# Patient Record
Sex: MALE | Race: WHITE | NOT HISPANIC OR LATINO | Employment: OTHER | URBAN - METROPOLITAN AREA
[De-identification: names, ages, dates, MRNs, and addresses within clinical notes are randomized per-mention and may not be internally consistent; named-entity substitution may affect disease eponyms.]

---

## 2017-01-07 ENCOUNTER — HOSPITAL ENCOUNTER (EMERGENCY)
Facility: HOSPITAL | Age: 62
Discharge: HOME | End: 2017-01-07
Attending: EMERGENCY MEDICINE
Payer: MEDICARE

## 2017-01-07 VITALS
OXYGEN SATURATION: 98 % | BODY MASS INDEX: 31.18 KG/M2 | WEIGHT: 194 LBS | RESPIRATION RATE: 18 BRPM | SYSTOLIC BLOOD PRESSURE: 159 MMHG | HEART RATE: 66 BPM | TEMPERATURE: 98 F | HEIGHT: 66 IN | DIASTOLIC BLOOD PRESSURE: 83 MMHG

## 2017-01-07 DIAGNOSIS — J44.1 COPD EXACERBATION: Primary | ICD-10-CM

## 2017-01-07 LAB
ALBUMIN SERPL BCP-MCNC: 3.8 G/DL
ALP SERPL-CCNC: 77 U/L
ALT SERPL W/O P-5'-P-CCNC: 10 U/L
ANION GAP SERPL CALC-SCNC: 9 MMOL/L
AST SERPL-CCNC: 13 U/L
BASOPHILS # BLD AUTO: 0.01 K/UL
BASOPHILS NFR BLD: 0.2 %
BILIRUB SERPL-MCNC: 0.7 MG/DL
BUN SERPL-MCNC: 41 MG/DL
CALCIUM SERPL-MCNC: 9.4 MG/DL
CHLORIDE SERPL-SCNC: 104 MMOL/L
CO2 SERPL-SCNC: 30 MMOL/L
CREAT SERPL-MCNC: 2.8 MG/DL
DIFFERENTIAL METHOD: ABNORMAL
EOSINOPHIL # BLD AUTO: 0.1 K/UL
EOSINOPHIL NFR BLD: 1.6 %
ERYTHROCYTE [DISTWIDTH] IN BLOOD BY AUTOMATED COUNT: 13.8 %
EST. GFR  (AFRICAN AMERICAN): 27 ML/MIN/1.73 M^2
EST. GFR  (NON AFRICAN AMERICAN): 23 ML/MIN/1.73 M^2
FLUAV AG SPEC QL IA: NEGATIVE
FLUBV AG SPEC QL IA: NEGATIVE
GLUCOSE SERPL-MCNC: 111 MG/DL
HCT VFR BLD AUTO: 32.3 %
HGB BLD-MCNC: 11.4 G/DL
LYMPHOCYTES # BLD AUTO: 0.6 K/UL
LYMPHOCYTES NFR BLD: 14.3 %
MCH RBC QN AUTO: 31.2 PG
MCHC RBC AUTO-ENTMCNC: 35.3 %
MCV RBC AUTO: 89 FL
MONOCYTES # BLD AUTO: 0.6 K/UL
MONOCYTES NFR BLD: 14.7 %
NEUTROPHILS # BLD AUTO: 3 K/UL
NEUTROPHILS NFR BLD: 69.2 %
PLATELET # BLD AUTO: 79 K/UL
PMV BLD AUTO: 8.9 FL
POTASSIUM SERPL-SCNC: 3.8 MMOL/L
PROT SERPL-MCNC: 6.8 G/DL
RBC # BLD AUTO: 3.65 M/UL
SODIUM SERPL-SCNC: 143 MMOL/L
SPECIMEN SOURCE: NORMAL
WBC # BLD AUTO: 4.28 K/UL

## 2017-01-07 PROCEDURE — 93005 ELECTROCARDIOGRAM TRACING: CPT

## 2017-01-07 PROCEDURE — 85025 COMPLETE CBC W/AUTO DIFF WBC: CPT

## 2017-01-07 PROCEDURE — 99284 EMERGENCY DEPT VISIT MOD MDM: CPT

## 2017-01-07 PROCEDURE — 25000242 PHARM REV CODE 250 ALT 637 W/ HCPCS: Performed by: EMERGENCY MEDICINE

## 2017-01-07 PROCEDURE — 80053 COMPREHEN METABOLIC PANEL: CPT

## 2017-01-07 PROCEDURE — 94644 CONT INHLJ TX 1ST HOUR: CPT

## 2017-01-07 PROCEDURE — 94640 AIRWAY INHALATION TREATMENT: CPT

## 2017-01-07 PROCEDURE — 87400 INFLUENZA A/B EACH AG IA: CPT | Mod: 59

## 2017-01-07 RX ORDER — ALBUTEROL SULFATE 90 UG/1
1 AEROSOL, METERED RESPIRATORY (INHALATION) EVERY 4 HOURS PRN
Qty: 1 INHALER | Refills: 1 | Status: SHIPPED | OUTPATIENT
Start: 2017-01-07 | End: 2017-06-08 | Stop reason: SDUPTHER

## 2017-01-07 RX ORDER — PREDNISONE 20 MG/1
40 TABLET ORAL DAILY
Qty: 10 TABLET | Refills: 0 | Status: SHIPPED | OUTPATIENT
Start: 2017-01-07 | End: 2017-01-17

## 2017-01-07 RX ORDER — AZITHROMYCIN 250 MG/1
250 TABLET, FILM COATED ORAL DAILY
Qty: 6 TABLET | Refills: 0 | Status: SHIPPED | OUTPATIENT
Start: 2017-01-07 | End: 2017-01-17

## 2017-01-07 RX ORDER — ALBUTEROL SULFATE 2.5 MG/.5ML
10 SOLUTION RESPIRATORY (INHALATION) ONCE
Status: COMPLETED | OUTPATIENT
Start: 2017-01-07 | End: 2017-01-07

## 2017-01-07 RX ADMIN — ALBUTEROL SULFATE 10 MG: 2.5 SOLUTION RESPIRATORY (INHALATION) at 03:01

## 2017-01-07 NOTE — ED AVS SNAPSHOT
[facility]  [address]  [place] [address]               Leif Ramirez JrRyann   2017  2:58 PM   ED    Description:  Male : 1955   Department:  [facility]           Your Care was Coordinated By:     Provider Role From To    Marcus Chen Jr., MD Attending Provider 17 1536 --      Reason for Visit     Wheezing           Diagnoses this Visit        Comments    COPD exacerbation    -  Primary       ED Disposition     ED Disposition Condition Comment    Discharge             To Do List           Follow-up Information     Follow up with Wale Mcguire MD In 1 week(s).    Specialty:  Internal Medicine    Contact information:    [address]  [address]  [place] [address]  [phone]         These Medications        Disp Refills Start End    azithromycin (ZITHROMAX Z-JEANNE) 250 MG tablet 6 tablet 0 2017    Take 1 tablet (250 mg total) by mouth once daily. Two tabs initially then 1 tab per day. - Oral    [facility] [address] [place] [address] [address] AT [facility] Ph #: [phone]       albuterol 90 mcg/actuation inhaler 1 Inhaler 1 2017    Inhale 1 puff into the lungs every 4 (four) hours as needed for Wheezing. - Inhalation    [facility] [employer] [address] [place] [address] [facility] [address] [facility] Ph #: [phone]       predniSONE (DELTASONE) 20 MG tablet 10 tablet 0 2017    Take 2 tablets (40 mg total) by mouth once daily. - Oral    [facility] [employer] [address] [facility] Ph #: [phone]         [facility]     [facility] Care [facility] -  Assistance  Registered nurses in the [facility] provide clinical advisement, health education, appointment booking, and other advisory  services.  Call for this free service at [phone].             Medications           Message regarding Medications     Verify the changes and/or additions to your medication regime listed below are the same as discussed with your clinician today.  If any of these changes or additions are incorrect, please notify your healthcare provider.        START taking these NEW medications        Refills    azithromycin (ZITHROMAX Z-JEANNE) 250 MG tablet 0    Sig: Take 1 tablet (250 mg total) by mouth once daily. Two tabs initially then 1 tab per day.    Class: Print    Route: Oral    albuterol 90 mcg/actuation inhaler 1    Sig: Inhale 1 puff into the lungs every 4 (four) hours as needed for Wheezing.    Class: Print    Route: Inhalation    predniSONE (DELTASONE) 20 MG tablet 0    Sig: Take 2 tablets (40 mg total) by mouth once daily.    Class: Print    Route: Oral      These medications were administered today        Dose Freq    albuterol sulfate nebulizer solution 10 mg 10 mg Once    Sig: Take 10 mg by nebulization once.    Class: Normal    Route: Nebulization           Verify that the below list of medications is an accurate representation of the medications you are currently taking.  If none reported, the list may be blank. If incorrect, please contact your healthcare provider. Carry this list with you in case of emergency.           Current Medications     albuterol 90 mcg/actuation inhaler Inhale 1 puff into the lungs every 4 (four) hours as needed for Wheezing.    albuterol sulfate nebulizer solution 10 mg Take 10 mg by nebulization once.    amlodipine (NORVASC) 10 MG tablet Take 1 tablet (10 mg total) by mouth once daily.    azithromycin (ZITHROMAX Z-JEANNE) 250 MG tablet Take 1 tablet (250 mg total) by mouth once daily. Two tabs initially then 1 tab per day.    ERGOCALCIFEROL, VITAMIN D2, (VITAMIN D ORAL) Take 2,000 Units by mouth once daily.     ferrous sulfate 325 mg (65 mg iron) Tab tablet TAKE ONE TABLET BY MOUTH  "ONCE DAILY WITH LUNCH    fluoxetine (PROZAC) 20 MG capsule Take 1 capsule by mouth once daily.    gabapentin (NEURONTIN) 300 MG capsule Take 300 mg by mouth 3 (three) times daily.     hydrALAZINE (APRESOLINE) 10 MG tablet TAKE 1 TABLET(10 MG) BY MOUTH THREE TIMES DAILY    hydrocodone-acetaminophen 5-325mg (NORCO) 5-325 mg per tablet Take 1 tablet by mouth 3 (three) times daily as needed for Pain.    lorazepam (ATIVAN) 1 MG tablet Take 0.5 tablets (0.5 mg total) by mouth every 6 (six) hours as needed for Anxiety.    olanzapine (ZYPREXA) 20 MG tablet Take 20 mg by mouth nightly.     olanzapine (ZYPREXA) 5 MG tablet Take 5 mg by mouth once daily.    predniSONE (DELTASONE) 20 MG tablet Take 2 tablets (40 mg total) by mouth once daily.    tamsulosin (FLOMAX) 0.4 mg Cp24 Take 1 capsule (0.4 mg total) by mouth once daily.           Clinical Reference Information           Your Vitals Were     BP Pulse Temp Resp Height Weight    159/83 66 97.8 °F (36.6 °C) (Oral) 18 5' 6" (1.676 m) 88 kg (194 lb)    SpO2 BMI             98% 31.31 kg/m2         Allergies as of 1/7/2017        Reactions    Codeine     Other reaction(s): Nausea    Depakote [Divalproex] Other (See Comments)    Thrombocytopenia    Haldol [Haloperidol Lactate] Other (See Comments)    Seizures    Methadone     Morphine     Naloxone     Opium     Propoxyphene     Stelazine [Trifluoperazine]     Amoxicillin Rash      Immunizations Administered on Date of Encounter - 1/7/2017     None      ED Micro, Lab, POCT     Start Ordered       Status Ordering Provider    01/07/17 1542 01/07/17 1541  CBC auto differential  Once      Final result     01/07/17 1542 01/07/17 1541  Comprehensive metabolic panel  Once      Final result     01/07/17 1542 01/07/17 1541  Influenza antigen  STAT      Final result       ED Imaging Orders     Start Ordered       Status Ordering Provider    01/07/17 1542 01/07/17 1541  X-Ray Chest 1 View  1 time imaging      Final result         Discharge " Instructions           COPD Flare    You have had a flare-up of your COPD.  COPD, or chronic obstructive pulmonary disease, is a common lung disease. It causes your airways to become irritated and narrower. This makes it harder for you to breathe. Emphysema and chronic bronchitis are both types of COPD. This is a chronic condition, which means you always have it. Sometimes it gets worse. When this happens, it is called a flare-up.  Symptoms of COPD  People with COPD may have symptoms most of the time. In a flare-up, your symptoms get worse. These symptoms may mean you are having a flare-up:  · Shortness of breath, shallow or rapid breathing, or wheezing that gets worse  · Lung infection  · Cough that gets worse  · More mucus, thicker mucus or mucus of a different color  · Tiredness, decreased energy, or trouble doing your usual activities  · Fever  · Chest tightness  · Your symptoms dont get better even when you use your usual medicines, inhalers, and nebulizer  · Trouble talking  · You feel confused  Causes of flare-ups  Unfortunately, a flare-up can happen even though you did everything right, and you followed your doctors instructions. Some causes of flare-ups are:  · Smoking or secondhand smoke  · Colds, the flu, or respiratory infections  · Air pollution  · Sudden change in the weather  · Dust, irritating chemicals, or strong fumes  · Not taking your medicines as prescribed  Home care  Here are some things you can do at home to treat a flare-up:  · Try not to panic. This makes it harder to breathe, and keeps you from doing the right things.  · Dont smoke or be around others who are smoking.  · Try to drink more fluids than usual during a flare-up, unless your doctor has told you not to because of heart and kidney problems. More fluids can help loosen the mucus.  · Use your inhalers and nebulizer, if you have one, as you have been told to.  · If you were given antibiotics, take them until they are used up or  your doctor tells you to stop. Its important to finish the antibiotics, even though you feel better. This will make sure the infection has cleared.  · If you were given prednisone or another steroid, finish it even if you feel better.  Preventing a flare-up  Even though flare-ups happen, the best way to treat one is to prevent it before it starts. Here are some pointers:  · Dont smoke or be around others who are smoking.  · Take your medicines as you have been told.  · Talk with your doctor about getting a flu shot every year. Also find out if you need a pneumonia shot.  · If there is a weather advisory warning to stay indoors, try to stay inside when possible.  · Try to eat healthy and get plenty of sleep.  · Try to avoid things that usually set you off, like dust, chemical fumes, hairsprays, or strong perfumes.  Follow-up care  Follow up with your healthcare provider.  If a culture was done, you will be told if your treatment needs to be changed. You can call as directed for the results.  If X-rays were done, and a radiologist had not seen them while you were there, they will be reviewed. You will be told if there is a change in the reading, especially if it affects your treatment.  Call 911  Call 911 if any of these occur:  · You have trouble breathing  · You feel confused or its difficult to wake you up  · You faint or lose consciousness  · You have a rapid heart rate  · You have new pain in your chest, arm, shoulder, neck or upper back  When to seek medical advice  Call your healthcare provider right away if any of these occur:  · Wheezing or shortness of breath gets worse  · You need to use your inhalers more often than usual without relief  · Fever of 100.4°F (38ºC) or higher, or as directed  · Coughing up lots of dark-colored or bloody sputum (mucus)  · Chest pain with each breath  · You do not start to get better within 24 hours  · Swelling or your ankles gets worse  · Dizziness or weakness     ©  6367-8537 [employer]. [address] [place] [address], [place] [address]. All rights reserved. This information is not intended as a substitute for professional medical care. Always follow your healthcare professional's instructions.          [facility] Sign-Up     Activating your [facility] account is as easy as 1-2-3!     1) Visit [email or web address], select Sign Up Now, enter this activation code and your date of birth, then select Next.  [email or web address]  Expires: 1/20/2017  3:26 PM      2) Create a username and password to use when you visit [facility] in the future and select a security question in case you lose your password and select Next.    3) Enter your e-mail address and click Sign Up!    Additional Information  If you have questions, please e-mail [email or web address] or call [phone] to talk to our [facility] staff. Remember, [facility] is NOT to be used for urgent needs. For medical emergencies, dial 911.         Smoking Cessation     If you would like to quit smoking:   You may be eligible for free services if you are a [place] resident and started smoking cigarettes before September 1, 1988.  Call the Smoking Cessation Trust (SCT) toll free at (958) 800-3317 or (940) 042-5517.   Call 2-800-QUIT-NOW if you do not meet the above criteria.             [facility] complies with applicable Federal civil rights laws and does not discriminate on the basis of race, color, national origin, age, disability, or sex.        Language Assistance Services     ATTENTION: Language assistance services are available, free of charge. Please call [phone].      ATENCIÓN: Si habla español, tiene a young disposición servicios gratuitos de asistencia lingüística. Llame al [phone].     CHÚ Ý: N?u b?n nói Ti?ng Vi?t, có các d?ch v? h? tr? ngôn ng? mi?n phí dành cho b?n. G?i s? [phone].

## 2017-01-07 NOTE — ED PROVIDER NOTES
Encounter Date: 1/7/2017       History     Chief Complaint   Patient presents with    Wheezing     coughing and wheezing that woke him up this morning.  Was given solumedrol 125mg and albuterol treatment by EMS.     Review of patient's allergies indicates:   Allergen Reactions    Codeine      Other reaction(s): Nausea    Depakote [divalproex] Other (See Comments)     Thrombocytopenia    Haldol [haloperidol lactate] Other (See Comments)     Seizures    Methadone     Morphine     Naloxone     Opium     Propoxyphene     Stelazine [trifluoperazine]     Amoxicillin Rash     HPI     62 y/o WF with history of Bipolar disorder, Schizophrenia; Asthma/COPD, CHF, chronic hepatitis C, DM, HTN, presents with wheezing, SOB, cough, fever (subjective), feeling flu-like. Symptoms began last night. He ran out of Albuterol and could not refill it because of the cost. No chest pain or vomiting or diarrhea. Patient was given a neb treatment and Solumedrol 125 mg IVP enroute by EMS.  Past Medical History   Diagnosis Date    Anemia     Anxiety     Arthritis     Asthma     Bipolar affective     Cancer      blood and bone    CHF (congestive heart failure)     Chronic hepatitis C with cirrhosis     COPD (chronic obstructive pulmonary disease)     Coronary artery disease     Depression     Diabetes mellitus type II     Encounter for blood transfusion     History of back injury      Chronic back pain    Hyperlipidemia     Hypertension     Internal hemorrhoid, bleeding 9/21/2015    Pancytopenia     Schizo affective schizophrenia     Seizures     Stroke     Thyroid disease      No past medical history pertinent negatives.  Past Surgical History   Procedure Laterality Date    Elbow bursa surgery      Joint replacement      Orif femur fracture Right 8/2015     Family History   Problem Relation Age of Onset    Hypertension Mother     Diabetes Mother     Transient ischemic attack Mother     Heart disease  Mother      stent placement    Arthritis Mother     Heart disease Father     Hypertension Father     Diabetes Father     Stroke Father     Arthritis Sister     Hypertension Sister     Heart disease Maternal Grandmother     Diabetes Maternal Grandmother     Stroke Maternal Grandmother     Cancer Paternal Grandmother      breast    Heart disease Paternal Grandmother     Cancer Sister      brain     Social History   Substance Use Topics    Smoking status: Current Every Day Smoker     Packs/day: 0.50    Smokeless tobacco: Never Used    Alcohol use No     Review of Systems   Constitutional: Positive for fever (subjective).   HENT: Negative for facial swelling and sore throat.    Eyes: Negative for discharge and redness.   Respiratory: Positive for cough, shortness of breath and wheezing. Negative for chest tightness.    Cardiovascular: Negative for chest pain and leg swelling.   Gastrointestinal: Negative for abdominal pain, diarrhea, nausea and vomiting.   Genitourinary: Negative for difficulty urinating, dysuria, frequency and hematuria.   Musculoskeletal: Negative.  Negative for back pain and joint swelling.   Skin: Negative for rash.   Neurological: Negative for dizziness, speech difficulty and headaches.   Hematological: Negative for adenopathy.   Psychiatric/Behavioral: Negative.  Negative for confusion.   All other systems reviewed and are negative.      Physical Exam   Initial Vitals   BP Pulse Resp Temp SpO2   01/07/17 1500 01/07/17 1500 01/07/17 1500 01/07/17 1500 01/07/17 1500   165/90 81 20 97.8 °F (36.6 °C) 100 %     Physical Exam    Nursing note and vitals reviewed.  Constitutional: He appears well-developed and well-nourished.   HENT:   Head: Normocephalic and atraumatic.   Right Ear: External ear normal.   Left Ear: External ear normal.   Nose: Nose normal.   Mouth/Throat: Oropharynx is clear and moist.   Eyes: Conjunctivae and EOM are normal. Pupils are equal, round, and reactive to  light.   Neck: Normal range of motion. Neck supple. No JVD present.   Cardiovascular: Normal rate, regular rhythm and normal heart sounds.   No murmur heard.  Pulmonary/Chest: No respiratory distress. He has wheezes (bilateral). He has no rhonchi. He has no rales. He exhibits no tenderness.   Abdominal: Soft. Bowel sounds are normal. There is no tenderness. There is no rebound and no guarding.   Musculoskeletal: Normal range of motion. He exhibits no edema or tenderness.   Neurological: He is alert and oriented to person, place, and time. He has normal strength. No cranial nerve deficit.   Skin: Skin is warm and dry. No rash noted.   Psychiatric: He has a normal mood and affect.         ED Course   Procedures  Labs Reviewed   CBC W/ AUTO DIFFERENTIAL - Abnormal; Notable for the following:        Result Value    RBC 3.65 (*)     Hemoglobin 11.4 (*)     Hematocrit 32.3 (*)     MCH 31.2 (*)     Platelets 79 (*)     MPV 8.9 (*)     Lymph # 0.6 (*)     Lymph% 14.3 (*)     All other components within normal limits   COMPREHENSIVE METABOLIC PANEL - Abnormal; Notable for the following:     CO2 30 (*)     Glucose 111 (*)     BUN, Bld 41 (*)     Creatinine 2.8 (*)     eGFR if  27 (*)     eGFR if non  23 (*)     All other components within normal limits   INFLUENZA A AND B ANTIGEN     Imaging Results         X-Ray Chest 1 View (Final result) Result time:  01/07/17 16:20:11    Final result by Lico Vance MD (01/07/17 16:20:11)    Impression:       No acute intrathoracic process.          Electronically signed by: LICO VANCE MD  Date:     01/07/17  Time:    16:20     Narrative:    [id number]  Accession # [id number]      Study:  XR CHEST 1 VIEW    Indication: cough.    Comparison: Chest radiograph from 10/10/2016.    Findings:     XR CHEST 1 VIEW.    Mild cardiomegaly.  Calcification of the aortic arch.  Pulmonary vasculature is within normal limits.    Lungs well-aerated.  No focal  consolidation.   No pleural effusion.  Osseous structures demonstrate no significant abnormalities.              EKG Readings: (Independently Interpreted)   Initial Reading: No STEMI. Rhythm: Normal Sinus Rhythm. Heart Rate: 78. Ectopy: No Ectopy. Conduction: Normal. ST Segments: Normal ST Segments. T Waves: Normal. Axis: Left Axis Deviation. Clinical Impression: Normal Sinus Rhythm Other Impression: LAD          Medical Decision Making:   Initial Assessment:   60 y/o WF with history of Bipolar disorder, Schizophrenia; Asthma/COPD, CHF, chronic hepatitis C, DM, HTN, presents with wheezing, SOB, cough, fever (subjective), feeling flu-like. Symptoms began last night. He ran out of Albuterol and could not refill it because of the cost. No chest pain or vomiting or diarrhea. Patient was given a neb treatment and Solumedrol 125 mg IVP enroute by EMS.    Afebrile; bilateral wheezes  Differential Diagnosis:   Influenza; other viral syndrome; COPD/Asthma exacerbation; pneumonia; CHF  Clinical Tests:   Lab Tests: Ordered and Reviewed  The following lab test(s) were unremarkable: CBC and CMP       <> Summary of Lab: Influenza screen negative  CXR - no acute findings  EKG - no acute findings    Radiological Study: Ordered and Reviewed  Medical Tests: Ordered and Reviewed  ED Management:  Exam; labs; CXR  1 hour long nebulizer treatment with albuterol 10 mg  Patient was give Solumedrol 125 mg IVP enroute by EMS    Symptoms improve after treatment  Rx Z-pack; Albuterol MDI; Prednisone 40 mg daily x 5d  Follow up with PCP next week  Return to ER if condition worsens    Critical Care: 35 minutes                   ED Course     Clinical Impression:   The encounter diagnosis was COPD exacerbation.          Marcus Chen Jr., MD  01/07/17 4663       Marcus Chen Jr., MD  01/07/17 7785

## 2017-01-07 NOTE — DISCHARGE INSTRUCTIONS
COPD Flare    You have had a flare-up of your COPD.  COPD, or chronic obstructive pulmonary disease, is a common lung disease. It causes your airways to become irritated and narrower. This makes it harder for you to breathe. Emphysema and chronic bronchitis are both types of COPD. This is a chronic condition, which means you always have it. Sometimes it gets worse. When this happens, it is called a flare-up.  Symptoms of COPD  People with COPD may have symptoms most of the time. In a flare-up, your symptoms get worse. These symptoms may mean you are having a flare-up:  · Shortness of breath, shallow or rapid breathing, or wheezing that gets worse  · Lung infection  · Cough that gets worse  · More mucus, thicker mucus or mucus of a different color  · Tiredness, decreased energy, or trouble doing your usual activities  · Fever  · Chest tightness  · Your symptoms dont get better even when you use your usual medicines, inhalers, and nebulizer  · Trouble talking  · You feel confused  Causes of flare-ups  Unfortunately, a flare-up can happen even though you did everything right, and you followed your doctors instructions. Some causes of flare-ups are:  · Smoking or secondhand smoke  · Colds, the flu, or respiratory infections  · Air pollution  · Sudden change in the weather  · Dust, irritating chemicals, or strong fumes  · Not taking your medicines as prescribed  Home care  Here are some things you can do at home to treat a flare-up:  · Try not to panic. This makes it harder to breathe, and keeps you from doing the right things.  · Dont smoke or be around others who are smoking.  · Try to drink more fluids than usual during a flare-up, unless your doctor has told you not to because of heart and kidney problems. More fluids can help loosen the mucus.  · Use your inhalers and nebulizer, if you have one, as you have been told to.  · If you were given antibiotics, take them until they are used up or your doctor tells  you to stop. Its important to finish the antibiotics, even though you feel better. This will make sure the infection has cleared.  · If you were given prednisone or another steroid, finish it even if you feel better.  Preventing a flare-up  Even though flare-ups happen, the best way to treat one is to prevent it before it starts. Here are some pointers:  · Dont smoke or be around others who are smoking.  · Take your medicines as you have been told.  · Talk with your doctor about getting a flu shot every year. Also find out if you need a pneumonia shot.  · If there is a weather advisory warning to stay indoors, try to stay inside when possible.  · Try to eat healthy and get plenty of sleep.  · Try to avoid things that usually set you off, like dust, chemical fumes, hairsprays, or strong perfumes.  Follow-up care  Follow up with your healthcare provider.  If a culture was done, you will be told if your treatment needs to be changed. You can call as directed for the results.  If X-rays were done, and a radiologist had not seen them while you were there, they will be reviewed. You will be told if there is a change in the reading, especially if it affects your treatment.  Call 911  Call 911 if any of these occur:  · You have trouble breathing  · You feel confused or its difficult to wake you up  · You faint or lose consciousness  · You have a rapid heart rate  · You have new pain in your chest, arm, shoulder, neck or upper back  When to seek medical advice  Call your healthcare provider right away if any of these occur:  · Wheezing or shortness of breath gets worse  · You need to use your inhalers more often than usual without relief  · Fever of 100.4°F (38ºC) or higher, or as directed  · Coughing up lots of dark-colored or bloody sputum (mucus)  · Chest pain with each breath  · You do not start to get better within 24 hours  · Swelling or your ankles gets worse  · Dizziness or weakness     © 9664-5269 [employer] [facility] [employer]  [employer]. [address] [place] [address], [place] [address]. All rights reserved. This information is not intended as a substitute for professional medical care. Always follow your healthcare professional's instructions.

## 2017-01-07 NOTE — ED NOTES
Spoke with patient in lobby - states unhappy with Dr. Chen and service but unable to state what he feels needs to be done. Dr. Chen aware and has spoken to patient several times, explaining that there is no reason for admission. Reviewed discharge instructions with patient as well as lab results. Patient requesting to speak with Dr. Mcguire. Explained to patient that Dr. Mcguire is not available on weekends. Recommended that he fill prescriptions and follow-up on Monday. Asked if there was anyone to call to pick him up and states he will take cab home. Assisted patient in calling cab. Patient is awake, alert, oriented. Ambulatory with steady gait with no distress noted.

## 2017-01-07 NOTE — ED NOTES
Patient has verified the spelling of their name and  on armband  LOC: The patient is awake, alert, and aware of environment with an appropriate affect, the patient is oriented x 3 and speaking appropriately.   APPEARANCE: Patient resting comfortably and in no acute distress, patient is clean and well groomed, patient's clothing is properly fastened.   SKIN: The skin is warm and dry, color consistent with ethnicity, patient has normal skin turgor and moist mucus membranes, skin intact, no breakdown or bruising noted.   :   Voids without difficulty  MUSCULOSKELETAL: Patient moving all extremities spontaneously, no obvious swelling or deformities noted.   RESPIRATORY: Airway is open and patent, respirations are spontaneous, patient has a normal effort and rate, no accessory muscle use noted, bilateral breath sounds diminished - expiratory wheezineg, SOB, scant yellow productive cough   ABDOMEN: Soft and non tender to palpation, no distention noted, normoactive bowel sounds present in all four quadrants.   CARDIAC:  Normal rate and rhythm, no peripheral edema noted, less then 3 second capillary refill, denies chest pain

## 2017-01-09 ENCOUNTER — TELEPHONE (OUTPATIENT)
Dept: FAMILY MEDICINE | Facility: CLINIC | Age: 62
End: 2017-01-09

## 2017-01-09 NOTE — TELEPHONE ENCOUNTER
----- Message from Sarah Santiago MA sent at 1/9/2017  9:06 AM CST -----  Contact: [phone] self [phone] self   Patient is requesting to be seen today, went to ED last night and released the same night. Patient still having trouble breathing and wheezing. Please advise

## 2017-01-09 NOTE — TELEPHONE ENCOUNTER
Called patient he states he only has one pill left explained to patient that he had taken the medication incorrectly and patient not able to understand. Appointment made

## 2017-01-10 DIAGNOSIS — R06.02 SHORTNESS OF BREATH: Primary | ICD-10-CM

## 2017-01-25 DIAGNOSIS — G89.29 CHRONIC BILATERAL LOW BACK PAIN WITH LEFT-SIDED SCIATICA: ICD-10-CM

## 2017-01-25 DIAGNOSIS — M25.551 BILATERAL HIP PAIN: ICD-10-CM

## 2017-01-25 DIAGNOSIS — M25.552 BILATERAL HIP PAIN: ICD-10-CM

## 2017-01-25 DIAGNOSIS — M54.42 CHRONIC BILATERAL LOW BACK PAIN WITH LEFT-SIDED SCIATICA: ICD-10-CM

## 2017-01-25 RX ORDER — HYDROCODONE BITARTRATE AND ACETAMINOPHEN 5; 325 MG/1; MG/1
1 TABLET ORAL 3 TIMES DAILY PRN
Qty: 90 TABLET | Refills: 0 | Status: SHIPPED | OUTPATIENT
Start: 2017-01-25 | End: 2017-03-01 | Stop reason: SDUPTHER

## 2017-01-25 NOTE — TELEPHONE ENCOUNTER
Pt requesting a refill on pain medication hydrocodone-acetaminophen 5-325mg (NORCO) 5-325 mg per tablet.  Please advise

## 2017-01-25 NOTE — TELEPHONE ENCOUNTER
----- Message from James Haque sent at 1/25/2017  9:35 AM CST -----  Contact: [phone]/self  Pt requesting a refill on pain medication hydrocodone-acetaminophen 5-325mg (NORCO) 5-325 mg per tablet.  Please advise

## 2017-01-26 ENCOUNTER — TELEPHONE (OUTPATIENT)
Dept: FAMILY MEDICINE | Facility: CLINIC | Age: 62
End: 2017-01-26

## 2017-01-26 NOTE — TELEPHONE ENCOUNTER
Called patient to inform him that his prescription is ready for . I was unable to leave a voicemail

## 2017-01-26 NOTE — TELEPHONE ENCOUNTER
----- Message from Lilly Lambert sent at 1/26/2017  2:03 PM CST -----  Contact: [phone]/ self   Patient called in requesting to speak with you. Patient prefers to speak with a nurse. Please advise.

## 2017-01-31 ENCOUNTER — TELEPHONE (OUTPATIENT)
Dept: FAMILY MEDICINE | Facility: CLINIC | Age: 62
End: 2017-01-31

## 2017-01-31 NOTE — TELEPHONE ENCOUNTER
----- Message from Asya Oconnor sent at 1/31/2017  8:48 AM CST -----  Contact: [phone]  Pt is complaining of pain on his left side and would like to be seen today of tomorrow. Please advise.

## 2017-01-31 NOTE — TELEPHONE ENCOUNTER
Spoke with patient who stated that he has been having pain in his hip, appointment scheduled with Dr. Mcguire for tomorrow.

## 2017-02-01 ENCOUNTER — OFFICE VISIT (OUTPATIENT)
Dept: PODIATRY | Facility: CLINIC | Age: 62
End: 2017-02-01
Payer: MEDICARE

## 2017-02-01 VITALS
HEART RATE: 62 BPM | HEIGHT: 66 IN | BODY MASS INDEX: 31.18 KG/M2 | DIASTOLIC BLOOD PRESSURE: 76 MMHG | WEIGHT: 194 LBS | SYSTOLIC BLOOD PRESSURE: 128 MMHG

## 2017-02-01 DIAGNOSIS — M20.41 HAMMER TOES OF BOTH FEET: ICD-10-CM

## 2017-02-01 DIAGNOSIS — R29.898 WEAKNESS OF BOTH LOWER EXTREMITIES: ICD-10-CM

## 2017-02-01 DIAGNOSIS — E11.42 DIABETIC POLYNEUROPATHY ASSOCIATED WITH TYPE 2 DIABETES MELLITUS: Primary | ICD-10-CM

## 2017-02-01 DIAGNOSIS — L84 CORN OR CALLUS: ICD-10-CM

## 2017-02-01 DIAGNOSIS — B35.1 ONYCHOMYCOSIS DUE TO DERMATOPHYTE: ICD-10-CM

## 2017-02-01 DIAGNOSIS — M20.42 HAMMER TOES OF BOTH FEET: ICD-10-CM

## 2017-02-01 PROCEDURE — 11721 DEBRIDE NAIL 6 OR MORE: CPT | Mod: PBBFAC,PO | Performed by: PODIATRIST

## 2017-02-01 PROCEDURE — 99999 PR PBB SHADOW E&M-EST. PATIENT-LVL III: CPT | Mod: PBBFAC,,, | Performed by: PODIATRIST

## 2017-02-01 PROCEDURE — 99499 UNLISTED E&M SERVICE: CPT | Mod: S$PBB,,, | Performed by: PODIATRIST

## 2017-02-01 PROCEDURE — 11721 DEBRIDE NAIL 6 OR MORE: CPT | Mod: S$PBB,Q9,, | Performed by: PODIATRIST

## 2017-02-01 PROCEDURE — 99213 OFFICE O/P EST LOW 20 MIN: CPT | Mod: PBBFAC,PO | Performed by: PODIATRIST

## 2017-02-01 RX ORDER — LORAZEPAM 1 MG/1
TABLET ORAL
Refills: 0 | COMMUNITY
Start: 2017-01-19 | End: 2017-08-02 | Stop reason: SDUPTHER

## 2017-02-01 RX ORDER — METOPROLOL TARTRATE 50 MG/1
TABLET ORAL
Refills: 1 | COMMUNITY
Start: 2016-12-27 | End: 2017-04-06 | Stop reason: SDUPTHER

## 2017-02-01 RX ORDER — FUROSEMIDE 80 MG/1
TABLET ORAL
Refills: 1 | COMMUNITY
Start: 2016-12-27 | End: 2017-11-17 | Stop reason: ALTCHOICE

## 2017-02-01 NOTE — MR AVS SNAPSHOT
[facility]   [address] [address]  Phone: [phone]                  Leif Ramirez Jr.   2017 9:45 AM   Office Visit    Description:  Male : 1955   Provider:  Rian Rico DPM   Department:  [facility]           Reason for Visit     Nail Care           Diagnoses this Visit        Comments    Diabetic polyneuropathy associated with type 2 diabetes mellitus    -  Primary     Hammer toes of both feet         Onychomycosis due to dermatophyte         Corn or callus         Weakness of both lower extremities                To Do List           Future Appointments        Provider Department Dept Phone    2017 4:20 PM Wale Mcguire MD [facility] [phone]      Goals (5 Years of Data)     None      Follow-Up and Disposition     Return in about 4 months (around 2017).      [facility]     [facility] -  Assistance  Registered nurses in the [facility] provide clinical advisement, health education, appointment booking, and other advisory services.  Call for this free service at [phone].             Medications           Message regarding Medications     Verify the changes and/or additions to your medication regime listed below are the same as discussed with your clinician today.  If any of these changes or additions are incorrect, please notify your healthcare provider.             Verify that the below list of medications is an accurate representation of the medications you are currently taking.  If none reported, the list may be blank. If incorrect, please contact your healthcare provider. Carry this list with you in case of emergency.           Current Medications     albuterol 90 mcg/actuation inhaler Inhale 1 puff into the lungs every 4 (four) hours as needed for Wheezing.    amlodipine (NORVASC) 10 MG tablet Take 1 tablet (10 mg total) by mouth once daily.    ERGOCALCIFEROL, VITAMIN D2,  "(VITAMIN D ORAL) Take 2,000 Units by mouth once daily.     ferrous sulfate 325 mg (65 mg iron) Tab tablet TAKE ONE TABLET BY MOUTH ONCE DAILY WITH LUNCH    fluoxetine (PROZAC) 20 MG capsule Take 1 capsule by mouth once daily.    furosemide (LASIX) 80 MG tablet TK ONE T PO QAM.    gabapentin (NEURONTIN) 300 MG capsule Take 300 mg by mouth 3 (three) times daily.     hydrALAZINE (APRESOLINE) 10 MG tablet TAKE 1 TABLET(10 MG) BY MOUTH THREE TIMES DAILY    hydrocodone-acetaminophen 5-325mg (NORCO) 5-325 mg per tablet Take 1 tablet by mouth 3 (three) times daily as needed for Pain.    lorazepam (ATIVAN) 1 MG tablet TK 1/2 T PO Q 6 H PRF ANXIETY    metoprolol tartrate (LOPRESSOR) 50 MG tablet TK ONE T PO BID.    olanzapine (ZYPREXA) 20 MG tablet Take 20 mg by mouth nightly.     olanzapine (ZYPREXA) 5 MG tablet Take 5 mg by mouth once daily.    tamsulosin (FLOMAX) 0.4 mg Cp24 Take 1 capsule (0.4 mg total) by mouth once daily.           Clinical Reference Information           Vital Signs - Last Recorded  Most recent update: 2/1/2017  9:35 AM by José Luis Wilson MA    BP Pulse Ht Wt BMI    128/76 62 5' 6" (1.676 m) 88 kg (194 lb) 31.31 kg/m2      Blood Pressure          Most Recent Value    BP  128/76      Allergies as of 2/1/2017     Codeine    Depakote [Divalproex]    Haldol [Haloperidol Lactate]    Methadone    Morphine    Naloxone    Opium    Propoxyphene    Stelazine [Trifluoperazine]    Amoxicillin      Immunizations Administered on Date of Encounter - 2/1/2017     None      Maintenance Dialysis History     Patient has no recorded history of maintenance dialysis.      [facility] Sign-Up     Activating your [facility] account is as easy as 1-2-3!     1) Visit [email or web address], select Sign Up Now, enter this activation code and your date of birth, then select Next.  [email or web address]  Expires: 3/18/2017 10:12 AM      2) Create a username and password to use when you visit [facility] in the future and select a security " question in case you lose your password and select Next.    3) Enter your e-mail address and click Sign Up!    Additional Information  If you have questions, please e-mail [email or web address] or call [phone] to talk to our [facility] staff. Remember, [facility] is NOT to be used for urgent needs. For medical emergencies, dial 911.         Smoking Cessation     If you would like to quit smoking:   You may be eligible for free services if you are a [place] resident and started smoking cigarettes before September 1, 1988.  Call the [employer]oking [employer]essation [employer] ([employer]) toll free at (940) 713-3179 or (979) 406-2376.   Call 3-918-QUIT-NOW if you do not meet the above criteria.

## 2017-02-01 NOTE — PROGRESS NOTES
Subjective:      Patient ID: Leif Ramirez Jr. is a 61 y.o. male.    Chief Complaint: No chief complaint on file.    Leif is a 61 y.o. male who presents to the clinic for evaluation and treatment of high risk feet. Leif has a past medical history of Anemia; Anxiety; Arthritis; Asthma; Bipolar affective; Cancer; CHF (congestive heart failure); Chronic hepatitis C with cirrhosis; COPD (chronic obstructive pulmonary disease); Coronary artery disease; Depression; Diabetes mellitus type II; Encounter for blood transfusion; History of back injury; Hyperlipidemia; Hypertension; Internal hemorrhoid, bleeding (9/21/2015); Pancytopenia; Schizo affective schizophrenia; Seizures; Stroke; and Thyroid disease. The patient's chief complaint is long, thick toenails. This patient has documented high risk feet requiring routine maintenance secondary to peripheral neuropathy.    PCP: Wale Mcguire MD    Date Last Seen by PCP: 12/6/16        Hemoglobin A1C   Date Value Ref Range Status   04/15/2015 4.7 4.5 - 6.2 % Final   12/18/2012 4.8 4.0 - 6.2 % Final   10/15/2012 text % Final     Comment:     Hemoglobin A1C:   3.7        Review of Systems   Constitution: Negative for chills, fever and malaise/fatigue.   Cardiovascular: Negative for chest pain, leg swelling, orthopnea and palpitations.   Respiratory: Negative for cough, shortness of breath and wheezing.    Skin: Positive for color change, dry skin and nail changes. Negative for itching, poor wound healing and rash.   Musculoskeletal: Negative for arthritis, gout, joint pain, joint swelling, muscle weakness and myalgias.   Neurological: Positive for disturbances in coordination, numbness, paresthesias and sensory change. Negative for dizziness, focal weakness and tremors.           Objective:      Physical Exam   Cardiovascular:   Dorsalis pedis and posterior tibial pulses are diminished Bilaterally. Toes are cool to touch. Feet are warm proximally.There is decreased digital  hair. Skin is atrophic, slightly hyperpigmented, and mildly edematous       Musculoskeletal:   Musculoskeletal:  Muscle strength is 5/5 in all groups bilaterally.  Metatarsophalangeal and subtalar range of motion are within normal limits without crepitus bilaterally. There is limitation of ankle dorsiflexion with knees extended and flexed Bilaterally.    Reducible extensor and flexor contractures at the MTPJ and PIPJ of toes 2-5, bilat.          Neurological:   [place] 5.07 monofilamant testing is diminished both feet. Sharp/dull sensation diminished Bilaterally. Light touch absent Bilaterally.       Skin:   Toenails 1-5 bilaterally are elongated by 2-3 mm, thickened by 2-3 mm, discolored/yellowed, dystrophic, brittle with subungual debris. No incurvation. Mild xerosis noted, bilat. No open wounds.                 Assessment:       Encounter Diagnoses   Name Primary?    Diabetic polyneuropathy associated with type 2 diabetes mellitus Yes    Hammer toes of both feet     Onychomycosis due to dermatophyte     Corn or callus     Weakness of both lower extremities          Plan:       Diagnoses and all orders for this visit:    Diabetic polyneuropathy associated with type 2 diabetes mellitus    Hammer toes of both feet    Onychomycosis due to dermatophyte    Corn or callus    Weakness of both lower extremities      I counseled the patient on his conditions, their implications and medical management.        - Shoe inspection. Diabetic Foot Education. Patient reminded of the importance of good nutrition and blood sugar control to help prevent podiatric complications of diabetes. Patient instructed on proper foot hygeine. We discussed wearing proper shoe gear, daily foot inspections, never walking without protective shoe gear, never putting sharp instruments to feet.     - With patient's permission, nails were aggressively reduced and debrided x 10 to their soft tissue attachment mechanically and with electric  , removing all offending nail and debris. Patient relates relief following the procedure. He will continue to monitor the areas daily, inspect his feet, wear protective shoe gear when ambulatory, moisturizer to maintain skin integrity and follow in this office in approximately 6 months, sooner p.r.n.    - Discussed importance of supportive shoes with accommodative toe box to reduce pressure and irritation to forefoot

## 2017-02-03 DIAGNOSIS — E11.9 TYPE 2 DIABETES MELLITUS WITHOUT COMPLICATION: ICD-10-CM

## 2017-02-17 ENCOUNTER — TELEPHONE (OUTPATIENT)
Dept: FAMILY MEDICINE | Facility: CLINIC | Age: 62
End: 2017-02-17

## 2017-02-17 DIAGNOSIS — G89.29 CHRONIC BILATERAL LOW BACK PAIN WITHOUT SCIATICA: Primary | ICD-10-CM

## 2017-02-17 DIAGNOSIS — M54.50 CHRONIC BILATERAL LOW BACK PAIN WITHOUT SCIATICA: Primary | ICD-10-CM

## 2017-02-17 NOTE — TELEPHONE ENCOUNTER
----- Message from Oli Hui LPN sent at 2/17/2017  4:03 PM CST -----  Contact: [phone]  Said he saw you in December and he wants a referral to a neurologist and an orthopedist.      ----- Message -----     From: Donya Barros     Sent: 2/17/2017   3:54 PM       To: [facility] Staff    Patient would like to speak with you regarding his referral to see a neurologist

## 2017-02-17 NOTE — TELEPHONE ENCOUNTER
----- Message from Donya Barros sent at 2/17/2017  3:54 PM CST -----  Contact: [phone]  Patient would like to speak with you regarding his referral to see a neurologist

## 2017-02-20 ENCOUNTER — TELEPHONE (OUTPATIENT)
Dept: FAMILY MEDICINE | Facility: CLINIC | Age: 62
End: 2017-02-20

## 2017-02-20 DIAGNOSIS — R20.2 BILATERAL LEG PARESTHESIA: Primary | ICD-10-CM

## 2017-02-20 DIAGNOSIS — M79.605 BILATERAL LEG PAIN: ICD-10-CM

## 2017-02-20 DIAGNOSIS — M54.9 BACK PAIN, UNSPECIFIED BACK LOCATION, UNSPECIFIED BACK PAIN LATERALITY, UNSPECIFIED CHRONICITY: ICD-10-CM

## 2017-02-20 DIAGNOSIS — M79.604 BILATERAL LEG PAIN: ICD-10-CM

## 2017-02-20 NOTE — TELEPHONE ENCOUNTER
----- Message from Erin Abbott sent at 2/20/2017  1:39 PM CST -----  Contact: self/[phone]  Patient would like to be seen before 2/28/17 for a pinch nerve in his leg and hip pain.  Please advise

## 2017-02-21 NOTE — TELEPHONE ENCOUNTER
----- Message from Erin Abbott sent at 2/21/2017  2:53 PM CST -----  Contact: self/[phone]  Patient would like to be seen soon for a referral.  Please advise

## 2017-02-24 ENCOUNTER — TELEPHONE (OUTPATIENT)
Dept: FAMILY MEDICINE | Facility: CLINIC | Age: 62
End: 2017-02-24

## 2017-02-24 DIAGNOSIS — M54.9 BACK PAIN, UNSPECIFIED BACK LOCATION, UNSPECIFIED BACK PAIN LATERALITY, UNSPECIFIED CHRONICITY: Primary | ICD-10-CM

## 2017-02-24 NOTE — TELEPHONE ENCOUNTER
----- Message from Graciela Garnica MA sent at 2/22/2017  8:57 AM CST -----  Regarding: FW: Orthopedic Referral  Please advise  ----- Message -----     From: Alexa Liriano     Sent: 2/22/2017   8:54 AM       To: [facility] Staff  Subject: Orthopedic Referral                              Dr. Mcguire referring patient to Orthopedics for low back pain. Orthopedics does not see back pain, recommend referral to  [facility].

## 2017-02-25 PROCEDURE — 99284 EMERGENCY DEPT VISIT MOD MDM: CPT | Mod: 25

## 2017-02-25 PROCEDURE — 96360 HYDRATION IV INFUSION INIT: CPT

## 2017-02-26 ENCOUNTER — HOSPITAL ENCOUNTER (EMERGENCY)
Facility: HOSPITAL | Age: 62
Discharge: HOME | End: 2017-02-26
Attending: EMERGENCY MEDICINE
Payer: MEDICARE

## 2017-02-26 VITALS
HEART RATE: 76 BPM | OXYGEN SATURATION: 93 % | HEIGHT: 68 IN | BODY MASS INDEX: 28.04 KG/M2 | WEIGHT: 185 LBS | RESPIRATION RATE: 20 BRPM | DIASTOLIC BLOOD PRESSURE: 112 MMHG | TEMPERATURE: 98 F | SYSTOLIC BLOOD PRESSURE: 190 MMHG

## 2017-02-26 DIAGNOSIS — R31.9 HEMATURIA: Primary | ICD-10-CM

## 2017-02-26 LAB
ALBUMIN SERPL BCP-MCNC: 3.8 G/DL
ALP SERPL-CCNC: 65 U/L
ALT SERPL W/O P-5'-P-CCNC: 9 U/L
AMMONIA PLAS-SCNC: 21 UMOL/L
AMORPH CRY URNS QL MICRO: NORMAL
ANION GAP SERPL CALC-SCNC: 10 MMOL/L
AST SERPL-CCNC: 13 U/L
BACTERIA #/AREA URNS HPF: NORMAL /HPF
BASOPHILS # BLD AUTO: 0.01 K/UL
BASOPHILS NFR BLD: 0.3 %
BILIRUB SERPL-MCNC: 0.5 MG/DL
BILIRUB UR QL STRIP: NEGATIVE
BUN SERPL-MCNC: 44 MG/DL
CALCIUM SERPL-MCNC: 9 MG/DL
CHLORIDE SERPL-SCNC: 109 MMOL/L
CLARITY UR: CLEAR
CO2 SERPL-SCNC: 24 MMOL/L
COLOR UR: YELLOW
CREAT SERPL-MCNC: 2.7 MG/DL
DIFFERENTIAL METHOD: ABNORMAL
EOSINOPHIL # BLD AUTO: 0.1 K/UL
EOSINOPHIL NFR BLD: 1.7 %
ERYTHROCYTE [DISTWIDTH] IN BLOOD BY AUTOMATED COUNT: 14.7 %
EST. GFR  (AFRICAN AMERICAN): 28 ML/MIN/1.73 M^2
EST. GFR  (NON AFRICAN AMERICAN): 24 ML/MIN/1.73 M^2
ETHANOL SERPL-MCNC: <10 MG/DL
GLUCOSE SERPL-MCNC: 90 MG/DL
GLUCOSE UR QL STRIP: NEGATIVE
HCT VFR BLD AUTO: 28.3 %
HGB BLD-MCNC: 9.8 G/DL
HGB UR QL STRIP: ABNORMAL
HYALINE CASTS #/AREA URNS LPF: 0 /LPF
INR PPP: 1
KETONES UR QL STRIP: NEGATIVE
LEUKOCYTE ESTERASE UR QL STRIP: NEGATIVE
LYMPHOCYTES # BLD AUTO: 0.9 K/UL
LYMPHOCYTES NFR BLD: 30.8 %
MCH RBC QN AUTO: 30.7 PG
MCHC RBC AUTO-ENTMCNC: 34.6 %
MCV RBC AUTO: 89 FL
MICROSCOPIC COMMENT: NORMAL
MONOCYTES # BLD AUTO: 0.3 K/UL
MONOCYTES NFR BLD: 11.3 %
NEUTROPHILS # BLD AUTO: 1.6 K/UL
NEUTROPHILS NFR BLD: 55.9 %
NITRITE UR QL STRIP: NEGATIVE
PH UR STRIP: 7 [PH] (ref 5–8)
PLATELET # BLD AUTO: 70 K/UL
PMV BLD AUTO: 8.5 FL
POTASSIUM SERPL-SCNC: 4.5 MMOL/L
PROT SERPL-MCNC: 6.5 G/DL
PROT UR QL STRIP: ABNORMAL
PROTHROMBIN TIME: 10.4 SEC
RBC # BLD AUTO: 3.19 M/UL
RBC #/AREA URNS HPF: 1 /HPF (ref 0–4)
SODIUM SERPL-SCNC: 143 MMOL/L
SP GR UR STRIP: 1.01 (ref 1–1.03)
SQUAMOUS #/AREA URNS HPF: 1 /HPF
URN SPEC COLLECT METH UR: ABNORMAL
UROBILINOGEN UR STRIP-ACNC: NEGATIVE EU/DL
WBC # BLD AUTO: 2.92 K/UL
WBC #/AREA URNS HPF: 0 /HPF (ref 0–5)

## 2017-02-26 PROCEDURE — 81000 URINALYSIS NONAUTO W/SCOPE: CPT

## 2017-02-26 PROCEDURE — 25000003 PHARM REV CODE 250: Performed by: EMERGENCY MEDICINE

## 2017-02-26 PROCEDURE — 85025 COMPLETE CBC W/AUTO DIFF WBC: CPT

## 2017-02-26 PROCEDURE — 80053 COMPREHEN METABOLIC PANEL: CPT

## 2017-02-26 PROCEDURE — 82140 ASSAY OF AMMONIA: CPT

## 2017-02-26 PROCEDURE — 80320 DRUG SCREEN QUANTALCOHOLS: CPT

## 2017-02-26 PROCEDURE — 85610 PROTHROMBIN TIME: CPT

## 2017-02-26 RX ADMIN — SODIUM CHLORIDE 1000 ML: 0.9 INJECTION, SOLUTION INTRAVENOUS at 03:02

## 2017-02-26 NOTE — ED NOTES
Pt. Is easily arrousable. bp-190/112. md updated, pt. Is asymptomatic. Spoke to pt. Sister who will come and pick him up. Pt. Is still drowsy and states he often over medicates himself for his chronic pain. Denies taking any medications while here. He states he is a recovering alcoholic and has not had a drink in approx. 15 months. He denies si and states he attends AA meetings. The pat. ia able to stand and ambulate without assistance but with some discomfort.

## 2017-02-26 NOTE — ED PROVIDER NOTES
Encounter Date: 2/25/2017       History     Chief Complaint   Patient presents with    Male  Problem     pt. reports dysuria/hematuria for a month. today reports bloody discharge from penis     Review of patient's allergies indicates:   Allergen Reactions    Codeine      Other reaction(s): Nausea    Depakote [divalproex] Other (See Comments)     Thrombocytopenia    Haldol [haloperidol lactate] Other (See Comments)     Seizures    Methadone     Morphine     Naloxone     Opium     Propoxyphene     Stelazine [trifluoperazine]     Amoxicillin Rash     HPI Comments: 63 yo male with h/o hep c cirrhosis presents with bilat back pain, gross hematuria and dysuria for 1 day. No BRBPR    The history is provided by the patient.     Past Medical History:   Diagnosis Date    Anemia     Anxiety     Arthritis     Asthma     Bipolar affective     Cancer     blood and bone    CHF (congestive heart failure)     Chronic hepatitis C with cirrhosis     COPD (chronic obstructive pulmonary disease)     Coronary artery disease     Depression     Diabetes mellitus type II     Encounter for blood transfusion     History of back injury     Chronic back pain    Hyperlipidemia     Hypertension     Internal hemorrhoid, bleeding 9/21/2015    Pancytopenia     Schizo affective schizophrenia     Seizures     Stroke     Thyroid disease      Past Surgical History:   Procedure Laterality Date    ELBOW BURSA SURGERY      JOINT REPLACEMENT      ORIF FEMUR FRACTURE Right 8/2015     Family History   Problem Relation Age of Onset    Hypertension Mother     Diabetes Mother     Transient ischemic attack Mother     Heart disease Mother      stent placement    Arthritis Mother     Heart disease Father     Hypertension Father     Diabetes Father     Stroke Father     Arthritis Sister     Hypertension Sister     Heart disease Maternal Grandmother     Diabetes Maternal Grandmother     Stroke Maternal Grandmother      Cancer Paternal Grandmother      breast    Heart disease Paternal Grandmother     Cancer Sister      brain     Social History   Substance Use Topics    Smoking status: Current Every Day Smoker     Packs/day: 0.50    Smokeless tobacco: Never Used    Alcohol use No     Review of Systems   Constitutional: Negative for fever.   HENT: Negative for sore throat.    Respiratory: Negative for shortness of breath.    Cardiovascular: Negative for chest pain.   Gastrointestinal: Negative for abdominal distention, abdominal pain, anal bleeding, blood in stool, constipation, diarrhea, nausea, rectal pain and vomiting.   Genitourinary: Positive for hematuria. Negative for difficulty urinating and dysuria.   Musculoskeletal: Negative for back pain.   Skin: Negative for rash.   Neurological: Negative for weakness.   Hematological: Does not bruise/bleed easily.   All other systems reviewed and are negative.      Physical Exam   Initial Vitals   BP Pulse Resp Temp SpO2   02/25/17 2236 02/25/17 2236 02/25/17 2236 02/25/17 2236 02/25/17 2236   160/84 67 16 97.1 °F (36.2 °C) 96 %     Physical Exam    Nursing note and vitals reviewed.  Constitutional: Vital signs are normal. He appears well-developed and well-nourished. No distress.   HENT:   Head: Normocephalic and atraumatic.   Eyes: EOM are normal. Pupils are equal, round, and reactive to light.   Neck: Normal range of motion. Neck supple.   Cardiovascular: Normal rate and regular rhythm.   Pulmonary/Chest: Breath sounds normal. No respiratory distress. He has no wheezes. He has no rhonchi. He has no rales. He exhibits no tenderness.   Abdominal: Soft. He exhibits no distension. There is tenderness. There is no rebound and no guarding.   Musculoskeletal: Normal range of motion. He exhibits no tenderness.   Neurological: He is alert and oriented to person, place, and time. No cranial nerve deficit.   Skin: Skin is warm and dry.   Psychiatric: He has a normal mood and affect.          ED Course   Procedures  Labs Reviewed   URINALYSIS   CBC W/ AUTO DIFFERENTIAL   COMPREHENSIVE METABOLIC PANEL   AMMONIA   PROTIME-INR                               ED Course     Clinical Impression:   The encounter diagnosis was Hematuria.    Disposition:   Disposition: Discharged  Condition: Stable       Jerel Singletary MD  02/28/17 0133

## 2017-02-26 NOTE — ED TRIAGE NOTES
Pt. C/o rt. Flank pain for approx. 1 month. Pt. Reports having some bloody penile discharge today. Pt.noted to have some dried blood on his underwear. Denies n/v or other associated syptoms. Pt. Is a poor historian but answers simple question.

## 2017-02-26 NOTE — DISCHARGE INSTRUCTIONS
Blood in the Urine    Blood in the urine (hematuria) has many possible causes. If it occurs after an injury (such as a car accident or fall), it is most often a sign of bruising to the kidney or bladder. Common causes of blood in the urine include urinary tract infections, kidney stones, inflammation, tumors, or certain other diseases of the kidney or bladder. Menstruation can cause blood to appear in the urine sample, although it is not coming from the urinary tract.  If only a trace amount of blood is present, it will show up on the urine test, even though the urine may be yellow and not pink or red. This may occur with any of the above conditions, as well as heavy exercise or high fever. In this case, your doctor may want to repeat the urine test on another day. This will show if the blood is still present. If it is, then other tests can be done to find out the cause.  Home care  Follow these home care guidelines:  · If your urine does not appear bloody (pink, brown or red) then you do not need to restrict your activity in any way.  · If you can see blood in your urine, rest and avoid heavy exertion until your next exam. Do not use aspirin, blood thinners, or anti-platelet or anti-inflammatory medicines. These include ibuprofen and naproxen. These thin the blood and may increase bleeding.  Follow-up care  Follow up with your healthcare provider, or as advised. If you were injured and had blood in your urine, you should have a repeat urine test in 1 to 2 days. Contact your doctor for this test.  A radiologist will review any X-rays that were taken. You will be told of any new findings that may affect your care.  When to seek medical advice  Call your healthcare provider right away if any of these occur:  · Bright red blood or blood clots in the urine (if you did not have this before)  · Weakness, dizziness or fainting  · New groin, abdominal, or back pain  · Fever of 100.4ºF (38ºC) or higher, or as directed by  your healthcare provider  · Repeated vomiting  · Bleeding from the nose or gums or easy bruising  Date Last Reviewed: 9/1/2016 © 2000-2016 [employer]. [address] [place] [address], [place] [address]. All rights reserved. This information is not intended as a substitute for professional medical care. Always follow your healthcare professional's instructions.        Blood in the Urine    Blood in the urine (hematuria) has many possible causes. If it occurs after an injury (such as a car accident or fall), it is most often a sign of bruising to the kidney or bladder. Common causes of blood in the urine include urinary tract infections, kidney stones, inflammation, tumors, or certain other diseases of the kidney or bladder. Menstruation can cause blood to appear in the urine sample, although it is not coming from the urinary tract.  If only a trace amount of blood is present, it will show up on the urine test, even though the urine may be yellow and not pink or red. This may occur with any of the above conditions, as well as heavy exercise or high fever. In this case, your doctor may want to repeat the urine test on another day. This will show if the blood is still present. If it is, then other tests can be done to find out the cause.  Home care  Follow these home care guidelines:  · If your urine does not appear bloody (pink, brown or red) then you do not need to restrict your activity in any way.  · If you can see blood in your urine, rest and avoid heavy exertion until your next exam. Do not use aspirin, blood thinners, or anti-platelet or anti-inflammatory medicines. These include ibuprofen and naproxen. These thin the blood and may increase bleeding.  Follow-up care  Follow up with your healthcare provider, or as advised. If you were injured and had blood in your urine, you should have a repeat urine test in 1 to 2 days. Contact your doctor for this test.  A radiologist will review any X-rays that were  taken. You will be told of any new findings that may affect your care.  When to seek medical advice  Call your healthcare provider right away if any of these occur:  · Bright red blood or blood clots in the urine (if you did not have this before)  · Weakness, dizziness or fainting  · New groin, abdominal, or back pain  · Fever of 100.4ºF (38ºC) or higher, or as directed by your healthcare provider  · Repeated vomiting  · Bleeding from the nose or gums or easy bruising  Date Last Reviewed: 9/1/2016  © 0153-1985 [employer]. [address] [place] [address]. All rights reserved. This information is not intended as a substitute for professional medical care. Always follow your healthcare professional's instructions.

## 2017-02-26 NOTE — ED AVS SNAPSHOT
[facility]  [address]  [place] [address]               Leif Ramirez Jr.   2017 12:15 AM   ED    Description:  Male : 1955   Department:  [facility]           Your Care was Coordinated By:     Provider Role From To    Jerel Singletary MD Attending Provider 17 0129 --      Reason for Visit     Male  Problem           Diagnoses this Visit        Comments    Hematuria    -  Primary       ED Disposition     None           To Do List           Follow-up Information     Follow up with Wale Mcguire MD In 1 day.    Specialty:  Internal Medicine    Contact information:    [address]  [address]  [facility] [id number] [phone]          Follow up with Jaylon Avelar MD In 1 week.    Specialty:  Urology    Contact information:    [address]  [address]  [facility] [id number] [phone]        [facility]     [facility] -  Assistance  Registered nurses in the [facility] provide clinical advisement, health education, appointment booking, and other advisory services.  Call for this free service at [phone].             Medications           Message regarding Medications     Verify the changes and/or additions to your medication regime listed below are the same as discussed with your clinician today.  If any of these changes or additions are incorrect, please notify your healthcare provider.        These medications were administered today        Dose Freq    sodium chloride 0.9% bolus 1,000 mL 1,000 mL ED 1 Time    Sig: Inject 1,000 mLs into the vein ED 1 Time.    Class: Normal    Route: Intravenous           Verify that the below list of medications is an accurate representation of the medications you are currently taking.  If none reported, the list may be blank. If incorrect, please contact your healthcare provider. Carry this list with you in case of emergency.            Current Medications     albuterol 90 mcg/actuation inhaler Inhale 1 puff into the lungs every 4 (four) hours as needed for Wheezing.    amlodipine (NORVASC) 10 MG tablet Take 1 tablet (10 mg total) by mouth once daily.    ERGOCALCIFEROL, VITAMIN D2, (VITAMIN D ORAL) Take 2,000 Units by mouth once daily.     ferrous sulfate 325 mg (65 mg iron) Tab tablet TAKE ONE TABLET BY MOUTH ONCE DAILY WITH LUNCH    fluoxetine (PROZAC) 20 MG capsule Take 1 capsule by mouth once daily.    furosemide (LASIX) 80 MG tablet TK ONE T PO QAM.    gabapentin (NEURONTIN) 300 MG capsule Take 300 mg by mouth 3 (three) times daily.     hydrALAZINE (APRESOLINE) 10 MG tablet TAKE 1 TABLET(10 MG) BY MOUTH THREE TIMES DAILY    hydrocodone-acetaminophen 5-325mg (NORCO) 5-325 mg per tablet Take 1 tablet by mouth 3 (three) times daily as needed for Pain.    lorazepam (ATIVAN) 1 MG tablet TK 1/2 T PO Q 6 H PRF ANXIETY    metoprolol tartrate (LOPRESSOR) 50 MG tablet TK ONE T PO BID.    olanzapine (ZYPREXA) 20 MG tablet Take 20 mg by mouth nightly.     olanzapine (ZYPREXA) 5 MG tablet Take 5 mg by mouth once daily.    tamsulosin (FLOMAX) 0.4 mg Cp24 Take 1 capsule (0.4 mg total) by mouth once daily.           Clinical Reference Information           Your Vitals Were     BP                   160/84           Allergies as of 2/26/2017        Reactions    Codeine     Other reaction(s): Nausea    Depakote [Divalproex] Other (See Comments)    Thrombocytopenia    Haldol [Haloperidol Lactate] Other (See Comments)    Seizures    Methadone     Morphine     Naloxone     Opium     Propoxyphene     Stelazine [Trifluoperazine]     Amoxicillin Rash      Immunizations Administered on Date of Encounter - 2/26/2017     None      ED Micro, Lab, POCT     Start Ordered       Status Ordering Provider    02/26/17 0332 02/26/17 0331  Ethanol  Add-on      Completed     02/26/17 0233 02/26/17 0232  Protime-INR  STAT      Final result     02/26/17 0232 02/26/17 0232  CBC auto  differential  STAT      Final result     02/26/17 0232 02/26/17 0232  Comprehensive metabolic panel  STAT      Final result     02/26/17 0232 02/26/17 0232  Ammonia  Once      Final result     02/26/17 0232 02/26/17 0232  Ethanol  Once      Final result     02/26/17 0057 02/26/17 0056  Urinalysis Clean Catch  STAT      Final result     02/26/17 0056 02/26/17 0056  Urinalysis Microscopic  Once      Final result       ED Imaging Orders     Start Ordered       Status Ordering Provider    02/26/17 0232 02/26/17 0232  CT Renal Stone Study ABD Pelvis WO  1 time imaging      Final result         Discharge Instructions           Blood in the Urine    Blood in the urine (hematuria) has many possible causes. If it occurs after an injury (such as a car accident or fall), it is most often a sign of bruising to the kidney or bladder. Common causes of blood in the urine include urinary tract infections, kidney stones, inflammation, tumors, or certain other diseases of the kidney or bladder. Menstruation can cause blood to appear in the urine sample, although it is not coming from the urinary tract.  If only a trace amount of blood is present, it will show up on the urine test, even though the urine may be yellow and not pink or red. This may occur with any of the above conditions, as well as heavy exercise or high fever. In this case, your doctor may want to repeat the urine test on another day. This will show if the blood is still present. If it is, then other tests can be done to find out the cause.  Home care  Follow these home care guidelines:  · If your urine does not appear bloody (pink, brown or red) then you do not need to restrict your activity in any way.  · If you can see blood in your urine, rest and avoid heavy exertion until your next exam. Do not use aspirin, blood thinners, or anti-platelet or anti-inflammatory medicines. These include ibuprofen and naproxen. These thin the blood and may increase  bleeding.  Follow-up care  Follow up with your healthcare provider, or as advised. If you were injured and had blood in your urine, you should have a repeat urine test in 1 to 2 days. Contact your doctor for this test.  A radiologist will review any X-rays that were taken. You will be told of any new findings that may affect your care.  When to seek medical advice  Call your healthcare provider right away if any of these occur:  · Bright red blood or blood clots in the urine (if you did not have this before)  · Weakness, dizziness or fainting  · New groin, abdominal, or back pain  · Fever of 100.4ºF (38ºC) or higher, or as directed by your healthcare provider  · Repeated vomiting  · Bleeding from the nose or gums or easy bruising  Date Last Reviewed: 9/1/2016 © 2000-2016 [employer]. [address] [place] [address]. All rights reserved. This information is not intended as a substitute for professional medical care. Always follow your healthcare professional's instructions.        Blood in the Urine    Blood in the urine (hematuria) has many possible causes. If it occurs after an injury (such as a car accident or fall), it is most often a sign of bruising to the kidney or bladder. Common causes of blood in the urine include urinary tract infections, kidney stones, inflammation, tumors, or certain other diseases of the kidney or bladder. Menstruation can cause blood to appear in the urine sample, although it is not coming from the urinary tract.  If only a trace amount of blood is present, it will show up on the urine test, even though the urine may be yellow and not pink or red. This may occur with any of the above conditions, as well as heavy exercise or high fever. In this case, your doctor may want to repeat the urine test on another day. This will show if the blood is still present. If it is, then other tests can be done to find out the cause.  Home care  Follow these home care  guidelines:  · If your urine does not appear bloody (pink, brown or red) then you do not need to restrict your activity in any way.  · If you can see blood in your urine, rest and avoid heavy exertion until your next exam. Do not use aspirin, blood thinners, or anti-platelet or anti-inflammatory medicines. These include ibuprofen and naproxen. These thin the blood and may increase bleeding.  Follow-up care  Follow up with your healthcare provider, or as advised. If you were injured and had blood in your urine, you should have a repeat urine test in 1 to 2 days. Contact your doctor for this test.  A radiologist will review any X-rays that were taken. You will be told of any new findings that may affect your care.  When to seek medical advice  Call your healthcare provider right away if any of these occur:  · Bright red blood or blood clots in the urine (if you did not have this before)  · Weakness, dizziness or fainting  · New groin, abdominal, or back pain  · Fever of 100.4ºF (38ºC) or higher, or as directed by your healthcare provider  · Repeated vomiting  · Bleeding from the nose or gums or easy bruising  Date Last Reviewed: 9/1/2016 © 2000-2016 [employer]. [address] [place] [address], [place] [address]. All rights reserved. This information is not intended as a substitute for professional medical care. Always follow your healthcare professional's instructions.          Your Scheduled Appointments     Mar 27, 2017  2:00 PM CDT   Established Patient Visit with Wale Mcguire MD   [facility] ([address]  [facility] [address] [phone]              [facility] Sign-Up     Activating your [facility] account is as easy as 1-2-3!     1) Visit [email or web address], select Sign Up Now, enter this activation code and your date of birth, then select Next.  [email or web address]  Expires: 3/18/2017 10:12 AM      2) Create a username and password to use when you visit  [facility] in the future and select a security question in case you lose your password and select Next.    3) Enter your e-mail address and click Sign Up!    Additional Information  If you have questions, please e-mail [email or web address] or call [phone] to talk to our [facility] staff. Remember, [facility] is NOT to be used for urgent needs. For medical emergencies, dial 911.          [facility] complies with applicable Federal civil rights laws and does not discriminate on the basis of race, color, national origin, age, disability, or sex.        Language Assistance Services     ATTENTION: Language assistance services are available, free of charge. Please call [phone].      ATENCIÓN: Si trila kassie, tiene a young disposición servicios gratuitos de asistencia lingüística. Llame al [phone].     CHÚ Ý: N?u b?n nói Ti?ng Vi?t, có các d?ch v? h? tr? ngôn ng? mi?n phí dành cho b?n. G?i s? [phone].

## 2017-02-27 ENCOUNTER — TELEPHONE (OUTPATIENT)
Dept: FAMILY MEDICINE | Facility: CLINIC | Age: 62
End: 2017-02-27

## 2017-02-27 NOTE — TELEPHONE ENCOUNTER
----- Message from Paige Mei sent at 2/27/2017  9:44 AM CST -----  Contact: [phone] self  Patient was discharged from [facility] last night. He had blood in his urine and they wanted him to have a follow up with Dr. Mcguire the next day. Patient is requesting a return call and is needing a follow up appointment as soon as possible.

## 2017-02-27 NOTE — TELEPHONE ENCOUNTER
----- Message from Lilly Lambert sent at 2/27/2017 10:01 AM CST -----  Contact: [phone]  Patient went to the ER and would like to follow up with Dr Mcguire. Patient would like to be seen sooner than the next available appointment. Please advise.

## 2017-03-01 ENCOUNTER — OFFICE VISIT (OUTPATIENT)
Dept: UROLOGY | Facility: CLINIC | Age: 62
End: 2017-03-01
Payer: MEDICARE

## 2017-03-01 VITALS
BODY MASS INDEX: 28.04 KG/M2 | DIASTOLIC BLOOD PRESSURE: 79 MMHG | SYSTOLIC BLOOD PRESSURE: 125 MMHG | HEART RATE: 71 BPM | HEIGHT: 68 IN | WEIGHT: 185 LBS | TEMPERATURE: 98 F

## 2017-03-01 DIAGNOSIS — G89.29 CHRONIC BILATERAL LOW BACK PAIN WITH LEFT-SIDED SCIATICA: ICD-10-CM

## 2017-03-01 DIAGNOSIS — N18.4 CKD (CHRONIC KIDNEY DISEASE), STAGE IV: ICD-10-CM

## 2017-03-01 DIAGNOSIS — M25.551 BILATERAL HIP PAIN: ICD-10-CM

## 2017-03-01 DIAGNOSIS — R31.0 GROSS HEMATURIA: Primary | ICD-10-CM

## 2017-03-01 DIAGNOSIS — N40.1 BPH NOS W UR OBS/LUTS: ICD-10-CM

## 2017-03-01 DIAGNOSIS — M54.9 BACK PAIN, UNSPECIFIED BACK LOCATION, UNSPECIFIED BACK PAIN LATERALITY, UNSPECIFIED CHRONICITY: ICD-10-CM

## 2017-03-01 DIAGNOSIS — F17.210 CIGARETTE SMOKER: ICD-10-CM

## 2017-03-01 DIAGNOSIS — M54.42 CHRONIC BILATERAL LOW BACK PAIN WITH LEFT-SIDED SCIATICA: ICD-10-CM

## 2017-03-01 DIAGNOSIS — M25.552 BILATERAL HIP PAIN: ICD-10-CM

## 2017-03-01 PROCEDURE — 99999 PR PBB SHADOW E&M-EST. PATIENT-LVL III: CPT | Mod: PBBFAC,,, | Performed by: UROLOGY

## 2017-03-01 PROCEDURE — 99213 OFFICE O/P EST LOW 20 MIN: CPT | Mod: PBBFAC,PO | Performed by: UROLOGY

## 2017-03-01 PROCEDURE — 99215 OFFICE O/P EST HI 40 MIN: CPT | Mod: S$PBB,,, | Performed by: UROLOGY

## 2017-03-01 PROCEDURE — 87086 URINE CULTURE/COLONY COUNT: CPT

## 2017-03-01 RX ORDER — CARVEDILOL 25 MG/1
TABLET ORAL
Refills: 3 | COMMUNITY
Start: 2017-02-22 | End: 2017-05-05

## 2017-03-01 NOTE — TELEPHONE ENCOUNTER
----- Message from Radha Cordero sent at 3/1/2017  4:52 PM CST -----  Contact: [phone]  Patient needs refill on hydrocodone-acetaminophen 5-325mg (NORCO) 5-325 mg per tablet

## 2017-03-01 NOTE — MR AVS SNAPSHOT
[facility]  [address] [place] [address] Ave  [place] [address]  Phone: [phone]                  Leif Ramirez Jr.   3/1/2017 4:00 PM   Office Visit    Description:  Male : 1955   Provider:  Jaylon Avelar MD   Department:  [facility]           Diagnoses this Visit        Comments    Gross hematuria    -  Primary     BPH NOS w ur obs/LUTS         CKD (chronic kidney disease), stage IV         Back pain, unspecified back location, unspecified back pain laterality, unspecified chronicity                To Do List           Future Appointments        Provider Department Dept Phone    3/10/2017 2:30 PM Jaylon Avelar MD [facility] [phone]    3/27/2017 2:00 PM Wale Mcguire MD [facility] [phone]      Goals (5 Years of Data)     None      [facility]     [facility] On Call Nurse Care Line -  Assistance  Registered nurses in the [facility] On Call Center provide clinical advisement, health education, appointment booking, and other advisory services.  Call for this free service at [phone].             Medications           Message regarding Medications     Verify the changes and/or additions to your medication regime listed below are the same as discussed with your clinician today.  If any of these changes or additions are incorrect, please notify your healthcare provider.             Verify that the below list of medications is an accurate representation of the medications you are currently taking.  If none reported, the list may be blank. If incorrect, please contact your healthcare provider. Carry this list with you in case of emergency.           Current Medications     albuterol 90 mcg/actuation inhaler Inhale 1 puff into the lungs every 4 (four) hours as needed for Wheezing.    amlodipine (NORVASC) 10 MG tablet Take 1 tablet (10 mg total) by mouth once daily.    carvedilol (COREG) 25 MG tablet     ERGOCALCIFEROL, VITAMIN D2, (VITAMIN D ORAL)  Take 2,000 Units by mouth once daily.     ferrous sulfate 325 mg (65 mg iron) Tab tablet TAKE ONE TABLET BY MOUTH ONCE DAILY WITH LUNCH    fluoxetine (PROZAC) 20 MG capsule Take 1 capsule by mouth once daily.    furosemide (LASIX) 80 MG tablet TK ONE T PO QAM.    gabapentin (NEURONTIN) 300 MG capsule Take 300 mg by mouth 3 (three) times daily.     hydrALAZINE (APRESOLINE) 10 MG tablet TAKE 1 TABLET(10 MG) BY MOUTH THREE TIMES DAILY    hydrocodone-acetaminophen 5-325mg (NORCO) 5-325 mg per tablet Take 1 tablet by mouth 3 (three) times daily as needed for Pain.    lorazepam (ATIVAN) 1 MG tablet TK 1/2 T PO Q 6 H PRF ANXIETY    metoprolol tartrate (LOPRESSOR) 50 MG tablet TK ONE T PO BID.    olanzapine (ZYPREXA) 20 MG tablet Take 20 mg by mouth nightly.     olanzapine (ZYPREXA) 5 MG tablet Take 5 mg by mouth once daily.    tamsulosin (FLOMAX) 0.4 mg Cp24 Take 1 capsule (0.4 mg total) by mouth once daily.           Clinical Reference Information           Your Vitals Were     BP                   125/79           Blood Pressure          Most Recent Value    BP  125/79      Allergies as of 3/1/2017     Codeine    Depakote [Divalproex]    Haldol [Haloperidol Lactate]    Methadone    Morphine    Naloxone    Opium    Propoxyphene    Stelazine [Trifluoperazine]    Amoxicillin      Immunizations Administered on Date of Encounter - 3/1/2017     None      Orders Placed During Today's Visit      Normal Orders This Visit    Urine culture     Future Labs/Procedures Expected by Expires    Cystoscopy  As directed 3/1/2018      Maintenance Dialysis History     Patient has no recorded history of maintenance dialysis.      [facility] Sign-Up     Activating your [facility] account is as easy as 1-2-3!     1) Visit [email or web address], select Sign Up Now, enter this activation code and your date of birth, then select Next.  [email or web address]  Expires: 3/18/2017 10:12 AM      2) Create a username and password to use when you visit [facility] in  the future and select a security question in case you lose your password and select Next.    3) Enter your e-mail address and click Sign Up!    Additional Information  If you have questions, please e-mail [email or web address] or call [phone] to talk to our [facility] staff. Remember, [facility] is NOT to be used for urgent needs. For medical emergencies, dial 911.         Smoking Cessation     If you would like to quit smoking:   You may be eligible for free services if you are a [place] resident and started smoking cigarettes before September 1, 1988.  Call the Smoking Cessation [employer] (SCT) toll free at (226) 683-2910 or (550) 747-1592.   Call 2-909-QUIT-NOW if you do not meet the above criteria.            Language Assistance Services     ATTENTION: Language assistance services are available, free of charge. Please call [phone].      ATENCIÓN: Si habla español, tiene a young disposición servicios gratuitos de asistencia lingüística. Llame al [phone].     CHÚ Ý: N?u b?n nói Ti?ng Vi?t, có các d?ch v? h? tr? ngôn ng? mi?n phí dành cho b?n. G?i s? [phone].         [facility] complies with applicable Federal civil rights laws and does not discriminate on the basis of race, color, national origin, age, disability, or sex.

## 2017-03-01 NOTE — PROGRESS NOTES
"HPI:  Leif Ramirez Jr. is a 62 y.o. year old male that  presents with No chief complaint on file.  .  This patient comes in follow-up from recent emergency room visit for gross hematuria.  He states that he has had 2-3 episodes of gross hematuria recently which have resolved on their own Noncontrast CT scan obtained shows normal kidneys with no evidence of kidney stones.  This image is brought up by me and reviewed and I agree with the presence of normal-appearing kidneys.  Patient has been seen by this urology office in October 2016 for evaluation of left testicular pain and sebaceous cyst of scrotum which no therapy was needed.  Patient is new to me.  The patient has "slight" dysuria and claims to have fevers and chills at home which have since resolved.    He is in between strength of stream with nocturia ×4-5.  He does not strain to void and he has been on tamsulosin long-standing    The patient does have chronic back pain and request for me to refill his Percocet.  The patient is in a wheelchair due to orthopedic issues with his back and hips    He denies a family history of prostate cancer and denies ever having kidney stones.  He states that he smokes 1 pack per day of cigarettes since age 13.    Patient has stage IV chronic kidney disease and recent GFR was 24 which is stable for the patient.    Chart review shows the note from his PCP from December of last year with the patient complaining of abdominal pain.  This shows also a history of hepatitis C and anxiety      Past Medical History:   Diagnosis Date    Allergy     poison ivy    Anemia     Anxiety     Arthritis     Asthma     Bipolar affective     Cancer     blood and bone    CHF (congestive heart failure)     Chronic hepatitis C with cirrhosis     COPD (chronic obstructive pulmonary disease)     Coronary artery disease     Depression     Diabetes mellitus type II     Disorder of kidney and ureter     Elevated PSA     has had prostate " "problems but unaware of his psa level    Encounter for blood transfusion     History of back injury     Chronic back pain    Hyperlipidemia     Hypertension     Internal hemorrhoid, bleeding 9/21/2015    Pancytopenia     Schizo affective schizophrenia     Seizures     Stroke     Thyroid disease      Social History     Social History    Marital status: Single     Spouse name: N/A    Number of children: N/A    Years of education: N/A     Occupational History    Not on file.     Social History Main Topics    Smoking status: Current Every Day Smoker     Packs/day: 0.50    Smokeless tobacco: Never Used    Alcohol use No    Drug use: No      Comment: quit 1985    Sexual activity: Not Currently     Other Topics Concern    Not on file     Social History Narrative     Past Surgical History:   Procedure Laterality Date    ELBOW BURSA SURGERY      JOINT REPLACEMENT      ORIF FEMUR FRACTURE Right 8/2015     Family History   Problem Relation Age of Onset    Hypertension Mother     Diabetes Mother     Transient ischemic attack Mother     Heart disease Mother      stent placement    Arthritis Mother     Heart disease Father     Hypertension Father     Diabetes Father     Stroke Father     Arthritis Sister     Hypertension Sister     Heart disease Maternal Grandmother     Diabetes Maternal Grandmother     Stroke Maternal Grandmother     Cancer Paternal Grandmother      breast    Heart disease Paternal Grandmother     Cancer Sister      brain    Prostate cancer Neg Hx     Kidney disease Neg Hx            Review of Systems  The patient has  chest pains.  The patient has  shortness of breath  Patient wears glasses.  Patient has chronic back pain.  He denies diarrhea.  All other review of systems are negative.      Physical Exam:  /79  Pulse 71  Temp 97.7 °F (36.5 °C)  Ht 5' 8" (1.727 m)  Wt 83.9 kg (185 lb)  BMI 28.13 kg/m2  General appearance: alert, cooperative, no " distress  Constitutional:Oriented to person, place, and time.appears well-developed and well-nourished.   HEENT: Normocephalic, atraumatic, neck symmetrical, no nasal discharge   Eyes: conjunctivae/corneas clear, PERRL, EOM's intact  Lungs: clear to auscultation bilaterally, no dullness to percussion bilaterally  Heart: regular rate and rhythm without rub; no displacement of the PMI   Abdomen: soft, non-tender; bowel sounds normoactive; no organomegaly  :Penis/perineum without lesions, scrotum without rash/cysts, epididymis nontender bilaterally, urethral meatus in normal location normal size, no penile plaques palpated, prostate smoothly enlarged, no nodules, seminal vesicles not palpated.  No rectal masses, sphincter tone normal.  Testes equal in size without masses  Extremities: extremities symmetric; no clubbing, cyanosis, or edema  Integument: Skin color, texture, turgor normal; no rashes; hair distrubution normal  Neurologic: Alert and oriented X 3, normal strength, normal coordination and gait  Psychiatric: no pressured speech; normal affect; no evidence of impaired cognition     LABS:    Complete Blood Count  Lab Results   Component Value Date    RBC 3.19 (L) 02/26/2017    HGB 9.8 (L) 02/26/2017    HCT 28.3 (L) 02/26/2017    MCV 89 02/26/2017    MCH 30.7 02/26/2017    MCHC 34.6 02/26/2017    RDW 14.7 (H) 02/26/2017    PLT 70 (L) 02/26/2017    MPV 8.5 (L) 02/26/2017    GRAN 1.6 (L) 02/26/2017    GRAN 55.9 02/26/2017    LYMPH 0.9 (L) 02/26/2017    LYMPH 30.8 02/26/2017    MONO 0.3 02/26/2017    MONO 11.3 02/26/2017    EOS 0.1 02/26/2017    BASO 0.01 02/26/2017    EOSINOPHIL 1.7 02/26/2017    BASOPHIL 0.3 02/26/2017    DIFFMETHOD Automated 02/26/2017       Comprehensive Metabolic Panel  Lab Results   Component Value Date    GLU 90 02/26/2017    BUN 44 (H) 02/26/2017    CREATININE 2.7 (H) 02/26/2017     02/26/2017    K 4.5 02/26/2017     02/26/2017    PROT 6.5 02/26/2017    ALBUMIN 3.8 02/26/2017     BILITOT 0.5 02/26/2017    AST 13 02/26/2017    ALKPHOS 65 02/26/2017    CO2 24 02/26/2017    ALT 9 (L) 02/26/2017    ANIONGAP 10 02/26/2017    EGFRNONAA 24 (A) 02/26/2017    ESTGFRAFRICA 28 (A) 02/26/2017       PSA  Lab Results   Component Value Date    PSA 0.60 10/13/2011         Assessment:    ICD-10-CM ICD-9-CM    1. Gross hematuria R31.0 599.71 Urine culture      Cystoscopy   2. BPH NOS w ur obs/LUTS N40.1 600.91    3. CKD (chronic kidney disease), stage IV N18.4 585.4    4. Back pain, unspecified back location, unspecified back pain laterality, unspecified chronicity M54.9 724.5    5. Cigarette smoker F17.210 305.1      The primary encounter diagnosis was Gross hematuria. Diagnoses of BPH NOS w ur obs/LUTS, CKD (chronic kidney disease), stage IV, Back pain, unspecified back location, unspecified back pain laterality, unspecified chronicity, and Cigarette smoker were also pertinent to this visit.      Plan: #1 gross hematuria.  Plan.Plan.  I discussed at length in detail with the patient the need to evaluate blood in the urine.  I discussed that this is being done to rule out the presence of urothelial cancer.  I discussed the need for both imaging of the upper tracts and also cystoscopy.  Patient voices understanding and agrees.  Recent noncontrast CT will serve as evaluation of his upper tracts.  Cystoscopy at next available time slot.  Urine will be sent to rule out infection    #2 chronic kidney disease stage IV.  Avoid contrast CT.  After cystoscopy, we will discuss possible retrograde pyelography with the patient    #3 BPH.  Plan.  Continue tamsulosin.  Further recommendation after cystoscopy    #4Strong tobacco history.  I discussed that this is a risk factor for urothelial cancer and is another reason to perform workup.    #5 back pain.  Plan.  Patient requesting Percocet.  I discussed that I cannot give him a prescription for this and that he has to see the physician that is managing his chronic back  pain for a refill of this narcotic.  Orders Placed This Encounter   Procedures    Cystoscopy    Urine culture           Jaylon Avelar MD

## 2017-03-02 LAB — BACTERIA UR CULT: NO GROWTH

## 2017-03-02 RX ORDER — HYDROCODONE BITARTRATE AND ACETAMINOPHEN 5; 325 MG/1; MG/1
1 TABLET ORAL 3 TIMES DAILY PRN
Qty: 90 TABLET | Refills: 0 | Status: SHIPPED | OUTPATIENT
Start: 2017-03-02 | End: 2017-04-06 | Stop reason: SDUPTHER

## 2017-03-03 ENCOUNTER — TELEPHONE (OUTPATIENT)
Dept: FAMILY MEDICINE | Facility: CLINIC | Age: 62
End: 2017-03-03

## 2017-03-03 NOTE — TELEPHONE ENCOUNTER
----- Message from Emilie Ruggiero sent at 3/3/2017 10:51 AM CST -----  No. [phone]   Is patient's script ready to be picked up.    Please call.

## 2017-03-09 ENCOUNTER — OFFICE VISIT (OUTPATIENT)
Dept: SPINE | Facility: CLINIC | Age: 62
End: 2017-03-09
Payer: MEDICARE

## 2017-03-09 VITALS
HEIGHT: 68 IN | WEIGHT: 185 LBS | SYSTOLIC BLOOD PRESSURE: 133 MMHG | DIASTOLIC BLOOD PRESSURE: 86 MMHG | HEART RATE: 63 BPM | BODY MASS INDEX: 28.04 KG/M2

## 2017-03-09 DIAGNOSIS — M47.812 CERVICAL SPONDYLOSIS WITHOUT MYELOPATHY: ICD-10-CM

## 2017-03-09 DIAGNOSIS — M54.14 THORACIC AND LUMBOSACRAL NEURITIS: Primary | ICD-10-CM

## 2017-03-09 DIAGNOSIS — G89.29 CHRONIC BILATERAL LOW BACK PAIN WITH BILATERAL SCIATICA: ICD-10-CM

## 2017-03-09 DIAGNOSIS — M54.41 CHRONIC BILATERAL LOW BACK PAIN WITH BILATERAL SCIATICA: ICD-10-CM

## 2017-03-09 DIAGNOSIS — M54.2 NECK PAIN: ICD-10-CM

## 2017-03-09 DIAGNOSIS — M43.10 ACQUIRED SPONDYLOLISTHESIS: ICD-10-CM

## 2017-03-09 DIAGNOSIS — M50.30 DEGENERATION OF CERVICAL INTERVERTEBRAL DISC: ICD-10-CM

## 2017-03-09 DIAGNOSIS — M54.17 THORACIC AND LUMBOSACRAL NEURITIS: Primary | ICD-10-CM

## 2017-03-09 DIAGNOSIS — M54.42 CHRONIC BILATERAL LOW BACK PAIN WITH BILATERAL SCIATICA: ICD-10-CM

## 2017-03-09 DIAGNOSIS — M51.37 DDD (DEGENERATIVE DISC DISEASE), LUMBOSACRAL: ICD-10-CM

## 2017-03-09 DIAGNOSIS — M47.819 SPONDYLOSIS WITHOUT MYELOPATHY: ICD-10-CM

## 2017-03-09 PROCEDURE — 99214 OFFICE O/P EST MOD 30 MIN: CPT | Mod: PBBFAC | Performed by: PHYSICIAN ASSISTANT

## 2017-03-09 PROCEDURE — 99999 PR PBB SHADOW E&M-EST. PATIENT-LVL IV: CPT | Mod: PBBFAC,,, | Performed by: PHYSICIAN ASSISTANT

## 2017-03-09 PROCEDURE — 99214 OFFICE O/P EST MOD 30 MIN: CPT | Mod: S$PBB,,, | Performed by: PHYSICIAN ASSISTANT

## 2017-03-09 NOTE — LETTER
March 9, 2017      Wale Mcguire MD  [address]  [address]  [facility] [address]           [facility]  [address]  [address]  [facility] [address]  Phone: [phone]  Fax: [phone]          Patient: Leif Ramirez Jr.   MR Number: [id number]   YOB: 1955   Date of Visit: 3/9/2017       Dear Dr. Wale Mcguire:    Thank you for referring Leif Ramirez to me for evaluation. Attached you will find relevant portions of my assessment and plan of care.    If you have questions, please do not hesitate to call me. I look forward to following Leif Ramirez along with you.    Sincerely,    Malathi Shelton PA-C    Enclosure  CC:  No Recipients    If you would like to receive this communication electronically, please contact [email or web address] or [phone] to request more information on [employer] Link access.    For providers and/or their staff who would like to refer a patient to [facility], please contact us through our one-stop-shop provider referral line, [facility], at [phone].    If you feel you have received this communication in error or would no longer like to receive these types of communications, please e-mail [email or web address]

## 2017-03-09 NOTE — MR AVS SNAPSHOT
[facility]  [address]  [address]  [facility] [address]  Phone: [phone]  Fax: [phone]                  Leif Ramirez Jr.   3/9/2017 1:00 PM   Office Visit    Description:  Male : 1955   Provider:  Malathi Shelton PA-C   Department:  [facility]           Reason for Visit     Low-back Pain           Diagnoses this Visit        Comments    Thoracic and lumbosacral neuritis    -  Primary     Spondylosis without myelopathy         DDD (degenerative disc disease), lumbosacral         Acquired spondylolisthesis         Chronic bilateral low back pain with bilateral sciatica                To Do List           Future Appointments        Provider Department Dept Phone    3[facility]/2017 2:30 PM [facility] [phone]    3/16/2017 8:00 AM MD Lori Marie  Neurology [phone]    3/27/2017 2:00 PM Wale Mcguire MD [facility] Family Medicine [phone]    2017 1:00 PM Malathi Shelton PA-C [facility] [phone]      Goals (5 Years of Data)     None      Follow-Up and Disposition     Return in 3 months (on 2017).      [facility] On [facility]     [facility] On Call Nurse Care Line - 24/7 Assistance  Registered nurses in the [facility] provide clinical advisement, health education, appointment booking, and other advisory services.  Call for this free service at [phone].             Medications           Message regarding Medications     Verify the changes and/or additions to your medication regime listed below are the same as discussed with your clinician today.  If any of these changes or additions are incorrect, please notify your healthcare provider.             Verify that the below list of medications is an accurate representation of the medications you are currently taking.  If none reported, the list may be blank. If incorrect, please contact your healthcare provider. Carry this list with you  "in case of emergency.           Current Medications     albuterol 90 mcg/actuation inhaler Inhale 1 puff into the lungs every 4 (four) hours as needed for Wheezing.    amlodipine (NORVASC) 10 MG tablet Take 1 tablet (10 mg total) by mouth once daily.    carvedilol (COREG) 25 MG tablet     ERGOCALCIFEROL, VITAMIN D2, (VITAMIN D ORAL) Take 2,000 Units by mouth once daily.     ferrous sulfate 325 mg (65 mg iron) Tab tablet TAKE ONE TABLET BY MOUTH ONCE DAILY WITH LUNCH    fluoxetine (PROZAC) 20 MG capsule Take 1 capsule by mouth once daily.    gabapentin (NEURONTIN) 300 MG capsule Take 300 mg by mouth 3 (three) times daily.     hydrALAZINE (APRESOLINE) 10 MG tablet TAKE 1 TABLET(10 MG) BY MOUTH THREE TIMES DAILY    hydrocodone-acetaminophen 5-325mg (NORCO) 5-325 mg per tablet Take 1 tablet by mouth 3 (three) times daily as needed for Pain.    metoprolol tartrate (LOPRESSOR) 50 MG tablet TK ONE T PO BID.    olanzapine (ZYPREXA) 20 MG tablet Take 20 mg by mouth nightly.     olanzapine (ZYPREXA) 5 MG tablet Take 5 mg by mouth once daily.    tamsulosin (FLOMAX) 0.4 mg Cp24 Take 1 capsule (0.4 mg total) by mouth once daily.    furosemide (LASIX) 80 MG tablet TK ONE T PO QAM.    lorazepam (ATIVAN) 1 MG tablet TK 1/2 T PO Q 6 H PRF ANXIETY           Clinical Reference Information           Your Vitals Were     BP Pulse Height Weight BMI    133/86 63 5' 8" (1.727 m) 83.9 kg (185 lb) 28.13 kg/m2      Blood Pressure          Most Recent Value    BP  133/86      Allergies as of 3/9/2017     Codeine    Depakote [Divalproex]    Haldol [Haloperidol Lactate]    Methadone    Morphine    Naloxone    Opium    Propoxyphene    Stelazine [Trifluoperazine]    Amoxicillin      Immunizations Administered on Date of Encounter - 3/9/2017     None      Orders Placed During Today's Visit      Normal Orders This Visit    Ambulatory referral to Physical Therapy - Lumbar       Maintenance Dialysis History     Patient has no recorded history of " maintenance dialysis.      [facility] Sign-Up     Activating your [facility] account is as easy as 1-2-3!     1) Visit [email or web address], select Sign Up Now, enter this activation code and your date of birth, then select Next.  [email or web address]  Expires: 3/18/2017 10:12 AM      2) Create a username and password to use when you visit [facility] in the future and select a security question in case you lose your password and select Next.    3) Enter your e-mail address and click Sign Up!    Additional Information  If you have questions, please e-mail [email or web address] or call [phone] to talk to our [facility] staff. Remember, [facility] is NOT to be used for urgent needs. For medical emergencies, dial 911.         Smoking Cessation     If you would like to quit smoking:   You may be eligible for free services if you are a [place] resident and started smoking cigarettes before September 1, 1988.  Call the Smoking Cessation [employer] (SCT) toll free at (898) 133-1110 or (151) 830-2105.   Call -752-QUIT-NOW if you do not meet the above criteria.            Language Assistance Services     ATTENTION: Language assistance services are available, free of charge. Please call [phone].      ATENCIÓN: Si habla español, tiene a young disposición servicios gratuitos de asistencia lingüística. Llame al [phone].     CHÚ Ý: N?u b?n nói Ti?ng Vi?t, có các d?ch v? h? tr? ngôn ng? mi?n phí dành cho b?n. G?i s? [phone].         [facility] complies with applicable Federal civil rights laws and does not discriminate on the basis of race, color, national origin, age, disability, or sex.

## 2017-03-09 NOTE — PROGRESS NOTES
Subjective:      Patient ID: Leif Ramirez Jr. is a 62 y.o. male.    Chief Complaint: Low-back Pain      HPI     He is a very poor historian, however he looks much better today than in the past. He is very calm and collected. Known slip C3-C4 and C4-C5 with diffuse mild DDD/spondylosis along with slip L4-L5 with mild DDD/spondylosis L4-S1.     His neck pain is improved. Had a few good massages that helped. He was going to PT in [place] but had a disagreement with the therapist and does not want to go back. He continues to complain of constant LBP with radiation to bilateral legs (entire) to his feet. Pain is worse with increased activity. He has some relief with medications. He notes numbness, tingling, and weakness in his legs. Pain varies and can be sharp, shooting, or stabbing. He rates his pain as a 10 on a scale of 1-10. No injections. No surgery on his neck/back. He is on norco and neurontin- gets these from PCP. Known CKD stage 3 with schizoaffective disorder, Bipolar disorder, COPD, HTN, and chronic Hep C with cirrhosis.        Review of Systems   Constitution: Negative for chills, night sweats and weight gain.   Gastrointestinal: Negative for nausea and vomiting.   Neurological: Negative for disturbances in coordination and loss of balance.           Objective:        General: Leif is well-developed, well-nourished, appears stated age, in no acute distress, alert and oriented to time, place and person.     Ortho/SPM Exam    Patient sits comfortably in the exam room and answers questions appropriately. Grossly patient is able to move bilateral UEs/LEs without difficulty. He is in WC.     Strength testing of the bilateral LEs shows  Right hip abduction:  +5/5  Left hip abduction:  +5/5  Right hip flexion:  +5/5   Left hip flexion:  +5/5  Right hip extensors:  +5/5  Left hip extensors:  +5/5  Right quadriceps:  +5/5  Left quadriceps:  +5/5  Right hamstring:  +5/5  Left hamstring:  +5/5  Right  dorsiflexion:   +5/5  Left dorsiflexion:  +5/5  Right plantar flexion:  +5/5  Left plantar flexion:  +5/5   Right EHL:  +5/5   Left EHL:  +5/5    Sensation is intact to light touch in bilateral LEs.         Assessment:       1. Thoracic and lumbosacral neuritis    2. Spondylosis without myelopathy    3. DDD (degenerative disc disease), lumbosacral    4. Acquired spondylolisthesis    5. Chronic bilateral low back pain with bilateral sciatica    6. Neck pain    7. Degeneration of cervical intervertebral disc    8. Cervical spondylosis without myelopathy           Plan:       Orders Placed This Encounter    Ambulatory referral to Physical Therapy - Lumbar       Persistent chronic LBP with bilateral leg pain (entire leg) to his feet. LBP > leg pain, right leg = left leg pain. Known slip L4-L5 with mild DDD/spondylosis L4-S1. Improvement in neck and right arm pain since last visit. Known slip C3-C4 and C4-C5 with diffuse mild DDD/spondylosis. Stopped PT in [place] as he did not get along with therapist. Treatment options reviewed with patient and following plan made:     - Restart PT for lumbar spine with good HEP. Internal script sent [place].   - Continue neurontin and prn norco from PCP.   - Avoid NSAIDs- he has CKD 3.   - If no improvement, consider lumbar MRI scan to see if he is candidate for ESIs.   - Of note, he has multiple comorbidities including schizoaffective disorder, Bipolar disorder, COPD, HTN, CKD 3 (as above) and chronic Hep C with cirrhosis.     Follow-up: Return in 3 months (on 6/9/2017). If there are any questions prior to this, the patient was instructed to contact the office.

## 2017-03-10 ENCOUNTER — PROCEDURE VISIT (OUTPATIENT)
Dept: UROLOGY | Facility: CLINIC | Age: 62
End: 2017-03-10
Payer: MEDICARE

## 2017-03-10 VITALS
BODY MASS INDEX: 28.04 KG/M2 | HEIGHT: 68 IN | SYSTOLIC BLOOD PRESSURE: 131 MMHG | HEART RATE: 72 BPM | DIASTOLIC BLOOD PRESSURE: 83 MMHG | TEMPERATURE: 98 F | WEIGHT: 185 LBS

## 2017-03-10 DIAGNOSIS — R31.0 GROSS HEMATURIA: ICD-10-CM

## 2017-03-10 DIAGNOSIS — N32.81 OAB (OVERACTIVE BLADDER): ICD-10-CM

## 2017-03-10 DIAGNOSIS — N18.4 CKD (CHRONIC KIDNEY DISEASE), STAGE IV: ICD-10-CM

## 2017-03-10 DIAGNOSIS — N40.1 BPH NOS W UR OBS/LUTS: Primary | ICD-10-CM

## 2017-03-10 PROCEDURE — 52000 CYSTOURETHROSCOPY: CPT | Mod: S$PBB,,, | Performed by: UROLOGY

## 2017-03-10 PROCEDURE — 99213 OFFICE O/P EST LOW 20 MIN: CPT | Mod: S$PBB,25,, | Performed by: UROLOGY

## 2017-03-10 PROCEDURE — 52000 CYSTOURETHROSCOPY: CPT | Mod: PBBFAC,PO | Performed by: UROLOGY

## 2017-03-10 RX ORDER — OXYBUTYNIN CHLORIDE 5 MG/1
5 TABLET ORAL NIGHTLY
Qty: 30 TABLET | Refills: 11 | Status: SHIPPED | OUTPATIENT
Start: 2017-03-10 | End: 2017-10-05 | Stop reason: SDUPTHER

## 2017-03-10 NOTE — PROCEDURES
Procedures Procedures: Flexible cystourethroscopy    Pre Procedure Diagnosis: Gross hematuria    Post Procedure Diagnosis: Same of presumed benign etiology    Date of Procedure.  March 10, 2017    Indications.  This is a man with gross hematuria who presents now for evaluation.  Reviewed his noncontrast CT scan shows no evidence of upper tract malignancy    Surgeon: Jaylon Avelar MD    Anesthesia: 2% uro-jet lidocaine jelly for local analgesia    Flexible cysto-urethroscopy was performed after consent was obtained.    2% lidocaine urojet was used for local analgesia.  The genitalia were prepped and draped in the usual sterile fashion.    The flexible scope was advanced into the urethra and into the bladder.  Bilateral ureteral orifices were evaluated and noted to be normal with clear efflux.  The bladder was completely surveyed in a systematic fashion.   No bladder tumors or lesions were seen.  No strictures were noted.  The prostate showed 10-15 g of bilobar hyperplasia    The patient tolerated the procedure well without complication.    Impression: Gross hematuria presumed benign etiology.  Possible need for retrograde pyelography discussed below.    Numbers 2 and 3.  BPH and overactive bladder.  Patient states that he has a strong stream on the tamsulosin.  He does however complain of significant nocturia.  Risks of oxybutynin daily at bedtime discussed.  Patient wants to give it a try.  Patient warned about constipation and difficulty voiding and instructed to stop if needed.  Follow-up 6 weeks for recheck    #4.  Chronic renal failure.  Plan.  I discussed that complete workup would in all retrograde pyelography under anesthesia.  Patient states that at this point he wants to hold off on this.  If the patient has recurrent gross hematuria, then this would need to be performed.  Patient voices understanding and agrees    .  Physical exam reveals a well-developed well-nourished male in no acute distress..   "Patient is alert and oriented ×3 with normal mood and affect.  Respiratory  effort is normal there is no peripheral edema.  Skin is normal to inspection and palpation.Penis/perineum without lesions, scrotum without rash/cysts, epididymis nontender bilaterally, urethral meatus in normal location normal size, no penile plaques palpated,   Testes equal in size without masses.    /83  Pulse 72  Temp 98.1 °F (36.7 °C)  Ht 5' 8" (1.727 m)  Wt 83.9 kg (185 lb)  BMI 28.13 kg/m2  Review of Systems  General ROS: negative for chills, fever or weight loss  Respiratory ROS: no cough, shortness of breath, or wheezing  Cardiovascular ROS: no chest pain or dyspnea on exertion  Musculoskeletal ROS: negative for gait disturbance or muscular weakness    Family History   Problem Relation Age of Onset    Hypertension Mother     Diabetes Mother     Transient ischemic attack Mother     Heart disease Mother      stent placement    Arthritis Mother     Heart disease Father     Hypertension Father     Diabetes Father     Stroke Father     Arthritis Sister     Hypertension Sister     Heart disease Maternal Grandmother     Diabetes Maternal Grandmother     Stroke Maternal Grandmother     Cancer Paternal Grandmother      breast    Heart disease Paternal Grandmother     Cancer Sister      brain    Prostate cancer Neg Hx     Kidney disease Neg Hx      Past Medical History:   Diagnosis Date    Allergy     poison ivy    Anemia     Anxiety     Arthritis     Asthma     Bipolar affective     Cancer     blood and bone    CHF (congestive heart failure)     Chronic hepatitis C with cirrhosis     COPD (chronic obstructive pulmonary disease)     Coronary artery disease     Depression     Diabetes mellitus type II     Disorder of kidney and ureter     Elevated PSA     has had prostate problems but unaware of his psa level    Encounter for blood transfusion     History of back injury     Chronic back pain    " Hyperlipidemia     Hypertension     Internal hemorrhoid, bleeding 9/21/2015    Pancytopenia     Schizo affective schizophrenia     Seizures     Stroke     Thyroid disease      Family History   Problem Relation Age of Onset    Hypertension Mother     Diabetes Mother     Transient ischemic attack Mother     Heart disease Mother      stent placement    Arthritis Mother     Heart disease Father     Hypertension Father     Diabetes Father     Stroke Father     Arthritis Sister     Hypertension Sister     Heart disease Maternal Grandmother     Diabetes Maternal Grandmother     Stroke Maternal Grandmother     Cancer Paternal Grandmother      breast    Heart disease Paternal Grandmother     Cancer Sister      brain    Prostate cancer Neg Hx     Kidney disease Neg Hx      Social History   Substance Use Topics    Smoking status: Current Every Day Smoker     Packs/day: 0.50    Smokeless tobacco: Never Used    Alcohol use No       Impression:      ICD-10-CM ICD-9-CM    1. BPH NOS w ur obs/LUTS N40.1 600.91    2. Gross hematuria R31.0 599.71 Cystoscopy   3. OAB (overactive bladder) N32.81 596.51    4. CKD (chronic kidney disease), stage IV N18.4 585.4

## 2017-03-10 NOTE — MR AVS SNAPSHOT
[facility]  [address] [place] [address]  Phone: [phone]                  Leif Ramirez Jr.   3/10/2017 2:30 PM   Procedure visit    Description:  Male : 1955   Provider:  Jaylon Avelar MD   Department:  [facility]           Diagnoses this Visit        Comments    BPH NOS w ur obs/LUTS    -  Primary     Gross hematuria         OAB (overactive bladder)                To Do List           Future Appointments        Provider Department Dept Phone    3/16/2017 8:00 AM Howard Burleson MD [facility] Neurology [phone]    3/16/2017 3:00 PM Howard Phillips, PT [facility]     3/27/2017 2:00 PM Wale Mcguire MD [facility] Family Medicine [phone]    2017 1:00 PM Jaylon Avelar MD [facility] [phone]    2017 1:00 PM Malathi Shelton PA-C [facility] [phone]      Goals (5 Years of Data)     None      Follow-Up and Disposition     Return in about 6 weeks (around 2017).       These Medications        Disp Refills Start End    oxybutynin (DITROPAN) 5 MG Tab 30 tablet 11 3/10/2017 3/10/2018    Take 1 tablet (5 mg total) by mouth every evening. One tablet before sleep - Oral    Pharmacy: [facility] [address] [place] [address] AT [facility] Ph #: [phone]         [facility]     [facility] -  Assistance  Registered nurses in the [facility] provide clinical advisement, health education, appointment booking, and other advisory services.  Call for this free service at [phone].             Medications           Message regarding Medications     Verify the changes and/or additions to your medication regime listed below are the same as discussed with your clinician today.  If any of these changes or additions are incorrect, please notify your healthcare provider.        START taking these NEW medications         "Refills    oxybutynin (DITROPAN) 5 MG Tab 11    Sig: Take 1 tablet (5 mg total) by mouth every evening. One tablet before sleep    Class: Normal    Route: Oral           Verify that the below list of medications is an accurate representation of the medications you are currently taking.  If none reported, the list may be blank. If incorrect, please contact your healthcare provider. Carry this list with you in case of emergency.           Current Medications     albuterol 90 mcg/actuation inhaler Inhale 1 puff into the lungs every 4 (four) hours as needed for Wheezing.    amlodipine (NORVASC) 10 MG tablet Take 1 tablet (10 mg total) by mouth once daily.    carvedilol (COREG) 25 MG tablet     ERGOCALCIFEROL, VITAMIN D2, (VITAMIN D ORAL) Take 2,000 Units by mouth once daily.     ferrous sulfate 325 mg (65 mg iron) Tab tablet TAKE ONE TABLET BY MOUTH ONCE DAILY WITH LUNCH    fluoxetine (PROZAC) 20 MG capsule Take 1 capsule by mouth once daily.    furosemide (LASIX) 80 MG tablet TK ONE T PO QAM.    gabapentin (NEURONTIN) 300 MG capsule Take 300 mg by mouth 3 (three) times daily.     hydrALAZINE (APRESOLINE) 10 MG tablet TAKE 1 TABLET(10 MG) BY MOUTH THREE TIMES DAILY    hydrocodone-acetaminophen 5-325mg (NORCO) 5-325 mg per tablet Take 1 tablet by mouth 3 (three) times daily as needed for Pain.    lorazepam (ATIVAN) 1 MG tablet TK 1/2 T PO Q 6 H PRF ANXIETY    metoprolol tartrate (LOPRESSOR) 50 MG tablet TK ONE T PO BID.    olanzapine (ZYPREXA) 20 MG tablet Take 20 mg by mouth nightly.     olanzapine (ZYPREXA) 5 MG tablet Take 5 mg by mouth once daily.    oxybutynin (DITROPAN) 5 MG Tab Take 1 tablet (5 mg total) by mouth every evening. One tablet before sleep    tamsulosin (FLOMAX) 0.4 mg Cp24 Take 1 capsule (0.4 mg total) by mouth once daily.           Clinical Reference Information           Your Vitals Were     BP Pulse Temp Height Weight BMI    131/83 72 98.1 °F (36.7 °C) 5' 8" (1.727 m) 83.9 kg (185 lb) 28.13 kg/m2 "      Blood Pressure          Most Recent Value    BP  131/83      Allergies as of 3/10/2017     Codeine    Depakote [Divalproex]    Haldol [Haloperidol Lactate]    Methadone    Morphine    Naloxone    Opium    Propoxyphene    Stelazine [Trifluoperazine]    Amoxicillin      Immunizations Administered on Date of Encounter - 3/10/2017     None      Orders Placed During Today's Visit      Normal Orders This Visit    Cystoscopy       Maintenance Dialysis History     Patient has no recorded history of maintenance dialysis.      [facility] Sign-Up     Activating your [facility] account is as easy as 1-2-3!     1) Visit [email or web address], select Sign Up Now, enter this activation code and your date of birth, then select Next.  [email or web address]  Expires: 3/18/2017 10:12 AM      2) Create a username and password to use when you visit [facility] in the future and select a security question in case you lose your password and select Next.    3) Enter your e-mail address and click Sign Up!    Additional Information  If you have questions, please e-mail [email or web address] or call [phone] to talk to our [facility] staff. Remember, [facility] is NOT to be used for urgent needs. For medical emergencies, dial 911.         Smoking Cessation     If you would like to quit smoking:   You may be eligible for free services if you are a [place] resident and started smoking cigarettes before September 1, 1988.  Call the Smoking Cessation Trust ([facility]) toll free at (342) 518-7225 or (097) 694-3453.   Call 1-800-QUIT-NOW if you do not meet the above criteria.            Language Assistance Services     ATTENTION: Language assistance services are available, free of charge. Please call [phone].      ATENCIÓN: Si habla español, tiene a young disposición servicios gratuitos de asistencia lingüística. Llame al [phone].     CHÚ Ý: N?u b?n nói Ti?ng Vi?t, có các d?ch v? h? tr? ngôn ng? mi?n phí dành cho b?n. G?i s? [phone].          [facility] complies with applicable Federal civil rights laws and does not discriminate on the basis of race, color, national origin, age, disability, or sex.

## 2017-03-17 ENCOUNTER — LAB VISIT (OUTPATIENT)
Dept: LAB | Facility: HOSPITAL | Age: 62
End: 2017-03-17
Attending: INTERNAL MEDICINE
Payer: MEDICARE

## 2017-03-17 DIAGNOSIS — B18.2 CHRONIC HEPATITIS C: ICD-10-CM

## 2017-03-17 DIAGNOSIS — I12.9 MALIGNANT HYPERTENSIVE KIDNEY DISEASE WITH CHRONIC KIDNEY DISEASE STAGE I THROUGH STAGE IV, OR UNSPECIFIED(403.00): Primary | ICD-10-CM

## 2017-03-17 DIAGNOSIS — N18.30 CHRONIC KIDNEY DISEASE, STAGE III (MODERATE): ICD-10-CM

## 2017-03-17 DIAGNOSIS — J44.9 CHRONIC OBSTRUCTIVE LUNG DISEASE: ICD-10-CM

## 2017-03-17 LAB
ALBUMIN SERPL BCP-MCNC: 3.7 G/DL
ANION GAP SERPL CALC-SCNC: 9 MMOL/L
BACTERIA #/AREA URNS HPF: NORMAL /HPF
BASOPHILS # BLD AUTO: 0.03 K/UL
BASOPHILS NFR BLD: 0.7 %
BILIRUB UR QL STRIP: NEGATIVE
BUN SERPL-MCNC: 46 MG/DL
CALCIUM SERPL-MCNC: 9.1 MG/DL
CHLORIDE SERPL-SCNC: 106 MMOL/L
CLARITY UR: CLEAR
CO2 SERPL-SCNC: 25 MMOL/L
COLOR UR: YELLOW
CREAT SERPL-MCNC: 3 MG/DL
CREAT UR-MCNC: 38.5 MG/DL
DIFFERENTIAL METHOD: ABNORMAL
EOSINOPHIL # BLD AUTO: 0.1 K/UL
EOSINOPHIL NFR BLD: 2.1 %
ERYTHROCYTE [DISTWIDTH] IN BLOOD BY AUTOMATED COUNT: 13.9 %
EST. GFR  (AFRICAN AMERICAN): 24 ML/MIN/1.73 M^2
EST. GFR  (NON AFRICAN AMERICAN): 21 ML/MIN/1.73 M^2
FERRITIN SERPL-MCNC: 226 NG/ML
GLUCOSE SERPL-MCNC: 118 MG/DL
GLUCOSE UR QL STRIP: NEGATIVE
HCT VFR BLD AUTO: 28.2 %
HGB BLD-MCNC: 9.9 G/DL
HGB UR QL STRIP: ABNORMAL
HYALINE CASTS #/AREA URNS LPF: 0 /LPF
IRON SERPL-MCNC: 61 UG/DL
KETONES UR QL STRIP: NEGATIVE
LEUKOCYTE ESTERASE UR QL STRIP: NEGATIVE
LYMPHOCYTES # BLD AUTO: 0.9 K/UL
LYMPHOCYTES NFR BLD: 19.6 %
MCH RBC QN AUTO: 30.7 PG
MCHC RBC AUTO-ENTMCNC: 35.1 %
MCV RBC AUTO: 88 FL
MICROSCOPIC COMMENT: NORMAL
MONOCYTES # BLD AUTO: 0.4 K/UL
MONOCYTES NFR BLD: 9.7 %
NEUTROPHILS # BLD AUTO: 2.9 K/UL
NEUTROPHILS NFR BLD: 67.7 %
NITRITE UR QL STRIP: NEGATIVE
PH UR STRIP: 6 [PH] (ref 5–8)
PHOSPHATE SERPL-MCNC: 4.1 MG/DL
PLATELET # BLD AUTO: 81 K/UL
PMV BLD AUTO: 9.3 FL
POTASSIUM SERPL-SCNC: 4.9 MMOL/L
PROT UR QL STRIP: ABNORMAL
PROT UR-MCNC: 186 MG/DL
PROT/CREAT RATIO, UR: 4.83
PTH-INTACT SERPL-MCNC: 91 PG/ML
RBC # BLD AUTO: 3.22 M/UL
RBC #/AREA URNS HPF: 2 /HPF (ref 0–4)
SATURATED IRON: 20 %
SODIUM SERPL-SCNC: 140 MMOL/L
SP GR UR STRIP: 1.01 (ref 1–1.03)
TOTAL IRON BINDING CAPACITY: 302 UG/DL
TRANSFERRIN SERPL-MCNC: 204 MG/DL
URN SPEC COLLECT METH UR: ABNORMAL
UROBILINOGEN UR STRIP-ACNC: NEGATIVE EU/DL
WBC # BLD AUTO: 4.33 K/UL
WBC #/AREA URNS HPF: 0 /HPF (ref 0–5)

## 2017-03-17 PROCEDURE — 81000 URINALYSIS NONAUTO W/SCOPE: CPT

## 2017-03-17 PROCEDURE — 83970 ASSAY OF PARATHORMONE: CPT

## 2017-03-17 PROCEDURE — 82728 ASSAY OF FERRITIN: CPT

## 2017-03-17 PROCEDURE — 36415 COLL VENOUS BLD VENIPUNCTURE: CPT

## 2017-03-17 PROCEDURE — 82570 ASSAY OF URINE CREATININE: CPT

## 2017-03-17 PROCEDURE — 83540 ASSAY OF IRON: CPT

## 2017-03-17 PROCEDURE — 80069 RENAL FUNCTION PANEL: CPT

## 2017-03-17 PROCEDURE — 82652 VIT D 1 25-DIHYDROXY: CPT

## 2017-03-17 PROCEDURE — 85025 COMPLETE CBC W/AUTO DIFF WBC: CPT

## 2017-03-20 LAB — 1,25(OH)2D3 SERPL-MCNC: 9 PG/ML

## 2017-03-24 ENCOUNTER — TELEPHONE (OUTPATIENT)
Dept: ADMINISTRATIVE | Facility: HOSPITAL | Age: 62
End: 2017-03-24

## 2017-03-31 ENCOUNTER — TELEPHONE (OUTPATIENT)
Dept: SPINE | Facility: CLINIC | Age: 62
End: 2017-03-31

## 2017-03-31 DIAGNOSIS — M51.37 DDD (DEGENERATIVE DISC DISEASE), LUMBOSACRAL: ICD-10-CM

## 2017-03-31 DIAGNOSIS — M54.2 NECK PAIN: ICD-10-CM

## 2017-03-31 DIAGNOSIS — N18.9 CHRONIC KIDNEY DISEASE, UNSPECIFIED STAGE: ICD-10-CM

## 2017-03-31 DIAGNOSIS — M54.42 CHRONIC BILATERAL LOW BACK PAIN WITH BILATERAL SCIATICA: ICD-10-CM

## 2017-03-31 DIAGNOSIS — M54.14 THORACIC AND LUMBOSACRAL NEURITIS: Primary | ICD-10-CM

## 2017-03-31 DIAGNOSIS — G89.29 CHRONIC BILATERAL LOW BACK PAIN WITH BILATERAL SCIATICA: ICD-10-CM

## 2017-03-31 DIAGNOSIS — M54.17 THORACIC AND LUMBOSACRAL NEURITIS: Primary | ICD-10-CM

## 2017-03-31 DIAGNOSIS — M47.819 SPONDYLOSIS WITHOUT MYELOPATHY: ICD-10-CM

## 2017-03-31 DIAGNOSIS — M54.12 BRACHIAL NEURITIS: ICD-10-CM

## 2017-03-31 DIAGNOSIS — M43.10 ACQUIRED SPONDYLOLISTHESIS: ICD-10-CM

## 2017-03-31 DIAGNOSIS — M47.812 CERVICAL SPONDYLOSIS WITHOUT MYELOPATHY: ICD-10-CM

## 2017-03-31 DIAGNOSIS — M50.30 DEGENERATION OF CERVICAL INTERVERTEBRAL DISC: ICD-10-CM

## 2017-03-31 DIAGNOSIS — M54.41 CHRONIC BILATERAL LOW BACK PAIN WITH BILATERAL SCIATICA: ICD-10-CM

## 2017-03-31 NOTE — TELEPHONE ENCOUNTER
Spoke with patient regarding message. Patient is asking if you can please send PT orders to [facility] for his back and neck     ----- Message from Emelina Oconnor sent at 3/31/2017  8:52 AM CDT -----  x_  1st Request  _  2nd Request  _  3rd Request        Who:  eyal    Why: pt. Would like to speak with adelfo in regards to physical therapy orders.    What Number to Call Back:[phone]    When to Expect a call back: (Before the end of the day)   -- if the call is after 12:00, the call back will be tomorrow.

## 2017-04-04 ENCOUNTER — OFFICE VISIT (OUTPATIENT)
Dept: UROLOGY | Facility: CLINIC | Age: 62
End: 2017-04-04
Payer: MEDICARE

## 2017-04-04 VITALS
DIASTOLIC BLOOD PRESSURE: 80 MMHG | HEIGHT: 68 IN | SYSTOLIC BLOOD PRESSURE: 127 MMHG | HEART RATE: 75 BPM | BODY MASS INDEX: 28.04 KG/M2 | WEIGHT: 185 LBS | TEMPERATURE: 98 F

## 2017-04-04 DIAGNOSIS — N32.81 OAB (OVERACTIVE BLADDER): ICD-10-CM

## 2017-04-04 DIAGNOSIS — R31.0 GROSS HEMATURIA: Primary | ICD-10-CM

## 2017-04-04 DIAGNOSIS — N40.1 BPH NOS W UR OBS/LUTS: ICD-10-CM

## 2017-04-04 DIAGNOSIS — N18.4 CKD (CHRONIC KIDNEY DISEASE), STAGE IV: ICD-10-CM

## 2017-04-04 PROCEDURE — 99999 PR PBB SHADOW E&M-EST. PATIENT-LVL III: CPT | Mod: PBBFAC,,, | Performed by: UROLOGY

## 2017-04-04 PROCEDURE — 99214 OFFICE O/P EST MOD 30 MIN: CPT | Mod: S$PBB,,, | Performed by: UROLOGY

## 2017-04-04 PROCEDURE — 99213 OFFICE O/P EST LOW 20 MIN: CPT | Mod: PBBFAC,PO | Performed by: UROLOGY

## 2017-04-04 PROCEDURE — 87086 URINE CULTURE/COLONY COUNT: CPT

## 2017-04-04 NOTE — PROGRESS NOTES
"This patient was last seen by me March 10, 2017 at which time patient had negative cystoscopy for gross hematuria.  At that time he was continued on tamsulosin and oxybutynin was added for nocturia.  He now comes in follow-up and states that this helps.    He states that he still has intermittent gross hematuria not associated with dysuria    She does have chronic renal failure.  Recent noncontrast CT showed no significant lesions    Physical exam reveals reveals a well-developed well-nourished patient  in no acute distress.  Patient is alert and oriented ×3 with normal mood and affect.  Respiratory effort is normal and there is no peripheral edema.  Skin is normal to inspection and palpation. Penis/perineum without lesions, scrotum without rash/cysts, epididymis nontender bilaterally, urethral meatus in normal location normal size, no penile plaques palpated, prostate:   Smooth and enlarged                    seminal vesicles not palpated.  No rectal masses, sphincter tone normal.  Testes equal in size without masses    /80  Pulse 75  Temp 98.4 °F (36.9 °C)  Ht 5' 8" (1.727 m)  Wt 83.9 kg (185 lb)  BMI 28.13 kg/m2  Review of Systems  General ROS: negative for chills, fever or weight loss  Respiratory ROS: no cough, shortness of breath, or wheezing  Cardiovascular ROS: no chest pain or dyspnea on exertion  Musculoskeletal ROS: negative for gait disturbance or muscular weakness    Family History   Problem Relation Age of Onset    Hypertension Mother     Diabetes Mother     Transient ischemic attack Mother     Heart disease Mother      stent placement    Arthritis Mother     Heart disease Father     Hypertension Father     Diabetes Father     Stroke Father     Arthritis Sister     Hypertension Sister     Heart disease Maternal Grandmother     Diabetes Maternal Grandmother     Stroke Maternal Grandmother     Cancer Paternal Grandmother      breast    Heart disease Paternal Grandmother     " Cancer Sister      brain    Prostate cancer Neg Hx     Kidney disease Neg Hx      Past Medical History:   Diagnosis Date    Allergy     poison ivy    Anemia     Anxiety     Arthritis     Asthma     Bipolar affective     Cancer     blood and bone    CHF (congestive heart failure)     Chronic hepatitis C with cirrhosis     COPD (chronic obstructive pulmonary disease)     Coronary artery disease     Depression     Diabetes mellitus type II     Disorder of kidney and ureter     Elevated PSA     has had prostate problems but unaware of his psa level    Encounter for blood transfusion     History of back injury     Chronic back pain    Hyperlipidemia     Hypertension     Internal hemorrhoid, bleeding 9/21/2015    Pancytopenia     Schizo affective schizophrenia     Seizures     Stroke     Thyroid disease      Family History   Problem Relation Age of Onset    Hypertension Mother     Diabetes Mother     Transient ischemic attack Mother     Heart disease Mother      stent placement    Arthritis Mother     Heart disease Father     Hypertension Father     Diabetes Father     Stroke Father     Arthritis Sister     Hypertension Sister     Heart disease Maternal Grandmother     Diabetes Maternal Grandmother     Stroke Maternal Grandmother     Cancer Paternal Grandmother      breast    Heart disease Paternal Grandmother     Cancer Sister      brain    Prostate cancer Neg Hx     Kidney disease Neg Hx      Social History   Substance Use Topics    Smoking status: Current Every Day Smoker     Packs/day: 0.50    Smokeless tobacco: Never Used    Alcohol use No       Impression:      ICD-10-CM ICD-9-CM    1. Gross hematuria R31.0 599.71 APTT      Protime-INR      EKG 12-lead      X-Ray Chest PA And Lateral      Basic metabolic panel      CBC auto differential      Urine culture   2. BPH NOS w ur obs/LUTS N40.1 600.91    3. OAB (overactive bladder) N32.81 596.51    4. CKD (chronic kidney  disease), stage IV N18.4 585.4     plan #1.  Gross hematuria.  Patient needs cystoscopy with retrograde pyelography bilaterally under anesthesia to further evaluate gross hematuria.  We'll also get consent for ureteroscopy with possible biopsy.  Possible complications including bleeding infection injury to bladder or ureter discussed.  Patient voices understanding and agrees    Numbers 2 and 3 BPH and overactive bladder.  Plan.  Continue tamsulosin and oxybutynin    #4 chronic kidney disease.  Plan.  Retrograde pyelography as discussed above

## 2017-04-05 ENCOUNTER — TELEPHONE (OUTPATIENT)
Dept: UROLOGY | Facility: CLINIC | Age: 62
End: 2017-04-05

## 2017-04-05 NOTE — TELEPHONE ENCOUNTER
----- Message from Sagrario Oconnor sent at 4/5/2017 10:16 AM CDT -----  Contact: Lakisha/[phone] or Kelly (same number)  Patient's sister called to find out about surgery for patient.  He is mentally disabled and does not fully understand and is afraid.  Please advise.

## 2017-04-06 ENCOUNTER — LAB VISIT (OUTPATIENT)
Dept: LAB | Facility: HOSPITAL | Age: 62
End: 2017-04-06
Attending: INTERNAL MEDICINE
Payer: MEDICARE

## 2017-04-06 ENCOUNTER — TELEPHONE (OUTPATIENT)
Dept: UROLOGY | Facility: CLINIC | Age: 62
End: 2017-04-06

## 2017-04-06 DIAGNOSIS — M25.552 BILATERAL HIP PAIN: ICD-10-CM

## 2017-04-06 DIAGNOSIS — I10 ESSENTIAL HYPERTENSION: Primary | ICD-10-CM

## 2017-04-06 DIAGNOSIS — I12.9 HYPERTENSIVE CHRONIC KIDNEY DISEASE WITH STAGE 1 THROUGH STAGE 4 CHRONIC KIDNEY DISEASE, OR UNSPECIFIED CHRONIC KIDNEY DISEASE: Primary | ICD-10-CM

## 2017-04-06 DIAGNOSIS — B18.2 CHRONIC HEPATITIS C: ICD-10-CM

## 2017-04-06 DIAGNOSIS — M54.42 CHRONIC BILATERAL LOW BACK PAIN WITH LEFT-SIDED SCIATICA: ICD-10-CM

## 2017-04-06 DIAGNOSIS — N18.30 CKD (CHRONIC KIDNEY DISEASE), STAGE III: ICD-10-CM

## 2017-04-06 DIAGNOSIS — G89.29 CHRONIC BILATERAL LOW BACK PAIN WITH LEFT-SIDED SCIATICA: ICD-10-CM

## 2017-04-06 DIAGNOSIS — M25.551 BILATERAL HIP PAIN: ICD-10-CM

## 2017-04-06 DIAGNOSIS — J44.9 CHRONIC OBSTRUCTIVE LUNG DISEASE: ICD-10-CM

## 2017-04-06 LAB
25(OH)D3+25(OH)D2 SERPL-MCNC: 29 NG/ML
ALBUMIN SERPL BCP-MCNC: 3.9 G/DL
ANION GAP SERPL CALC-SCNC: 9 MMOL/L
BACTERIA UR CULT: NORMAL
BASOPHILS # BLD AUTO: 0.03 K/UL
BASOPHILS NFR BLD: 0.7 %
BUN SERPL-MCNC: 40 MG/DL
CALCIUM SERPL-MCNC: 9.4 MG/DL
CHLORIDE SERPL-SCNC: 104 MMOL/L
CO2 SERPL-SCNC: 24 MMOL/L
CREAT SERPL-MCNC: 3 MG/DL
DIFFERENTIAL METHOD: ABNORMAL
EOSINOPHIL # BLD AUTO: 0.1 K/UL
EOSINOPHIL NFR BLD: 2.2 %
ERYTHROCYTE [DISTWIDTH] IN BLOOD BY AUTOMATED COUNT: 13.5 %
EST. GFR  (AFRICAN AMERICAN): 24 ML/MIN/1.73 M^2
EST. GFR  (NON AFRICAN AMERICAN): 21 ML/MIN/1.73 M^2
FERRITIN SERPL-MCNC: 252 NG/ML
GLUCOSE SERPL-MCNC: 81 MG/DL
HCT VFR BLD AUTO: 29.5 %
HGB BLD-MCNC: 10.4 G/DL
IRON SERPL-MCNC: 74 UG/DL
LYMPHOCYTES # BLD AUTO: 1.2 K/UL
LYMPHOCYTES NFR BLD: 27.5 %
MCH RBC QN AUTO: 30.3 PG
MCHC RBC AUTO-ENTMCNC: 35.3 %
MCV RBC AUTO: 86 FL
MONOCYTES # BLD AUTO: 0.5 K/UL
MONOCYTES NFR BLD: 11.4 %
NEUTROPHILS # BLD AUTO: 2.6 K/UL
NEUTROPHILS NFR BLD: 58 %
PHOSPHATE SERPL-MCNC: 4 MG/DL
PLATELET # BLD AUTO: 89 K/UL
PMV BLD AUTO: 9 FL
POTASSIUM SERPL-SCNC: 3.8 MMOL/L
PTH-INTACT SERPL-MCNC: 77 PG/ML
RBC # BLD AUTO: 3.43 M/UL
SATURATED IRON: 23 %
SODIUM SERPL-SCNC: 137 MMOL/L
TOTAL IRON BINDING CAPACITY: 320 UG/DL
TRANSFERRIN SERPL-MCNC: 216 MG/DL
WBC # BLD AUTO: 4.47 K/UL

## 2017-04-06 PROCEDURE — 82306 VITAMIN D 25 HYDROXY: CPT

## 2017-04-06 PROCEDURE — 84466 ASSAY OF TRANSFERRIN: CPT

## 2017-04-06 PROCEDURE — 85025 COMPLETE CBC W/AUTO DIFF WBC: CPT

## 2017-04-06 PROCEDURE — 83540 ASSAY OF IRON: CPT

## 2017-04-06 PROCEDURE — 82728 ASSAY OF FERRITIN: CPT

## 2017-04-06 PROCEDURE — 80069 RENAL FUNCTION PANEL: CPT

## 2017-04-06 PROCEDURE — 36415 COLL VENOUS BLD VENIPUNCTURE: CPT

## 2017-04-06 PROCEDURE — 83970 ASSAY OF PARATHORMONE: CPT

## 2017-04-06 RX ORDER — METOPROLOL TARTRATE 50 MG/1
TABLET ORAL
Qty: 180 TABLET | Refills: 3 | Status: SHIPPED | OUTPATIENT
Start: 2017-04-06 | End: 2017-05-05 | Stop reason: SDUPTHER

## 2017-04-06 RX ORDER — HYDROCODONE BITARTRATE AND ACETAMINOPHEN 5; 325 MG/1; MG/1
1 TABLET ORAL 3 TIMES DAILY PRN
Qty: 90 TABLET | Refills: 0 | Status: SHIPPED | OUTPATIENT
Start: 2017-04-06 | End: 2017-05-11 | Stop reason: SDUPTHER

## 2017-04-06 NOTE — TELEPHONE ENCOUNTER
----- Message from Erin Abbott sent at 4/6/2017 12:27 PM CDT -----  Contact: Taylor(sister)/[phone] or [phone]  Patient's family will be going out of town and he will have no ride for his procedure on 4/18/17 and his sister would like to know if his procedure can be pushed back to 4/26/17.  Please advise

## 2017-04-28 ENCOUNTER — TELEPHONE (OUTPATIENT)
Dept: UROLOGY | Facility: CLINIC | Age: 62
End: 2017-04-28

## 2017-04-28 NOTE — TELEPHONE ENCOUNTER
----- Message from Emilie Ruggiero sent at 4/28/2017  9:51 AM CDT -----  No. [phone]  Patient would like to speak to Dr. Avelar regarding a possible procedure.

## 2017-04-28 NOTE — TELEPHONE ENCOUNTER
----- Message from Emilie Ruggiero sent at 4/28/2017  9:51 AM CDT -----  No. [phone]  Patient would like to speak to Dr. Avelar regarding a possible procedure.

## 2017-04-28 NOTE — TELEPHONE ENCOUNTER
Contact patient and scheduled office visit so I can further discuss the procedure that he canceled.  Please let him know that we cannot reschedule the procedure until I see him in the office again.    Please let me know if he does not make this appointment.

## 2017-05-03 ENCOUNTER — TELEPHONE (OUTPATIENT)
Dept: SMOKING CESSATION | Facility: CLINIC | Age: 62
End: 2017-05-03

## 2017-05-03 ENCOUNTER — CLINICAL SUPPORT (OUTPATIENT)
Dept: SMOKING CESSATION | Facility: CLINIC | Age: 62
End: 2017-05-03
Payer: COMMERCIAL

## 2017-05-03 DIAGNOSIS — F17.200 NICOTINE DEPENDENCE: Primary | ICD-10-CM

## 2017-05-03 PROCEDURE — 99407 BEHAV CHNG SMOKING > 10 MIN: CPT | Mod: S$GLB,,, | Performed by: GENERAL PRACTICE

## 2017-05-04 ENCOUNTER — CLINICAL SUPPORT (OUTPATIENT)
Dept: SMOKING CESSATION | Facility: CLINIC | Age: 62
End: 2017-05-04
Payer: COMMERCIAL

## 2017-05-04 DIAGNOSIS — F17.200 NICOTINE DEPENDENCE: Primary | ICD-10-CM

## 2017-05-04 PROCEDURE — 99999 PR PBB SHADOW E&M-EST. PATIENT-LVL I: CPT | Mod: PBBFAC,,,

## 2017-05-04 PROCEDURE — 99404 PREV MED CNSL INDIV APPRX 60: CPT | Mod: S$GLB,,,

## 2017-05-04 RX ORDER — IBUPROFEN 200 MG
1 TABLET ORAL DAILY
Qty: 28 PATCH | Refills: 0 | Status: SHIPPED | OUTPATIENT
Start: 2017-05-04 | End: 2017-06-28 | Stop reason: SDUPTHER

## 2017-05-04 NOTE — PROGRESS NOTES
See Tobacco Cessation Intake Form for patient assessment and recommendations.  Exhaled carbon monoxide level was 7 ppm per [employer].

## 2017-05-04 NOTE — Clinical Note
Pt seen at intake today. He currently smokes 20 cigs/day. Discussed tobacco cessation medication of 21 mg nicotine patch QD. Pt started on rate reduction and wait time of 15 min prior to smoking. Exhaled carbon monoxide level was 6 (0-6 non-smoker). Will see pt back in office in 1 wk.

## 2017-05-05 ENCOUNTER — LAB VISIT (OUTPATIENT)
Dept: LAB | Facility: HOSPITAL | Age: 62
End: 2017-05-05
Attending: FAMILY MEDICINE
Payer: MEDICARE

## 2017-05-05 ENCOUNTER — TELEPHONE (OUTPATIENT)
Dept: PRIMARY CARE CLINIC | Facility: CLINIC | Age: 62
End: 2017-05-05

## 2017-05-05 ENCOUNTER — OFFICE VISIT (OUTPATIENT)
Dept: NEUROLOGY | Facility: CLINIC | Age: 62
End: 2017-05-05
Payer: MEDICARE

## 2017-05-05 ENCOUNTER — OFFICE VISIT (OUTPATIENT)
Dept: FAMILY MEDICINE | Facility: CLINIC | Age: 62
End: 2017-05-05
Payer: MEDICARE

## 2017-05-05 VITALS
BODY MASS INDEX: 29.51 KG/M2 | HEIGHT: 68 IN | SYSTOLIC BLOOD PRESSURE: 130 MMHG | DIASTOLIC BLOOD PRESSURE: 79 MMHG | WEIGHT: 194.69 LBS | HEART RATE: 70 BPM

## 2017-05-05 VITALS — SYSTOLIC BLOOD PRESSURE: 146 MMHG | HEART RATE: 76 BPM | DIASTOLIC BLOOD PRESSURE: 88 MMHG

## 2017-05-05 DIAGNOSIS — Z12.5 SCREENING PSA (PROSTATE SPECIFIC ANTIGEN): ICD-10-CM

## 2017-05-05 DIAGNOSIS — D69.6 THROMBOCYTOPENIA: Chronic | ICD-10-CM

## 2017-05-05 DIAGNOSIS — K74.69 COMPENSATED CIRRHOSIS RELATED TO HEPATITIS C VIRUS (HCV): Chronic | ICD-10-CM

## 2017-05-05 DIAGNOSIS — E78.5 HYPERLIPIDEMIA, UNSPECIFIED HYPERLIPIDEMIA TYPE: Chronic | ICD-10-CM

## 2017-05-05 DIAGNOSIS — R73.02 GLUCOSE INTOLERANCE (IMPAIRED GLUCOSE TOLERANCE): ICD-10-CM

## 2017-05-05 DIAGNOSIS — R26.89 DECREASED FUNCTIONAL MOBILITY: ICD-10-CM

## 2017-05-05 DIAGNOSIS — B19.20 COMPENSATED CIRRHOSIS RELATED TO HEPATITIS C VIRUS (HCV): Chronic | ICD-10-CM

## 2017-05-05 DIAGNOSIS — M70.62 TROCHANTERIC BURSITIS OF LEFT HIP: ICD-10-CM

## 2017-05-05 DIAGNOSIS — N18.4 ANEMIA OF CHRONIC RENAL FAILURE, STAGE 4 (SEVERE): ICD-10-CM

## 2017-05-05 DIAGNOSIS — I10 ESSENTIAL HYPERTENSION: Primary | Chronic | ICD-10-CM

## 2017-05-05 DIAGNOSIS — F32.A DEPRESSION, UNSPECIFIED DEPRESSION TYPE: ICD-10-CM

## 2017-05-05 DIAGNOSIS — D64.9 CHRONIC ANEMIA: ICD-10-CM

## 2017-05-05 DIAGNOSIS — I10 ESSENTIAL HYPERTENSION: Chronic | ICD-10-CM

## 2017-05-05 DIAGNOSIS — J43.8 OTHER EMPHYSEMA: Chronic | ICD-10-CM

## 2017-05-05 DIAGNOSIS — R29.898 WEAKNESS OF BOTH LOWER EXTREMITIES: Primary | ICD-10-CM

## 2017-05-05 DIAGNOSIS — M70.61 GREATER TROCHANTERIC BURSITIS OF RIGHT HIP: ICD-10-CM

## 2017-05-05 DIAGNOSIS — G89.29 CHRONIC LOW BACK PAIN, UNSPECIFIED BACK PAIN LATERALITY, WITH SCIATICA PRESENCE UNSPECIFIED: ICD-10-CM

## 2017-05-05 DIAGNOSIS — D63.1 ANEMIA OF CHRONIC RENAL FAILURE, STAGE 4 (SEVERE): ICD-10-CM

## 2017-05-05 DIAGNOSIS — N18.4 CKD (CHRONIC KIDNEY DISEASE), STAGE IV: ICD-10-CM

## 2017-05-05 DIAGNOSIS — Z87.828 HISTORY OF BACK INJURY: ICD-10-CM

## 2017-05-05 DIAGNOSIS — M54.5 CHRONIC LOW BACK PAIN, UNSPECIFIED BACK PAIN LATERALITY, WITH SCIATICA PRESENCE UNSPECIFIED: ICD-10-CM

## 2017-05-05 DIAGNOSIS — S72.001D AFTERCARE FOR HEALING TRAUMATIC CLOSED FRACTURE OF RIGHT HIP: ICD-10-CM

## 2017-05-05 PROBLEM — M54.9 CHRONIC BACK PAIN: Status: ACTIVE | Noted: 2017-05-05

## 2017-05-05 LAB
ALBUMIN SERPL BCP-MCNC: 4 G/DL
ALP SERPL-CCNC: 70 U/L
ALT SERPL W/O P-5'-P-CCNC: 12 U/L
ANION GAP SERPL CALC-SCNC: 9 MMOL/L
AST SERPL-CCNC: 16 U/L
BASOPHILS # BLD AUTO: 0.03 K/UL
BASOPHILS NFR BLD: 0.7 %
BILIRUB SERPL-MCNC: 0.5 MG/DL
BUN SERPL-MCNC: 45 MG/DL
CALCIUM SERPL-MCNC: 9.7 MG/DL
CHLORIDE SERPL-SCNC: 104 MMOL/L
CHOLEST/HDLC SERPL: 4.9 {RATIO}
CO2 SERPL-SCNC: 28 MMOL/L
COMPLEXED PSA SERPL-MCNC: 0.35 NG/ML
CREAT SERPL-MCNC: 3.6 MG/DL
DIFFERENTIAL METHOD: ABNORMAL
EOSINOPHIL # BLD AUTO: 0.1 K/UL
EOSINOPHIL NFR BLD: 1.7 %
ERYTHROCYTE [DISTWIDTH] IN BLOOD BY AUTOMATED COUNT: 13.4 %
EST. GFR  (AFRICAN AMERICAN): 19 ML/MIN/1.73 M^2
EST. GFR  (NON AFRICAN AMERICAN): 17 ML/MIN/1.73 M^2
GLUCOSE SERPL-MCNC: 106 MG/DL
HCT VFR BLD AUTO: 31.6 %
HDL/CHOLESTEROL RATIO: 20.4 %
HDLC SERPL-MCNC: 167 MG/DL
HDLC SERPL-MCNC: 34 MG/DL
HGB BLD-MCNC: 11.1 G/DL
LDLC SERPL CALC-MCNC: 88.6 MG/DL
LYMPHOCYTES # BLD AUTO: 0.8 K/UL
LYMPHOCYTES NFR BLD: 19.9 %
MCH RBC QN AUTO: 29.8 PG
MCHC RBC AUTO-ENTMCNC: 35.1 %
MCV RBC AUTO: 85 FL
MONOCYTES # BLD AUTO: 0.6 K/UL
MONOCYTES NFR BLD: 14 %
NEUTROPHILS # BLD AUTO: 2.7 K/UL
NEUTROPHILS NFR BLD: 63.7 %
NONHDLC SERPL-MCNC: 133 MG/DL
PLATELET # BLD AUTO: 104 K/UL
PMV BLD AUTO: 8.6 FL
POTASSIUM SERPL-SCNC: 4.6 MMOL/L
PROT SERPL-MCNC: 7.2 G/DL
RBC # BLD AUTO: 3.73 M/UL
SODIUM SERPL-SCNC: 141 MMOL/L
TRIGL SERPL-MCNC: 222 MG/DL
WBC # BLD AUTO: 4.22 K/UL

## 2017-05-05 PROCEDURE — 99204 OFFICE O/P NEW MOD 45 MIN: CPT | Mod: S$PBB,,, | Performed by: PSYCHIATRY & NEUROLOGY

## 2017-05-05 PROCEDURE — 99214 OFFICE O/P EST MOD 30 MIN: CPT | Mod: 25,S$PBB,, | Performed by: FAMILY MEDICINE

## 2017-05-05 PROCEDURE — 36415 COLL VENOUS BLD VENIPUNCTURE: CPT

## 2017-05-05 PROCEDURE — 20610 DRAIN/INJ JOINT/BURSA W/O US: CPT | Mod: S$PBB,,, | Performed by: FAMILY MEDICINE

## 2017-05-05 PROCEDURE — 84153 ASSAY OF PSA TOTAL: CPT

## 2017-05-05 PROCEDURE — 99213 OFFICE O/P EST LOW 20 MIN: CPT | Mod: PBBFAC,25,PO | Performed by: FAMILY MEDICINE

## 2017-05-05 PROCEDURE — 80061 LIPID PANEL: CPT

## 2017-05-05 PROCEDURE — 85025 COMPLETE CBC W/AUTO DIFF WBC: CPT

## 2017-05-05 PROCEDURE — 80053 COMPREHEN METABOLIC PANEL: CPT

## 2017-05-05 PROCEDURE — 20610 DRAIN/INJ JOINT/BURSA W/O US: CPT | Mod: PBBFAC,PO | Performed by: FAMILY MEDICINE

## 2017-05-05 PROCEDURE — 99999 PR PBB SHADOW E&M-EST. PATIENT-LVL III: CPT | Mod: PBBFAC,,, | Performed by: FAMILY MEDICINE

## 2017-05-05 PROCEDURE — 99999 PR PBB SHADOW E&M-EST. PATIENT-LVL III: CPT | Mod: 27,PBBFAC,, | Performed by: PSYCHIATRY & NEUROLOGY

## 2017-05-05 RX ORDER — METOPROLOL TARTRATE 50 MG/1
TABLET ORAL
Qty: 180 TABLET | Refills: 3 | Status: SHIPPED | OUTPATIENT
Start: 2017-05-05 | End: 2017-11-17

## 2017-05-05 RX ORDER — TRIAMCINOLONE ACETONIDE 40 MG/ML
40 INJECTION, SUSPENSION INTRA-ARTICULAR; INTRAMUSCULAR
Status: COMPLETED | OUTPATIENT
Start: 2017-05-05 | End: 2017-05-05

## 2017-05-05 RX ADMIN — TRIAMCINOLONE ACETONIDE 40 MG: 40 INJECTION, SUSPENSION INTRA-ARTICULAR; INTRAMUSCULAR at 11:05

## 2017-05-05 NOTE — PROGRESS NOTES
[facility] NEUROLOGY  [facility]    Date: May 5, 2017   Patient Name: Leif Ramirez Jr.   MRN: [id number]   PCP: Wale Mcguire  Referring Provider: Wale Mcguire MD    Assessment:   Leif Ramirez Jr. is a 62 y.o. male presenting for evaluation of chronic lower extremity pain.  At present, it appears the patient's greatest pain is coming from his hip and knee and is likely multifactorial in nature.  I see no evidence of an acute neurologic process at present though the patient does have known degenerative lumbar spinal disease.  In general, the patient may benefit from evaluation for pain management as he may be a good candidate for interventional pain procedures for long-term control of his chronic pain.    Plan:     Problem List Items Addressed This Visit        Neuro    Weakness of both lower extremities - Primary    Relevant Orders    Ambulatory consult to Pain Clinic    Chronic back pain    Relevant Orders    Ambulatory consult to Pain Clinic       Musculoskeletal and Integument    Greater trochanteric bursitis of right hip    Relevant Orders    Ambulatory consult to Pain Clinic       Other    History of back injury    Overview     Chronic back pain         Relevant Orders    Ambulatory consult to Pain Clinic    Decreased functional mobility    Relevant Orders    Ambulatory consult to Pain Clinic        Howard Burleson MD  [facility]   Department of Neurology  Patient note was created using Dragon Dictation.  Any errors in syntax or even information may not have been identified and edited on initial review prior to signing this note.  Subjective:   Patient seen in consultation at the request of Dr. Mcguire for the evaluation of lower extremity pain. A copy of this note will be sent to the referring physician.      HPI:   Mr. Leif Ramirez Jr. is a 62 y.o. male who presents with a chief complaint of chronic lower extremity pain.  The patient currently localizes his  pain to his left hip and knee. He denies any radicular pain, incontinence, or numbness, He has a known history of degenerative disease in his spine as well as chronic bursitis of his left hip and arthritis of his knee.  While he has full strength of his lower extremities, he states that he uses a wheelchair due to his chronic pain.  He has never followed with pain management and states that he has not been compliant with physical or occupational therapy.    PAST MEDICAL HISTORY:  Past Medical History:   Diagnosis Date    Allergy     poison ivy    Anemia     Anxiety     Arthritis     Asthma     Bipolar affective     Cancer     blood and bone    CHF (congestive heart failure)     Chronic hepatitis C with cirrhosis     COPD (chronic obstructive pulmonary disease)     Coronary artery disease     Depression     Diabetes mellitus type II     Disorder of kidney and ureter     Elevated PSA     has had prostate problems but unaware of his psa level    Encounter for blood transfusion     History of back injury     Chronic back pain    Hyperlipidemia     Hypertension     Internal hemorrhoid, bleeding 9/21/2015    Pancytopenia     Schizo affective schizophrenia     Seizures     Stroke     Thyroid disease      PAST SURGICAL HISTORY:  Past Surgical History:   Procedure Laterality Date    ELBOW BURSA SURGERY      JOINT REPLACEMENT      ORIF FEMUR FRACTURE Right 8/2015     CURRENT MEDS:  Current Outpatient Prescriptions   Medication Sig Dispense Refill    albuterol 90 mcg/actuation inhaler Inhale 1 puff into the lungs every 4 (four) hours as needed for Wheezing. 1 Inhaler 1    amlodipine (NORVASC) 10 MG tablet Take 1 tablet (10 mg total) by mouth once daily. 90 tablet 3    ERGOCALCIFEROL, VITAMIN D2, (VITAMIN D ORAL) Take 2,000 Units by mouth once daily.       ferrous sulfate 325 mg (65 mg iron) Tab tablet TAKE ONE TABLET BY MOUTH ONCE DAILY WITH LUNCH 90 tablet 0    fluoxetine (PROZAC) 20 MG  capsule Take 1 capsule by mouth once daily.  3    furosemide (LASIX) 80 MG tablet TK ONE T PO QAM.  1    gabapentin (NEURONTIN) 300 MG capsule Take 300 mg by mouth 3 (three) times daily.   0    hydrALAZINE (APRESOLINE) 10 MG tablet TAKE 1 TABLET(10 MG) BY MOUTH THREE TIMES DAILY 270 tablet 11    hydrocodone-acetaminophen 5-325mg (NORCO) 5-325 mg per tablet Take 1 tablet by mouth 3 (three) times daily as needed for Pain. 90 tablet 0    lorazepam (ATIVAN) 1 MG tablet TK 1/2 T PO Q 6 H PRF ANXIETY  0    nicotine (NICODERM CQ) 21 mg/24 hr Place 1 patch onto the skin once daily. 28 patch 0    olanzapine (ZYPREXA) 20 MG tablet Take 20 mg by mouth nightly.       olanzapine (ZYPREXA) 5 MG tablet Take 5 mg by mouth once daily.      oxybutynin (DITROPAN) 5 MG Tab Take 1 tablet (5 mg total) by mouth every evening. One tablet before sleep 30 tablet 11    tamsulosin (FLOMAX) 0.4 mg Cp24 Take 1 capsule (0.4 mg total) by mouth once daily. 90 capsule 3    metoprolol tartrate (LOPRESSOR) 50 MG tablet TK ONE T PO BID. 180 tablet 3     No current facility-administered medications for this visit.      ALLERGIES:  Review of patient's allergies indicates:   Allergen Reactions    Codeine      Other reaction(s): Nausea    Depakote [divalproex] Other (See Comments)     Thrombocytopenia    Haldol [haloperidol lactate] Other (See Comments)     Seizures    Methadone     Morphine     Naloxone     Opium     Propoxyphene     Stelazine [trifluoperazine]     Amoxicillin Rash     FAMILY HISTORY:  Family History   Problem Relation Age of Onset    Hypertension Mother     Diabetes Mother     Transient ischemic attack Mother     Heart disease Mother      stent placement    Arthritis Mother     Heart disease Father     Hypertension Father     Diabetes Father     Stroke Father     Arthritis Sister     Hypertension Sister     Heart disease Maternal Grandmother     Diabetes Maternal Grandmother     Stroke Maternal  Grandmother     Cancer Paternal Grandmother      breast    Heart disease Paternal Grandmother     Cancer Sister      brain    Prostate cancer Neg Hx     Kidney disease Neg Hx      SOCIAL HISTORY:  Social History   Substance Use Topics    Smoking status: Current Every Day Smoker     Packs/day: 0.50    Smokeless tobacco: Never Used    Alcohol use No     Review of Systems:  12 review of systems is negative except for the symptoms mentioned in HPI.      Objective:     Vitals:    05/05/17 1002   BP: (!) 146/88   BP Location: Right arm   Pulse: 76       General: NAD, well nourished   Eyes: no tearing, discharge, no erythema   ENT: moist mucous membranes of the oral cavity, nares patent    Neck: Supple, full range of motion  Cardiovascular: Warm and well perfused, pulses equal and symmetrical  Lungs: Normal work of breathing, normal chest wall excursions  Skin: No rash, lesions, or breakdown on exposed skin  Psychiatry: Mood and affect are appropriate   Abdomen: soft, non tender, non distended  Extremeties: No cyanosis, clubbing or edema.    Neurological   MENTAL STATUS: Alert and oriented to person, place, and time. Attention and concentration within normal limits. Speech without dysarthria, able to name and repeat without difficulty. Recent and remote memory within normal limits   CRANIAL NERVES: Visual fields intact. PERRL. EOMI. Facial sensation intact. Face symmetrical. Hearing grossly intact. Full shoulder shrug bilaterally. Tongue protrudes midline   SENSORY: Sensation is intact to light touch throughout.    MOTOR: Normal bulk and tone. No pronator drift.  5/5 deltoid, biceps, triceps, interosseous, hand  bilaterally. 5/5 iliopsoas, knee extension/flexion, foot dorsi/plantarflexion bilaterally.    REFLEXES: Symmetric and 2+ throughout.   CEREBELLAR/COORDINATION/GAIT: Gait deferred due patient complaints of pain.  Finger to nose intact. Normal rapid alternating movements.

## 2017-05-05 NOTE — PATIENT INSTRUCTIONS
General Neck and Back Pain    Both neck and back pain are usually caused by injury to the muscles or ligaments of the spine. Sometimes the disks that separate each bone of the spine may cause pain by pressing on a nearby nerve. Back and neck pain may appear after a sudden twisting or bending force (such as in a car accident), or sometimes after a simple awkward movement. In either case, muscle spasm is often present and adds to the pain.  Acute neck and back pain usually gets better in 1 to 2 weeks. Pain related to disk disease, arthritis in the spinal joints or spinal stenosis (narrowing of the spinal canal) can become chronic and last for months or years.  Back and neck pain are common problems. Most people feel better in 1 or 2 weeks, and most of the rest in 1 to 2 months. Most people can remain active.  People experience and describe pain differently.  · Pain can be sharp, stabbing, shooting, aching, cramping, or burning  · Movement, standing, bending, lifting, sitting, or walking may worsen the pain  · Pain can be localized to one spot or area, or it can be more generalized  · Pain can spread or radiate upwards, downwards, to the front, or go down your arms  · Muscle spasm may occur.  Most of the time mechanical problems with the muscles or spine cause the pain. it is usually caused by an injury, whether known or not, to the muscles or ligaments. While illnesses can cause back pain, it is usually not caused by a serious illness. Pain is usually related to physical activity, whether sports, exercise, work, or normal activity. Sometimes it can occur without an identifiable cause. This can happen simply by stretching or moving wrong, without noting pain at the time. Other causes include:  · Overexertion, lifting, pushing, pulling incorrectly or too aggressively.  · Sudden twisting, bending or stretching from an accident (car or fall), or accidental movement.  · Poor posture  · Poor conditioning, lack of regular  exercise  · Spinal disc disease or arthritis  · Stress  · Pregnancy, or illness like appendicitis, bladder or kidney infection, pelvic infections   Home care  · For neck pain: Use a comfortable pillow that supports the head and keeps the spine in a neutral position. The position of the head should not be tilted forward or backward.  · When in bed, try to find a position of comfort. A firm mattress is best. Try lying flat on your back with pillows under your knees. You can also try lying on your side with your knees bent up towards your chest and a pillow between your knees.  · At first, do not try to stretch out the sore spots. If there is a strain, it is not like the good soreness you get after exercising without an injury. In this case, stretching may make it worse.  · Avoid prolonged sitting, long car rides or travel. This puts more stress on the lower back than standing or walking.  · During the first 24 to 72 hours after an injury, apply an ice pack to the painful area for 20 minutes and then remove it for 20 minutes over a period of 60 to 90 minutes or several times a day.   · You can alternate ice and heat therapies. Talk with your healthcare provider about the best treatment for your back or neck pain. As a safety precaution, do not use a heating pad at bedtime. Sleeping with a heating pad can lead to skin burns or tissue damage.  · Therapeutic massage can help relax the back and neck muscles without stretching them.  · Be aware of safe lifting methods and do not lift anything over 15 pounds until all the pain is gone.  Medications  Talk to your healthcare provider before using medicine, especially if you have other medical problems or are taking other medicines.  · You may use over-the-counter medicine to control pain, unless another pain medicine was prescribed. If you have chronic conditions like diabetes, liver or kidney disease, stomach ulcers,  gastrointestinal bleeding, or are taking blood thinner  medicines.  · Be careful if you are given pain medicines, narcotics, or medicine for muscle spasm. They can cause drowsiness, and can affect your coordination, reflexes, and judgment. Do not drive or operate heavy machinery.  Follow-up care  Follow up with your healthcare provider, or as advised. Physical therapy or further tests may be needed.  If X-rays were taken, you will be notified of any new findings that may affect your care.  Call 911  Seek emergency medical care if any of the following occur:  · Trouble breathing  · Confusion  · Very drowsy or trouble awakening  · Fainting or loss of consciousness  · Rapid or very slow heart rate  · Loss of bowel or bladder control  When to seek medical advice  Call your healthcare provider right away if any of these occur:  · Pain becomes worse or spreads into your arms or legs  · Weakness, numbness or pain in one or both arms or legs  · Numbness in the groin area  · Difficulty walking  · Fever of 100.4ºF (38ºC) or higher, or as directed by your healthcare provider  Date Last Reviewed: 7/1/2016 © 2000-2016 [employer]. [address] [place] [address], [place] [address]. All rights reserved. This information is not intended as a substitute for professional medical care. Always follow your healthcare professional's instructions.

## 2017-05-05 NOTE — LETTER
May 5, 2017      Wale Mcguire MD  [address]  [address]  [facility] [address]           [facility]  [address] [facility] [address]  Phone: [phone]  Fax: [phone]          Patient: Leif Ramirez Jr.   MR Number: [id number]   YOB: 1955   Date of Visit: 5/5/2017       Dear Dr. Wale Mcguire:    Thank you for referring Leif Ramirez to me for evaluation. Attached you will find relevant portions of my assessment and plan of care.    If you have questions, please do not hesitate to call me. I look forward to following Leif Ramirez along with you.    Sincerely,    Howard Burleson MD    Enclosure  CC:  No Recipients    If you would like to receive this communication electronically, please contact [email or web address] or [phone] to request more information on [employer] Link access.    For providers and/or their staff who would like to refer a patient to [facility], please contact us through our one-stop-shop provider referral line, [facility], at [phone].    If you feel you have received this communication in error or would no longer like to receive these types of communications, please e-mail [email or web address]

## 2017-05-05 NOTE — MR AVS SNAPSHOT
[facility]  [address] [place] [address] Ave  [place] [address]  Phone: [phone]  Fax: [phone]                  Leif Ramirez Jr.   2017 9:20 AM   Office Visit    Description:  Male : 1955   Provider:  Howard Burleson MD   Department:  [facility]           Diagnoses this Visit        Comments    Weakness of both lower extremities    -  Primary     Decreased functional mobility         Greater trochanteric bursitis of right hip         History of back injury         Chronic low back pain, unspecified back pain laterality, with sciatica presence unspecified                To Do List           Future Appointments        Provider Department Dept Phone    2017 10:20 AM Wale Mcguire MD [facility] Family Medicine [phone]    2017 1:30 PM Rian Rico DPM [facility] Podiat[facility] [phone]    2017 1:00 PM Malathi Shelton PA-C [facility] [phone]      Goals (5 Years of Data)     None      Follow-Up and Disposition     Return if symptoms worsen or fail to improve.      [facility]     [facility] On Call Nurse Care Line -  Assistance  Unless otherwise directed by your provider, please contact [facility], our nurse care line that is available for  assistance.     Registered nurses in the [facility] Center provide: appointment scheduling, clinical advisement, health education, and other advisory services.  Call: [phone] (toll free)               Medications           Message regarding Medications     Verify the changes and/or additions to your medication regime listed below are the same as discussed with your clinician today.  If any of these changes or additions are incorrect, please notify your healthcare provider.             Verify that the below list of medications is an accurate representation of the medications you are currently taking.  If none reported, the list may be blank. If incorrect, please contact your healthcare  provider. Carry this list with you in case of emergency.           Current Medications     albuterol 90 mcg/actuation inhaler Inhale 1 puff into the lungs every 4 (four) hours as needed for Wheezing.    amlodipine (NORVASC) 10 MG tablet Take 1 tablet (10 mg total) by mouth once daily.    carvedilol (COREG) 25 MG tablet     ERGOCALCIFEROL, VITAMIN D2, (VITAMIN D ORAL) Take 2,000 Units by mouth once daily.     ferrous sulfate 325 mg (65 mg iron) Tab tablet TAKE ONE TABLET BY MOUTH ONCE DAILY WITH LUNCH    fluoxetine (PROZAC) 20 MG capsule Take 1 capsule by mouth once daily.    furosemide (LASIX) 80 MG tablet TK ONE T PO QAM.    gabapentin (NEURONTIN) 300 MG capsule Take 300 mg by mouth 3 (three) times daily.     hydrALAZINE (APRESOLINE) 10 MG tablet TAKE 1 TABLET(10 MG) BY MOUTH THREE TIMES DAILY    hydrocodone-acetaminophen 5-325mg (NORCO) 5-325 mg per tablet Take 1 tablet by mouth 3 (three) times daily as needed for Pain.    lorazepam (ATIVAN) 1 MG tablet TK 1/2 T PO Q 6 H PRF ANXIETY    metoprolol tartrate (LOPRESSOR) 50 MG tablet TK ONE T PO BID.    nicotine (NICODERM CQ) 21 mg/24 hr Place 1 patch onto the skin once daily.    olanzapine (ZYPREXA) 20 MG tablet Take 20 mg by mouth nightly.     olanzapine (ZYPREXA) 5 MG tablet Take 5 mg by mouth once daily.    oxybutynin (DITROPAN) 5 MG Tab Take 1 tablet (5 mg total) by mouth every evening. One tablet before sleep    tamsulosin (FLOMAX) 0.4 mg Cp24 Take 1 capsule (0.4 mg total) by mouth once daily.           Clinical Reference Information           Your Vitals Were     BP Pulse                146/88 (BP Location: Right arm) 76          Blood Pressure          Most Recent Value    BP  (!)  146/88      Allergies as of 5/5/2017     Codeine    Depakote [Divalproex]    Haldol [Haloperidol Lactate]    Methadone    Morphine    Naloxone    Opium    Propoxyphene    Stelazine [Trifluoperazine]    Amoxicillin      Immunizations Administered on Date of Encounter - 5/5/2017      None      Orders Placed During Today's Visit      Normal Orders This Visit    Ambulatory consult to Pain Clinic       Maintenance Dialysis History     Patient has no recorded history of maintenance dialysis.      [facility] Sign-Up     Activating your [facility] account is as easy as 1-2-3!     1) Visit [email or web address], select Sign Up Now, enter this activation code and your date of birth, then select Next.  [email or web address]  Expires: 6/19/2017 10:12 AM      2) Create a username and password to use when you visit [facility] in the future and select a security question in case you lose your password and select Next.    3) Enter your e-mail address and click Sign Up!    Additional Information  If you have questions, please e-mail [email or web address] or call [phone] to talk to our [facility] staff. Remember, [facility] is NOT to be used for urgent needs. For medical emergencies, dial 911.         Instructions      General Neck and Back Pain    Both neck and back pain are usually caused by injury to the muscles or ligaments of the spine. Sometimes the disks that separate each bone of the spine may cause pain by pressing on a nearby nerve. Back and neck pain may appear after a sudden twisting or bending force (such as in a car accident), or sometimes after a simple awkward movement. In either case, muscle spasm is often present and adds to the pain.  Acute neck and back pain usually gets better in 1 to 2 weeks. Pain related to disk disease, arthritis in the spinal joints or spinal stenosis (narrowing of the spinal canal) can become chronic and last for months or years.  Back and neck pain are common problems. Most people feel better in 1 or 2 weeks, and most of the rest in 1 to 2 months. Most people can remain active.  People experience and describe pain differently.  · Pain can be sharp, stabbing, shooting, aching, cramping, or burning  · Movement, standing, bending, lifting, sitting, or walking may worsen the  pain  · Pain can be localized to one spot or area, or it can be more generalized  · Pain can spread or radiate upwards, downwards, to the front, or go down your arms  · Muscle spasm may occur.  Most of the time mechanical problems with the muscles or spine cause the pain. it is usually caused by an injury, whether known or not, to the muscles or ligaments. While illnesses can cause back pain, it is usually not caused by a serious illness. Pain is usually related to physical activity, whether sports, exercise, work, or normal activity. Sometimes it can occur without an identifiable cause. This can happen simply by stretching or moving wrong, without noting pain at the time. Other causes include:  · Overexertion, lifting, pushing, pulling incorrectly or too aggressively.  · Sudden twisting, bending or stretching from an accident (car or fall), or accidental movement.  · Poor posture  · Poor conditioning, lack of regular exercise  · Spinal disc disease or arthritis  · Stress  · Pregnancy, or illness like appendicitis, bladder or kidney infection, pelvic infections   Home care  · For neck pain: Use a comfortable pillow that supports the head and keeps the spine in a neutral position. The position of the head should not be tilted forward or backward.  · When in bed, try to find a position of comfort. A firm mattress is best. Try lying flat on your back with pillows under your knees. You can also try lying on your side with your knees bent up towards your chest and a pillow between your knees.  · At first, do not try to stretch out the sore spots. If there is a strain, it is not like the good soreness you get after exercising without an injury. In this case, stretching may make it worse.  · Avoid prolonged sitting, long car rides or travel. This puts more stress on the lower back than standing or walking.  · During the first 24 to 72 hours after an injury, apply an ice pack to the painful area for 20 minutes and then  remove it for 20 minutes over a period of 60 to 90 minutes or several times a day.   · You can alternate ice and heat therapies. Talk with your healthcare provider about the best treatment for your back or neck pain. As a safety precaution, do not use a heating pad at bedtime. Sleeping with a heating pad can lead to skin burns or tissue damage.  · Therapeutic massage can help relax the back and neck muscles without stretching them.  · Be aware of safe lifting methods and do not lift anything over 15 pounds until all the pain is gone.  Medications  Talk to your healthcare provider before using medicine, especially if you have other medical problems or are taking other medicines.  · You may use over-the-counter medicine to control pain, unless another pain medicine was prescribed. If you have chronic conditions like diabetes, liver or kidney disease, stomach ulcers,  gastrointestinal bleeding, or are taking blood thinner medicines.  · Be careful if you are given pain medicines, narcotics, or medicine for muscle spasm. They can cause drowsiness, and can affect your coordination, reflexes, and judgment. Do not drive or operate heavy machinery.  Follow-up care  Follow up with your healthcare provider, or as advised. Physical therapy or further tests may be needed.  If X-rays were taken, you will be notified of any new findings that may affect your care.  Call 911  Seek emergency medical care if any of the following occur:  · Trouble breathing  · Confusion  · Very drowsy or trouble awakening  · Fainting or loss of consciousness  · Rapid or very slow heart rate  · Loss of bowel or bladder control  When to seek medical advice  Call your healthcare provider right away if any of these occur:  · Pain becomes worse or spreads into your arms or legs  · Weakness, numbness or pain in one or both arms or legs  · Numbness in the groin area  · Difficulty walking  · Fever of 100.4ºF (38ºC) or higher, or as directed by your healthcare  provider  Date Last Reviewed: 7/1/2016  © 9858-0253 [employer]. [address] [place] [address], [place] [address]. All rights reserved. This information is not intended as a substitute for professional medical care. Always follow your healthcare professional's instructions.             Smoking Cessation     If you would like to quit smoking:   You may be eligible for free services if you are a [place] resident and started smoking cigarettes before September 1, 1988.  Call the Smoking Cessation [employer] (SCT) toll free at (769) 254-4885 or (976) 224-8628.   Call 1-800-QUIT-NOW if you do not meet the above criteria.   Contact us via email: [email or web address]   View our website for more information: [email or web address]        Language Assistance Services     ATTENTION: Language assistance services are available, free of charge. Please call [phone].      ATENCIÓN: Si habla español, tiene a young disposición servicios gratuitos de asistencia lingüística. Llame al [phone].     CHÚ Ý: N?u b?n nói Ti?ng Vi?t, có các d?ch v? h? tr? ngôn ng? mi?n phí dành cho b?n. G?i s? [phone].         [facility] complies with applicable Federal civil rights laws and does not discriminate on the basis of race, color, national origin, age, disability, or sex.

## 2017-05-05 NOTE — PROGRESS NOTES
Subjective:       Patient ID: Leif Ramirez Jr. is a 62 y.o. male.    Chief Complaint: Hip Pain (Left hip)    HPI Comments: 62 yr old pleasant white male with anxiety/depression, schizoaffective disorder, Bipolar disorder, chronic Hep C with cirrhosis, CKD III, chronic low back pain, B/L hip pain, BPH, HLD, pancytopenia, presents today for his routine 3 month follow up. C/o worsening left hip pain.        Knee pain B/L - daily pain with instability - tried conservative approach with rest and meds and no relief - do not want any surgery - never used brace for support    HTN - chronic - controlled - on CCB and BB - - compliant - no side effects      HLD - controlled - diet alone -     LDLCALC                  50.0 (L)            04/15/2015    - lab due                  COPD - chronic - controlled      Chronic Hep C with Cirrhosis - seen hepatology - started on Harvoni and trying to be compliant      CKD IIV/V - worsening - seeing nephrology - urine ok    Pancytopenia - seen Hem/Onc and never had follow up -       Chronic LBP/hip pain - he has B/L hip replacement and has pain in them every single day and he suffers and was on some pain medication until his doctor refused it - he also has back pain and it radiates to leg leg - no numbness or saddle anesthesia, bladder/bowel problem      History as below - reviewed      Health maintenance  -labs UTD  -colon screen and PSA reports UTD      Hypertension   This is a chronic problem. The current episode started more than 1 year ago. The problem is controlled. Associated symptoms include anxiety. Pertinent negatives include no chest pain or palpitations. There are no associated agents to hypertension. Risk factors for coronary artery disease include dyslipidemia and male gender. Past treatments include ACE inhibitors and central alpha agonists. The current treatment provides significant improvement. There are no compliance problems.  There is no history of angina, CAD/MI,  CVA, left ventricular hypertrophy, PVD, renovascular disease, retinopathy or a thyroid problem. There is no history of chronic renal disease, hyperparathyroidism or pheochromocytoma.   Anemia   Presents for follow-up visit. Symptoms include pallor. There has been no confusion, light-headedness or palpitations. Past treatments include nothing. There is no history of alcohol abuse, cancer, chronic renal disease, dementia, hypothyroidism, malnutrition, recent illness or recent trauma. There is no past history of bone marrow exam, colonoscopy, EGD or FOBT. Compliance with medications is 51-75%.   Hyperlipidemia   This is a chronic problem. The current episode started more than 1 year ago. The problem is controlled. Recent lipid tests were reviewed and are normal. He has no history of chronic renal disease, diabetes, hypothyroidism or liver disease. There are no known factors aggravating his hyperlipidemia. Associated symptoms include myalgias. Pertinent negatives include no chest pain. He is currently on no antihyperlipidemic treatment. The current treatment provides mild improvement of lipids. There are no compliance problems.  Risk factors for coronary artery disease include dyslipidemia, hypertension and a sedentary lifestyle.     Review of Systems   Constitutional: Negative.  Negative for activity change, diaphoresis and unexpected weight change.   HENT: Negative.  Negative for congestion, ear pain, mouth sores, rhinorrhea and voice change.    Eyes: Negative.  Negative for pain, discharge and visual disturbance.   Respiratory: Negative.  Negative for apnea, cough and wheezing.    Cardiovascular: Negative.  Negative for chest pain and palpitations.   Gastrointestinal: Negative.  Negative for abdominal distention, anal bleeding, diarrhea and vomiting.   Endocrine: Negative.  Negative for cold intolerance and polyuria.   Genitourinary: Negative.  Negative for decreased urine volume, difficulty urinating, discharge,  frequency and scrotal swelling.   Musculoskeletal: Positive for arthralgias and myalgias. Negative for back pain and neck stiffness.   Skin: Positive for pallor. Negative for color change and rash.   Allergic/Immunologic: Negative.  Negative for environmental allergies and immunocompromised state.   Neurological: Negative.  Negative for dizziness, speech difficulty, weakness and light-headedness.   Hematological: Negative.    Psychiatric/Behavioral: Negative.  Negative for agitation, confusion, dysphoric mood and suicidal ideas. The patient is not nervous/anxious.        PMH/PSH/FH/SH/MED/ALLERGY reviewed    Objective:       Vitals:    05/05/17 1025   BP: 130/79   Pulse: 70       Physical Exam   Constitutional: He is oriented to person, place, and time. He appears well-developed and well-nourished. No distress.   HENT:   Head: Normocephalic and atraumatic.   Right Ear: External ear normal.   Left Ear: External ear normal.   Nose: Nose normal.   Mouth/Throat: Oropharynx is clear and moist.   Eyes: EOM are normal. Pupils are equal, round, and reactive to light.   Neck: Normal range of motion. Neck supple. No JVD present. No tracheal deviation present. No thyromegaly present.   Cardiovascular: Normal rate, regular rhythm, normal heart sounds and intact distal pulses.  Exam reveals no gallop and no friction rub.    No murmur heard.  Pulmonary/Chest: Effort normal and breath sounds normal. No stridor. No respiratory distress. He has no wheezes. He has no rales. He exhibits no tenderness.   Abdominal: Soft. Bowel sounds are normal. He exhibits distension. He exhibits no mass. There is no tenderness. There is no rebound and no guarding. No hernia.   Musculoskeletal: Normal range of motion. He exhibits tenderness (TTP B/L hips with restricted ROM and lower lumbar spine and pralumbar spine TTP. SLRT + left). He exhibits no edema.   Lymphadenopathy:     He has no cervical adenopathy.   Neurological: He is alert and oriented  to person, place, and time. He has normal reflexes. He displays normal reflexes. No cranial nerve deficit. He exhibits normal muscle tone. Coordination normal.   Skin: Skin is warm and dry. No rash noted. He is not diaphoretic. No erythema. No pallor.   Psychiatric: He has a normal mood and affect. His behavior is normal. Judgment and thought content normal.       Assessment:       1. Other emphysema    2. Essential hypertension    3. CKD (chronic kidney disease), stage IV    4. Anemia of chronic renal failure, stage 4 (severe)    5. 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)    6. Hyperlipidemia, unspecified hyperlipidemia type    7. Greater trochanteric bursitis of right hip    8. Aftercare for healing traumatic closed fracture of right hip    9. Thrombocytopenia        Plan:       Leif was seen today for hip pain.    Diagnoses and all orders for this visit:    Essential hypertension  -     metoprolol tartrate (LOPRESSOR) 50 MG tablet; TK ONE T PO BID.  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)  -     CBC auto differential; Future  -     Comprehensive metabolic panel; Future    Hyperlipidemia, unspecified hyperlipidemia type  -     CBC auto differential; Future  -     Comprehensive metabolic panel; Future    Greater trochanteric bursitis of right hip    Aftercare for healing traumatic closed fracture of right hip    Thrombocytopenia  -     CBC auto differential; Future    Chronic anemia  -     CBC auto differential; Future    Depression, unspecified depression type    Screening PSA (prostate specific antigen)  -      PSA, Screening; Future    Glucose intolerance (impaired glucose tolerance)  -     Hemoglobin A1c; Future    Trochanteric bursitis of left hip  -     triamcinolone acetonide injection 40 mg; 1 mL (40 mg total) by INTRABURSAL route one time.      Left trochanteric bursitis  -failure of conservative approach    Injection - after informed consent, area cleaned and draped in standard fashion. Adequate topical anesthesia obtained with spray. 20 mg kenalog mixed with 1.5 cc lidocaine 2% without epi injected into trochanteric bursa left. No bleeding or immediate complication. Pt tolerated procedure well. Post procedure precautions given    Hep C/Cirrhosis  -completed Harvoni - viral load negative  -follows hepatology    COPD  -stable  -advised to quit smoking    HTN  -controlled  -refilled meds - CCB and BB to continue    OA knee B/L  -failed conservative therapy  -trial of brace daily - rx done    CKD IV  -stable  -follow nephrology      Anxiety/depression/schizoaffective/bipolar  -follows Dr. Dc, Psychiatry    B/L hip pain and chronic LBP  -on norco  -advised to check with hepatology for tylenol intake    Spent adequate time in obtaining history and explaining differentials    40 minutes spent during this visit of which greater than 50% devoted to face-face counseling and coordination of care regarding diagnosis and management plan    RTC 3 months

## 2017-05-05 NOTE — MR AVS SNAPSHOT
[address]  [place] [address]  Phone: [phone]  Fax: [phone]                  Leif Ramirez Jr.   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)         Hyperlipidemia, unspecified hyperlipidemia type         Greater trochanteric bursitis of right hip         Aftercare for healing traumatic closed fracture of right hip         Thrombocytopenia         Chronic anemia         Depression, unspecified depression type         Screening PSA (prostate specific antigen)         Glucose intolerance (impaired glucose tolerance)                To Do List           Future Appointments        Provider Department Dept Phone    2017 11:20 AM APPOINTMENT LAB, [place] [facility] [phone]    2017 1:30 PM Rian Rico DPM [place] - Podiatry [phone]    2017 1:00 PM Malathi Shelton PA-C [place] - Spine Services [phone]      Goals (5 Years of Data)     None       These Medications        Disp Refills Start End    metoprolol tartrate (LOPRESSOR) 50 MG tablet 180 tablet 3 2017     TK ONE T PO BID.    Pharmacy: [facility] [address] [place] [address] AT [facility] Ph #: [phone]         [facility]     [facility] Nurse Care [facility] - 24/ Assistance  Unless otherwise directed by your provider, please contact [facility], our nurse care line that is available for 24/ assistance.     Registered nurses in the [facility] provide:  appointment scheduling, clinical advisement, health education, and other advisory services.  Call: [phone] (toll free)               Medications           Message regarding Medications     Verify the changes and/or additions to your medication regime listed below are the same as discussed with your clinician today.  If any of these changes or additions are incorrect, please notify your healthcare provider.        STOP taking these medications     carvedilol (COREG) 25 MG tablet            Verify that the below list of medications is an accurate representation of the medications you are currently taking.  If none reported, the list may be blank. If incorrect, please contact your healthcare provider. Carry this list with you in case of emergency.           Current Medications     albuterol 90 mcg/actuation inhaler Inhale 1 puff into the lungs every 4 (four) hours as needed for Wheezing.    amlodipine (NORVASC) 10 MG tablet Take 1 tablet (10 mg total) by mouth once daily.    ERGOCALCIFEROL, VITAMIN D2, (VITAMIN D ORAL) Take 2,000 Units by mouth once daily.     ferrous sulfate 325 mg (65 mg iron) Tab tablet TAKE ONE TABLET BY MOUTH ONCE DAILY WITH LUNCH    fluoxetine (PROZAC) 20 MG capsule Take 1 capsule by mouth once daily.    furosemide (LASIX) 80 MG tablet TK ONE T PO QAM.    gabapentin (NEURONTIN) 300 MG capsule Take 300 mg by mouth 3 (three) times daily.     hydrALAZINE (APRESOLINE) 10 MG tablet TAKE 1 TABLET(10 MG) BY MOUTH THREE TIMES DAILY    hydrocodone-acetaminophen 5-325mg (NORCO) 5-325 mg per tablet Take 1 tablet by mouth 3 (three) times daily as needed for Pain.    lorazepam (ATIVAN) 1 MG tablet TK 1/2 T PO Q 6 H PRF ANXIETY    nicotine (NICODERM CQ) 21 mg/24 hr Place 1 patch onto the skin once daily.    olanzapine (ZYPREXA) 20 MG tablet Take 20 mg by mouth nightly.     olanzapine (ZYPREXA) 5 MG tablet Take 5 mg by mouth once daily.    oxybutynin (DITROPAN) 5 MG Tab Take 1 tablet (5 mg total) by  "mouth every evening. One tablet before sleep    tamsulosin (FLOMAX) 0.4 mg Cp24 Take 1 capsule (0.4 mg total) by mouth once daily.    metoprolol tartrate (LOPRESSOR) 50 MG tablet TK ONE T PO BID.           Clinical Reference Information           Your Vitals Were     BP Pulse Height Weight BMI    147/79 (BP Location: Left arm, Patient Position: Sitting, BP Method: Automatic) 70 5' 8" (1.727 m) 88.3 kg (194 lb 10.7 oz) 29.6 kg/m2      Blood Pressure          Most Recent Value    BP  (!)  147/79      Allergies as of 5/5/2017     Codeine    Depakote [Divalproex]    Haldol [Haloperidol Lactate]    Methadone    Morphine    Naloxone    Opium    Propoxyphene    Stelazine [Trifluoperazine]    Amoxicillin      Immunizations Administered on Date of Encounter - 5/5/2017     None      Orders Placed During Today's Visit     Future Labs/Procedures Expected by Expires    CBC auto differential  5/5/2017 7/4/2018    Comprehensive metabolic panel  5/5/2017 7/4/2018    Hemoglobin A1c  5/5/2017 7/4/2018    Lipid panel  5/5/2017 7/4/2018    PSA, Screening  5/5/2017 7/4/2018      Maintenance Dialysis History     Patient has no recorded history of maintenance dialysis.      [facility] Sign-Up     Activating your [facility] account is as easy as 1-2-3!     1) Visit [email or web address], select Sign Up Now, enter this activation code and your date of birth, then select Next.  [email or web address]  Expires: 6/19/2017 10:12 AM      2) Create a username and password to use when you visit [facility] in the future and select a security question in case you lose your password and select Next.    3) Enter your e-mail address and click Sign Up!    Additional Information  If you have questions, please e-mail [email or web address] or call [phone] to talk to our [facility] staff. Remember, [facility] is NOT to be used for urgent needs. For medical emergencies, dial 911.         Smoking Cessation     If you would like to quit smoking:   You may be eligible for " free services if you are a [place] resident and started smoking cigarettes before September 1, 1988.  Call the Smoking Cessation Trust (SCT) toll free at (756) 006-3627 or (084) 979-4119.   Call 1-800-QUIT-NOW if you do not meet the above criteria.   Contact us via email: [email or web address]   View our website for more information: [email or web address]        Language Assistance Services     ATTENTION: Language assistance services are available, free of charge. Please call [phone].      ATENCIÓN: Si habla español, tiene a young disposición servicios gratuitos de asistencia lingüística. Llame al [phone].     CHÚ Ý: N?u b?n nói Ti?ng Vi?t, có các d?ch v? h? tr? ngôn ng? mi?n phí dành cho b?n. G?i s? [phone].         [facility] complies with applicable Federal civil rights laws and does not discriminate on the basis of race, color, national origin, age, disability, or sex.

## 2017-05-08 ENCOUNTER — TELEPHONE (OUTPATIENT)
Dept: UROLOGY | Facility: CLINIC | Age: 62
End: 2017-05-08

## 2017-05-10 ENCOUNTER — OFFICE VISIT (OUTPATIENT)
Dept: UROLOGY | Facility: CLINIC | Age: 62
End: 2017-05-10
Payer: MEDICARE

## 2017-05-10 VITALS
WEIGHT: 194 LBS | HEIGHT: 68 IN | SYSTOLIC BLOOD PRESSURE: 131 MMHG | BODY MASS INDEX: 29.4 KG/M2 | TEMPERATURE: 98 F | DIASTOLIC BLOOD PRESSURE: 74 MMHG | HEART RATE: 74 BPM

## 2017-05-10 DIAGNOSIS — N32.81 OAB (OVERACTIVE BLADDER): ICD-10-CM

## 2017-05-10 DIAGNOSIS — N40.1 BPH NOS W UR OBS/LUTS: ICD-10-CM

## 2017-05-10 DIAGNOSIS — R31.0 GROSS HEMATURIA: Primary | ICD-10-CM

## 2017-05-10 PROCEDURE — 99214 OFFICE O/P EST MOD 30 MIN: CPT | Mod: PBBFAC,PO | Performed by: UROLOGY

## 2017-05-10 PROCEDURE — 99999 PR PBB SHADOW E&M-EST. PATIENT-LVL IV: CPT | Mod: PBBFAC,,, | Performed by: UROLOGY

## 2017-05-10 PROCEDURE — 87086 URINE CULTURE/COLONY COUNT: CPT

## 2017-05-10 PROCEDURE — 99214 OFFICE O/P EST MOD 30 MIN: CPT | Mod: S$PBB,,, | Performed by: UROLOGY

## 2017-05-10 PROCEDURE — 81001 URINALYSIS AUTO W/SCOPE: CPT

## 2017-05-10 NOTE — MR AVS SNAPSHOT
[facility]  [address] [place] [address] Ave  [place] [address]  Phone: [phone]                  Leif Ramirez Jr.   5/10/2017 3:45 PM   Office Visit    Description:  Male : 1955   Provider:  Jaylon Avelar MD   Department:  [facility]           Diagnoses this Visit        Comments    Gross hematuria    -  Primary     BPH NOS w ur obs/LUTS         OAB (overactive bladder)                To Do List           Future Appointments        Provider Department Dept Phone    2017 1:30 PM Rian Rico DPM [facility] Podiatry [phone]    2017 1:00 PM Malathi Shelton PA-C [facility] - Spine Services [phone]      Goals (5 Years of Data)     None      Follow-Up and Disposition     Return in about 4 months (around 9/10/2017).      [facility]     [facility] Nurse Care Line -  Assistance  Unless otherwise directed by your provider, please contact [facility], our nurse care line that is available for  assistance.     Registered nurses in the [facility] provide: appointment scheduling, clinical advisement, health education, and other advisory services.  Call: [phone] (toll free)               Medications           Message regarding Medications     Verify the changes and/or additions to your medication regime listed below are the same as discussed with your clinician today.  If any of these changes or additions are incorrect, please notify your healthcare provider.             Verify that the below list of medications is an accurate representation of the medications you are currently taking.  If none reported, the list may be blank. If incorrect, please contact your healthcare provider. Carry this list with you in case of emergency.           Current Medications     albuterol 90 mcg/actuation inhaler Inhale 1 puff into the lungs every 4 (four) hours as needed for Wheezing.    amlodipine (NORVASC) 10 MG tablet Take 1 tablet (10 mg total) by mouth  "once daily.    ERGOCALCIFEROL, VITAMIN D2, (VITAMIN D ORAL) Take 2,000 Units by mouth once daily.     ferrous sulfate 325 mg (65 mg iron) Tab tablet TAKE ONE TABLET BY MOUTH ONCE DAILY WITH LUNCH    fluoxetine (PROZAC) 20 MG capsule Take 1 capsule by mouth once daily.    furosemide (LASIX) 80 MG tablet TK ONE T PO QAM.    gabapentin (NEURONTIN) 300 MG capsule Take 300 mg by mouth 3 (three) times daily.     hydrALAZINE (APRESOLINE) 10 MG tablet TAKE 1 TABLET(10 MG) BY MOUTH THREE TIMES DAILY    hydrocodone-acetaminophen 5-325mg (NORCO) 5-325 mg per tablet Take 1 tablet by mouth 3 (three) times daily as needed for Pain.    lorazepam (ATIVAN) 1 MG tablet TK 1/2 T PO Q 6 H PRF ANXIETY    metoprolol tartrate (LOPRESSOR) 50 MG tablet TK ONE T PO BID.    nicotine (NICODERM CQ) 21 mg/24 hr Place 1 patch onto the skin once daily.    olanzapine (ZYPREXA) 20 MG tablet Take 20 mg by mouth nightly.     olanzapine (ZYPREXA) 5 MG tablet Take 5 mg by mouth once daily.    oxybutynin (DITROPAN) 5 MG Tab Take 1 tablet (5 mg total) by mouth every evening. One tablet before sleep    tamsulosin (FLOMAX) 0.4 mg Cp24 Take 1 capsule (0.4 mg total) by mouth once daily.           Clinical Reference Information           Your Vitals Were     BP Pulse Temp Height Weight BMI    131/74 74 98.4 °F (36.9 °C) 5' 8" (1.727 m) 88 kg (194 lb) 29.5 kg/m2      Blood Pressure          Most Recent Value    BP  131/74      Allergies as of 5/10/2017     Codeine    Depakote [Divalproex]    Haldol [Haloperidol Lactate]    Methadone    Morphine    Naloxone    Opium    Propoxyphene    Stelazine [Trifluoperazine]    Amoxicillin      Immunizations Administered on Date of Encounter - 5/10/2017     None      Maintenance Dialysis History     Patient has no recorded history of maintenance dialysis.      [facility] Sign-Up     Activating your [facility] account is as easy as 1-2-3!     1) Visit [email or web address], select Sign Up Now, enter this activation code and your date " of birth, then select Next.  [email or web address]  Expires: 6/19/2017 10:12 AM      2) Create a username and password to use when you visit [facility] in the future and select a security question in case you lose your password and select Next.    3) Enter your e-mail address and click Sign Up!    Additional Information  If you have questions, please e-mail [email or web address] or call [phone] to talk to our [facility] staff. Remember, [facility] is NOT to be used for urgent needs. For medical emergencies, dial 911.         Smoking Cessation     If you would like to quit smoking:   You may be eligible for free services if you are a [place] resident and started smoking cigarettes before September 1, 1988.  Call the Smoking Cessation Trust ([facility]) toll free at (403) 591-3953 or (535) 027-1490.   Call 4-745-QUIT-NOW if you do not meet the above criteria.   Contact us via email: [email or web address]   View our website for more information: [email or web address]        Language Assistance Services     ATTENTION: Language assistance services are available, free of charge. Please call [phone].      ATENCIÓN: Si mal fernando, tiene a young disposición servicios gratuitos de asistencia lingüística. Llame al [phone].     CHÚ Ý: N?u b?n nói Ti?ng Vi?t, có các d?ch v? h? tr? ngôn ng? mi?n phí dành cho b?n. G?i s? [phone].         [facility] complies with applicable Federal civil rights laws and does not discriminate on the basis of race, color, national origin, age, disability, or sex.

## 2017-05-10 NOTE — PROGRESS NOTES
"This patient was last seen by me several weeks ago at which time he continues to have intermittent gross hematuria.  History of negative cystoscopy for evaluation of gross hematuria and negative noncontrast CT for evaluation of gross hematuria.  CT scan with contrast obtained due to renal failure.  Patient scheduled for cystoscopy with retrograde pyelography however he was a no show.    He now comes in follow-up to further discuss possible workup.    Patient reports no further gross hematuria.  He has no dysuria.  He continues on both tamsulosin oxybutynin which have helped somewhat with his voiding pattern and nocturia and urgency    Physical exam reveals reveals a well-developed well-nourished patient  in no acute distress.  Patient in wheelchair Patient is alert and oriented ×3 with affect consistent with4.  Respiratory effort is normal and there is no peripheral edema.  Skin is normal to inspection and palpation. Penis/perineum without lesions, scrotum without rash/cysts, epididymis nontender bilaterally, urethral meatus in normal location normal size, no penile plaques palpated, prostate:     Smooth and enlarged                  seminal vesicles not palpated.  No rectal masses, sphincter tone normal.  Testes equal in size without masses    /74  Pulse 74  Temp 98.4 °F (36.9 °C)  Ht 5' 8" (1.727 m)  Wt 88 kg (194 lb)  BMI 29.5 kg/m2  Review of Systems  General ROS: negative for chills, fever or weight loss  Respiratory ROS: no cough, shortness of breath, or wheezing  Cardiovascular ROS: no chest pain or dyspnea on exertion  Musculoskeletal ROS: negative for gait disturbance or muscular weakness    Family History   Problem Relation Age of Onset    Hypertension Mother     Diabetes Mother     Transient ischemic attack Mother     Heart disease Mother      stent placement    Arthritis Mother     Heart disease Father     Hypertension Father     Diabetes Father     Stroke Father     Arthritis Sister "     Hypertension Sister     Heart disease Maternal Grandmother     Diabetes Maternal Grandmother     Stroke Maternal Grandmother     Cancer Paternal Grandmother      breast    Heart disease Paternal Grandmother     Cancer Sister      brain    Prostate cancer Neg Hx     Kidney disease Neg Hx      Past Medical History:   Diagnosis Date    Allergy     poison ivy    Anemia     Anxiety     Arthritis     Asthma     Bipolar affective     Cancer     blood and bone    CHF (congestive heart failure)     Chronic hepatitis C with cirrhosis     COPD (chronic obstructive pulmonary disease)     Coronary artery disease     Depression     Diabetes mellitus type II     Disorder of kidney and ureter     Elevated PSA     has had prostate problems but unaware of his psa level    Encounter for blood transfusion     History of back injury     Chronic back pain    Hyperlipidemia     Hypertension     Internal hemorrhoid, bleeding 9/21/2015    Pancytopenia     Schizo affective schizophrenia     Seizures     Stroke     Thyroid disease     Urinary tract infection      Family History   Problem Relation Age of Onset    Hypertension Mother     Diabetes Mother     Transient ischemic attack Mother     Heart disease Mother      stent placement    Arthritis Mother     Heart disease Father     Hypertension Father     Diabetes Father     Stroke Father     Arthritis Sister     Hypertension Sister     Heart disease Maternal Grandmother     Diabetes Maternal Grandmother     Stroke Maternal Grandmother     Cancer Paternal Grandmother      breast    Heart disease Paternal Grandmother     Cancer Sister      brain    Prostate cancer Neg Hx     Kidney disease Neg Hx      Social History   Substance Use Topics    Smoking status: Current Every Day Smoker     Packs/day: 0.50    Smokeless tobacco: Never Used    Alcohol use No       Impression:      ICD-10-CM ICD-9-CM    1. Gross hematuria R31.0 599.71 Urine  culture      Urinalysis Microscopic   2. BPH NOS w ur obs/LUTS N40.1 600.91    3. OAB (overactive bladder) N32.81 596.51        Plan:    #1.  Gross hematuria.  Plan.  I again discussed the rationale for retrograde pyelography to make sure that there is no cancer in his ureters.  Patient voices understanding.  At this point wants to hold on this further evaluation.  Follow-up in 4 months or sooner if develops recurrent gross hematuria    Numbers 2 and 3 BPH and overactive bladder.  Plan.  Continue current medical regimen    PLEASE NOTE:  Please be advised that portions of this note were dictated using voice recognition software and may contain dictation related errors in spelling/grammar/appropriate pronouns/syntax or other errors that might have not been found and or corrected on text review.

## 2017-05-11 DIAGNOSIS — M25.551 BILATERAL HIP PAIN: ICD-10-CM

## 2017-05-11 DIAGNOSIS — G89.29 CHRONIC BILATERAL LOW BACK PAIN WITH LEFT-SIDED SCIATICA: ICD-10-CM

## 2017-05-11 DIAGNOSIS — M25.552 BILATERAL HIP PAIN: ICD-10-CM

## 2017-05-11 DIAGNOSIS — M54.42 CHRONIC BILATERAL LOW BACK PAIN WITH LEFT-SIDED SCIATICA: ICD-10-CM

## 2017-05-11 LAB
BACTERIA #/AREA URNS AUTO: NORMAL /HPF
MICROSCOPIC COMMENT: NORMAL
RBC #/AREA URNS AUTO: 1 /HPF (ref 0–4)
WBC #/AREA URNS AUTO: 0 /HPF (ref 0–5)

## 2017-05-11 RX ORDER — HYDROCODONE BITARTRATE AND ACETAMINOPHEN 5; 325 MG/1; MG/1
1 TABLET ORAL 3 TIMES DAILY PRN
Qty: 90 TABLET | Refills: 0 | Status: SHIPPED | OUTPATIENT
Start: 2017-05-11 | End: 2017-07-17 | Stop reason: SDUPTHER

## 2017-05-11 NOTE — TELEPHONE ENCOUNTER
----- Message from Lynne Pelaez sent at 5/11/2017 12:18 PM CDT -----  Contact: self, [phone]  Patient requests his Hydrocodone medication refilled. Please advise.

## 2017-05-12 LAB — BACTERIA UR CULT: NO GROWTH

## 2017-06-01 ENCOUNTER — TELEPHONE (OUTPATIENT)
Dept: FAMILY MEDICINE | Facility: CLINIC | Age: 62
End: 2017-06-01

## 2017-06-01 NOTE — TELEPHONE ENCOUNTER
----- Message from Génesis Hogan sent at 6/1/2017  2:15 PM CDT -----  Contact: [phone]/self  Patient would like to be seen sooner than the next available appointment. Please advise.

## 2017-06-02 ENCOUNTER — LAB VISIT (OUTPATIENT)
Dept: LAB | Facility: HOSPITAL | Age: 62
End: 2017-06-02
Attending: INTERNAL MEDICINE
Payer: MEDICARE

## 2017-06-02 ENCOUNTER — OFFICE VISIT (OUTPATIENT)
Dept: INTERNAL MEDICINE | Facility: CLINIC | Age: 62
End: 2017-06-02
Payer: MEDICARE

## 2017-06-02 VITALS
OXYGEN SATURATION: 98 % | DIASTOLIC BLOOD PRESSURE: 80 MMHG | HEIGHT: 68 IN | SYSTOLIC BLOOD PRESSURE: 170 MMHG | WEIGHT: 189.63 LBS | HEART RATE: 61 BPM | BODY MASS INDEX: 28.74 KG/M2

## 2017-06-02 DIAGNOSIS — R07.89 ATYPICAL CHEST PAIN: Primary | ICD-10-CM

## 2017-06-02 DIAGNOSIS — R30.0 DYSURIA: ICD-10-CM

## 2017-06-02 DIAGNOSIS — R19.7 DIARRHEA, UNSPECIFIED TYPE: ICD-10-CM

## 2017-06-02 DIAGNOSIS — R10.9 CHRONIC ABDOMINAL PAIN: ICD-10-CM

## 2017-06-02 DIAGNOSIS — G89.29 CHRONIC ABDOMINAL PAIN: ICD-10-CM

## 2017-06-02 LAB
ALBUMIN SERPL BCP-MCNC: 3.6 G/DL
ALP SERPL-CCNC: 91 U/L
ALT SERPL W/O P-5'-P-CCNC: 14 U/L
ANION GAP SERPL CALC-SCNC: 11 MMOL/L
AST SERPL-CCNC: 30 U/L
BACTERIA #/AREA URNS AUTO: NORMAL /HPF
BASOPHILS # BLD AUTO: 0.02 K/UL
BASOPHILS NFR BLD: 0.4 %
BILIRUB SERPL-MCNC: 0.7 MG/DL
BILIRUB UR QL STRIP: NEGATIVE
BUN SERPL-MCNC: 35 MG/DL
CALCIUM SERPL-MCNC: 8.6 MG/DL
CHLORIDE SERPL-SCNC: 110 MMOL/L
CLARITY UR REFRACT.AUTO: CLEAR
CO2 SERPL-SCNC: 21 MMOL/L
COLOR UR AUTO: ABNORMAL
CREAT SERPL-MCNC: 3.5 MG/DL
DIFFERENTIAL METHOD: ABNORMAL
EOSINOPHIL # BLD AUTO: 0.1 K/UL
EOSINOPHIL NFR BLD: 1.3 %
ERYTHROCYTE [DISTWIDTH] IN BLOOD BY AUTOMATED COUNT: 13.5 %
EST. GFR  (AFRICAN AMERICAN): 20.1 ML/MIN/1.73 M^2
EST. GFR  (NON AFRICAN AMERICAN): 17.4 ML/MIN/1.73 M^2
GLUCOSE SERPL-MCNC: 119 MG/DL
GLUCOSE UR QL STRIP: ABNORMAL
HCT VFR BLD AUTO: 32.1 %
HGB BLD-MCNC: 11.5 G/DL
HGB UR QL STRIP: NEGATIVE
HYALINE CASTS UR QL AUTO: 0 /LPF
KETONES UR QL STRIP: NEGATIVE
LEUKOCYTE ESTERASE UR QL STRIP: NEGATIVE
LIPASE SERPL-CCNC: 73 U/L
LYMPHOCYTES # BLD AUTO: 1.1 K/UL
LYMPHOCYTES NFR BLD: 22 %
MCH RBC QN AUTO: 30.1 PG
MCHC RBC AUTO-ENTMCNC: 35.8 %
MCV RBC AUTO: 84 FL
MICROSCOPIC COMMENT: NORMAL
MONOCYTES # BLD AUTO: 0.4 K/UL
MONOCYTES NFR BLD: 8.2 %
NEUTROPHILS # BLD AUTO: 3.3 K/UL
NEUTROPHILS NFR BLD: 68.1 %
NITRITE UR QL STRIP: NEGATIVE
PH UR STRIP: 7 [PH] (ref 5–8)
PLATELET # BLD AUTO: 104 K/UL
PMV BLD AUTO: 9.3 FL
POTASSIUM SERPL-SCNC: 3.8 MMOL/L
PROT SERPL-MCNC: 6.6 G/DL
PROT UR QL STRIP: ABNORMAL
RBC # BLD AUTO: 3.82 M/UL
RBC #/AREA URNS AUTO: 2 /HPF (ref 0–4)
SODIUM SERPL-SCNC: 142 MMOL/L
SP GR UR STRIP: 1.01 (ref 1–1.03)
URN SPEC COLLECT METH UR: ABNORMAL
UROBILINOGEN UR STRIP-ACNC: NEGATIVE EU/DL
WBC # BLD AUTO: 4.78 K/UL
WBC #/AREA URNS AUTO: 1 /HPF (ref 0–5)

## 2017-06-02 PROCEDURE — 99214 OFFICE O/P EST MOD 30 MIN: CPT | Mod: S$PBB,,, | Performed by: INTERNAL MEDICINE

## 2017-06-02 PROCEDURE — 80053 COMPREHEN METABOLIC PANEL: CPT

## 2017-06-02 PROCEDURE — 93010 ELECTROCARDIOGRAM REPORT: CPT | Mod: ,,, | Performed by: INTERNAL MEDICINE

## 2017-06-02 PROCEDURE — 93005 ELECTROCARDIOGRAM TRACING: CPT | Mod: ,,, | Performed by: INTERNAL MEDICINE

## 2017-06-02 PROCEDURE — 83690 ASSAY OF LIPASE: CPT

## 2017-06-02 PROCEDURE — 36415 COLL VENOUS BLD VENIPUNCTURE: CPT | Mod: PO

## 2017-06-02 PROCEDURE — 99999 PR PBB SHADOW E&M-EST. PATIENT-LVL IV: CPT | Mod: PBBFAC,,, | Performed by: INTERNAL MEDICINE

## 2017-06-02 PROCEDURE — 85025 COMPLETE CBC W/AUTO DIFF WBC: CPT

## 2017-06-02 NOTE — PROGRESS NOTES
Portions of this note are generated with voice recognition software. Typographical errors may exist.     SUBJECTIVE:    This is a/an 62 y.o. male here for primary care visit for  Chief Complaint   Patient presents with    Abdominal Pain     Patient comes in with multiple somatic complaints today.  Very unfocused.  With redirection the patient states that abdominal symptoms are really the most troublesome.  States that the abdominal symptom is diffuse.  Describes bloating and pain episodic in nature.  Also describes having irregular bowel movements.  States that he has watery bowel movements but that this is not consistent.  States that this morning he had a formed bowel movement but 2 days ago it was watery.  Patient offers very few additional symptoms until asked closed ended question in which case he gives consistent positive answers.  He does believe he is having fevers.  He is inconsistent about the course of febrile symptoms.  Overall the patient states that he has a sense of malaise.  States that he is receiving treatment for depression and previous problems with hopelessness and suicidal ideation.  He denies active suicidal ideation recently.     Patient states that abdominal symptoms happen off and on and he cannot think of any specific prandial effects.  Does not seem to happen with exertion.  Overall he is sedentary and stays in a wheelchair.  Patient relates lot of past medical history tangentially related to current conversation.  States that he had a stroke in the past and has persistent numbness along the left side of his body.  States that this is not a new issue.  States that he has chronic cough related to smoking.  He struggles to relate if this is any different than usual.      Medications Reviewed and Updated    Past medical, family, and social histories were reviewed and updated.    Review of Systems negative unless otherwise noted in history of present illness-  General ROS:  negative  Psychological ROS: negative  ENT ROS: negative  Allergy and Immunology ROS: negative  Cardiovascular ROS: negative  Gastrointestinal ROS: negative  Genito-Urinary ROS: negative  Musculoskeletal ROS: negative  Neurological ROS: negative  Dermatological ROS: negative      Allergic:    Review of patient's allergies indicates:   Allergen Reactions    Codeine      Other reaction(s): Nausea    Depakote [divalproex] Other (See Comments)     Thrombocytopenia    Haldol [haloperidol lactate] Other (See Comments)     Seizures    Methadone     Morphine     Naloxone     Opium     Propoxyphene     Stelazine [trifluoperazine]     Amoxicillin Rash       OBJECTIVE:  BP: (!) 170/80 Pulse: 61    Wt Readings from Last 3 Encounters:   06/02/17 86 kg (189 lb 9.5 oz)   05/10/17 88 kg (194 lb)   05/05/17 88.3 kg (194 lb 10.7 oz)    Body mass index is 28.83 kg/m².  Previous Blood Pressure Readings :   BP Readings from Last 3 Encounters:   06/02/17 (!) 170/80   05/10/17 131/74   05/05/17 130/79       GEN: No apparent distress  HEENT: sclera non-icteric, conjunctiva clear  CV: no peripheral edema  PULM: breathing non-labored  ABD: Obese, protuberant abdomen.  Supple.  No point tenderness.  Normal active bowel sounds.  PSYCH: Depressed mood.  Very circumstantial.  MSK: able to rise from chair with assistance.  Wheelchair  SKIN: normal skin turgor    Pertinent Labs Reviewed       ASSESSMENT/PLAN:    Atypical chest pain.  EKG reassuring.  Acute coronary syndrome less likely. patient advised if symptoms change or intensify to seek care in the nearest [facility] health center   -     EKG 12-lead; Future    Dysuria.  Further evaluation warranted.  Patient lost to follow-up from urology.  Recommend follow-up with urology  -     URINALYSIS  -     Urine culture    Diarrhea, unspecified type.  Inconsistent history.  Unsure if infectious diarrheal process likely.   -     Clostridium difficile EIA; Future; Expected date:  06/02/2017    Chronic abdominal pain.  Etiology unclear.  Functional component suspected.  Laboratory studies can help to substantiate if organic cause.  Recommend close follow-up with PCP.     Comprehensive metabolic panel; Future; Expected date: 06/02/2017  -     Lipase; Future; Expected date: 06/02/2017  -     CBC auto differential; Future; Expected date: 06/02/2017          Future Appointments  Date Time Provider Department Center   6/7/2017 1:30 PM Rian Rico DPM [facility]   6/8/2017 3:00 PM Yandel S. Artto [facility]   6/14/2017 2:40 PM Wale Mcguire MD [facility]   6/15/2017 3:00 PM Vincent S. Arttuso [facility]   6/16/2017 1:00 PM Malathi Shelton PA-C [facility]   6/22/2017 3:00 PM Vincent S. Guttuso [facility]   6/29/2017 3:00 PM Vincent S. Arttuso [facility]   9/13/2017 3:30 PM Jaylon Avelar MD [facility]OLOG[facility]       Sivakumar Crenshaw  6/2/2017  8:28 AM

## 2017-06-03 LAB — BACTERIA UR CULT: NO GROWTH

## 2017-06-05 ENCOUNTER — LAB VISIT (OUTPATIENT)
Dept: LAB | Facility: HOSPITAL | Age: 62
End: 2017-06-05
Attending: INTERNAL MEDICINE
Payer: MEDICARE

## 2017-06-05 DIAGNOSIS — R19.7 DIARRHEA, UNSPECIFIED TYPE: ICD-10-CM

## 2017-06-05 PROCEDURE — 87209 SMEAR COMPLEX STAIN: CPT

## 2017-06-06 LAB — O+P STL TRI STN: NORMAL

## 2017-06-07 ENCOUNTER — OFFICE VISIT (OUTPATIENT)
Dept: PODIATRY | Facility: CLINIC | Age: 62
End: 2017-06-07
Payer: MEDICARE

## 2017-06-07 VITALS
BODY MASS INDEX: 28.64 KG/M2 | HEIGHT: 68 IN | SYSTOLIC BLOOD PRESSURE: 142 MMHG | DIASTOLIC BLOOD PRESSURE: 79 MMHG | WEIGHT: 189 LBS | HEART RATE: 80 BPM

## 2017-06-07 DIAGNOSIS — M20.42 HAMMER TOES OF BOTH FEET: ICD-10-CM

## 2017-06-07 DIAGNOSIS — E11.69 ONYCHOMYCOSIS OF MULTIPLE TOENAILS WITH TYPE 2 DIABETES MELLITUS AND PERIPHERAL ANGIOPATHY: ICD-10-CM

## 2017-06-07 DIAGNOSIS — L84 TYPE 2 DIABETES MELLITUS WITH PRESSURE CALLUS: ICD-10-CM

## 2017-06-07 DIAGNOSIS — E11.42 DIABETIC POLYNEUROPATHY ASSOCIATED WITH TYPE 2 DIABETES MELLITUS: Primary | ICD-10-CM

## 2017-06-07 DIAGNOSIS — M20.41 HAMMER TOES OF BOTH FEET: ICD-10-CM

## 2017-06-07 DIAGNOSIS — B35.1 ONYCHOMYCOSIS OF MULTIPLE TOENAILS WITH TYPE 2 DIABETES MELLITUS AND PERIPHERAL ANGIOPATHY: ICD-10-CM

## 2017-06-07 DIAGNOSIS — E11.51 ONYCHOMYCOSIS OF MULTIPLE TOENAILS WITH TYPE 2 DIABETES MELLITUS AND PERIPHERAL ANGIOPATHY: ICD-10-CM

## 2017-06-07 DIAGNOSIS — Z86.31 PERSONAL HISTORY OF DIABETIC FOOT ULCER: ICD-10-CM

## 2017-06-07 DIAGNOSIS — N18.5 CHRONIC KIDNEY DISEASE (CKD), STAGE V: ICD-10-CM

## 2017-06-07 DIAGNOSIS — E11.628 TYPE 2 DIABETES MELLITUS WITH PRESSURE CALLUS: ICD-10-CM

## 2017-06-07 PROCEDURE — 11056 PARNG/CUTG B9 HYPRKR LES 2-4: CPT | Mod: S$PBB,Q9,, | Performed by: PODIATRIST

## 2017-06-07 PROCEDURE — 99499 UNLISTED E&M SERVICE: CPT | Mod: S$PBB,,, | Performed by: PODIATRIST

## 2017-06-07 PROCEDURE — 11721 DEBRIDE NAIL 6 OR MORE: CPT | Mod: PBBFAC,PO | Performed by: PODIATRIST

## 2017-06-07 PROCEDURE — 99213 OFFICE O/P EST LOW 20 MIN: CPT | Mod: PBBFAC,PO | Performed by: PODIATRIST

## 2017-06-07 PROCEDURE — 11721 DEBRIDE NAIL 6 OR MORE: CPT | Mod: S$PBB,59,Q9, | Performed by: PODIATRIST

## 2017-06-07 PROCEDURE — 99999 PR PBB SHADOW E&M-EST. PATIENT-LVL III: CPT | Mod: PBBFAC,,, | Performed by: PODIATRIST

## 2017-06-07 PROCEDURE — 11056 PARNG/CUTG B9 HYPRKR LES 2-4: CPT | Mod: PBBFAC,PO | Performed by: PODIATRIST

## 2017-06-07 NOTE — PROGRESS NOTES
Subjective:      Patient ID: Leif Ramirez Jr. is a 62 y.o. male.    Chief Complaint: Diabetic Foot Exam    Leif is a 62 y.o. male who presents to the clinic for evaluation and treatment of high risk feet. Leif has a past medical history of Allergy; Anemia; Anxiety; Arthritis; Asthma; Bipolar affective; Cancer; CHF (congestive heart failure); Chronic hepatitis C with cirrhosis; COPD (chronic obstructive pulmonary disease); Coronary artery disease; Depression; Diabetes mellitus type II; Disorder of kidney and ureter; Elevated PSA; Encounter for blood transfusion; History of back injury; Hyperlipidemia; Hypertension; Internal hemorrhoid, bleeding (9/21/2015); Pancytopenia; Schizo affective schizophrenia; Seizures; Stroke; Thyroid disease; and Urinary tract infection. The patient's chief complaint is long, thick toenails. Complains of recurrent blisters at soles of feet. History of right DFU, no recent wounds.  This patient has documented high risk feet requiring routine maintenance secondary to peripheral neuropathy.    PCP: Wale Mcguire MD    Date Last Seen by PCP: 5/5/17        Hemoglobin A1C   Date Value Ref Range Status   04/15/2015 4.7 4.5 - 6.2 % Final   12/18/2012 4.8 4.0 - 6.2 % Final   10/15/2012 text % Final     Comment:     Hemoglobin A1C:   3.7        Review of Systems   Constitution: Negative for chills, fever and malaise/fatigue.   Cardiovascular: Negative for chest pain, leg swelling, orthopnea and palpitations.   Respiratory: Negative for cough, shortness of breath and wheezing.    Skin: Positive for color change, dry skin and nail changes. Negative for itching, poor wound healing and rash.   Musculoskeletal: Negative for arthritis, gout, joint pain, joint swelling, muscle weakness and myalgias.   Neurological: Positive for disturbances in coordination, numbness, paresthesias and sensory change. Negative for dizziness, focal weakness and tremors.           Objective:      Physical Exam    Cardiovascular:   Dorsalis pedis and posterior tibial pulses are diminished Bilaterally. Toes are cool to touch. Feet are warm proximally.There is decreased digital hair. Skin is atrophic, slightly hyperpigmented, and mildly edematous       Musculoskeletal:   Musculoskeletal:  Muscle strength is 5/5 in all groups bilaterally.  Metatarsophalangeal and subtalar range of motion are within normal limits without crepitus bilaterally. There is limitation of ankle dorsiflexion with knees extended and flexed Bilaterally.    Reducible extensor and flexor contractures at the MTPJ and PIPJ of toes 2-5, bilat.          Neurological:   Concrete-Hai 5.07 monofilamant testing is diminished both feet. Sharp/dull sensation diminished Bilaterally. Light touch absent Bilaterally.       Skin:   Toenails 1-5 bilaterally are elongated by 2-3 mm, thickened by 2-3 mm, discolored/yellowed, dystrophic, brittle with subungual debris. No incurvation. Mild xerosis noted, bilat. No open wounds.      Hyperkeratotic lesions at the following locations:   left plantar 2nd toe and plantar 2nd MTPJ, Right.                 Assessment:       Encounter Diagnoses   Name Primary?    Diabetic polyneuropathy associated with type 2 diabetes mellitus Yes    Hammer toes of both feet     Chronic kidney disease (CKD), stage V     Onychomycosis of multiple toenails with type 2 diabetes mellitus and peripheral angiopathy     Type 2 diabetes mellitus with pressure callus     Personal history of diabetic foot ulcer - Right Foot          Plan:       Leif was seen today for diabetic foot exam.    Diagnoses and all orders for this visit:    Diabetic polyneuropathy associated with type 2 diabetes mellitus  -     DIABETIC SHOES FOR HOME USE    Hammer toes of both feet  -     DIABETIC SHOES FOR HOME USE    Chronic kidney disease (CKD), stage V  -     DIABETIC SHOES FOR HOME USE    Onychomycosis of multiple toenails with type 2 diabetes mellitus and  peripheral angiopathy  -     DIABETIC SHOES FOR HOME USE    Type 2 diabetes mellitus with pressure callus  -     DIABETIC SHOES FOR HOME USE    Personal history of diabetic foot ulcer - Right Foot  -     DIABETIC SHOES FOR HOME USE      I counseled the patient on his conditions, their implications and medical management.        - Shoe inspection. Diabetic Foot Education. Patient reminded of the importance of good nutrition and blood sugar control to help prevent podiatric complications of diabetes. Patient instructed on proper foot hygeine. We discussed wearing proper shoe gear, daily foot inspections, never walking without protective shoe gear, never putting sharp instruments to feet.     - After cleansing the  area w/ alcohol prep pad the above mentioned hyperkeratosis was trimmed utilizing No 15 scapel, to a smooth base with out incident. Patient tolerated this  well and reported comfort to the area of left plantar 2nd toe and plantar 2nd MTPJ, Right.    - With patient's permission, nails were aggressively reduced and debrided x 10 to their soft tissue attachment mechanically and with electric , removing all offending nail and debris. Patient relates relief following the procedure. He will continue to monitor the areas daily, inspect his feet, wear protective shoe gear when ambulatory, moisturizer to maintain skin integrity and follow in this office in approximately 4-6 months, sooner p.r.n.    - Discussed importance of supportive shoes with accommodative toe box to reduce pressure and irritation to forefoot

## 2017-06-08 ENCOUNTER — CLINICAL SUPPORT (OUTPATIENT)
Dept: SMOKING CESSATION | Facility: CLINIC | Age: 62
End: 2017-06-08
Payer: COMMERCIAL

## 2017-06-08 DIAGNOSIS — F17.200 NICOTINE DEPENDENCE: ICD-10-CM

## 2017-06-08 DIAGNOSIS — J44.1 COPD EXACERBATION: ICD-10-CM

## 2017-06-08 PROCEDURE — 99402 PREV MED CNSL INDIV APPRX 30: CPT | Mod: S$GLB,,,

## 2017-06-08 PROCEDURE — 99999 PR PBB SHADOW E&M-EST. PATIENT-LVL I: CPT | Mod: PBBFAC,,,

## 2017-06-08 RX ORDER — ALBUTEROL SULFATE 90 UG/1
1 AEROSOL, METERED RESPIRATORY (INHALATION) EVERY 4 HOURS PRN
Qty: 1 INHALER | Refills: 1 | Status: SHIPPED | OUTPATIENT
Start: 2017-06-08 | End: 2018-05-23 | Stop reason: SDUPTHER

## 2017-06-08 NOTE — PROGRESS NOTES
"Individual Follow-Up Form    6/8/2017    Clinical Status of Patient: Outpatient    Length of Service: 15 minutes    Continuing Medication: yes  Patches    Other Medications: none     Target Symptoms: Withdrawal and medication side effects. The following were  rated moderate (3) to severe (4) on TCRS:  · Moderate (3): desire/crave tobacco  · Severe (4): none    Comments:  Pt seen in office today. He continues to smoke 8 cigs/day. Pt remains on tobacco cessation medication of 21 mg nicotine patch QD and nicotine nasal spray PRN (1-2 per hour in place of cigarettes). He is not currently using nasal spray due to "burning" sensation. Pt asked me to refill his albuterol inhaler. I explained to him that I was not allowed to do that. Advised him to call his PCP for refill. No adverse effects/mental changes noted at this time. Pt asked to reduce current smoking rate by 2 cigs/day. Reviewed coping strategies/habitual behavior/relapse prevention with patient. Exhaled carbon monoxide level was 6 ppm per Smokerlyzer (0-6 non-smoker). Will see pt back in office in 1 wk.     Diagnosis: F17.200    Next Visit: 1 week  "

## 2017-06-08 NOTE — Clinical Note
"Pt seen in office today. He continues to smoke 8 cigs/day. Pt remains on tobacco cessation medication of 21 mg nicotine patch QD and nicotine nasal spray PRN (1-2 per hour in place of cigarettes). He is not currently using nasal spray due to "burning" sensation. Pt asked me to refill his albuterol inhaler. I explained to him that I was not allowed to do that. Advised him to call his PCP for refill. No adverse effects/mental changes noted at this time. Pt asked to reduce current smoking rate by 2 cigs/day. Reviewed coping strategies/habitual behavior/relapse prevention with patient. Exhaled carbon monoxide level was 6 ppm per Smokerlyzer (0-6 non-smoker). Will see pt back in office in 1 wk.  "

## 2017-06-09 ENCOUNTER — TELEPHONE (OUTPATIENT)
Dept: ADMINISTRATIVE | Facility: OTHER | Age: 62
End: 2017-06-09

## 2017-06-14 ENCOUNTER — TELEPHONE (OUTPATIENT)
Dept: FAMILY MEDICINE | Facility: CLINIC | Age: 62
End: 2017-06-14

## 2017-06-14 NOTE — TELEPHONE ENCOUNTER
----- Message from Soniya Cordova sent at 6/14/2017  3:04 PM CDT -----  Contact: [facility]/[phone]  Patient said he needs to reschedule his appointment that was on 6/14/17. Please advise

## 2017-06-15 ENCOUNTER — HOSPITAL ENCOUNTER (EMERGENCY)
Facility: HOSPITAL | Age: 62
Discharge: PSYCHIATRIC OR SUBSTANCE USE FACILITY | End: 2017-06-15
Attending: EMERGENCY MEDICINE
Payer: MEDICARE

## 2017-06-15 VITALS
BODY MASS INDEX: 28.79 KG/M2 | DIASTOLIC BLOOD PRESSURE: 78 MMHG | HEIGHT: 68 IN | RESPIRATION RATE: 20 BRPM | SYSTOLIC BLOOD PRESSURE: 170 MMHG | OXYGEN SATURATION: 97 % | TEMPERATURE: 96 F | WEIGHT: 190 LBS | HEART RATE: 77 BPM

## 2017-06-15 DIAGNOSIS — F32.A DEPRESSION WITH SUICIDAL IDEATION: Primary | ICD-10-CM

## 2017-06-15 DIAGNOSIS — F32.A FATIGUE DUE TO DEPRESSION: ICD-10-CM

## 2017-06-15 DIAGNOSIS — N18.4 CKD (CHRONIC KIDNEY DISEASE) STAGE 4, GFR 15-29 ML/MIN: ICD-10-CM

## 2017-06-15 DIAGNOSIS — D64.9 ANEMIA, UNSPECIFIED TYPE: ICD-10-CM

## 2017-06-15 DIAGNOSIS — R53.83 FATIGUE DUE TO DEPRESSION: ICD-10-CM

## 2017-06-15 DIAGNOSIS — R45.851 DEPRESSION WITH SUICIDAL IDEATION: Primary | ICD-10-CM

## 2017-06-15 LAB
ALBUMIN SERPL BCP-MCNC: 3.3 G/DL
ALP SERPL-CCNC: 63 U/L
ALT SERPL W/O P-5'-P-CCNC: 10 U/L
AMPHET+METHAMPHET UR QL: NEGATIVE
ANION GAP SERPL CALC-SCNC: 9 MMOL/L
APAP SERPL-MCNC: <3 UG/ML
AST SERPL-CCNC: 15 U/L
BACTERIA #/AREA URNS HPF: NORMAL /HPF
BARBITURATES UR QL SCN>200 NG/ML: NEGATIVE
BASOPHILS # BLD AUTO: 0.01 K/UL
BASOPHILS NFR BLD: 0.1 %
BENZODIAZ UR QL SCN>200 NG/ML: NEGATIVE
BILIRUB SERPL-MCNC: 0.8 MG/DL
BILIRUB UR QL STRIP: NEGATIVE
BUN SERPL-MCNC: 40 MG/DL
BZE UR QL SCN: NEGATIVE
CALCIUM SERPL-MCNC: 9.2 MG/DL
CANNABINOIDS UR QL SCN: NEGATIVE
CHLORIDE SERPL-SCNC: 106 MMOL/L
CLARITY UR: CLEAR
CO2 SERPL-SCNC: 25 MMOL/L
COLOR UR: YELLOW
CREAT SERPL-MCNC: 3.6 MG/DL
CREAT UR-MCNC: 26.9 MG/DL
DIFFERENTIAL METHOD: ABNORMAL
EOSINOPHIL # BLD AUTO: 0.1 K/UL
EOSINOPHIL NFR BLD: 1.3 %
ERYTHROCYTE [DISTWIDTH] IN BLOOD BY AUTOMATED COUNT: 13.7 %
EST. GFR  (AFRICAN AMERICAN): 19 ML/MIN/1.73 M^2
EST. GFR  (NON AFRICAN AMERICAN): 17 ML/MIN/1.73 M^2
ETHANOL SERPL-MCNC: <10 MG/DL
GLUCOSE SERPL-MCNC: 105 MG/DL
GLUCOSE UR QL STRIP: ABNORMAL
HCT VFR BLD AUTO: 32.2 %
HGB BLD-MCNC: 11.3 G/DL
HGB UR QL STRIP: ABNORMAL
HYALINE CASTS #/AREA URNS LPF: 0 /LPF
KETONES UR QL STRIP: NEGATIVE
LEUKOCYTE ESTERASE UR QL STRIP: NEGATIVE
LYMPHOCYTES # BLD AUTO: 0.9 K/UL
LYMPHOCYTES NFR BLD: 11.8 %
MCH RBC QN AUTO: 29.4 PG
MCHC RBC AUTO-ENTMCNC: 35.1 %
MCV RBC AUTO: 84 FL
METHADONE UR QL SCN>300 NG/ML: NEGATIVE
MICROSCOPIC COMMENT: NORMAL
MONOCYTES # BLD AUTO: 0.6 K/UL
MONOCYTES NFR BLD: 8.3 %
NEUTROPHILS # BLD AUTO: 5.8 K/UL
NEUTROPHILS NFR BLD: 78.4 %
NITRITE UR QL STRIP: NEGATIVE
OPIATES UR QL SCN: NORMAL
PCP UR QL SCN>25 NG/ML: NEGATIVE
PH UR STRIP: 7 [PH] (ref 5–8)
PLATELET # BLD AUTO: 99 K/UL
PMV BLD AUTO: 8.5 FL
POCT GLUCOSE: 112 MG/DL (ref 70–110)
POTASSIUM SERPL-SCNC: 4 MMOL/L
PROT SERPL-MCNC: 6.7 G/DL
PROT UR QL STRIP: ABNORMAL
RBC # BLD AUTO: 3.84 M/UL
RBC #/AREA URNS HPF: 0 /HPF (ref 0–4)
SODIUM SERPL-SCNC: 140 MMOL/L
SP GR UR STRIP: 1.01 (ref 1–1.03)
TOXICOLOGY INFORMATION: NORMAL
TSH SERPL DL<=0.005 MIU/L-ACNC: 1.33 UIU/ML
URN SPEC COLLECT METH UR: ABNORMAL
UROBILINOGEN UR STRIP-ACNC: NEGATIVE EU/DL
WBC # BLD AUTO: 7.44 K/UL
WBC #/AREA URNS HPF: 0 /HPF (ref 0–5)

## 2017-06-15 PROCEDURE — 82962 GLUCOSE BLOOD TEST: CPT

## 2017-06-15 PROCEDURE — 93005 ELECTROCARDIOGRAM TRACING: CPT

## 2017-06-15 PROCEDURE — 84443 ASSAY THYROID STIM HORMONE: CPT

## 2017-06-15 PROCEDURE — 99285 EMERGENCY DEPT VISIT HI MDM: CPT | Mod: 25

## 2017-06-15 PROCEDURE — 80053 COMPREHEN METABOLIC PANEL: CPT

## 2017-06-15 PROCEDURE — 25000003 PHARM REV CODE 250: Performed by: EMERGENCY MEDICINE

## 2017-06-15 PROCEDURE — 85025 COMPLETE CBC W/AUTO DIFF WBC: CPT

## 2017-06-15 PROCEDURE — 80320 DRUG SCREEN QUANTALCOHOLS: CPT

## 2017-06-15 PROCEDURE — 81000 URINALYSIS NONAUTO W/SCOPE: CPT

## 2017-06-15 PROCEDURE — 80329 ANALGESICS NON-OPIOID 1 OR 2: CPT

## 2017-06-15 PROCEDURE — 80307 DRUG TEST PRSMV CHEM ANLYZR: CPT

## 2017-06-15 RX ORDER — HYDROCODONE BITARTRATE AND ACETAMINOPHEN 5; 325 MG/1; MG/1
1 TABLET ORAL
Status: COMPLETED | OUTPATIENT
Start: 2017-06-15 | End: 2017-06-15

## 2017-06-15 RX ADMIN — HYDROCODONE BITARTRATE AND ACETAMINOPHEN 1 TABLET: 5; 325 TABLET ORAL at 09:06

## 2017-06-15 NOTE — ED NOTES
"Pt reports that over the past few weeks he has been having generalized weakness/fatigue, not wanting/able to get off of the sofa. Pt expresses severe depression with passive SI, says that he prays to GOD "to just take me" and "end it." Pt denies active SI and HI. States has no support from family and no friends. Reports that he lives alone and d/t physical disability he is unable to go to the store and sometimes goes days without eating. Previous attempts to obtain assistance or personal care worker have failed. Sister at bedside states that the family would like pt to stay at home, but she already cares for their elderly mother and is unable to help her brother at the same time. Pt is AAO, VSS. Poor eye contact and depressed mood, blunted affect.    APPEARANCE: Alert, oriented. Poor eye contact.  CARDIAC: Normal rate and rhythm.  PERIPHERAL VASCULAR: peripheral pulses present. Normal cap refill. Warm to touch.    RESPIRATORY:Normal rate and effort. Respirations are equal and unlabored no obvious signs of distress.  GASTRO: soft, no tenderness, no abdominal distention.  MUSC: Full ROM. No bony tenderness or soft tissue tenderness. No obvious deformity. +generalized weakness/fatigue.  SKIN: Skin is warm and dry, normal skin turgor.  NEURO: 5/5 strength major flexors/extensors bilaterally. Bobbi coma scale: eyes open spontaneously-4, oriented & converses-5, obeys commands-6. No neurological abnormalities.   MENTAL STATUS: awake, alert and aware of environment.         "

## 2017-06-15 NOTE — ED NOTES
Per PA, pt is not being PEC'd at this time and does not require at sitter. Pt agreed to wait to see Dr. Dc for psyc evaluation.

## 2017-06-15 NOTE — ED PROVIDER NOTES
Encounter Date: 6/15/2017       History     Chief Complaint   Patient presents with    Fatigue     has been feeling depressed for the past week, not wanting to get out of bed and feeling fatigue. Denies SI and/or HI.      Review of patient's allergies indicates:   Allergen Reactions    Codeine      Other reaction(s): Nausea    Depakote [divalproex] Other (See Comments)     Thrombocytopenia    Haldol [haloperidol lactate] Other (See Comments)     Seizures    Methadone     Morphine     Naloxone     Opium     Propoxyphene     Stelazine [trifluoperazine]     Amoxicillin Rash     Leif DANN Ramirez Jr. 62 y.o. Male with PMH of bipolar, schizoaffective and depression presented to the ED with c/o  Fatigue for the past several months. He expresses dysphoric mood for some time due to other comorbidities and inability to care for him self any longer with no reported help due to family health issues and monetary reasons.       The history is provided by the patient and a relative.   Mental Health Problem   The primary symptoms include dysphoric mood, negative symptoms and somatic symptoms. The primary symptoms do not include hallucinations. The current episode started several weeks ago. This is a chronic problem.   The dysphoric mood began more than 2 weeks ago. The mood has been worsening since its onset. He characterizes the problem as moderate. The mood includes feelings of sadness and emptiness.   The negative symptoms began more than 1 month ago. The negative symptoms include anhedonia and avolition.   The somatic symptoms began more than 1 month ago. The somatic symptoms have been unchanged since their onset. Somatic symptoms include fatigue. Somatic symptoms do not include headaches, back pain or myalgias.   The onset of the illness is precipitated by emotional stress. The degree of incapacity that he is experiencing as a consequence of his illness is severe. Sequelae of the illness include an inability to work  and an inability to care for self. Additional symptoms of the illness include insomnia, appetite change, unexpected weight change, fatigue and feelings of worthlessness. Additional symptoms of the illness do not include no agitation or no headaches. He admits to suicidal ideas. He does not have a plan to commit suicide. He has not already injured self. He has not already  injured another person. Risk factors that are present for mental illness include a history of mental illness, substance abuse and a family history of mental illness.     Past Medical History:   Diagnosis Date    Allergy     poison ivy    Anemia     Anxiety     Arthritis     Asthma     Bipolar affective     Cancer     blood and bone    CHF (congestive heart failure)     Chronic hepatitis C with cirrhosis     COPD (chronic obstructive pulmonary disease)     Coronary artery disease     Depression     Diabetes mellitus type II     Disorder of kidney and ureter     Elevated PSA     has had prostate problems but unaware of his psa level    Encounter for blood transfusion     History of back injury     Chronic back pain    Hyperlipidemia     Hypertension     Internal hemorrhoid, bleeding 9/21/2015    Pancytopenia     Schizo affective schizophrenia     Seizures     Stroke     Thyroid disease     Urinary tract infection      Past Surgical History:   Procedure Laterality Date    ELBOW BURSA SURGERY      JOINT REPLACEMENT      ORIF FEMUR FRACTURE Right 8/2015     Family History   Problem Relation Age of Onset    Hypertension Mother     Diabetes Mother     Transient ischemic attack Mother     Heart disease Mother      stent placement    Arthritis Mother     Heart disease Father     Hypertension Father     Diabetes Father     Stroke Father     Arthritis Sister     Hypertension Sister     Heart disease Maternal Grandmother     Diabetes Maternal Grandmother     Stroke Maternal Grandmother     Cancer Paternal Grandmother       breast    Heart disease Paternal Grandmother     Cancer Sister      brain    Prostate cancer Neg Hx     Kidney disease Neg Hx      Social History   Substance Use Topics    Smoking status: Current Every Day Smoker     Packs/day: 0.50    Smokeless tobacco: Never Used    Alcohol use No     Review of Systems   Constitutional: Positive for activity change, appetite change, fatigue and unexpected weight change. Negative for chills and fever.   HENT: Negative for sore throat.    Eyes: Negative for visual disturbance.   Respiratory: Negative for chest tightness and shortness of breath.    Cardiovascular: Negative for chest pain.   Gastrointestinal: Negative for nausea and vomiting.   Genitourinary: Negative for dysuria.   Musculoskeletal: Negative for arthralgias, back pain and myalgias.   Skin: Negative for color change and rash.   Neurological: Negative for dizziness, weakness, light-headedness and headaches.   Hematological: Does not bruise/bleed easily.   Psychiatric/Behavioral: Positive for dysphoric mood, sleep disturbance and suicidal ideas. Negative for agitation, confusion, hallucinations and self-injury. The patient has insomnia.        Physical Exam     Initial Vitals [06/15/17 1313]   BP Pulse Resp Temp SpO2   (!) 172/99 76 16 98.3 °F (36.8 °C) 100 %     Physical Exam    Nursing note and vitals reviewed.  Constitutional: Vital signs are normal. He appears well-developed and well-nourished. He is cooperative.  Non-toxic appearance. He does not appear ill.   HENT:   Head: Normocephalic and atraumatic.   Eyes: Conjunctivae and lids are normal.   Neck: Normal range of motion. Neck supple.   Cardiovascular: Normal rate and regular rhythm.   Abdominal: Soft. Normal appearance.   Neurological: He is alert and oriented to person, place, and time. GCS eye subscore is 4. GCS verbal subscore is 5. GCS motor subscore is 6.   Skin: Skin is warm, dry and intact. No rash noted.   Psychiatric: His speech is normal.  Judgment normal. He is withdrawn. Cognition and memory are normal. He exhibits a depressed mood. He expresses suicidal ideation. He expresses no homicidal ideation. He expresses no suicidal plans and no homicidal plans.         ED Course   Procedures  Labs Reviewed   CBC W/ AUTO DIFFERENTIAL - Abnormal; Notable for the following:        Result Value    RBC 3.84 (*)     Hemoglobin 11.3 (*)     Hematocrit 32.2 (*)     Platelets 99 (*)     MPV 8.5 (*)     Lymph # 0.9 (*)     Gran% 78.4 (*)     Lymph% 11.8 (*)     All other components within normal limits   COMPREHENSIVE METABOLIC PANEL - Abnormal; Notable for the following:     BUN, Bld 40 (*)     Creatinine 3.6 (*)     Albumin 3.3 (*)     eGFR if  19 (*)     eGFR if non  17 (*)     All other components within normal limits   URINALYSIS - Abnormal; Notable for the following:     Protein, UA 3+ (*)     Glucose, UA Trace (*)     Occult Blood UA 1+ (*)     All other components within normal limits   ACETAMINOPHEN LEVEL - Abnormal; Notable for the following:     Acetaminophen (Tylenol), Serum <3.0 (*)     All other components within normal limits   POCT GLUCOSE - Abnormal; Notable for the following:     POCT Glucose 112 (*)     All other components within normal limits   DRUG SCREEN PANEL, URINE EMERGENCY   ALCOHOL,MEDICAL (ETHANOL)   URINALYSIS MICROSCOPIC   TSH         Imaging Results          X-Ray Chest PA And Lateral (Final result)  Result time 06/15/17 14:27:27    Final result by Bertin Armstrong MD (06/15/17 14:27:27)                 Impression:     As above.      Electronically signed by: BERTIN ARMSTRONG MD  Date:     06/15/17  Time:    14:27              Narrative:    Chest PA lateral.    Findings: 2 views.  The lungs are clear.  There is no pneumothorax or pleural fluid.  The cardiac silhouette is not enlarged.  There is calcification of the aorta.  The osseous structures demonstrate degenerative change.                                  "  Medical Decision Making:   Initial Assessment:   Patient with substantial mental health history presents with worsening depression. He expresses wishes that "god should just end it all" due to frustration and difficulties of caring for himself and his other comorbidities. PE reveals patient with flat affect exhibiting hopelessness and suicidal ideas.   Differential Diagnosis:   Depression,bipolar disorder, suicidalaity, electrolyte abnormality, RAPHAEL, dehydration  Clinical Tests:   Lab Tests: Ordered and Reviewed  Radiological Study: Ordered and Reviewed  ED Management:  Psych consult requested. by Dr. Chung will come by this evening for possible impatient treatment. Labs and CXR for medical screening. PEC completed signed by MD              Attending Attestation:     Physician Attestation Statement for NP/PA:   I have conducted a face to face encounter with this patient in addition to the NP/PA, due to Medical Complexity    Other NP/PA Attestation Additions:      Medical Decision Makin61 y/o M with history of multiple medical and psychiatric comorbidities who presents with worsening dysphoria and occasional SI, inability to perform basic daily tasks because of severe worsening depression.  Plan PEC/psychiatric clearance and eval by Dr. Alvarez                  ED Course     Clinical Impression:   The primary encounter diagnosis was Depression with suicidal ideation. Diagnoses of Fatigue due to depression, CKD (chronic kidney disease) stage 4, GFR 15-29 ml/min, and Anemia, unspecified type were also pertinent to this visit.    Disposition:   Disposition: Transferred  Condition: Stable       1       JUDIE Medina  [phone]       Viktoriya Dos Santos MD  07/10/17 [id number]    "

## 2017-06-16 NOTE — ED NOTES
SPD arrived for patient transport. Valuable obtained from security and place in patient belongings bag.

## 2017-06-16 NOTE — PSYCH
"IDENTIFICATION DATA:  This is a 62-year-old single white male who was brought to   ER due to severe depression.  This consult is requested by Dr. Viktoriya Dos Santos for   psych evaluation.  The patient is on a PEC status.    CHIEF COMPLAINT:  "I am very depressed and miserable."    HISTORY OF PRESENTING ILLNESS:  According to PEC, the patient presented with a   complaint of worsening fatigue for several months.  He is feeling depressed and   wished that he was dead.  The patient states that he is not feeling well for few   days.  He feels depressed, sad, hopeless, helpless, anhedonic, tired and does   not want to do anything and does not get out of the bed.  The patient states   that he has a sister and mother.  They are old and they do not care about him   much.  The patient states that he feels miserable and now wishing that God would   take him back because he does not want to live in misery.  The patient states   that he still hears voices, but they are not bad and he talks to people and they   know him.  The patient states that he lives in his own place, but has nothing   to do.  The patient states that he is not doing anything.  He was seeing Dr. Ray Avendano.  The patient states that he is not seeing things like he was.  The   patient states that his anger part is better and he does not get mad.  The   patient used to come with manic episodes and now according to him, he is not   getting those things because he is too old.  The patient's previous two   admissions were related to severe depression.  The patient is not sexually   inappropriate like he used to be.  He used to yell, scream, manipulative,   demanding and acting out all the time.  The patient had trouble dealing with   other people and he is now doing better at his own place.  The patient has a   history of nicole and depression.  The patient states that his depression is   going on for the last few weeks.  The patient does not drink or do drugs.  He is   " not obsessive or compulsive.  He claims to be consistent with medications.    PAST PSYCHIATRIC HISTORY:  The patient has a long history of schizoaffective   disorder.  He had been to multiple psych facilities including [facility], [facility].  He used to be   on Depakote, which was discontinued because of thrombocytopenia.  He had low GFR   with lithium.  He likes his Zyprexa and it helps.  He has no history of suicide   or homicide; however, he used to fight with the staff and peers.  He has never   been in rehab program.  The patient is now living independently.  He lives close   to his mom.  He used to live at [facility]; otherwise, the psych   history is same.    MEDICAL HISTORY:  The patient has hepatitis C, congestive heart failure, COPD,   back injury.    ALLERGIES:  THE PATIENT IS ALLERGIC TO CODEINE, DEPAKOTE, METHADONE, OPIOIDS,   AMOXIL AND STELAZINE.  See H and P for details.    MENTAL STATUS EXAMINATION:  This is a 62-year-old white male who looks about his   stated age.  He is alert, cooperative and oriented to day, date, month and   year.  Mood is depressed with sad affect.  Psychomotor activity is decreased.    His speech is soft, clear, normal in amount, rate and tone.  No loose   association, racing thoughts or flight of ideas noted.  He denies auditory,   visual, tactile or olfactory hallucinations, no delusional content noted.  The   patient admitted that he is hearing voices and they are good and he talks to   himself.  He denies thoughts of harm to self or others, but feels miserable and   now wishing God to take him back.  Insight and judgment are impaired.  He is of   average intelligent person.    PSYCHIATRIC DIAGNOSES:  AXIS I:  Schizoaffective disorder, depressed phase.  AXIS II:  Personality disorder, NOS.  AXIS III:  Hepatitis C, cirrhosis of liver, low GFR, arthritis, COPD, congestive   heart failure.  AXIS IV:  Medical  problems, limited support system, unable to cope with the   stressors.    RECOMMENDATIONS:  We will transfer this patient to [facility] for   stabilization.  His initial diagnostic workup and treatment will also include   nursing assessment,  assessment, laboratory workup and physical   examination.  We will continue his Zyprexa and Prozac as per order.  We will   follow the recommendation of medical doctors.    PROGNOSIS:  Fair.    ESTIMATED LENGTH OF STAY IN THE HOSPITAL:  Would be 5 days.    CRITERIA FOR DISCHARGE:  The patient will show improvement in depression,   suicidal ideation and consistent with medication.    ABLE TO GIVE CONSENT:  Yes.    PROBLEM LIST:  Depression, hallucinations, suicidal thoughts.        / [id number] review        EKATERINA/  dd: 06/15/2017 19:05:19 (CDT)  td: 06/16/2017 01:20:56 (CDT)  Doc ID   #[id number]  Job ID #[id number]    CC: [facility]   LOLIS REYES III M.D.

## 2017-06-17 ENCOUNTER — OUTPATIENT CASE MANAGEMENT (OUTPATIENT)
Dept: ADMINISTRATIVE | Facility: OTHER | Age: 62
End: 2017-06-17

## 2017-06-17 NOTE — PROGRESS NOTES
Thank you for the referral.     For your information:    The following patient has been assigned to Debbie Donaldson RN  with [facility] for high risk screening.    Reason: High risk    Please contact [facility] at [phone] with any questions.    Thank you,      Jenifer Marroquin, SSC

## 2017-06-20 ENCOUNTER — OUTPATIENT CASE MANAGEMENT (OUTPATIENT)
Dept: ADMINISTRATIVE | Facility: OTHER | Age: 62
End: 2017-06-20

## 2017-06-20 NOTE — PROGRESS NOTES
Talked to sister Taylor [place] to perform initial assessment for OPCM.  Taylor reports that patient is currently an inpatient at [facility]; [phone].  Taylor reports that patient was d/c from [facility] on 6/16/2017 and was transferred to [facility] on 6/16/2017.  Since patient is currently having all of his needs met at this time, patient will be dis-enrolled at this time. Encouraged Taylor to call this RN if having any questions/concerns.   Will dis-enroll at this time.  DAINA Donaldson, OPCM-RN

## 2017-06-28 DIAGNOSIS — F17.200 NICOTINE DEPENDENCE: ICD-10-CM

## 2017-06-28 RX ORDER — CARVEDILOL 25 MG/1
TABLET ORAL
Qty: 180 TABLET | Refills: 0 | Status: SHIPPED | OUTPATIENT
Start: 2017-06-28 | End: 2017-12-14 | Stop reason: SDUPTHER

## 2017-06-28 RX ORDER — IBUPROFEN 200 MG
TABLET ORAL
Qty: 28 PATCH | Refills: 0 | Status: SHIPPED | OUTPATIENT
Start: 2017-06-28 | End: 2017-07-17 | Stop reason: SDUPTHER

## 2017-06-28 RX ORDER — IBUPROFEN 200 MG
TABLET ORAL
Qty: 28 PATCH | Refills: 0 | Status: ON HOLD | OUTPATIENT
Start: 2017-06-28 | End: 2017-11-20 | Stop reason: ALTCHOICE

## 2017-07-03 ENCOUNTER — TELEPHONE (OUTPATIENT)
Dept: SMOKING CESSATION | Facility: CLINIC | Age: 62
End: 2017-07-03

## 2017-07-12 ENCOUNTER — TELEPHONE (OUTPATIENT)
Dept: FAMILY MEDICINE | Facility: CLINIC | Age: 62
End: 2017-07-12

## 2017-07-12 NOTE — TELEPHONE ENCOUNTER
----- Message from Lynne Pelaez sent at 7/11/2017  4:44 PM CDT -----  Contact: self, [phone]  Patient requests to be seen sooner than the next available appointment on 8/14. States he is having blood pressure issues. Please advise.

## 2017-07-14 DIAGNOSIS — I10 ESSENTIAL HYPERTENSION: Chronic | ICD-10-CM

## 2017-07-14 DIAGNOSIS — I10 ACCELERATED HYPERTENSION: ICD-10-CM

## 2017-07-14 RX ORDER — FERROUS SULFATE 325(65) MG
TABLET ORAL
Qty: 90 TABLET | Refills: 0 | Status: SHIPPED | OUTPATIENT
Start: 2017-07-14 | End: 2017-11-17 | Stop reason: ALTCHOICE

## 2017-07-14 RX ORDER — AMLODIPINE BESYLATE 10 MG/1
TABLET ORAL
Qty: 90 TABLET | Refills: 0 | Status: SHIPPED | OUTPATIENT
Start: 2017-07-14 | End: 2017-07-17 | Stop reason: SDUPTHER

## 2017-07-14 NOTE — TELEPHONE ENCOUNTER
----- Message from Sagrario Oconnor sent at 7/14/2017 12:09 PM CDT -----  Contact: Jammie from [phone]  Patient is requesting a refill on their medication.  Please advise.    amlodipine (NORVASC) 10 MG tablet    [facility]  [phone]

## 2017-07-17 ENCOUNTER — OFFICE VISIT (OUTPATIENT)
Dept: INTERNAL MEDICINE | Facility: CLINIC | Age: 62
End: 2017-07-17
Attending: FAMILY MEDICINE
Payer: MEDICARE

## 2017-07-17 VITALS
SYSTOLIC BLOOD PRESSURE: 134 MMHG | BODY MASS INDEX: 28.94 KG/M2 | OXYGEN SATURATION: 97 % | HEIGHT: 68 IN | DIASTOLIC BLOOD PRESSURE: 77 MMHG | HEART RATE: 63 BPM | WEIGHT: 190.94 LBS

## 2017-07-17 DIAGNOSIS — M25.552 BILATERAL HIP PAIN: ICD-10-CM

## 2017-07-17 DIAGNOSIS — G89.29 CHRONIC BILATERAL LOW BACK PAIN WITH LEFT-SIDED SCIATICA: ICD-10-CM

## 2017-07-17 DIAGNOSIS — I10 ESSENTIAL HYPERTENSION: Primary | Chronic | ICD-10-CM

## 2017-07-17 DIAGNOSIS — M54.40 CHRONIC BILATERAL LOW BACK PAIN WITH SCIATICA, SCIATICA LATERALITY UNSPECIFIED: ICD-10-CM

## 2017-07-17 DIAGNOSIS — G89.29 CHRONIC BILATERAL LOW BACK PAIN WITH SCIATICA, SCIATICA LATERALITY UNSPECIFIED: ICD-10-CM

## 2017-07-17 DIAGNOSIS — M25.551 BILATERAL HIP PAIN: ICD-10-CM

## 2017-07-17 DIAGNOSIS — M54.42 CHRONIC BILATERAL LOW BACK PAIN WITH LEFT-SIDED SCIATICA: ICD-10-CM

## 2017-07-17 PROCEDURE — 99213 OFFICE O/P EST LOW 20 MIN: CPT | Mod: PBBFAC,PO | Performed by: INTERNAL MEDICINE

## 2017-07-17 PROCEDURE — 99212 OFFICE O/P EST SF 10 MIN: CPT | Mod: S$PBB,,, | Performed by: INTERNAL MEDICINE

## 2017-07-17 PROCEDURE — 99999 PR PBB SHADOW E&M-EST. PATIENT-LVL III: CPT | Mod: PBBFAC,,, | Performed by: INTERNAL MEDICINE

## 2017-07-17 RX ORDER — HYDROCODONE BITARTRATE AND ACETAMINOPHEN 5; 325 MG/1; MG/1
1 TABLET ORAL 3 TIMES DAILY PRN
Qty: 90 TABLET | Refills: 0 | Status: SHIPPED | OUTPATIENT
Start: 2017-07-17 | End: 2017-08-21 | Stop reason: SDUPTHER

## 2017-07-17 NOTE — PROGRESS NOTES
HPI:  Leif Ramirez Jr. is a 62 y.o. year old male that  presents with   Chief Complaint   Patient presents with    Hypertension   .   Patient is here for hypertension follow-up.  His blood pressures are well-controlled.  Patient also would like a refill of his pain medication that is normally filled by Dr. Davis.  He takes this for his chronic hip and back pain.  Reviewed the chart and sure enough this is prescribed normal by Dr. Mcguire.  Patient does see psychiatry and regular basis.  He sees Dr. Ray Avendano.  No major issues at this time.    Past Medical History:   Diagnosis Date    Allergy     poison ivy    Anemia     Anxiety     Arthritis     Asthma     Bipolar affective     Cancer     blood and bone    CHF (congestive heart failure)     Chronic hepatitis C with cirrhosis     COPD (chronic obstructive pulmonary disease)     Coronary artery disease     Depression     Diabetes mellitus type II     Disorder of kidney and ureter     Elevated PSA     has had prostate problems but unaware of his psa level    Encounter for blood transfusion     History of back injury     Chronic back pain    Hyperlipidemia     Hypertension     Internal hemorrhoid, bleeding 9/21/2015    Pancytopenia     Schizo affective schizophrenia     Seizures     Stroke     Thyroid disease     Urinary tract infection      Social History     Social History    Marital status: Single     Spouse name: N/A    Number of children: N/A    Years of education: N/A     Occupational History    Not on file.     Social History Main Topics    Smoking status: Current Every Day Smoker     Packs/day: 0.50    Smokeless tobacco: Never Used    Alcohol use No    Drug use: No      Comment: quit 1985    Sexual activity: Not Currently     Other Topics Concern    Not on file     Social History Narrative    No narrative on file     Past Surgical History:   Procedure Laterality Date    ELBOW BURSA SURGERY      JOINT REPLACEMENT    "   ORIF FEMUR FRACTURE Right 8/2015     Family History   Problem Relation Age of Onset    Hypertension Mother     Diabetes Mother     Transient ischemic attack Mother     Heart disease Mother      stent placement    Arthritis Mother     Heart disease Father     Hypertension Father     Diabetes Father     Stroke Father     Arthritis Sister     Hypertension Sister     Heart disease Maternal Grandmother     Diabetes Maternal Grandmother     Stroke Maternal Grandmother     Cancer Paternal Grandmother      breast    Heart disease Paternal Grandmother     Cancer Sister      brain    Prostate cancer Neg Hx     Kidney disease Neg Hx            HPI    Health Maintenance Topics with due status: Overdue       Topic Date Due    Pneumococcal PPSV23 (High Risk) 06/10/2015       Review of Systems  ROS   No chest pain, no shortness of breath    Physical Exam:  /77 (BP Location: Left arm, Patient Position: Sitting, BP Method: Automatic)   Pulse 63   Ht 5' 8" (1.727 m)   Wt 86.6 kg (190 lb 14.7 oz)   SpO2 97%   BMI 29.03 kg/m²   Physical Exam  Vital Signs: Reviewed  General:  No apparent distress  Head:  No signs of head trauma  Eyes:  Pupils are equal.  Extraocular motions intact.  Normal conjunctiva.  Ears:  Hearing grossly intact.  Chest:  Non-labored respirations.  Symmetrical expansion.  Musculoskeletal:  Normal gait.    Neuro:  Alert & oriented X 3.  Speech normal.   Follows commands.    Skin:  No obvious rash or lesions.    LABS:    Recent Results (from the past 2016 hour(s))   CBC auto differential    Collection Time: 05/05/17 11:41 AM   Result Value Ref Range    WBC 4.22 3.90 - 12.70 K/uL    RBC 3.73 (L) 4.60 - 6.20 M/uL    Hemoglobin 11.1 (L) 14.0 - 18.0 g/dL    Hematocrit 31.6 (L) 40.0 - 54.0 %    MCV 85 82 - 98 fL    MCH 29.8 27.0 - 31.0 pg    MCHC 35.1 32.0 - 36.0 %    RDW 13.4 11.5 - 14.5 %    Platelets 104 (L) 150 - 350 K/uL    MPV 8.6 (L) 9.2 - 12.9 fL    Gran # 2.7 1.8 - 7.7 K/uL    " Lymph # 0.8 (L) 1.0 - 4.8 K/uL    Mono # 0.6 0.3 - 1.0 K/uL    Eos # 0.1 0.0 - 0.5 K/uL    Baso # 0.03 0.00 - 0.20 K/uL    Gran% 63.7 38.0 - 73.0 %    Lymph% 19.9 18.0 - 48.0 %    Mono% 14.0 4.0 - 15.0 %    Eosinophil% 1.7 0.0 - 8.0 %    Basophil% 0.7 0.0 - 1.9 %    Differential Method Automated    Comprehensive metabolic panel    Collection Time: 05/05/17 11:41 AM   Result Value Ref Range    Sodium 141 136 - 145 mmol/L    Potassium 4.6 3.5 - 5.1 mmol/L    Chloride 104 95 - 110 mmol/L    CO2 28 23 - 29 mmol/L    Glucose 106 70 - 110 mg/dL    BUN, Bld 45 (H) 8 - 23 mg/dL    Creatinine 3.6 (H) 0.5 - 1.4 mg/dL    Calcium 9.7 8.7 - 10.5 mg/dL    Total Protein 7.2 6.0 - 8.4 g/dL    Albumin 4.0 3.5 - 5.2 g/dL    Total Bilirubin 0.5 0.1 - 1.0 mg/dL    Alkaline Phosphatase 70 55 - 135 U/L    AST 16 10 - 40 U/L    ALT 12 10 - 44 U/L    Anion Gap 9 8 - 16 mmol/L    eGFR if African American 19 (A) >60 mL/min/1.73 m^2    eGFR if non African American 17 (A) >60 mL/min/1.73 m^2   PSA, Screening    Collection Time: 05/05/17 11:41 AM   Result Value Ref Range    PSA, SCREEN 0.35 0.00 - 4.00 ng/mL   Lipid panel    Collection Time: 05/05/17 11:41 AM   Result Value Ref Range    Cholesterol 167 120 - 199 mg/dL    Triglycerides 222 (H) 30 - 150 mg/dL    HDL 34 (L) 40 - 75 mg/dL    LDL Cholesterol 88.6 63.0 - 159.0 mg/dL    HDL/Chol Ratio 20.4 20.0 - 50.0 %    Total Cholesterol/HDL Ratio 4.9 2.0 - 5.0    Non-HDL Cholesterol 133 mg/dL   Urine culture    Collection Time: 05/10/17  4:21 PM   Result Value Ref Range    Urine Culture, Routine No growth    Urinalysis Microscopic    Collection Time: 05/10/17  4:21 PM   Result Value Ref Range    RBC, UA 1 0 - 4 /hpf    WBC, UA 0 0 - 5 /hpf    Bacteria, UA Rare None-Occ /hpf    Microscopic Comment SEE COMMENT    URINALYSIS    Collection Time: 06/02/17  8:50 AM   Result Value Ref Range    Specimen UA Urine, Unspecified     Color, UA Straw Yellow, Straw, Patricia    Appearance, UA Clear Clear    pH, UA  7.0 5.0 - 8.0    Specific Gravity, UA 1.010 1.005 - 1.030    Protein, UA 3+ (A) Negative    Glucose, UA 2+ (A) Negative    Ketones, UA Negative Negative    Bilirubin (UA) Negative Negative    Occult Blood UA Negative Negative    Nitrite, UA Negative Negative    Urobilinogen, UA Negative <2.0 EU/dL    Leukocytes, UA Negative Negative   Urine culture    Collection Time: 06/02/17  8:50 AM   Result Value Ref Range    Urine Culture, Routine No growth    Urinalysis Microscopic    Collection Time: 06/02/17  8:50 AM   Result Value Ref Range    RBC, UA 2 0 - 4 /hpf    WBC, UA 1 0 - 5 /hpf    Bacteria, UA Rare None-Occ /hpf    Hyaline Casts, UA 0 0-1/lpf /lpf    Microscopic Comment SEE COMMENT    Comprehensive metabolic panel    Collection Time: 06/02/17  4:41 PM   Result Value Ref Range    Sodium 142 136 - 145 mmol/L    Potassium 3.8 3.5 - 5.1 mmol/L    Chloride 110 95 - 110 mmol/L    CO2 21 (L) 23 - 29 mmol/L    Glucose 119 (H) 70 - 110 mg/dL    BUN, Bld 35 (H) 8 - 23 mg/dL    Creatinine 3.5 (H) 0.5 - 1.4 mg/dL    Calcium 8.6 (L) 8.7 - 10.5 mg/dL    Total Protein 6.6 6.0 - 8.4 g/dL    Albumin 3.6 3.5 - 5.2 g/dL    Total Bilirubin 0.7 0.1 - 1.0 mg/dL    Alkaline Phosphatase 91 55 - 135 U/L    AST 30 10 - 40 U/L    ALT 14 10 - 44 U/L    Anion Gap 11 8 - 16 mmol/L    eGFR if African American 20.1 (A) >60 mL/min/1.73 m^2    eGFR if non African American 17.4 (A) >60 mL/min/1.73 m^2   Lipase    Collection Time: 06/02/17  4:41 PM   Result Value Ref Range    Lipase 73 (H) 4 - 60 U/L   CBC auto differential    Collection Time: 06/02/17  4:41 PM   Result Value Ref Range    WBC 4.78 3.90 - 12.70 K/uL    RBC 3.82 (L) 4.60 - 6.20 M/uL    Hemoglobin 11.5 (L) 14.0 - 18.0 g/dL    Hematocrit 32.1 (L) 40.0 - 54.0 %    MCV 84 82 - 98 fL    MCH 30.1 27.0 - 31.0 pg    MCHC 35.8 32.0 - 36.0 %    RDW 13.5 11.5 - 14.5 %    Platelets 104 (L) 150 - 350 K/uL    MPV 9.3 9.2 - 12.9 fL    Gran # 3.3 1.8 - 7.7 K/uL    Lymph # 1.1 1.0 - 4.8 K/uL    Mono #  0.4 0.3 - 1.0 K/uL    Eos # 0.1 0.0 - 0.5 K/uL    Baso # 0.02 0.00 - 0.20 K/uL    Gran% 68.1 38.0 - 73.0 %    Lymph% 22.0 18.0 - 48.0 %    Mono% 8.2 4.0 - 15.0 %    Eosinophil% 1.3 0.0 - 8.0 %    Basophil% 0.4 0.0 - 1.9 %    Differential Method Automated    Stool Exam-Ova,Cysts,Parasites    Collection Time: 06/05/17  3:37 PM   Result Value Ref Range    Stool Exam-Ova,Cysts,Parasites No ova or parasites seen    Urinalysis - clean catch    Collection Time: 06/15/17  2:16 PM   Result Value Ref Range    Specimen UA Urine, Clean Catch     Color, UA Yellow Yellow, Straw, Patricia    Appearance, UA Clear Clear    pH, UA 7.0 5.0 - 8.0    Specific Gravity, UA 1.015 1.005 - 1.030    Protein, UA 3+ (A) Negative    Glucose, UA Trace (A) Negative    Ketones, UA Negative Negative    Bilirubin (UA) Negative Negative    Occult Blood UA 1+ (A) Negative    Nitrite, UA Negative Negative    Urobilinogen, UA Negative <2.0 EU/dL    Leukocytes, UA Negative Negative   Drug screen panel, emergency    Collection Time: 06/15/17  2:16 PM   Result Value Ref Range    Benzodiazepines Negative     Methadone metabolites Negative     Cocaine (Metab.) Negative     Opiate Scrn, Ur Presumptive Positive     Barbiturate Screen, Ur Negative     Amphetamine Screen, Ur Negative     THC Negative     Phencyclidine Negative     Creatinine, Random Ur 26.9 23.0 - 375.0 mg/dL    Toxicology Information SEE COMMENT    Urinalysis Microscopic    Collection Time: 06/15/17  2:16 PM   Result Value Ref Range    RBC, UA 0 0 - 4 /hpf    WBC, UA 0 0 - 5 /hpf    Bacteria, UA Rare None-Occ /hpf    Hyaline Casts, UA 0 0-1/lpf /lpf    Microscopic Comment SEE COMMENT    CBC auto differential    Collection Time: 06/15/17  2:28 PM   Result Value Ref Range    WBC 7.44 3.90 - 12.70 K/uL    RBC 3.84 (L) 4.60 - 6.20 M/uL    Hemoglobin 11.3 (L) 14.0 - 18.0 g/dL    Hematocrit 32.2 (L) 40.0 - 54.0 %    MCV 84 82 - 98 fL    MCH 29.4 27.0 - 31.0 pg    MCHC 35.1 32.0 - 36.0 %    RDW 13.7 11.5  - 14.5 %    Platelets 99 (L) 150 - 350 K/uL    MPV 8.5 (L) 9.2 - 12.9 fL    Gran # 5.8 1.8 - 7.7 K/uL    Lymph # 0.9 (L) 1.0 - 4.8 K/uL    Mono # 0.6 0.3 - 1.0 K/uL    Eos # 0.1 0.0 - 0.5 K/uL    Baso # 0.01 0.00 - 0.20 K/uL    Gran% 78.4 (H) 38.0 - 73.0 %    Lymph% 11.8 (L) 18.0 - 48.0 %    Mono% 8.3 4.0 - 15.0 %    Eosinophil% 1.3 0.0 - 8.0 %    Basophil% 0.1 0.0 - 1.9 %    Differential Method Automated    Comprehensive metabolic panel    Collection Time: 06/15/17  2:28 PM   Result Value Ref Range    Sodium 140 136 - 145 mmol/L    Potassium 4.0 3.5 - 5.1 mmol/L    Chloride 106 95 - 110 mmol/L    CO2 25 23 - 29 mmol/L    Glucose 105 70 - 110 mg/dL    BUN, Bld 40 (H) 8 - 23 mg/dL    Creatinine 3.6 (H) 0.5 - 1.4 mg/dL    Calcium 9.2 8.7 - 10.5 mg/dL    Total Protein 6.7 6.0 - 8.4 g/dL    Albumin 3.3 (L) 3.5 - 5.2 g/dL    Total Bilirubin 0.8 0.1 - 1.0 mg/dL    Alkaline Phosphatase 63 55 - 135 U/L    AST 15 10 - 40 U/L    ALT 10 10 - 44 U/L    Anion Gap 9 8 - 16 mmol/L    eGFR if African American 19 (A) >60 mL/min/1.73 m^2    eGFR if non African American 17 (A) >60 mL/min/1.73 m^2   TSH    Collection Time: 06/15/17  2:28 PM   Result Value Ref Range    TSH 1.335 0.400 - 4.000 uIU/mL   Ethanol    Collection Time: 06/15/17  2:28 PM   Result Value Ref Range    Alcohol, Medical, Serum <10 <10 mg/dL   Acetaminophen level    Collection Time: 06/15/17  2:28 PM   Result Value Ref Range    Acetaminophen (Tylenol), Serum <3.0 (L) 10.0 - 20.0 ug/mL   POCT glucose    Collection Time: 06/15/17  2:30 PM   Result Value Ref Range    POCT Glucose 112 (H) 70 - 110 mg/dL       Imaging:  Imaging Results    None           Assessment:    ICD-10-CM ICD-9-CM    1. Essential hypertension I10 401.9    2. Chronic bilateral low back pain with left-sided sciatica M54.42 724.2 hydrocodone-acetaminophen 5-325mg (NORCO) 5-325 mg per tablet    G89.29 724.3      338.29    3. Bilateral hip pain M25.551 719.45 hydrocodone-acetaminophen 5-325mg (NORCO)  5-325 mg per tablet    M25.552     4. Chronic bilateral low back pain with sciatica, sciatica laterality unspecified M54.40 724.2     G89.29 724.3      338.29      The primary encounter diagnosis was Essential hypertension. Diagnoses of Chronic bilateral low back pain with left-sided sciatica, Bilateral hip pain, and Chronic bilateral low back pain with sciatica, sciatica laterality unspecified were also pertinent to this visit.      Plan:  No orders of the defined types were placed in this encounter.          Ray Hood MD

## 2017-08-02 RX ORDER — LORAZEPAM 1 MG/1
1 TABLET ORAL EVERY 6 HOURS PRN
Qty: 30 TABLET | Refills: 0 | Status: SHIPPED | OUTPATIENT
Start: 2017-08-02 | End: 2017-11-17 | Stop reason: ALTCHOICE

## 2017-08-10 ENCOUNTER — HOSPITAL ENCOUNTER (EMERGENCY)
Facility: HOSPITAL | Age: 62
Discharge: HOME | End: 2017-08-10
Attending: EMERGENCY MEDICINE
Payer: MEDICARE

## 2017-08-10 VITALS
HEART RATE: 74 BPM | DIASTOLIC BLOOD PRESSURE: 77 MMHG | SYSTOLIC BLOOD PRESSURE: 145 MMHG | OXYGEN SATURATION: 100 % | HEIGHT: 66 IN | WEIGHT: 180 LBS | TEMPERATURE: 98 F | BODY MASS INDEX: 28.93 KG/M2 | RESPIRATION RATE: 20 BRPM

## 2017-08-10 DIAGNOSIS — R10.9 LEFT FLANK PAIN: Primary | ICD-10-CM

## 2017-08-10 LAB
ALBUMIN SERPL BCP-MCNC: 3.7 G/DL
ALP SERPL-CCNC: 71 U/L
ALT SERPL W/O P-5'-P-CCNC: 10 U/L
ANION GAP SERPL CALC-SCNC: 10 MMOL/L
AST SERPL-CCNC: 15 U/L
BACTERIA #/AREA URNS HPF: NORMAL /HPF
BASOPHILS # BLD AUTO: 0.02 K/UL
BASOPHILS NFR BLD: 0.5 %
BILIRUB SERPL-MCNC: 0.6 MG/DL
BILIRUB UR QL STRIP: NEGATIVE
BUN SERPL-MCNC: 37 MG/DL
CALCIUM SERPL-MCNC: 8.7 MG/DL
CHLORIDE SERPL-SCNC: 107 MMOL/L
CLARITY UR: CLEAR
CO2 SERPL-SCNC: 22 MMOL/L
COLOR UR: YELLOW
CREAT SERPL-MCNC: 3.7 MG/DL
DIFFERENTIAL METHOD: ABNORMAL
EOSINOPHIL # BLD AUTO: 0.1 K/UL
EOSINOPHIL NFR BLD: 1.2 %
ERYTHROCYTE [DISTWIDTH] IN BLOOD BY AUTOMATED COUNT: 13.7 %
EST. GFR  (AFRICAN AMERICAN): 19 ML/MIN/1.73 M^2
EST. GFR  (NON AFRICAN AMERICAN): 16 ML/MIN/1.73 M^2
GLUCOSE SERPL-MCNC: 136 MG/DL (ref 70–110)
GLUCOSE SERPL-MCNC: 95 MG/DL
GLUCOSE UR QL STRIP: NEGATIVE
HCT VFR BLD AUTO: 31.1 %
HGB BLD-MCNC: 10.8 G/DL
HGB UR QL STRIP: ABNORMAL
HYALINE CASTS #/AREA URNS LPF: 0 /LPF
KETONES UR QL STRIP: NEGATIVE
LEUKOCYTE ESTERASE UR QL STRIP: NEGATIVE
LYMPHOCYTES # BLD AUTO: 1 K/UL
LYMPHOCYTES NFR BLD: 23.9 %
MCH RBC QN AUTO: 29.7 PG
MCHC RBC AUTO-ENTMCNC: 34.7 G/DL
MCV RBC AUTO: 85 FL
MICROSCOPIC COMMENT: NORMAL
MONOCYTES # BLD AUTO: 0.3 K/UL
MONOCYTES NFR BLD: 8 %
NEUTROPHILS # BLD AUTO: 2.8 K/UL
NEUTROPHILS NFR BLD: 66.2 %
NITRITE UR QL STRIP: NEGATIVE
PH UR STRIP: 6 [PH] (ref 5–8)
PLATELET # BLD AUTO: 100 K/UL
PMV BLD AUTO: 8.3 FL
POTASSIUM SERPL-SCNC: 3.9 MMOL/L
PROT SERPL-MCNC: 6.5 G/DL
PROT UR QL STRIP: ABNORMAL
RBC # BLD AUTO: 3.64 M/UL
RBC #/AREA URNS HPF: 0 /HPF (ref 0–4)
SODIUM SERPL-SCNC: 139 MMOL/L
SP GR UR STRIP: 1.01 (ref 1–1.03)
SQUAMOUS #/AREA URNS HPF: NORMAL /HPF
URN SPEC COLLECT METH UR: ABNORMAL
UROBILINOGEN UR STRIP-ACNC: NEGATIVE EU/DL
WBC # BLD AUTO: 4.26 K/UL
WBC #/AREA URNS HPF: 2 /HPF (ref 0–5)

## 2017-08-10 PROCEDURE — 80053 COMPREHEN METABOLIC PANEL: CPT

## 2017-08-10 PROCEDURE — 96375 TX/PRO/DX INJ NEW DRUG ADDON: CPT

## 2017-08-10 PROCEDURE — 96361 HYDRATE IV INFUSION ADD-ON: CPT

## 2017-08-10 PROCEDURE — 99284 EMERGENCY DEPT VISIT MOD MDM: CPT | Mod: 25

## 2017-08-10 PROCEDURE — 85025 COMPLETE CBC W/AUTO DIFF WBC: CPT

## 2017-08-10 PROCEDURE — 81000 URINALYSIS NONAUTO W/SCOPE: CPT

## 2017-08-10 PROCEDURE — 82962 GLUCOSE BLOOD TEST: CPT

## 2017-08-10 PROCEDURE — 63600175 PHARM REV CODE 636 W HCPCS: Performed by: EMERGENCY MEDICINE

## 2017-08-10 PROCEDURE — 96374 THER/PROPH/DIAG INJ IV PUSH: CPT

## 2017-08-10 PROCEDURE — 25000003 PHARM REV CODE 250: Performed by: EMERGENCY MEDICINE

## 2017-08-10 RX ORDER — KETOROLAC TROMETHAMINE 30 MG/ML
15 INJECTION, SOLUTION INTRAMUSCULAR; INTRAVENOUS
Status: COMPLETED | OUTPATIENT
Start: 2017-08-10 | End: 2017-08-10

## 2017-08-10 RX ORDER — ONDANSETRON 2 MG/ML
4 INJECTION INTRAMUSCULAR; INTRAVENOUS
Status: COMPLETED | OUTPATIENT
Start: 2017-08-10 | End: 2017-08-10

## 2017-08-10 RX ADMIN — SODIUM CHLORIDE 1000 ML: 0.9 INJECTION, SOLUTION INTRAVENOUS at 01:08

## 2017-08-10 RX ADMIN — KETOROLAC TROMETHAMINE 15 MG: 30 INJECTION, SOLUTION INTRAMUSCULAR at 01:08

## 2017-08-10 RX ADMIN — ONDANSETRON 4 MG: 2 INJECTION INTRAMUSCULAR; INTRAVENOUS at 01:08

## 2017-08-10 NOTE — ED NOTES
Patient requesting iv to be taken out. Nurse assessed iv site no infiltration/ reddening nurse notified DR. Sigala

## 2017-08-10 NOTE — ED NOTES
APPEARANCE: Alert, oriented and in no acute distress.  CARDIAC: Normal rate and rhythm, no murmur heard.   PERIPHERAL VASCULAR: peripheral pulses present. Normal cap refill. No edema. Warm to touch.    RESPIRATORY:Normal rate and effort, breath sounds clear bilaterally throughout chest. Respirations are equal and unlabored no obvious signs of distress.  GASTRO: soft, bowel sounds normal, no tenderness, no abdominal distention.    SKIN: Skin is warm and dry, normal skin turgor, mucous membranes moist.  NEURO: 5/5 strength major flexors/extensors bilaterally. Sensory intact to light touch bilaterally. [place] coma scale: eyes open spontaneously-4, oriented & converses-5, obeys commands-6. No neurological abnormalities.   MENTAL STATUS: awake, alert and aware of environment.  EYE: PERRL, both eyes: pupils brisk and reactive to light. Normal size.  ENT: EARS: no obvious drainage. NOSE: no active bleeding.

## 2017-08-10 NOTE — ED PROVIDER NOTES
Encounter Date: 8/10/2017       History     Chief Complaint   Patient presents with    Flank Pain     left flank pain with urinary retention - last time urinated was 11am, only has one kidney     Patient is a 62-year-old male who complains of left lower back pain radiating to his left flank.  Pain began yesterday.  He denies trauma.  No nausea or vomiting.  No fever or chills.  Patient says he only urinated once today at around 11 AM.      The history is provided by the patient.     Review of patient's allergies indicates:   Allergen Reactions    Codeine      Other reaction(s): Nausea    Depakote [divalproex] Other (See Comments)     Thrombocytopenia    Haldol [haloperidol lactate] Other (See Comments)     Seizures    Methadone     Morphine     Naloxone     Opium     Propoxyphene     Stelazine [trifluoperazine]     Amoxicillin Rash     Past Medical History:   Diagnosis Date    Allergy     poison ivy    Anemia     Anxiety     Arthritis     Asthma     Bipolar affective     Cancer     blood and bone    CHF (congestive heart failure)     Chronic hepatitis C with cirrhosis     COPD (chronic obstructive pulmonary disease)     Coronary artery disease     Depression     Diabetes mellitus type II     Disorder of kidney and ureter     Elevated PSA     has had prostate problems but unaware of his psa level    Encounter for blood transfusion     History of back injury     Chronic back pain    Hyperlipidemia     Hypertension     Internal hemorrhoid, bleeding 9/21/2015    Pancytopenia     Schizo affective schizophrenia     Seizures     Stroke     Thyroid disease     Urinary tract infection      Past Surgical History:   Procedure Laterality Date    ELBOW BURSA SURGERY      JOINT REPLACEMENT      ORIF FEMUR FRACTURE Right 8/2015     Family History   Problem Relation Age of Onset    Hypertension Mother     Diabetes Mother     Transient ischemic attack Mother     Heart disease Mother       stent placement    Arthritis Mother     Heart disease Father     Hypertension Father     Diabetes Father     Stroke Father     Arthritis Sister     Hypertension Sister     Heart disease Maternal Grandmother     Diabetes Maternal Grandmother     Stroke Maternal Grandmother     Cancer Paternal Grandmother      breast    Heart disease Paternal Grandmother     Cancer Sister      brain    Prostate cancer Neg Hx     Kidney disease Neg Hx      Social History   Substance Use Topics    Smoking status: Current Every Day Smoker     Packs/day: 0.50    Smokeless tobacco: Never Used    Alcohol use No     Review of Systems   Constitutional: Negative for chills and fever.   Gastrointestinal: Negative for nausea and vomiting.   Genitourinary: Positive for flank pain. Negative for dysuria and hematuria.   Musculoskeletal: Positive for back pain.   Neurological: Negative for numbness.   All other systems reviewed and are negative.      Physical Exam     Initial Vitals [08/10/17 1213]   BP Pulse Resp Temp SpO2   (!) 153/75 77 18 98.4 °F (36.9 °C) 98 %      MAP       101         Physical Exam    Nursing note and vitals reviewed.  Constitutional: No distress.   HENT:   Head: Normocephalic and atraumatic.   Eyes: EOM are normal.   Neck: Normal range of motion. Neck supple.   Cardiovascular: Normal rate, regular rhythm and normal heart sounds.   Pulmonary/Chest: Breath sounds normal.   Abdominal: Soft.   Musculoskeletal: Normal range of motion.   There is mild left CVA tenderness.   Neurological: He is alert and oriented to person, place, and time.   Skin: Skin is warm and dry.   Psychiatric: His behavior is normal. Thought content normal.         ED Course   Procedures  Labs Reviewed   URINALYSIS - Abnormal; Notable for the following:        Result Value    Protein, UA 3+ (*)     Occult Blood UA Trace (*)     All other components within normal limits   CBC W/ AUTO DIFFERENTIAL - Abnormal; Notable for the following:      RBC 3.64 (*)     Hemoglobin 10.8 (*)     Hematocrit 31.1 (*)     Platelets 100 (*)     MPV 8.3 (*)     All other components within normal limits   COMPREHENSIVE METABOLIC PANEL - Abnormal; Notable for the following:     CO2 22 (*)     BUN, Bld 37 (*)     Creatinine 3.7 (*)     eGFR if  19 (*)     eGFR if non  16 (*)     All other components within normal limits   POCT GLUCOSE - Abnormal; Notable for the following:     POCT Glucose 136 (*)     All other components within normal limits   URINALYSIS MICROSCOPIC             Medical Decision Making:   ED Management:  62-year-old male with left flank pain.  Patient was also concerned because of decreased urination today.  He was given IV fluids here in the ED.  Creatinine is 3.7 which is stable for the patient.  A renal CT was ordered, but while waiting for this to be done, the patient decided he wanted to leave claiming he felt much better and was able to urinate here in the ED.  I have suggested he return here for any returning pain and decreased urination.  He will follow-up with his primary physician as soon as able for recheck and further treatment as warranted.                   ED Course     Clinical Impression:   Left flank pain.                         Martin Sigala MD  08/11/17 [id number]

## 2017-08-10 NOTE — ED NOTES
62 year old male presents to ed chief compliant of left sided flank pain. Patient denies dysuria chest pain sob

## 2017-08-11 LAB — POCT GLUCOSE: 136 MG/DL (ref 70–110)

## 2017-08-14 ENCOUNTER — TELEPHONE (OUTPATIENT)
Dept: FAMILY MEDICINE | Facility: CLINIC | Age: 62
End: 2017-08-14

## 2017-08-14 NOTE — TELEPHONE ENCOUNTER
Called patient reschedule is upcoming appointment to the afternoon. I was unable to leave a voicemail.

## 2017-08-14 NOTE — TELEPHONE ENCOUNTER
----- Message from Emilie Ruggiero sent at 8/14/2017 10:28 AM CDT -----  [facility] has sure scribed request for Omeprazole and Diclofenac.  Please authorize.  No. [phone]

## 2017-08-15 ENCOUNTER — TELEPHONE (OUTPATIENT)
Dept: FAMILY MEDICINE | Facility: CLINIC | Age: 62
End: 2017-08-15

## 2017-08-15 ENCOUNTER — OUTPATIENT CASE MANAGEMENT (OUTPATIENT)
Dept: ADMINISTRATIVE | Facility: OTHER | Age: 62
End: 2017-08-15

## 2017-08-15 RX ORDER — OMEPRAZOLE 20 MG/1
20 CAPSULE, DELAYED RELEASE ORAL DAILY
Qty: 90 CAPSULE | Refills: 1 | Status: SHIPPED | OUTPATIENT
Start: 2017-08-15 | End: 2017-08-15 | Stop reason: SDUPTHER

## 2017-08-15 RX ORDER — OMEPRAZOLE 20 MG/1
20 CAPSULE, DELAYED RELEASE ORAL DAILY
Qty: 90 CAPSULE | Refills: 1 | Status: SHIPPED | OUTPATIENT
Start: 2017-08-15 | End: 2018-08-15

## 2017-08-15 RX ORDER — DICLOFENAC SODIUM 25 MG/1
25 TABLET, DELAYED RELEASE ORAL 3 TIMES DAILY PRN
Qty: 30 TABLET | Refills: 0 | Status: SHIPPED | OUTPATIENT
Start: 2017-08-15 | End: 2017-11-17 | Stop reason: ALTCHOICE

## 2017-08-15 NOTE — TELEPHONE ENCOUNTER
Called patient to move appointment time with Dr. Mcguire tomorrow. I was unable to leave a voicemail on the cell due to one not being set up. Left a message on the home phone requesting to reschedule.

## 2017-08-15 NOTE — PROGRESS NOTES
For your information:  The following patient has been assigned to Debbie Donaldson RN with [facility] for high risk screening.  Reason: High Risk  Please contact [facility] at [phone] with any questions.    Thank you,  Jenny Guzman

## 2017-08-16 ENCOUNTER — TELEPHONE (OUTPATIENT)
Dept: FAMILY MEDICINE | Facility: CLINIC | Age: 62
End: 2017-08-16

## 2017-08-16 ENCOUNTER — OFFICE VISIT (OUTPATIENT)
Dept: NEUROLOGY | Facility: CLINIC | Age: 62
End: 2017-08-16
Payer: MEDICARE

## 2017-08-16 ENCOUNTER — OUTPATIENT CASE MANAGEMENT (OUTPATIENT)
Dept: ADMINISTRATIVE | Facility: OTHER | Age: 62
End: 2017-08-16

## 2017-08-16 VITALS
HEIGHT: 66 IN | WEIGHT: 179.88 LBS | HEART RATE: 85 BPM | DIASTOLIC BLOOD PRESSURE: 84 MMHG | SYSTOLIC BLOOD PRESSURE: 156 MMHG | BODY MASS INDEX: 28.91 KG/M2

## 2017-08-16 DIAGNOSIS — G89.29 CHRONIC BILATERAL LOW BACK PAIN WITH BILATERAL SCIATICA: ICD-10-CM

## 2017-08-16 DIAGNOSIS — M54.42 CHRONIC BILATERAL LOW BACK PAIN WITH BILATERAL SCIATICA: ICD-10-CM

## 2017-08-16 DIAGNOSIS — M54.30 SCIATICA, UNSPECIFIED LATERALITY: Primary | ICD-10-CM

## 2017-08-16 DIAGNOSIS — G89.29 CHRONIC HIP PAIN, UNSPECIFIED LATERALITY: Primary | ICD-10-CM

## 2017-08-16 DIAGNOSIS — M54.41 CHRONIC BILATERAL LOW BACK PAIN WITH BILATERAL SCIATICA: ICD-10-CM

## 2017-08-16 DIAGNOSIS — M25.559 CHRONIC HIP PAIN, UNSPECIFIED LATERALITY: Primary | ICD-10-CM

## 2017-08-16 PROCEDURE — 99999 PR PBB SHADOW E&M-EST. PATIENT-LVL IV: CPT | Mod: PBBFAC,,, | Performed by: PSYCHIATRY & NEUROLOGY

## 2017-08-16 PROCEDURE — 3077F SYST BP >= 140 MM HG: CPT | Mod: ,,, | Performed by: PSYCHIATRY & NEUROLOGY

## 2017-08-16 PROCEDURE — 3008F BODY MASS INDEX DOCD: CPT | Mod: ,,, | Performed by: PSYCHIATRY & NEUROLOGY

## 2017-08-16 PROCEDURE — 99214 OFFICE O/P EST MOD 30 MIN: CPT | Mod: S$PBB,,, | Performed by: PSYCHIATRY & NEUROLOGY

## 2017-08-16 PROCEDURE — 99214 OFFICE O/P EST MOD 30 MIN: CPT | Mod: PBBFAC,PO | Performed by: PSYCHIATRY & NEUROLOGY

## 2017-08-16 PROCEDURE — 3079F DIAST BP 80-89 MM HG: CPT | Mod: ,,, | Performed by: PSYCHIATRY & NEUROLOGY

## 2017-08-16 NOTE — PROGRESS NOTES
[facility] NEUROLOGY  [facility] [place]    Date: August 16, 2017   Patient Name: Leif Ramirez Jr.   MRN: [id number]   PCP: Wale Mcguire  Referring Provider: Self, Aaareferral    Assessment:   Leif Ramirez Jr. is a 62 y.o. male presenting for evaluation of chronic low back and left hip pain.  The patient has known multilevel degenerative lumbar spinal disease (Xrays personally revirwed) as well as chronic left trochanteric bursitis. The patient has not yet followed up as recommended with orthopedics and pain management. I have placed repeat referral for these today. I see no evidence of an acute neurologic process as the primary source of the patient's pain.   Plan:     Problem List Items Addressed This Visit        Orthopedic    Chronic low back pain    Relevant Orders    Ambulatory consult to Orthopedics    Ambulatory consult to Pain Clinic      Other Visit Diagnoses     Chronic hip pain, unspecified laterality    -  Primary    Relevant Orders    Ambulatory consult to Orthopedics    Ambulatory consult to Pain Clinic        Howard Burleson MD  [facility]   Department of Neurology  Patient note was created using Dragon Dictation.  Any errors in syntax or even information may not have been identified and edited on initial review prior to signing this note.  Subjective:        HPI:   Mr. Leif Ramirez Jr. is a 62 y.o. male who presents with a chief complaint of chronic lower extremity and lumbar back pain.  The patient continues to complain of left hip pain, specifically in the joint and specifically with motion.  He reports that in the past, steroid injections in his hip have been helpful for his chronic bursitis.  Unfortunately, he has not followed up with orthopedics and pain management as previously recommended.  He did however have a recent hospitalization for SI per the record.  He states that he goes to a gym 3 times a week but also states that he is not compliant with  PT or OT exercises at home.     PAST MEDICAL HISTORY:  Past Medical History:   Diagnosis Date    Allergy     poison ivy    Anemia     Anxiety     Arthritis     Asthma     Bipolar affective     Cancer     blood and bone    CHF (congestive heart failure)     Chronic hepatitis C with cirrhosis     COPD (chronic obstructive pulmonary disease)     Coronary artery disease     Depression     Diabetes mellitus type II     Disorder of kidney and ureter     Elevated PSA     has had prostate problems but unaware of his psa level    Encounter for blood transfusion     History of back injury     Chronic back pain    Hyperlipidemia     Hypertension     Internal hemorrhoid, bleeding 9/21/2015    Pancytopenia     Schizo affective schizophrenia     Seizures     Stroke     Thyroid disease     Urinary tract infection      PAST SURGICAL HISTORY:  Past Surgical History:   Procedure Laterality Date    ELBOW BURSA SURGERY      JOINT REPLACEMENT      ORIF FEMUR FRACTURE Right 8/2015     CURRENT MEDS:  Current Outpatient Prescriptions   Medication Sig Dispense Refill    albuterol 90 mcg/actuation inhaler Inhale 1 puff into the lungs every 4 (four) hours as needed for Wheezing. 1 Inhaler 1    amlodipine (NORVASC) 10 MG tablet Take 1 tablet (10 mg total) by mouth once daily. 90 tablet 3    carvedilol (COREG) 25 MG tablet TAKE 1 TABLET(25 MG) BY MOUTH TWICE DAILY 180 tablet 0    diclofenac (VOLTAREN) 25 MG TbEC Take 1 tablet (25 mg total) by mouth 3 (three) times daily as needed (pain). 30 tablet 0    ERGOCALCIFEROL, VITAMIN D2, (VITAMIN D ORAL) Take 2,000 Units by mouth once daily.       ferrous sulfate 325 mg (65 mg iron) Tab tablet TAKE ONE TABLET BY MOUTH ONCE DAILY WITH LUNCH 90 tablet 0    ferrous sulfate 325 mg (65 mg iron) Tab tablet TAKE 1 TABLET BY MOUTH WITH LUNCH. 90 tablet 0    fluoxetine (PROZAC) 20 MG capsule Take 1 capsule by mouth once daily.  3    furosemide (LASIX) 80 MG tablet TK ONE T  PO QAM.  1    gabapentin (NEURONTIN) 300 MG capsule Take 300 mg by mouth 3 (three) times daily.   0    hydrALAZINE (APRESOLINE) 10 MG tablet TAKE 1 TABLET(10 MG) BY MOUTH THREE TIMES DAILY 270 tablet 11    hydrocodone-acetaminophen 5-325mg (NORCO) 5-325 mg per tablet Take 1 tablet by mouth 3 (three) times daily as needed for Pain. 90 tablet 0    metoprolol tartrate (LOPRESSOR) 50 MG tablet TK ONE T PO BID. 180 tablet 3    olanzapine (ZYPREXA) 20 MG tablet Take 20 mg by mouth nightly.       olanzapine (ZYPREXA) 5 MG tablet Take 5 mg by mouth once daily.      omeprazole (PRILOSEC) 20 MG capsule Take 1 capsule (20 mg total) by mouth once daily. 90 capsule 1    oxybutynin (DITROPAN) 5 MG Tab Take 1 tablet (5 mg total) by mouth every evening. One tablet before sleep 30 tablet 11    tamsulosin (FLOMAX) 0.4 mg Cp24 Take 1 capsule (0.4 mg total) by mouth once daily. 90 capsule 3    lorazepam (ATIVAN) 1 MG tablet Take 1 tablet (1 mg total) by mouth every 6 (six) hours as needed for Anxiety. 30 tablet 0    nicotine (NICODERM CQ) 21 mg/24 hr PLACE ONE PATCH ONTO THE SKIN ONCE DAILY 28 patch 0     No current facility-administered medications for this visit.      ALLERGIES:  Review of patient's allergies indicates:   Allergen Reactions    Codeine      Other reaction(s): Nausea    Depakote [divalproex] Other (See Comments)     Thrombocytopenia    Haldol [haloperidol lactate] Other (See Comments)     Seizures    Methadone     Morphine     Naloxone     Opium     Propoxyphene     Stelazine [trifluoperazine]     Amoxicillin Rash     FAMILY HISTORY:  Family History   Problem Relation Age of Onset    Hypertension Mother     Diabetes Mother     Transient ischemic attack Mother     Heart disease Mother      stent placement    Arthritis Mother     Heart disease Father     Hypertension Father     Diabetes Father     Stroke Father     Arthritis Sister     Hypertension Sister     Heart disease Maternal  "Grandmother     Diabetes Maternal Grandmother     Stroke Maternal Grandmother     Cancer Paternal Grandmother      breast    Heart disease Paternal Grandmother     Cancer Sister      brain    Prostate cancer Neg Hx     Kidney disease Neg Hx      SOCIAL HISTORY:  Social History   Substance Use Topics    Smoking status: Current Every Day Smoker     Packs/day: 0.50    Smokeless tobacco: Never Used    Alcohol use No     Review of Systems:  12 review of systems is negative except for the symptoms mentioned in HPI.      Objective:     Vitals:    08/16/17 0959   BP: (!) 156/84   Pulse: 85   Weight: 81.6 kg (179 lb 14.3 oz)   Height: 5' 6" (1.676 m)     General: NAD, well nourished   Eyes: no tearing, discharge, no erythema   ENT: moist mucous membranes of the oral cavity, nares patent    Neck: Supple, full range of motion  Cardiovascular: Warm and well perfused, pulses equal and symmetrical  Lungs: Normal work of breathing, normal chest wall excursions  Skin: No rash, lesions, or breakdown on exposed skin  Psychiatry: Mood and affect are appropriate   Abdomen: soft, non tender, non distended  Extremeties: No cyanosis, clubbing or edema.    Neurological   MENTAL STATUS: Alert and oriented to person, place, and time. Attention and concentration within normal limits. Speech without dysarthria, able to name and repeat without difficulty. Recent and remote memory within normal limits   CRANIAL NERVES: Visual fields intact. PERRL. EOMI. Facial sensation intact. Face symmetrical. Hearing grossly intact. Full shoulder shrug bilaterally. Tongue protrudes midline   SENSORY: Sensation is reduced to LT in distal LEs.   MOTOR: Normal bulk and tone.  5/5 deltoid, biceps, triceps, interosseous, hand  bilaterally. 5/5 iliopsoas, knee extension/flexion, foot dorsi/plantarflexion bilaterally.    REFLEXES: Symmetric and 2+ throughout.   CEREBELLAR/COORDINATION/GAIT: Gait deferred due patient complaints of pain.  Finger to nose " intact. Normal rapid alternating movements.

## 2017-08-16 NOTE — TELEPHONE ENCOUNTER
----- Message from Argelia Mahoney sent at 8/16/2017 11:48 AM CDT -----  Patient has a referral to Pain Mgmt, patient needs to have an MRI before they can see him

## 2017-08-16 NOTE — PROGRESS NOTES
First attempt to perform initial assessment for OPCM; talked to sister Taylor [place] and asked that this RN call back at a later date and time.  Patient currently at MD office.  DAINA Donaldson, OPCM-RN

## 2017-08-16 NOTE — PATIENT INSTRUCTIONS
Exercises to Strengthen Your Lower Back  Strong lower back and abdominal muscles work together to support your spine. The exercises below will help strengthen the lower back. It is important that you begin exercising slowly and increase levels gradually.  Always begin any exercise program with stretching. If you feel pain while doing any of these exercises, stop and talk to your doctor about a more specific exercise program that better suits your condition.   Low back stretch  The point of stretching is to make you more flexible and increase your range of motion. Stretch only as much as you are able. Stretch slowly. Do not push your stretch to the limit. If at any point you feel pain while stretching, this is your (temporary) limit.  · Lie on your back with your knees bent and both feet on the ground.  · Slowly raise your left knee to your chest as you flatten your lower back against the floor. Hold for 5 seconds.  · Relax and repeat the exercise with your right knee.  · Do 10 of these exercises for each leg.  · Repeat hugging both knees to your chest at the same time.  Building lower back strength  Start your exercise routine with 10 to 30 minutes a day, 1 to 3 times a day.  Initial exercises  Lying on your back:  1. Ankle pumps: Move your foot up and down, towards your head, and then away. Repeat 10 times with each foot.  2. Heel slides: Slowly bend your knee, drawing the heel of your foot towards you. Then slide your heel/foot from you, straightening your knee. Do not lift your foot off the floor (this is not a leg lift).  3. Abdominal contraction: Bend your knees and put your hands on your stomach. Tighten your stomach muscles. Hold for 5 seconds, then relax. Repeat 10 times.  4. Straight leg raise: Bend one leg at the knee and keep the other leg straight. Tighten your stomach muscles. Slowly lift your straight leg 6 to 12 inches off the floor and hold for up to 5 seconds. Repeat 10 times on each  side.  Standin. Wall squats: Stand with your back against the wall. Move your feet about 12 inches away from the wall. Tighten your stomach muscles, and slowly bend your knees until they are at about a 45 degree angle. Do not go down too far. Hold about 5 seconds. Then slowly return to your starting position. Repeat 10 times.  2. Heel raises: Stand facing the wall. Slowly raise the heels of your feet up and down, while keeping your toes on the floor. If you have trouble balancing, you can touch the wall with your hands. Repeat 10 times.  More advanced exercises  When you feel comfortable enough, try these exercises.  1. Kneeling lumbar extension: Begin on your hands and knees. At the same time, raise and straighten your right arm and left leg until they are parallel to the ground. Hold for 2 seconds and come back slowly to a starting position. Repeat with left arm and right leg, alternating 10 times.  2. Prone lumbar extension: Lie face down, arms extended overhead, palms on the floor. At the same time, raise your right arm and left leg as high as comfortably possible. Hold for 10 seconds and slowly return to start. Repeat with left arm and right leg, alternating 10 times. Gradually build up to 20 times. (Advanced: Repeat this exercise raising both arms and both legs a few inches off the floor at the same time. Hold for 5 seconds and release.)  3. Pelvic tilt: Lie on the floor on your back with your knees bent at 90 degrees. Your feet should be flat on the floor. Inhale, exhale, then slowly contract your abdominal muscles bringing your navel toward your spine. Let your pelvis rock back until your lower back is flat on the floor. Hold for 10 seconds while breathing smoothly.  4. Abdominal crunch: Perform a pelvic tilt (above) flattening your lower back against the floor. Holding the tension in your abdominal muscles, take another breath and raise your shoulder blades off the ground (this is not a full sit-up).  Keep your head in line with your body (dont bend your neck forward). Hold for 2 seconds, then slowly lower.  Date Last Reviewed: 6/1/2016  © 6037-2478 [employer]. [address] [place] [address], [place] [address]. All rights reserved. This information is not intended as a substitute for professional medical care. Always follow your healthcare professional's instructions.

## 2017-08-18 ENCOUNTER — OUTPATIENT CASE MANAGEMENT (OUTPATIENT)
Dept: ADMINISTRATIVE | Facility: OTHER | Age: 62
End: 2017-08-18

## 2017-08-18 ENCOUNTER — TELEPHONE (OUTPATIENT)
Dept: FAMILY MEDICINE | Facility: CLINIC | Age: 62
End: 2017-08-18

## 2017-08-18 NOTE — LETTER
August 18, 2017    Leif Ramirez Jr.  [address]  [place] [address]             [facility] [address] [place] [address] Dear Leif,    I work with [facility] Outpatient Case Management Department. I have been unsuccessful at reaching you to follow-up to see how you have been doing. If you require any future assistance or if any new concerns or problems arise, please do not hesitate to call.     The Outpatient Case Management Department can be reached at [phone] from 8:00AM to 4:30 PM on Monday thru Friday. [facility] also has a program where a nurse is available 24/7 to answer questions or provide medical advice, their number is [phone].    Thanks,      Debbie Donaldson,  RN  Outpatient Case Management

## 2017-08-18 NOTE — PROGRESS NOTES
After two failed attempts to perform initial assessment for OPCM, letter will be sent.  Case close.  DAINA Donaldson, OPCM-RN

## 2017-08-18 NOTE — TELEPHONE ENCOUNTER
----- Message from Génesis Hogan sent at 8/18/2017  3:24 PM CDT -----  Contact: [facility]  [facility] would like medications refill of omeprazole (PRILOSEC) 20 MG capsule and diclofenac (VOLTAREN) 25 MG TbEC  fax to [phone]

## 2017-08-21 ENCOUNTER — TELEPHONE (OUTPATIENT)
Dept: PAIN MEDICINE | Facility: CLINIC | Age: 62
End: 2017-08-21

## 2017-08-21 ENCOUNTER — TELEPHONE (OUTPATIENT)
Dept: FAMILY MEDICINE | Facility: CLINIC | Age: 62
End: 2017-08-21

## 2017-08-21 ENCOUNTER — OFFICE VISIT (OUTPATIENT)
Dept: FAMILY MEDICINE | Facility: CLINIC | Age: 62
End: 2017-08-21
Attending: FAMILY MEDICINE
Payer: MEDICARE

## 2017-08-21 VITALS
WEIGHT: 179 LBS | SYSTOLIC BLOOD PRESSURE: 139 MMHG | BODY MASS INDEX: 28.77 KG/M2 | HEART RATE: 84 BPM | OXYGEN SATURATION: 98 % | HEIGHT: 66 IN | DIASTOLIC BLOOD PRESSURE: 80 MMHG

## 2017-08-21 DIAGNOSIS — K74.69 COMPENSATED CIRRHOSIS RELATED TO HEPATITIS C VIRUS (HCV): Chronic | ICD-10-CM

## 2017-08-21 DIAGNOSIS — M25.551 BILATERAL HIP PAIN: ICD-10-CM

## 2017-08-21 DIAGNOSIS — N18.4 ANEMIA OF CHRONIC RENAL FAILURE, STAGE 4 (SEVERE): ICD-10-CM

## 2017-08-21 DIAGNOSIS — D69.6 THROMBOCYTOPENIA: Chronic | ICD-10-CM

## 2017-08-21 DIAGNOSIS — F32.A DEPRESSION, UNSPECIFIED DEPRESSION TYPE: ICD-10-CM

## 2017-08-21 DIAGNOSIS — B19.20 COMPENSATED CIRRHOSIS RELATED TO HEPATITIS C VIRUS (HCV): Chronic | ICD-10-CM

## 2017-08-21 DIAGNOSIS — M54.42 CHRONIC BILATERAL LOW BACK PAIN WITH LEFT-SIDED SCIATICA: ICD-10-CM

## 2017-08-21 DIAGNOSIS — D63.1 ANEMIA OF CHRONIC RENAL FAILURE, STAGE 4 (SEVERE): ICD-10-CM

## 2017-08-21 DIAGNOSIS — J43.8 OTHER EMPHYSEMA: Chronic | ICD-10-CM

## 2017-08-21 DIAGNOSIS — N18.4 CKD (CHRONIC KIDNEY DISEASE), STAGE IV: ICD-10-CM

## 2017-08-21 DIAGNOSIS — G89.29 CHRONIC BILATERAL LOW BACK PAIN WITH LEFT-SIDED SCIATICA: ICD-10-CM

## 2017-08-21 DIAGNOSIS — I10 ESSENTIAL HYPERTENSION: Primary | Chronic | ICD-10-CM

## 2017-08-21 DIAGNOSIS — M25.552 BILATERAL HIP PAIN: ICD-10-CM

## 2017-08-21 DIAGNOSIS — E78.5 HYPERLIPIDEMIA, UNSPECIFIED HYPERLIPIDEMIA TYPE: Chronic | ICD-10-CM

## 2017-08-21 DIAGNOSIS — F41.9 ANXIETY: ICD-10-CM

## 2017-08-21 PROCEDURE — 3079F DIAST BP 80-89 MM HG: CPT | Mod: ,,, | Performed by: FAMILY MEDICINE

## 2017-08-21 PROCEDURE — 99213 OFFICE O/P EST LOW 20 MIN: CPT | Mod: PBBFAC,PO | Performed by: FAMILY MEDICINE

## 2017-08-21 PROCEDURE — 99999 PR PBB SHADOW E&M-EST. PATIENT-LVL III: CPT | Mod: PBBFAC,,, | Performed by: FAMILY MEDICINE

## 2017-08-21 PROCEDURE — 3075F SYST BP GE 130 - 139MM HG: CPT | Mod: ,,, | Performed by: FAMILY MEDICINE

## 2017-08-21 PROCEDURE — 99214 OFFICE O/P EST MOD 30 MIN: CPT | Mod: S$PBB,,, | Performed by: FAMILY MEDICINE

## 2017-08-21 RX ORDER — HYDROCODONE BITARTRATE AND ACETAMINOPHEN 5; 325 MG/1; MG/1
1 TABLET ORAL 3 TIMES DAILY PRN
Qty: 90 TABLET | Refills: 0 | Status: SHIPPED | OUTPATIENT
Start: 2017-08-21 | End: 2017-09-15 | Stop reason: SDUPTHER

## 2017-08-21 NOTE — TELEPHONE ENCOUNTER
----- Message from Gifty Velasquez sent at 8/21/2017  1:20 PM CDT -----  Contact: [phone]/[facility]   [facility] would like medications refill of omeprazole (PRILOSEC) 20 MG capsule and diclofenac (VOLTAREN) 25 MG TbEC  fax to [phone]. Please advise

## 2017-08-21 NOTE — TELEPHONE ENCOUNTER
----- Message from Donya Barros sent at 8/21/2017  3:34 PM CDT -----  Contact: [phone]  Patient is requesting to know why his appt. Was cancelled on 08-22-17, patient states he is disabled and would like the phone to ring more than 4 times

## 2017-08-21 NOTE — PROGRESS NOTES
Subjective:       Patient ID: Leif Ramirez Jr. is a 62 y.o. male.    Chief Complaint: Medication Refill and Hip Pain    62 yr old pleasant white male with anxiety/depression, schizoaffective disorder, Bipolar disorder, chronic Hep C with cirrhosis, CKD III, chronic low back pain, B/L hip pain, BPH, HLD, pancytopenia, presents today for his routine 3 month follow up. C/o worsening left hip pain and low back pain and need pain medicine refill.      Knee pain B/L - daily pain with instability - tried conservative approach with rest and meds and no relief - do not want any surgery - never used brace for support    HTN - chronic - controlled - on CCB and BB - - compliant - no side effects      HLD - controlled - diet alone -     LDLCALC                  50.0 (L)            04/15/2015    - lab due                  COPD - chronic - controlled      Chronic Hep C with Cirrhosis - seen hepatology - started on Harvoni and trying to be compliant      CKD IV/V - worsening - seeing nephrology - urine ok    Pancytopenia - seen Hem/Onc and never had follow up -       Chronic LBP/hip pain - he has B/L hip replacement and has pain in them every single day and he suffers and was on some pain medication until his doctor refused it - he also has back pain and it radiates to leg leg - no numbness or saddle anesthesia, bladder/bowel problem      History as below - reviewed      Health maintenance  -labs UTD  -colon screen and PSA reports UTD        Medication Refill   This is a chronic problem. The current episode started more than 1 year ago. The problem occurs constantly. The problem has been gradually improving. Associated symptoms include arthralgias and myalgias. Pertinent negatives include no chest pain, congestion, coughing, diaphoresis, rash, vomiting or weakness. Nothing aggravates the symptoms. Treatments tried: pain medicine. The treatment provided moderate relief.   Hip Pain    The injury mechanism was a twisting injury.  The pain is present in the left hip and right hip. The quality of the pain is described as aching. The pain is at a severity of 6/10. The pain is moderate. The symptoms are aggravated by movement and palpation. He has tried heat and elevation for the symptoms. The treatment provided mild relief.   Hypertension   This is a chronic problem. The current episode started more than 1 year ago. The problem is controlled. Associated symptoms include anxiety. Pertinent negatives include no chest pain or palpitations. There are no associated agents to hypertension. Risk factors for coronary artery disease include dyslipidemia and male gender. Past treatments include ACE inhibitors and central alpha agonists. The current treatment provides significant improvement. There are no compliance problems.  There is no history of angina, CAD/MI, CVA, left ventricular hypertrophy, PVD, renovascular disease, retinopathy or a thyroid problem. There is no history of chronic renal disease, hyperparathyroidism or pheochromocytoma.   Anemia   Presents for follow-up visit. Symptoms include pallor. There has been no confusion, light-headedness or palpitations. Past treatments include nothing. There is no history of alcohol abuse, cancer, chronic renal disease, dementia, hypothyroidism, malnutrition, recent illness or recent trauma. There is no past history of bone marrow exam, colonoscopy, EGD or FOBT. Compliance with medications is 51-75%.   Hyperlipidemia   This is a chronic problem. The current episode started more than 1 year ago. The problem is controlled. Recent lipid tests were reviewed and are normal. He has no history of chronic renal disease, diabetes, hypothyroidism or liver disease. There are no known factors aggravating his hyperlipidemia. Associated symptoms include myalgias. Pertinent negatives include no chest pain. He is currently on no antihyperlipidemic treatment. The current treatment provides mild improvement of lipids.  There are no compliance problems.  Risk factors for coronary artery disease include dyslipidemia, hypertension and a sedentary lifestyle.     Review of Systems   Constitutional: Negative.  Negative for activity change, diaphoresis and unexpected weight change.   HENT: Negative.  Negative for congestion, ear pain, mouth sores, rhinorrhea and voice change.    Eyes: Negative.  Negative for pain, discharge and visual disturbance.   Respiratory: Negative.  Negative for apnea, cough and wheezing.    Cardiovascular: Negative.  Negative for chest pain and palpitations.   Gastrointestinal: Negative.  Negative for abdominal distention, anal bleeding, diarrhea and vomiting.   Endocrine: Negative.  Negative for cold intolerance and polyuria.   Genitourinary: Negative.  Negative for decreased urine volume, difficulty urinating, discharge, frequency and scrotal swelling.   Musculoskeletal: Positive for arthralgias, back pain and myalgias. Negative for neck stiffness.   Skin: Positive for pallor. Negative for color change and rash.   Allergic/Immunologic: Negative.  Negative for environmental allergies and immunocompromised state.   Neurological: Negative.  Negative for dizziness, speech difficulty, weakness and light-headedness.   Hematological: Negative.    Psychiatric/Behavioral: Negative.  Negative for agitation, confusion, dysphoric mood and suicidal ideas. The patient is not nervous/anxious.        PMH/PSH/FH/SH/MED/ALLERGY reviewed    Objective:       Vitals:    08/21/17 1335   BP: 139/80   Pulse: 84       Physical Exam   Constitutional: He is oriented to person, place, and time. He appears well-developed and well-nourished. No distress.   HENT:   Head: Normocephalic and atraumatic.   Right Ear: External ear normal.   Left Ear: External ear normal.   Nose: Nose normal.   Mouth/Throat: Oropharynx is clear and moist.   Eyes: EOM are normal. Pupils are equal, round, and reactive to light.   Neck: Normal range of motion. Neck  supple. No JVD present. No tracheal deviation present. No thyromegaly present.   Cardiovascular: Normal rate, regular rhythm, normal heart sounds and intact distal pulses.  Exam reveals no gallop and no friction rub.    No murmur heard.  Pulmonary/Chest: Effort normal and breath sounds normal. No stridor. No respiratory distress. He has no wheezes. He has no rales. He exhibits no tenderness.   Abdominal: Soft. Bowel sounds are normal. He exhibits distension. He exhibits no mass. There is no tenderness. There is no rebound and no guarding. No hernia.   Musculoskeletal: Normal range of motion. He exhibits tenderness (TTP B/L hips with restricted ROM and lower lumbar spine and paralumbar spine TTP. SLRT + left). He exhibits no edema.   Lymphadenopathy:     He has no cervical adenopathy.   Neurological: He is alert and oriented to person, place, and time. He has normal reflexes. He displays normal reflexes. No cranial nerve deficit. He exhibits normal muscle tone. Coordination normal.   Skin: Skin is warm and dry. No rash noted. He is not diaphoretic. No erythema. No pallor.   Psychiatric: He has a normal mood and affect. His behavior is normal. Judgment and thought content normal.       Assessment:       1. Essential hypertension    2. Chronic bilateral low back pain with left-sided sciatica    3. Bilateral hip pain    4. Anxiety    5. Depression, unspecified depression type    6. Other emphysema    7. Hyperlipidemia, unspecified hyperlipidemia type    8. CKD (chronic kidney disease), stage IV    9. Thrombocytopenia    10. Anemia of chronic renal failure, stage 4 (severe)    11. 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    Diagnoses and all orders for this visit:    Essential hypertension    Chronic bilateral low back pain with left-sided sciatica  -     hydrocodone-acetaminophen 5-325mg (NORCO) 5-325 mg per tablet; Take 1  tablet by mouth 3 (three) times daily as needed for Pain.    Bilateral hip pain  -     hydrocodone-acetaminophen 5-325mg (NORCO) 5-325 mg per tablet; Take 1 tablet by mouth 3 (three) times daily as needed for Pain.    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 Dc, Psychiatry    B/L hip pain and chronic LBP  -on norco  -advised to check with hepatology for tylenol intake    Spent adequate time in obtaining history and explaining differentials    40 minutes spent during this visit of which greater than 50% devoted to face-face counseling and coordination of care regarding diagnosis and management plan    RTC 3 months

## 2017-08-21 NOTE — TELEPHONE ENCOUNTER
Robyn is contacting pt to discuss the cancellation and rescheduling of this appt.  Pt needs imaging prior to the appt.

## 2017-08-23 ENCOUNTER — OFFICE VISIT (OUTPATIENT)
Dept: PODIATRY | Facility: CLINIC | Age: 62
End: 2017-08-23
Payer: MEDICARE

## 2017-08-23 VITALS
SYSTOLIC BLOOD PRESSURE: 150 MMHG | DIASTOLIC BLOOD PRESSURE: 90 MMHG | WEIGHT: 179 LBS | HEIGHT: 66 IN | HEART RATE: 85 BPM | BODY MASS INDEX: 28.77 KG/M2

## 2017-08-23 DIAGNOSIS — E11.69 ONYCHOMYCOSIS OF MULTIPLE TOENAILS WITH TYPE 2 DIABETES MELLITUS AND PERIPHERAL ANGIOPATHY: ICD-10-CM

## 2017-08-23 DIAGNOSIS — E11.42 DIABETIC POLYNEUROPATHY ASSOCIATED WITH TYPE 2 DIABETES MELLITUS: Primary | ICD-10-CM

## 2017-08-23 DIAGNOSIS — E11.51 ONYCHOMYCOSIS OF MULTIPLE TOENAILS WITH TYPE 2 DIABETES MELLITUS AND PERIPHERAL ANGIOPATHY: ICD-10-CM

## 2017-08-23 DIAGNOSIS — B35.1 ONYCHOMYCOSIS OF MULTIPLE TOENAILS WITH TYPE 2 DIABETES MELLITUS AND PERIPHERAL ANGIOPATHY: ICD-10-CM

## 2017-08-23 DIAGNOSIS — M20.41 HAMMER TOES OF BOTH FEET: ICD-10-CM

## 2017-08-23 DIAGNOSIS — E11.628 TYPE 2 DIABETES MELLITUS WITH PRESSURE CALLUS: ICD-10-CM

## 2017-08-23 DIAGNOSIS — M20.42 HAMMER TOES OF BOTH FEET: ICD-10-CM

## 2017-08-23 DIAGNOSIS — L84 TYPE 2 DIABETES MELLITUS WITH PRESSURE CALLUS: ICD-10-CM

## 2017-08-23 PROCEDURE — 11056 PARNG/CUTG B9 HYPRKR LES 2-4: CPT | Mod: S$PBB,Q9,, | Performed by: PODIATRIST

## 2017-08-23 PROCEDURE — 99999 PR PBB SHADOW E&M-EST. PATIENT-LVL IV: CPT | Mod: PBBFAC,,, | Performed by: PODIATRIST

## 2017-08-23 PROCEDURE — 11721 DEBRIDE NAIL 6 OR MORE: CPT | Mod: S$PBB,59,Q9, | Performed by: PODIATRIST

## 2017-08-23 PROCEDURE — 11721 DEBRIDE NAIL 6 OR MORE: CPT | Mod: PBBFAC,PO | Performed by: PODIATRIST

## 2017-08-23 PROCEDURE — 99214 OFFICE O/P EST MOD 30 MIN: CPT | Mod: PBBFAC,PO | Performed by: PODIATRIST

## 2017-08-23 PROCEDURE — 11056 PARNG/CUTG B9 HYPRKR LES 2-4: CPT | Mod: PBBFAC,PO | Performed by: PODIATRIST

## 2017-08-23 PROCEDURE — 99499 UNLISTED E&M SERVICE: CPT | Mod: S$PBB,,, | Performed by: PODIATRIST

## 2017-08-23 RX ORDER — QUETIAPINE 150 MG/1
TABLET, EXTENDED RELEASE ORAL
Refills: 0 | COMMUNITY
Start: 2017-08-13 | End: 2017-10-05

## 2017-08-25 ENCOUNTER — HOSPITAL ENCOUNTER (OUTPATIENT)
Dept: RADIOLOGY | Facility: HOSPITAL | Age: 62
Discharge: HOME | End: 2017-08-25
Attending: PSYCHIATRY & NEUROLOGY
Payer: MEDICARE

## 2017-08-25 DIAGNOSIS — M54.30 SCIATICA, UNSPECIFIED LATERALITY: ICD-10-CM

## 2017-08-25 PROCEDURE — 72148 MRI LUMBAR SPINE W/O DYE: CPT | Mod: TC

## 2017-08-25 PROCEDURE — 72148 MRI LUMBAR SPINE W/O DYE: CPT | Mod: 26,,, | Performed by: RADIOLOGY

## 2017-08-28 RX ORDER — TAMSULOSIN HYDROCHLORIDE 0.4 MG/1
CAPSULE ORAL
Qty: 90 CAPSULE | Refills: 0 | Status: SHIPPED | OUTPATIENT
Start: 2017-08-28

## 2017-08-28 NOTE — PROGRESS NOTES
Subjective:      Patient ID: Leif Ramirez Jr. is a 62 y.o. male.    Chief Complaint: Nail Care and Lump (Bilateral Foot)    Leif is a 62 y.o. male who presents to the clinic for evaluation and treatment of high risk feet. Leif has a past medical history of Allergy; Anemia; Anxiety; Arthritis; Asthma; Bipolar affective; Cancer; CHF (congestive heart failure); Chronic hepatitis C with cirrhosis; COPD (chronic obstructive pulmonary disease); Coronary artery disease; Depression; Diabetes mellitus type II; Disorder of kidney and ureter; Elevated PSA; Encounter for blood transfusion; History of back injury; Hyperlipidemia; Hypertension; Internal hemorrhoid, bleeding (9/21/2015); Pancytopenia; Schizo affective schizophrenia; Seizures; Stroke; Thyroid disease; and Urinary tract infection. The patient's chief complaint is long, thick toenails. History of right DFU, no recent wounds.  This patient has documented high risk feet requiring routine maintenance secondary to peripheral neuropathy.    PCP: Wale Mcguire MD    Date Last Seen by PCP: 8/21/17        Hemoglobin A1C   Date Value Ref Range Status   04/15/2015 4.7 4.5 - 6.2 % Final   12/18/2012 4.8 4.0 - 6.2 % Final   10/15/2012 text % Final     Comment:     Hemoglobin A1C:   3.7        Review of Systems   Constitution: Negative for chills, fever and malaise/fatigue.   Cardiovascular: Negative for chest pain, leg swelling, orthopnea and palpitations.   Respiratory: Negative for cough, shortness of breath and wheezing.    Skin: Positive for color change, dry skin and nail changes. Negative for itching, poor wound healing and rash.   Musculoskeletal: Negative for arthritis, gout, joint pain, joint swelling, muscle weakness and myalgias.   Neurological: Positive for disturbances in coordination, numbness, paresthesias and sensory change. Negative for dizziness, focal weakness and tremors.           Objective:      Physical Exam   Cardiovascular:   Dorsalis pedis and  posterior tibial pulses are diminished Bilaterally. Toes are cool to touch. Feet are warm proximally.There is decreased digital hair. Skin is atrophic, slightly hyperpigmented, and mildly edematous       Musculoskeletal:   Musculoskeletal:  Muscle strength is 5/5 in all groups bilaterally.  Metatarsophalangeal and subtalar range of motion are within normal limits without crepitus bilaterally. There is limitation of ankle dorsiflexion with knees extended and flexed Bilaterally.    Reducible extensor and flexor contractures at the MTPJ and PIPJ of toes 2-5, bilat.          Neurological:   Tolstoy-Hai 5.07 monofilamant testing is diminished both feet. Sharp/dull sensation diminished Bilaterally. Light touch absent Bilaterally.       Skin:   Toenails 1-5 bilaterally are elongated by 2-3 mm, thickened by 2-3 mm, discolored/yellowed, dystrophic, brittle with subungual debris. No incurvation. Mild xerosis noted, bilat. No open wounds.      Hyperkeratotic lesions at the following locations:   left plantar 2nd toe and plantar 2nd MTPJ, Right.                 Assessment:       Encounter Diagnoses   Name Primary?    Diabetic polyneuropathy associated with type 2 diabetes mellitus Yes    Onychomycosis of multiple toenails with type 2 diabetes mellitus and peripheral angiopathy     Type 2 diabetes mellitus with pressure callus     Hammer toes of both feet          Plan:       Leif was seen today for nail care and lump.    Diagnoses and all orders for this visit:    Diabetic polyneuropathy associated with type 2 diabetes mellitus  -     DIABETIC SHOES FOR HOME USE    Onychomycosis of multiple toenails with type 2 diabetes mellitus and peripheral angiopathy  -     DIABETIC SHOES FOR HOME USE    Type 2 diabetes mellitus with pressure callus  -     DIABETIC SHOES FOR HOME USE    Hammer toes of both feet  -     DIABETIC SHOES FOR HOME USE      I counseled the patient on his conditions, their implications and medical  management.        - Shoe inspection. Diabetic Foot Education. Patient reminded of the importance of good nutrition and blood sugar control to help prevent podiatric complications of diabetes. Patient instructed on proper foot hygeine. We discussed wearing proper shoe gear, daily foot inspections, never walking without protective shoe gear, never putting sharp instruments to feet.     - After cleansing the  area w/ alcohol prep pad the above mentioned hyperkeratosis was trimmed utilizing No 15 scapel, to a smooth base with out incident. Patient tolerated this  well and reported comfort to the area of left plantar 2nd toe and plantar 2nd MTPJ, Right.    - With patient's permission, nails were aggressively reduced and debrided x 10 to their soft tissue attachment mechanically and with electric , removing all offending nail and debris. Patient relates relief following the procedure. He will continue to monitor the areas daily, inspect his feet, wear protective shoe gear when ambulatory, moisturizer to maintain skin integrity and follow in this office in approximately 4-6 months, sooner p.r.n.    - Discussed importance of supportive shoes with accommodative toe box to reduce pressure and irritation to forefoot

## 2017-08-31 ENCOUNTER — TELEPHONE (OUTPATIENT)
Dept: SMOKING CESSATION | Facility: CLINIC | Age: 62
End: 2017-08-31

## 2017-08-31 RX ORDER — FLUOXETINE HYDROCHLORIDE 20 MG/1
20 CAPSULE ORAL DAILY
Qty: 90 CAPSULE | Refills: 3 | Status: SHIPPED | OUTPATIENT
Start: 2017-08-31 | End: 2017-11-17 | Stop reason: ALTCHOICE

## 2017-08-31 NOTE — TELEPHONE ENCOUNTER
----- Message from Lynne Latanya sent at 8/31/2017  2:44 PM CDT -----  Contact: self, [phone]  Patient called in returning your call, requests a call back to  [phone]. Please advise.

## 2017-08-31 NOTE — TELEPHONE ENCOUNTER
----- Message from Gifty Velasquez sent at 8/31/2017  7:51 AM CDT -----  Contact: [phone]  Pt its requesting to speak with the nurse in regarding getting a prescription for throat inflammation  . Please advise

## 2017-08-31 NOTE — TELEPHONE ENCOUNTER
----- Message from Lynne Latanya sent at 8/31/2017  2:44 PM CDT -----  Contact: self, [phone]  Patient called in returning your call, requests a call back to  [phone]. Please advise.

## 2017-08-31 NOTE — TELEPHONE ENCOUNTER
Attempted to contact patient regarding reschedule of cancelled scon visit on 8/17/17. No one answered, no available voice mail to leave message.

## 2017-09-06 ENCOUNTER — TELEPHONE (OUTPATIENT)
Dept: PAIN MEDICINE | Facility: CLINIC | Age: 62
End: 2017-09-06

## 2017-09-06 NOTE — TELEPHONE ENCOUNTER
----- Message from Sagrario Oconnor sent at 9/6/2017  8:45 AM CDT -----  Contact: self/[phone]  Patient called to find out 2 things.    1.  Are you going to get him the MRI results?  2.  Are you going to give him an injection in his hip like Dr. Mcguire should have done?    He said he is disabled with no one to help him so it is difficult for him to get to his appointment.  Please call and advise.

## 2017-09-06 NOTE — TELEPHONE ENCOUNTER
Left message with patient's sister to contact the office regarding message left for staff. Sister stated that patient was not in at this time.

## 2017-09-11 ENCOUNTER — OFFICE VISIT (OUTPATIENT)
Dept: PAIN MEDICINE | Facility: CLINIC | Age: 62
End: 2017-09-11
Payer: MEDICARE

## 2017-09-11 VITALS
BODY MASS INDEX: 28.89 KG/M2 | WEIGHT: 179 LBS | SYSTOLIC BLOOD PRESSURE: 126 MMHG | DIASTOLIC BLOOD PRESSURE: 78 MMHG | HEART RATE: 74 BPM

## 2017-09-11 DIAGNOSIS — M48.04 THORACIC SPINAL STENOSIS: Primary | ICD-10-CM

## 2017-09-11 DIAGNOSIS — M54.16 LUMBAR RADICULOPATHY: ICD-10-CM

## 2017-09-11 PROCEDURE — 3074F SYST BP LT 130 MM HG: CPT | Mod: ,,, | Performed by: ANESTHESIOLOGY

## 2017-09-11 PROCEDURE — 99213 OFFICE O/P EST LOW 20 MIN: CPT | Mod: S$PBB,,, | Performed by: ANESTHESIOLOGY

## 2017-09-11 PROCEDURE — 99214 OFFICE O/P EST MOD 30 MIN: CPT | Mod: PBBFAC,PO | Performed by: ANESTHESIOLOGY

## 2017-09-11 PROCEDURE — 99999 PR PBB SHADOW E&M-EST. PATIENT-LVL IV: CPT | Mod: PBBFAC,,, | Performed by: ANESTHESIOLOGY

## 2017-09-11 PROCEDURE — 3078F DIAST BP <80 MM HG: CPT | Mod: ,,, | Performed by: ANESTHESIOLOGY

## 2017-09-11 NOTE — PROGRESS NOTES
Chronic patient Established Note (Follow up visit)      SUBJECTIVE:    Leif Ramirez Jr. presents to the clinic for a follow-up appointment for bilateral hip and leg pain. Since the last visit, Leif Ramirez Jr. states the pain has been worsening. Current pain intensity is 10/10.He has extensive psychiatric history requiring multiple psychiatric admission. He has history of Schizophrenia and schizoaffective disorder.  He has multiple co-morbidities including chronichepatitis C  I SAW HIM ONCE AND I ordered mu;tiple imaging studies, but he never followed up with me afterwards  MRI L spine showed road-based circumferential disc bulge with central disc protrusion at T11-T12. There is associated mass effect/flattening of the cord with abnormal T2 signal hyperintensity suggestive of cord edema or encephalomalacia.    2. Advanced multilevel degenerative changes of the lumbar spine noting severe spinal canal stenosis at L3-4 and L4-5. There is additional bilateral neural foraminal narrowing at multiple levels as discussed above.Pain Disability Index Review:  Last 3 PDI Scores 9/11/2017 7/6/2015   Pain Disability Index (PDI) 65 20       Pain Medications:    Opioids: Norco  - Adjuvant Medications: None  - Anti-Coagulants: Aspirin  - Others: See Medication Card    Opioid Contract: no     report:  Reviewed and consistent with medication use as prescribed.    Pain Procedures: Hip Injections by Dr. Kim Dash    Physical Therapy/Home Exercise: yes    Imaging: MRI Lumbar Spine Without Contrast 8/25/17  Narrative     Technique: Sagittal T1, sagittal T2, sagittal STIR, axial T1, and axial T2 weighted images of the lumbar spine were obtained without contrast.    Comparison: Lumbar spine radiograph 7/6/2016    Findings:    There is mild grade 1 anterolisthesis of L4 on L5.  The vertebral body heights are well maintained, with no fracture.  There is no marrow signal abnormality suspicious for infiltrative process.  There  is intervertebral disc space height loss at L1-L2, L2-L3, L4-L5, and L5-S1.  The conus is normal in appearance and terminates at the L1-L2 level.      T11-T12: There is a broad-based circumferential disc bulge with central disc protrusion. There is resulting mass effect upon the cord and moderate spinal canal stenosis. There is flattening of the cord which appears somewhat decreased in volume with associated abnormal T2 signal hyperintensity suggestive of encephalomalacia or edema. There is mild bilateral neural foraminal narrowing.    T12-L1: There is no focal disc herniation. There is no significant spinal canal stenosis or neural foraminal narrowing.    L1-L2: There is a circumferential disc bulge, ligamentum flavum thickening, and bilateral facet arthropathy.  No significant central canal or neural foraminal narrowing.    L2-L3: There is a circumferential disc bulge, ligamentum flavum thickening, and bilateral facet arthropathy.  There is moderate spinal canal stenosis. There is moderate to severe bilateral neural foraminal narrowing.    L3-L4: There is a circumferential disc bulge, ligamentum flavum thickening, and bilateral facet arthropathy.  There is severe spinal canal stenosis. There is moderate to severe bilateral neural foraminal narrowing.    L4-L5: There is a circumferential disc bulge, ligamentum flavum thickening, and bilateral facet arthropathy. There is severe spinal canal stenosis. There is  severe right-sided and moderate left-sided neural foraminal narrowing.    L5-S1: There is a circumferential disc bulge and bilateral facet arthropathy.  There is mild spinal canal stenosis. There is moderate left-sided and mild right-sided neural foraminal narrowing.    Limited views of the posterior abdomen demonstrate bilateral T2 hyperintense renal lesions which are incompletely characterized but may represent renal cysts.   Impression         1. Broad-based circumferential disc bulge with central disc  protrusion at T11-T12. There is associated mass effect/flattening of the cord with abnormal T2 signal hyperintensity suggestive of cord edema or encephalomalacia.    2. Advanced multilevel degenerative changes of the lumbar spine noting severe spinal canal stenosis at L3-4 and L4-5. There is additional bilateral neural foraminal narrowing at multiple levels as discussed above.    3. Small bilateral T2 hyperintense renal lesions which are incompletely characterized, but may represent small renal cysts.    This report has been flagged in the [facility] medical record.      Electronically signed by: SISI KIM  Date: 08/26/17  Time: 03:34          10/16/12 Xray Hip Left      Result Narrative      DATE OF EXAM: Oct 16 2012     KDX 0153 - HIP COMPLETE LEFT:   [id number]    CLINICAL HISTORY: POST OP    ICD 9 CODE(S): ()    CPT 4 CODE(S)/MODIFIER(S): ()    Comparison: 10/13/12    Technique: AP and frog leg lateral radiographs of the left hip.    Findings: Interval left femoral ORIF is noted with a long intramedullary   nail and proximal inter-locking pin. No acute complication demonstrated.   Some degree of displacement and angulation remains at the fracture site   but there is improved alignment with loss of previously noted varus   angulation. Displaced lesser trochanteric fracture fragment again noted.   Expected postoperative air in the left hip soft tissues and overlying   skin staples.      Impression:  1. Interval left proximal femoral ORIF with improved alignment. No   radiographically apparent acute complication.  ______________________________________     Electronically signed by: Sukhjinder Mckinnon MD  Date: 10/16/12  Time: 23:21         : TIMOTHY  Transcribe Date/Time: Oct 16 2012 11:21P  Dictated by : SUKHJINDER MCKINNON,          Allergies:   Review of patient's allergies indicates:   Allergen Reactions    Codeine      Other reaction(s): Nausea    Depakote [divalproex] Other (See Comments)      Thrombocytopenia    Haldol [haloperidol lactate] Other (See Comments)     Seizures    Methadone     Morphine     Naloxone     Opium     Propoxyphene     Stelazine [trifluoperazine]     Amoxicillin Rash       Current Medications:   Current Outpatient Prescriptions   Medication Sig Dispense Refill    albuterol 90 mcg/actuation inhaler Inhale 1 puff into the lungs every 4 (four) hours as needed for Wheezing. 1 Inhaler 1    amlodipine (NORVASC) 10 MG tablet Take 1 tablet (10 mg total) by mouth once daily. 90 tablet 3    carvedilol (COREG) 25 MG tablet TAKE 1 TABLET(25 MG) BY MOUTH TWICE DAILY 180 tablet 0    ERGOCALCIFEROL, VITAMIN D2, (VITAMIN D ORAL) Take 2,000 Units by mouth once daily.       ferrous sulfate 325 mg (65 mg iron) Tab tablet TAKE ONE TABLET BY MOUTH ONCE DAILY WITH LUNCH 90 tablet 0    ferrous sulfate 325 mg (65 mg iron) Tab tablet TAKE 1 TABLET BY MOUTH WITH LUNCH. 90 tablet 0    fluoxetine (PROZAC) 20 MG capsule Take 1 capsule (20 mg total) by mouth once daily. 90 capsule 3    furosemide (LASIX) 80 MG tablet TK ONE T PO QAM.  1    hydrALAZINE (APRESOLINE) 10 MG tablet TAKE 1 TABLET(10 MG) BY MOUTH THREE TIMES DAILY 270 tablet 11    hydrocodone-acetaminophen 5-325mg (NORCO) 5-325 mg per tablet Take 1 tablet by mouth 3 (three) times daily as needed for Pain. 90 tablet 0    lorazepam (ATIVAN) 1 MG tablet Take 1 tablet (1 mg total) by mouth every 6 (six) hours as needed for Anxiety. 30 tablet 0    metoprolol tartrate (LOPRESSOR) 50 MG tablet TK ONE T PO BID. 180 tablet 3    nicotine (NICODERM CQ) 21 mg/24 hr PLACE ONE PATCH ONTO THE SKIN ONCE DAILY 28 patch 0    olanzapine (ZYPREXA) 20 MG tablet Take 20 mg by mouth nightly.       olanzapine (ZYPREXA) 5 MG tablet Take 5 mg by mouth once daily.      omeprazole (PRILOSEC) 20 MG capsule Take 1 capsule (20 mg total) by mouth once daily. 90 capsule 1    oxybutynin (DITROPAN) 5 MG Tab Take 1 tablet (5 mg total) by mouth every evening.  One tablet before sleep 30 tablet 11    SEROQUEL  mg Tb24 TK ONE T PO HS.  0    tamsulosin (FLOMAX) 0.4 mg Cp24 TAKE 1 CAPSULE(0.4 MG) BY MOUTH EVERY DAY 90 capsule 0    diclofenac (VOLTAREN) 25 MG TbEC Take 1 tablet (25 mg total) by mouth 3 (three) times daily as needed (pain). 30 tablet 0    gabapentin (NEURONTIN) 300 MG capsule Take 300 mg by mouth 3 (three) times daily.   0     No current facility-administered medications for this visit.        REVIEW OF SYSTEMS:    GENERAL:  No weight loss, malaise or fevers.  HEENT:  + headaches, + bronchitis .  NECK:  + neck   RESPIRATORY: + COPD + smoker  CARDIOVASCULAR: + CAD,  Negative for chest pain, leg swelling or palpitations.  GI:  Negative for abdominal discomfort, blood in stools or black stools or change in bowel habits.  MUSCULOSKELETAL:  See HPI.  SKIN:  Negative for lesions, rash, and itching.  PSYCH:  See HPI, denies SI  HEMATOLOGY/LYMPHOLOGY:  Negative for prolonged bleeding, bruising easily or swollen nodes.  NEURO:   No history of headaches, syncope, paralysis, + history of seizures  All other reviewed and negative other than HPI.  Past Medical History:  Past Medical History:   Diagnosis Date    Allergy     poison ivy    Anemia     Anxiety     Arthritis     Asthma     Bipolar affective     Cancer     blood and bone    CHF (congestive heart failure)     Chronic hepatitis C with cirrhosis     COPD (chronic obstructive pulmonary disease)     Coronary artery disease     Depression     Diabetes mellitus type II     Disorder of kidney and ureter     Elevated PSA     has had prostate problems but unaware of his psa level    Encounter for blood transfusion     History of back injury     Chronic back pain    Hyperlipidemia     Hypertension     Internal hemorrhoid, bleeding 9/21/2015    Pancytopenia     Schizo affective schizophrenia     Seizures     Stroke     Thyroid disease     Urinary tract infection        Past Surgical  History:  Past Surgical History:   Procedure Laterality Date    ELBOW BURSA SURGERY      JOINT REPLACEMENT      ORIF FEMUR FRACTURE Right 8/2015       Family History:  Family History   Problem Relation Age of Onset    Hypertension Mother     Diabetes Mother     Transient ischemic attack Mother     Heart disease Mother      stent placement    Arthritis Mother     Heart disease Father     Hypertension Father     Diabetes Father     Stroke Father     Arthritis Sister     Hypertension Sister     Heart disease Maternal Grandmother     Diabetes Maternal Grandmother     Stroke Maternal Grandmother     Cancer Paternal Grandmother      breast    Heart disease Paternal Grandmother     Cancer Sister      brain    Prostate cancer Neg Hx     Kidney disease Neg Hx        Social History:  Social History     Social History    Marital status: Single     Spouse name: N/A    Number of children: N/A    Years of education: N/A     Social History Main Topics    Smoking status: Current Every Day Smoker     Packs/day: 0.50    Smokeless tobacco: Never Used    Alcohol use No    Drug use: No      Comment: quit 1985    Sexual activity: Not Currently     Other Topics Concern    None     Social History Narrative    None       OBJECTIVE:    /78   Pulse 74   Wt 81.2 kg (179 lb)   BMI 28.89 kg/m²     PHYSICAL EXAMINATION:    General appearance: Well appearing, in no acute distress, alert and oriented x3.thin  Psych:  Mood and affect appropriate.  Skin: Scar of previous left hip surgery, Skin color, texture, turgor normal, no rashes or lesions, in both upper and lower body.  Head/face:  Normocephalic, atraumatic. No palpable lymph nodes.r.  Back: Straight leg raising in the sitting and supine positions is negative to radicular pain. + pain to palpation over the lower L spine   Extremities: Pain over the right groin and hip on internal rotation of the right hip No deformities, edema, or skin discoloration.  Good capillary refill.  Musculoskeletal:+ facet laoding Bilateral upper and lower extremity strength is normal and symmetric.  No atrophy or tone abnormalities are noted.  Neuro: Bilateral upper and lower extremity coordination and muscle stretch reflexes are physiologic and symmetric 1+ in LEs 2+ in UEs.   Plantar response are downgoing. No loss of sensation is noted.  Gait: slow, uses walker    ASSESSMENT: 62 y.o. year old male with low back and bilateral leg  pain, consistent with \    1. Thoracic spinal stenosis    2. Lumbar radiculopathy    He has extensive psychiatric history requiring multiple psychiatric admission. He has history of Schizophrenia and schizoaffective disorder.  He has multiple co-morbidities including chronichepatitis C  I SAW HIM ONCE AND I ordered mu;tiple imaging studies, but he never followed up with me afterwards    PLAN:     - I have stressed the importance of physical activity and a home exercise plan to help with pain and improve health.  -Refer to neurosurgery for evaluation   - RTC as needed  - Counseled patient regarding the importance of activity modification and physical therapy.    The above plan and management options were discussed at length with patient. Patient is in agreement with the above and verbalized understanding.    Carla Burks  09/11/2017

## 2017-09-11 NOTE — PROGRESS NOTES
Chronic patient Established Note (Follow up visit)      SUBJECTIVE:    Leif Ramirez Jr. presents to the clinic for a follow-up appointment for hip and leg pain. Since the last visit, Leif Ramirez Jr. states the pain has been {PAIN - I/W/P:70101}. Current pain intensity is {GEN PAIN SCALE HI:78140}.    Pain Disability Index Review:  Last 3 PDI Scores 7/6/2015   Pain Disability Index (PDI) 20       Pain Medications:    - Opioids: None  - Adjuvant Medications: Neurontin (Gabapentin)  - Anti-Coagulants: Aspirin  - Others: See Medication Card    Opioid Contract: no     report:  Reviewed and consistent with medication use as prescribed.    Pain Procedures:  Hip Injections by Dr. Kim Dash    Physical Therapy/Home Exercise: {YES/NO:63}    Imaging: MRI Lumbar Spine Without Contrast 8/25/17  Narrative     Technique: Sagittal T1, sagittal T2, sagittal STIR, axial T1, and axial T2 weighted images of the lumbar spine were obtained without contrast.    Comparison: Lumbar spine radiograph 7/6/2016    Findings:    There is mild grade 1 anterolisthesis of L4 on L5.  The vertebral body heights are well maintained, with no fracture.  There is no marrow signal abnormality suspicious for infiltrative process.  There is intervertebral disc space height loss at L1-L2, L2-L3, L4-L5, and L5-S1.  The conus is normal in appearance and terminates at the L1-L2 level.      T11-T12: There is a broad-based circumferential disc bulge with central disc protrusion. There is resulting mass effect upon the cord and moderate spinal canal stenosis. There is flattening of the cord which appears somewhat decreased in volume with associated abnormal T2 signal hyperintensity suggestive of encephalomalacia or edema. There is mild bilateral neural foraminal narrowing.    T12-L1: There is no focal disc herniation. There is no significant spinal canal stenosis or neural foraminal narrowing.    L1-L2: There is a circumferential disc bulge,  ligamentum flavum thickening, and bilateral facet arthropathy.  No significant central canal or neural foraminal narrowing.    L2-L3: There is a circumferential disc bulge, ligamentum flavum thickening, and bilateral facet arthropathy.  There is moderate spinal canal stenosis. There is moderate to severe bilateral neural foraminal narrowing.    L3-L4: There is a circumferential disc bulge, ligamentum flavum thickening, and bilateral facet arthropathy.  There is severe spinal canal stenosis. There is moderate to severe bilateral neural foraminal narrowing.    L4-L5: There is a circumferential disc bulge, ligamentum flavum thickening, and bilateral facet arthropathy. There is severe spinal canal stenosis. There is  severe right-sided and moderate left-sided neural foraminal narrowing.    L5-S1: There is a circumferential disc bulge and bilateral facet arthropathy.  There is mild spinal canal stenosis. There is moderate left-sided and mild right-sided neural foraminal narrowing.    Limited views of the posterior abdomen demonstrate bilateral T2 hyperintense renal lesions which are incompletely characterized but may represent renal cysts.   Impression         1. Broad-based circumferential disc bulge with central disc protrusion at T11-T12. There is associated mass effect/flattening of the cord with abnormal T2 signal hyperintensity suggestive of cord edema or encephalomalacia.    2. Advanced multilevel degenerative changes of the lumbar spine noting severe spinal canal stenosis at L3-4 and L4-5. There is additional bilateral neural foraminal narrowing at multiple levels as discussed above.    3. Small bilateral T2 hyperintense renal lesions which are incompletely characterized, but may represent small renal cysts.    This report has been flagged in the [facility] medical record.      Electronically signed by: SISI KIM  Date: 08/26/17  Time: 03:34          10/16/12 Xray Femur Left      Result Narrative      DATE OF  EXAM: Oct 16 2012     KDX 0069 - FEMUR, AP/LAT LEFT:   [id number]    CLINICAL HISTORY: POST OP    ICD 9 CODE(S): ()    CPT 4 CODE(S)/MODIFIER(S): ()    Comparison: 10/13/12    Technique: AP and lateral radiographs of the left femur.    Findings: Interval left proximal femoral ORIF noted with lung antegrade   femoral intramedullary nail and interlocking proximal cortical pin and   distal cortical screw. Improved alignment with loss of previously noted   varus angulation. Displaced lesser trochanteric fracture fragment again   noted. Some degree of displacement and angulation at the fracture site   remains best appreciated on the lateral view. Overlying skin staples   noted. Soft tissue air noted.      Impression:  1. Interval left proximal femoral ORIF of comminuted intertrochanteric   fracture with improved alignment.  ______________________________________     Electronically signed by: Sukhjinder Mckinnon MD  Date: 10/16/12  Time: 23:19         : TIMOTHY  Transcribe Date/Time: Oct 16 2012 11:19P  Dictated by : SUKHJINDER MCKINNON MD  Report reviewed by:   Read On:     Images were reviewed, findings were verified and document was   electronically  SIGNED BY: SUKHJINDER MCKINNON MD On: Oct 16 2012 11:19P              10/16/12 Xray Hip Left      Result Narrative      DATE OF EXAM: Oct 16 2012     KDX 0153 - HIP COMPLETE LEFT:   [id number]    CLINICAL HISTORY: POST OP    ICD 9 CODE(S): ()    CPT 4 CODE(S)/MODIFIER(S): ()    Comparison: 10/13/12    Technique: AP and frog leg lateral radiographs of the left hip.    Findings: Interval left femoral ORIF is noted with a long intramedullary   nail and proximal inter-locking pin. No acute complication demonstrated.   Some degree of displacement and angulation remains at the fracture site   but there is improved alignment with loss of previously noted varus   angulation. Displaced lesser trochanteric fracture fragment again noted.   Expected postoperative air in the left hip  soft tissues and overlying   skin staples.      Impression:  1. Interval left proximal femoral ORIF with improved alignment. No   radiographically apparent acute complication.  ______________________________________     Electronically signed by: Sukhjinder Mckinnon MD  Date: 10/16/12  Time: 23:21         : TIMOTHY  Transcribe Date/Time: Oct 16 2012 11:21P  Dictated by : SUKHJINDER MCKINNON MD  Report reviewed by:   Read On:     Images were reviewed, findings were verified and document was   electronically  SIGNED BY: SUKHJINDER MCKINNON MD On: Oct 16 2012 11:21P              Allergies:   Review of patient's allergies indicates:   Allergen Reactions    Codeine      Other reaction(s): Nausea    Depakote [divalproex] Other (See Comments)     Thrombocytopenia    Haldol [haloperidol lactate] Other (See Comments)     Seizures    Methadone     Morphine     Naloxone     Opium     Propoxyphene     Stelazine [trifluoperazine]     Amoxicillin Rash       Current Medications:   Current Outpatient Prescriptions   Medication Sig Dispense Refill    albuterol 90 mcg/actuation inhaler Inhale 1 puff into the lungs every 4 (four) hours as needed for Wheezing. 1 Inhaler 1    amlodipine (NORVASC) 10 MG tablet Take 1 tablet (10 mg total) by mouth once daily. 90 tablet 3    carvedilol (COREG) 25 MG tablet TAKE 1 TABLET(25 MG) BY MOUTH TWICE DAILY 180 tablet 0    diclofenac (VOLTAREN) 25 MG TbEC Take 1 tablet (25 mg total) by mouth 3 (three) times daily as needed (pain). 30 tablet 0    ERGOCALCIFEROL, VITAMIN D2, (VITAMIN D ORAL) Take 2,000 Units by mouth once daily.       ferrous sulfate 325 mg (65 mg iron) Tab tablet TAKE ONE TABLET BY MOUTH ONCE DAILY WITH LUNCH 90 tablet 0    ferrous sulfate 325 mg (65 mg iron) Tab tablet TAKE 1 TABLET BY MOUTH WITH LUNCH. 90 tablet 0    fluoxetine (PROZAC) 20 MG capsule Take 1 capsule (20 mg total) by mouth once daily. 90 capsule 3    furosemide (LASIX) 80 MG tablet TK ONE T PO  QAM.  1    gabapentin (NEURONTIN) 300 MG capsule Take 300 mg by mouth 3 (three) times daily.   0    hydrALAZINE (APRESOLINE) 10 MG tablet TAKE 1 TABLET(10 MG) BY MOUTH THREE TIMES DAILY 270 tablet 11    hydrocodone-acetaminophen 5-325mg (NORCO) 5-325 mg per tablet Take 1 tablet by mouth 3 (three) times daily as needed for Pain. 90 tablet 0    lorazepam (ATIVAN) 1 MG tablet Take 1 tablet (1 mg total) by mouth every 6 (six) hours as needed for Anxiety. 30 tablet 0    metoprolol tartrate (LOPRESSOR) 50 MG tablet TK ONE T PO BID. 180 tablet 3    nicotine (NICODERM CQ) 21 mg/24 hr PLACE ONE PATCH ONTO THE SKIN ONCE DAILY 28 patch 0    olanzapine (ZYPREXA) 20 MG tablet Take 20 mg by mouth nightly.       olanzapine (ZYPREXA) 5 MG tablet Take 5 mg by mouth once daily.      omeprazole (PRILOSEC) 20 MG capsule Take 1 capsule (20 mg total) by mouth once daily. 90 capsule 1    oxybutynin (DITROPAN) 5 MG Tab Take 1 tablet (5 mg total) by mouth every evening. One tablet before sleep 30 tablet 11    SEROQUEL  mg Tb24 TK ONE T PO HS.  0    tamsulosin (FLOMAX) 0.4 mg Cp24 TAKE 1 CAPSULE(0.4 MG) BY MOUTH EVERY DAY 90 capsule 0     No current facility-administered medications for this visit.        REVIEW OF SYSTEMS:    GENERAL:  No weight loss, malaise or fevers.  HEENT:  Negative for frequent or significant headaches.  NECK:  Negative for lumps, goiter, pain and significant neck swelling.  RESPIRATORY:  Negative for cough, wheezing or shortness of breath.  CARDIOVASCULAR:  Negative for chest pain, leg swelling or palpitations.  GI:  Negative for abdominal discomfort, blood in stools or black stools or change in bowel habits.  MUSCULOSKELETAL:  See HPI.  SKIN:  Negative for lesions, rash, and itching.  PSYCH:  Negative for sleep disturbance, mood disorder and recent psychosocial stressors.  HEMATOLOGY/LYMPHOLOGY:  Negative for prolonged bleeding, bruising easily or swollen nodes.  NEURO:   No history of headaches,  syncope, paralysis, seizures or tremors.  All other reviewed and negative other than HPI.    Past Medical History:  Past Medical History:   Diagnosis Date    Allergy     poison ivy    Anemia     Anxiety     Arthritis     Asthma     Bipolar affective     Cancer     blood and bone    CHF (congestive heart failure)     Chronic hepatitis C with cirrhosis     COPD (chronic obstructive pulmonary disease)     Coronary artery disease     Depression     Diabetes mellitus type II     Disorder of kidney and ureter     Elevated PSA     has had prostate problems but unaware of his psa level    Encounter for blood transfusion     History of back injury     Chronic back pain    Hyperlipidemia     Hypertension     Internal hemorrhoid, bleeding 9/21/2015    Pancytopenia     Schizo affective schizophrenia     Seizures     Stroke     Thyroid disease     Urinary tract infection        Past Surgical History:  Past Surgical History:   Procedure Laterality Date    ELBOW BURSA SURGERY      JOINT REPLACEMENT      ORIF FEMUR FRACTURE Right 8/2015       Family History:  Family History   Problem Relation Age of Onset    Hypertension Mother     Diabetes Mother     Transient ischemic attack Mother     Heart disease Mother      stent placement    Arthritis Mother     Heart disease Father     Hypertension Father     Diabetes Father     Stroke Father     Arthritis Sister     Hypertension Sister     Heart disease Maternal Grandmother     Diabetes Maternal Grandmother     Stroke Maternal Grandmother     Cancer Paternal Grandmother      breast    Heart disease Paternal Grandmother     Cancer Sister      brain    Prostate cancer Neg Hx     Kidney disease Neg Hx        Social History:  Social History     Social History    Marital status: Single     Spouse name: N/A    Number of children: N/A    Years of education: N/A     Social History Main Topics    Smoking status: Current Every Day Smoker      Packs/day: 0.50    Smokeless tobacco: Never Used    Alcohol use No    Drug use: No      Comment: quit 1985    Sexual activity: Not Currently     Other Topics Concern    Not on file     Social History Narrative    No narrative on file       OBJECTIVE:    There were no vitals taken for this visit.    PHYSICAL EXAMINATION:    General appearance: Well appearing, in no acute distress, alert and oriented x3.  Psych:  Mood and affect appropriate.  Skin: Skin color, texture, turgor normal, no rashes or lesions, in both upper and lower body.  Head/face:  Atraumatic, normocephalic. No palpable lymph nodes  Neck: No pain to palpation over the cervical paraspinous muscles. Spurling Negative. No pain with neck flexion, extension, or lateral flexion. .  Cor: RRR  Pulm: CTA  GI: Abdomen soft and non-tender.  Back: Straight leg raising in the sitting and supine positions is negative to radicular pain. No pain to palpation over the spine or costovertebral angles. Normal range of motion without pain reproduction.  Extremities: Peripheral joint ROM is full and pain free without obvious instability or laxity in all four extremities. No deformities, edema, or skin discoloration. Good capillary refill.  Musculoskeletal: Shoulder, hip, sacroiliac and knee provocative maneuvers are negative. Bilateral upper and lower extremity strength is normal and symmetric.  No atrophy or tone abnormalities are noted.  Neuro: Bilateral upper and lower extremity coordination and muscle stretch reflexes are physiologic and symmetric.  Plantar response are downgoing. No loss of sensation is noted.  Gait: Normal.    ASSESSMENT: 62 y.o. year old male with *** pain, consistent with ***     No diagnosis found.      PLAN:     {PLAN:29298}  - RTC ***  - Counseled patient regarding the importance of {:23121}.    The above plan and management options were discussed at length with patient. Patient is in agreement with the above and verbalized  understanding.    Codi Perez  09/11/2017

## 2017-09-13 ENCOUNTER — HOSPITAL ENCOUNTER (EMERGENCY)
Facility: HOSPITAL | Age: 62
Discharge: LEFT BEFORE DISCHARGE | End: 2017-09-13
Attending: EMERGENCY MEDICINE
Payer: MEDICARE

## 2017-09-13 VITALS
DIASTOLIC BLOOD PRESSURE: 70 MMHG | OXYGEN SATURATION: 95 % | BODY MASS INDEX: 28.25 KG/M2 | HEIGHT: 67 IN | SYSTOLIC BLOOD PRESSURE: 130 MMHG | TEMPERATURE: 98 F | HEART RATE: 66 BPM | WEIGHT: 180 LBS | RESPIRATION RATE: 10 BRPM

## 2017-09-13 DIAGNOSIS — I50.9 CONGESTIVE HEART FAILURE, UNSPECIFIED CONGESTIVE HEART FAILURE CHRONICITY, UNSPECIFIED CONGESTIVE HEART FAILURE TYPE: ICD-10-CM

## 2017-09-13 DIAGNOSIS — N17.9 AKI (ACUTE KIDNEY INJURY): Primary | ICD-10-CM

## 2017-09-13 DIAGNOSIS — R06.02 SOB (SHORTNESS OF BREATH): ICD-10-CM

## 2017-09-13 LAB
ALBUMIN SERPL BCP-MCNC: 4 G/DL
ALP SERPL-CCNC: 89 U/L
ALT SERPL W/O P-5'-P-CCNC: 25 U/L
ANION GAP SERPL CALC-SCNC: 10 MMOL/L
AST SERPL-CCNC: 27 U/L
BACTERIA #/AREA URNS HPF: NORMAL /HPF
BASOPHILS # BLD AUTO: 0 K/UL
BASOPHILS NFR BLD: 0 %
BILIRUB SERPL-MCNC: 0.4 MG/DL
BILIRUB UR QL STRIP: NEGATIVE
BNP SERPL-MCNC: 1264 PG/ML
BUN SERPL-MCNC: 60 MG/DL
CALCIUM SERPL-MCNC: 8.9 MG/DL
CHLORIDE SERPL-SCNC: 103 MMOL/L
CLARITY UR: CLEAR
CO2 SERPL-SCNC: 22 MMOL/L
COLOR UR: YELLOW
CREAT SERPL-MCNC: 4.3 MG/DL
DIFFERENTIAL METHOD: ABNORMAL
EOSINOPHIL # BLD AUTO: 0 K/UL
EOSINOPHIL NFR BLD: 0 %
ERYTHROCYTE [DISTWIDTH] IN BLOOD BY AUTOMATED COUNT: 13.5 %
EST. GFR  (AFRICAN AMERICAN): 16 ML/MIN/1.73 M^2
EST. GFR  (NON AFRICAN AMERICAN): 14 ML/MIN/1.73 M^2
GLUCOSE SERPL-MCNC: 173 MG/DL
GLUCOSE UR QL STRIP: NEGATIVE
HCT VFR BLD AUTO: 27.7 %
HGB BLD-MCNC: 9.7 G/DL
HGB UR QL STRIP: ABNORMAL
HYALINE CASTS #/AREA URNS LPF: 0 /LPF
KETONES UR QL STRIP: NEGATIVE
LEUKOCYTE ESTERASE UR QL STRIP: NEGATIVE
LIPASE SERPL-CCNC: 34 U/L
LYMPHOCYTES # BLD AUTO: 0.6 K/UL
LYMPHOCYTES NFR BLD: 7.9 %
MCH RBC QN AUTO: 29.5 PG
MCHC RBC AUTO-ENTMCNC: 35 G/DL
MCV RBC AUTO: 84 FL
MICROSCOPIC COMMENT: NORMAL
MONOCYTES # BLD AUTO: 0.5 K/UL
MONOCYTES NFR BLD: 6.5 %
NEUTROPHILS # BLD AUTO: 6.4 K/UL
NEUTROPHILS NFR BLD: 85.1 %
NITRITE UR QL STRIP: NEGATIVE
NON-SQ EPI CELLS #/AREA URNS HPF: 0 /HPF
PH UR STRIP: 6 [PH] (ref 5–8)
PLATELET # BLD AUTO: 119 K/UL
PMV BLD AUTO: 8.7 FL
POCT GLUCOSE: 210 MG/DL (ref 70–110)
POTASSIUM SERPL-SCNC: 4.9 MMOL/L
PROT SERPL-MCNC: 7 G/DL
PROT UR QL STRIP: ABNORMAL
RBC # BLD AUTO: 3.29 M/UL
RBC #/AREA URNS HPF: 0 /HPF (ref 0–4)
SODIUM SERPL-SCNC: 135 MMOL/L
SP GR UR STRIP: <=1.005 (ref 1–1.03)
SQUAMOUS #/AREA URNS HPF: 0 /HPF
TROPONIN I SERPL DL<=0.01 NG/ML-MCNC: <0.006 NG/ML
URN SPEC COLLECT METH UR: ABNORMAL
UROBILINOGEN UR STRIP-ACNC: NEGATIVE EU/DL
WBC # BLD AUTO: 7.49 K/UL
WBC #/AREA URNS HPF: 0 /HPF (ref 0–5)
WBC CLUMPS URNS QL MICRO: NORMAL
YEAST URNS QL MICRO: NORMAL

## 2017-09-13 PROCEDURE — 80053 COMPREHEN METABOLIC PANEL: CPT

## 2017-09-13 PROCEDURE — 82962 GLUCOSE BLOOD TEST: CPT

## 2017-09-13 PROCEDURE — 94640 AIRWAY INHALATION TREATMENT: CPT

## 2017-09-13 PROCEDURE — 83690 ASSAY OF LIPASE: CPT

## 2017-09-13 PROCEDURE — 85025 COMPLETE CBC W/AUTO DIFF WBC: CPT

## 2017-09-13 PROCEDURE — 93005 ELECTROCARDIOGRAM TRACING: CPT

## 2017-09-13 PROCEDURE — 99284 EMERGENCY DEPT VISIT MOD MDM: CPT | Mod: 25

## 2017-09-13 PROCEDURE — 25000242 PHARM REV CODE 250 ALT 637 W/ HCPCS: Performed by: NURSE PRACTITIONER

## 2017-09-13 PROCEDURE — 81000 URINALYSIS NONAUTO W/SCOPE: CPT

## 2017-09-13 PROCEDURE — 84484 ASSAY OF TROPONIN QUANT: CPT

## 2017-09-13 PROCEDURE — 83880 ASSAY OF NATRIURETIC PEPTIDE: CPT

## 2017-09-13 RX ORDER — IPRATROPIUM BROMIDE AND ALBUTEROL SULFATE 2.5; .5 MG/3ML; MG/3ML
3 SOLUTION RESPIRATORY (INHALATION)
Status: COMPLETED | OUTPATIENT
Start: 2017-09-13 | End: 2017-09-13

## 2017-09-13 RX ADMIN — IPRATROPIUM BROMIDE AND ALBUTEROL SULFATE 3 ML: .5; 3 SOLUTION RESPIRATORY (INHALATION) at 05:09

## 2017-09-13 NOTE — ED TRIAGE NOTES
Pt presents to ED today c/o SOB and chest pain x 2 days. He reports he started taking antibiotics and steroids 2 days ago. He reports he is non compliant and should be on dialysis and home O2. He is requesting only a breathing and to be discharged. After speaking with mid-level pt has agreed for EKG, chest xray, and labs.

## 2017-09-13 NOTE — ED NOTES
Pt lying in bed, resting with eyes closed.  NAD noted.  Respirations even, unlabored.  Pt remains on cardiac monitor, pulse ox, and BP with alarms on.  Will continue to monitor.

## 2017-09-13 NOTE — ED PROVIDER NOTES
"Encounter Date: 9/13/2017       History     Chief Complaint   Patient presents with    Shortness of Breath     SOB began to get worse 2 days ago, right hip pain from fall two days ago and states he possibly could have LOC but lives alone so he's not sure     61yo male with pmhx of COPD, CHF, CAD, CVA, HLD, HTN, Hep C, and UTI is here for SOB.   Pt states that he is "always" SOB, but it has gotten worse in the past two days.  Pt reports that he did go to another health care provider two days ago where he was started on Zithromax and Prednisone.  He reports that the medication is not helping, and he is still feeling SOB.  Pt does not report to me right hip pain or history of a fall.  Pt states that he is supposed to be on oxygen at home, but he cannot afford it and therefore never started it.  He reports "sweats" but no known fever.  He has occasional chest pain which started today, located in his bilateral lower ribs.  He reports the pain is coming and going, and SOB seems to make it worse.  He does report a productive cough with green sputum.  He is a current every day smoker.        The history is provided by the patient.   Shortness of Breath   This is a recurrent problem. The problem occurs continuously.The current episode started 2 days ago. The problem has not changed since onset.Associated symptoms include a fever (subjective), cough, sputum production, wheezing and chest pain. Pertinent negatives include no headaches, no rhinorrhea, no sore throat, no hemoptysis, no orthopnea, no vomiting, no abdominal pain and no rash. Risk factors include smoking. He has tried ipratropium inhalers for the symptoms. The treatment provided no relief. He has had prior hospitalizations. He has had prior ED visits. Associated medical issues include COPD, chronic lung disease, CAD and past MI.     Review of patient's allergies indicates:   Allergen Reactions    Codeine      Other reaction(s): Nausea    Depakote [divalproex] " Other (See Comments)     Thrombocytopenia    Haldol [haloperidol lactate] Other (See Comments)     Seizures    Methadone     Morphine     Naloxone     Opium     Propoxyphene     Stelazine [trifluoperazine]     Amoxicillin Rash     Past Medical History:   Diagnosis Date    Allergy     poison ivy    Anemia     Anxiety     Arthritis     Asthma     Bipolar affective     Cancer     blood and bone    CHF (congestive heart failure)     Chronic hepatitis C with cirrhosis     COPD (chronic obstructive pulmonary disease)     Coronary artery disease     Depression     Diabetes mellitus type II     Disorder of kidney and ureter     Elevated PSA     has had prostate problems but unaware of his psa level    Encounter for blood transfusion     History of back injury     Chronic back pain    Hyperlipidemia     Hypertension     Internal hemorrhoid, bleeding 9/21/2015    Pancytopenia     Schizo affective schizophrenia     Seizures     Stroke     Thyroid disease     Urinary tract infection      Past Surgical History:   Procedure Laterality Date    ELBOW BURSA SURGERY      JOINT REPLACEMENT      ORIF FEMUR FRACTURE Right 8/2015     Family History   Problem Relation Age of Onset    Hypertension Mother     Diabetes Mother     Transient ischemic attack Mother     Heart disease Mother      stent placement    Arthritis Mother     Heart disease Father     Hypertension Father     Diabetes Father     Stroke Father     Arthritis Sister     Hypertension Sister     Heart disease Maternal Grandmother     Diabetes Maternal Grandmother     Stroke Maternal Grandmother     Cancer Paternal Grandmother      breast    Heart disease Paternal Grandmother     Cancer Sister      brain    Prostate cancer Neg Hx     Kidney disease Neg Hx      Social History   Substance Use Topics    Smoking status: Current Every Day Smoker     Packs/day: 0.50    Smokeless tobacco: Never Used    Alcohol use No      Review of Systems   Constitutional: Positive for diaphoresis and fever (subjective). Negative for chills.   HENT: Negative for congestion, rhinorrhea and sore throat.    Respiratory: Positive for cough, sputum production, shortness of breath and wheezing. Negative for hemoptysis and choking.    Cardiovascular: Positive for chest pain. Negative for palpitations and orthopnea.   Gastrointestinal: Positive for nausea. Negative for abdominal pain and vomiting.   Musculoskeletal: Negative for back pain.   Skin: Negative for rash.   Neurological: Negative for weakness and headaches.   Hematological: Does not bruise/bleed easily.       Physical Exam     Initial Vitals [09/13/17 1625]   BP Pulse Resp Temp SpO2   (!) 123/59 68 18 98.3 °F (36.8 °C) 96 %      MAP       80.33         Physical Exam    Nursing note and vitals reviewed.  Constitutional: Vital signs are normal. He appears well-developed and well-nourished. He is active and cooperative. He is easily aroused.  Non-toxic appearance. He does not have a sickly appearance. He appears ill. No distress.   HENT:   Head: Normocephalic and atraumatic.   Eyes: Conjunctivae are normal.   Neck: Normal range of motion.   Cardiovascular: Normal rate, regular rhythm and normal heart sounds.   Pulmonary/Chest: Accessory muscle usage present. No tachypnea. No respiratory distress. He has decreased breath sounds. He has wheezes. He has no rhonchi. He has no rales.   Abdominal: Soft. Normal appearance and bowel sounds are normal. He exhibits no distension. There is no tenderness. There is no rigidity, no rebound, no guarding and no CVA tenderness.   Neurological: He is alert, oriented to person, place, and time and easily aroused. GCS eye subscore is 4. GCS verbal subscore is 5. GCS motor subscore is 6.   Skin: Skin is warm, dry and intact. No rash noted. There is pallor.   Psychiatric: He has a normal mood and affect. His speech is normal and behavior is normal. Judgment and  thought content normal. Cognition and memory are normal.         ED Course   Procedures  Labs Reviewed   CBC W/ AUTO DIFFERENTIAL - Abnormal; Notable for the following:        Result Value    RBC 3.29 (*)     Hemoglobin 9.7 (*)     Hematocrit 27.7 (*)     Platelets 119 (*)     MPV 8.7 (*)     Lymph # 0.6 (*)     Gran% 85.1 (*)     Lymph% 7.9 (*)     All other components within normal limits   POCT GLUCOSE - Abnormal; Notable for the following:     POCT Glucose 210 (*)     All other components within normal limits   LIPASE   COMPREHENSIVE METABOLIC PANEL   TROPONIN I   URINALYSIS   B-TYPE NATRIURETIC PEPTIDE   URINALYSIS   POCT GLUCOSE MONITORING CONTINUOUS         Imaging Results          X-Ray Chest PA And Lateral (Final result)  Result time 09/13/17 17:23:15    Final result by Cat De Los Santos MD (09/13/17 17:23:15)                 Impression:      No acute cardiopulmonary process identified.      Electronically signed by: CAT DE LOS SANTOS MD  Date:     09/13/17  Time:    17:23              Narrative:    Chest PA and lateral.  Comparison: 6/15/17.    Mediastinal structures are midline.  Cardiac silhouette is normal and stable in size.  Lungs are symmetrically expanded.  Mild chronic bilateral lung changes are again seen with 5 cm cyst visualized at the lateral aspect of the right lower lung zone.  No evidence of new focal consolidative process, pneumothorax, or significant effusion.  Bones appear intact.  No free air visualized beneath the diaphragm.                                   Medical Decision Making:   Initial Assessment:   63yo male with multiple health problems here for SOB and CP.  Pt has been on Zithromax and Prednisone for two days, and has had no improvement.  Pt appears ill but nontoxic.  Vitals stable.  MM moist.  HR RRR. Lungs with decreased breath sounds and wheezing bilaterally.  Abd soft, non-tender, no r/r/g, no distention.   Differential Diagnosis:   COPD exacerbation, CHF, arrhythmia, STEMI,  non-stemi, pneumonia, pleural effusion, pneumothorax, electrolyte derangement, dehydration  Clinical Tests:   Lab Tests: Reviewed and Ordered  Radiological Study: Ordered and Reviewed  Medical Tests: Ordered and Reviewed  ED Management:  Labs, EKG, CXR, Duoneb    Discussed with patient need for admission due to CHF and acute kidney injury.  I notified the [facility] admission team, but as they came to see the patient he eloped from the emergency department.  H&H chronically low.  Pt denies rectal bleeding.  Pt reports improvement of SOB after Duoneb.      Pt was turned over to  at 1800 for disposition.                     ED Course      Clinical Impression:   The primary encounter diagnosis was RAPHAEL (acute kidney injury). Diagnoses of SOB (shortness of breath) and Congestive heart failure, unspecified congestive heart failure chronicity, unspecified congestive heart failure type were also pertinent to this visit.                           Martin Sigala MD  09/13/17 1959

## 2017-09-13 NOTE — ED NOTES
APPEARANCE: Alert, oriented and in no acute distress.  CARDIAC: Normal rate and rhythm, no murmur heard.   PERIPHERAL VASCULAR: peripheral pulses present. Normal cap refill. No edema. Warm to touch.    RESPIRATORY:Normal rate and effort, breath sounds diminished with crackles throughout chest. Respirations are equal and unlabored no obvious signs of distress.SOB   GASTRO: soft, bowel sounds normal, no tenderness, no abdominal distention.  MUSC: Full ROM. No bony tenderness or soft tissue tenderness. No obvious deformity. Chest pain denies nausea or vomiting   SKIN: Skin is warm and dry, normal skin turgor, mucous membranes moist.  NEURO: 5/5 strength major flexors/extensors bilaterally. Sensory intact to light touch bilaterally. [place] coma scale: eyes open spontaneously-4, oriented & converses-5, obeys commands-6. No neurological abnormalities.   MENTAL STATUS: awake, alert and aware of environment.  EYE: PERRL, both eyes: pupils brisk and reactive to light. Normal size.  ENT: EARS: no obvious drainage. NOSE: no active bleeding.

## 2017-09-14 NOTE — ED NOTES
On entering room, pt no longer in room, and IV, (catheter intact) was on bed.  Dr. Sigala notified.

## 2017-09-15 ENCOUNTER — HOSPITAL ENCOUNTER (EMERGENCY)
Facility: HOSPITAL | Age: 62
Discharge: LEFT BEFORE DISCHARGE | End: 2017-09-15
Attending: EMERGENCY MEDICINE
Payer: MEDICARE

## 2017-09-15 VITALS
BODY MASS INDEX: 28.25 KG/M2 | OXYGEN SATURATION: 97 % | RESPIRATION RATE: 16 BRPM | DIASTOLIC BLOOD PRESSURE: 82 MMHG | HEIGHT: 67 IN | TEMPERATURE: 99 F | SYSTOLIC BLOOD PRESSURE: 164 MMHG | HEART RATE: 74 BPM | WEIGHT: 180 LBS

## 2017-09-15 DIAGNOSIS — I10 ACCELERATED HYPERTENSION: ICD-10-CM

## 2017-09-15 DIAGNOSIS — I10 ESSENTIAL HYPERTENSION: Chronic | ICD-10-CM

## 2017-09-15 DIAGNOSIS — M25.551 BILATERAL HIP PAIN: ICD-10-CM

## 2017-09-15 DIAGNOSIS — M25.552 BILATERAL HIP PAIN: ICD-10-CM

## 2017-09-15 DIAGNOSIS — G89.29 CHRONIC BILATERAL LOW BACK PAIN WITH LEFT-SIDED SCIATICA: ICD-10-CM

## 2017-09-15 DIAGNOSIS — M54.42 CHRONIC BILATERAL LOW BACK PAIN WITH LEFT-SIDED SCIATICA: ICD-10-CM

## 2017-09-15 PROCEDURE — 99282 EMERGENCY DEPT VISIT SF MDM: CPT

## 2017-09-18 RX ORDER — AMLODIPINE BESYLATE 10 MG/1
10 TABLET ORAL DAILY
Qty: 90 TABLET | Refills: 3 | Status: SHIPPED | OUTPATIENT
Start: 2017-09-18 | End: 2017-11-27 | Stop reason: SDUPTHER

## 2017-09-18 RX ORDER — HYDROCODONE BITARTRATE AND ACETAMINOPHEN 5; 325 MG/1; MG/1
1 TABLET ORAL 3 TIMES DAILY PRN
Qty: 90 TABLET | Refills: 0 | Status: SHIPPED | OUTPATIENT
Start: 2017-09-18 | End: 2017-10-05 | Stop reason: SDUPTHER

## 2017-09-28 ENCOUNTER — TELEPHONE (OUTPATIENT)
Dept: SMOKING CESSATION | Facility: CLINIC | Age: 62
End: 2017-09-28

## 2017-10-02 ENCOUNTER — INITIAL CONSULT (OUTPATIENT)
Dept: NEUROSURGERY | Facility: CLINIC | Age: 62
End: 2017-10-02
Payer: MEDICARE

## 2017-10-02 VITALS
BODY MASS INDEX: 28.25 KG/M2 | HEART RATE: 77 BPM | DIASTOLIC BLOOD PRESSURE: 82 MMHG | HEIGHT: 67 IN | WEIGHT: 180 LBS | SYSTOLIC BLOOD PRESSURE: 151 MMHG

## 2017-10-02 DIAGNOSIS — M48.062 LUMBAR STENOSIS WITH NEUROGENIC CLAUDICATION: Primary | ICD-10-CM

## 2017-10-02 PROCEDURE — 99999 PR PBB SHADOW E&M-EST. PATIENT-LVL V: CPT | Mod: PBBFAC,,, | Performed by: NEUROLOGICAL SURGERY

## 2017-10-02 PROCEDURE — 99204 OFFICE O/P NEW MOD 45 MIN: CPT | Mod: S$PBB,,, | Performed by: NEUROLOGICAL SURGERY

## 2017-10-02 PROCEDURE — 3077F SYST BP >= 140 MM HG: CPT | Mod: ,,, | Performed by: NEUROLOGICAL SURGERY

## 2017-10-02 PROCEDURE — 99215 OFFICE O/P EST HI 40 MIN: CPT | Mod: PBBFAC,PO | Performed by: NEUROLOGICAL SURGERY

## 2017-10-02 PROCEDURE — 3079F DIAST BP 80-89 MM HG: CPT | Mod: ,,, | Performed by: NEUROLOGICAL SURGERY

## 2017-10-02 NOTE — PROGRESS NOTES
NEUROSURGICAL OUTPATIENT CONSULTATION NOTE    DATE OF SERVICE:  10/02/2017    ATTENDING PHYSICIAN:  Rd Mina MD    CONSULT REQUESTED BY:  Carla Burks    REASON FOR CONSULT:  Back pain, walking impairment    SUBJECTIVE:    HISTORY OF PRESENT ILLNESS:  This is a very pleasant 62 y.o. male who reports worsening difficulty to walk. States that he cannot walk more than 20 feet without sitting. He his walking leaning forward. When he walks he has back and legs pain. The pain is relieved by sitting. His condition started more than 10 years ago and have worsened. He is afraid of having a surgery. He would like to do more PT and swimming.     Low Back Pain Scale  R Low Back-Pain Score: 7  R Low Back-Pain Intensity: Pain killers give complete relief from pain  R Low Back-Pain Score: I can look after myself normally but it causes extra pain  Low Back-Lifting: Pain prevents me from lifting heavy weights off the floor, but I can manage if they are conveniently positioned for example on a table   Low Back-Walking: Pain prevents me walking more than .5 mile   Low Back-Sitting: Pain prevents me from sitting more than 1 hour   Low Back-Standing: I cannot stand for longer than 1 hour without increasing pain   Low Back-Sleeping: Because of pain my normal nights sleep is reduced by less than one quarter   Low Back-Social Life: Pain has no significant effect on my social life apart from limiting my more en   Low Back-Traveling: I have extra pain while traveling but it does not compel me to seek alternate forms of travel   Low Back-Changing Degree of Pain: My pain seems to be getting better but improvement is slow         PAST MEDICAL HISTORY:  Active Ambulatory Problems     Diagnosis Date Noted    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) 07/19/2012    Anxiety     Depression     Arthritis     History of back injury     Hyperlipidemia     Thrombocytopenia 04/14/2015    COPD (chronic obstructive pulmonary  disease) 04/14/2015    Aftercare for healing traumatic closed fracture of right hip 07/06/2015    Greater trochanteric bursitis of right hip 07/06/2015    Schizoaffective disorder, bipolar type 07/06/2015    Essential hypertension 09/17/2015    Psychosis 09/21/2015    Hallucinations 12/29/2015    Accelerated hypertension 12/30/2015    Pancytopenia 12/30/2015    Leukopenia 01/23/2016    Bilateral hip pain 02/17/2016    Chronic low back pain 02/17/2016    CKD (chronic kidney disease), stage IV 06/09/2016    Weakness 06/16/2016    Neck pain 07/21/2016    Bilateral low back pain with sciatica 07/21/2016    Upper extremity weakness 07/21/2016    Weakness of both lower extremities 07/21/2016    Decreased functional mobility 07/21/2016    Anemia of chronic renal failure, stage 4 (severe) 09/20/2016    Primary osteoarthritis of both knees 09/20/2016    Chronic back pain 05/05/2017    Chronic anemia 05/05/2017    Thoracic spinal stenosis 09/11/2017    Lumbar radiculopathy 09/11/2017     Resolved Ambulatory Problems     Diagnosis Date Noted    RAPHAEL (acute kidney injury) 04/14/2015    Iron deficiency anemia 04/14/2015    Hypertensive emergency 04/15/2015    Chronic kidney disease, stage III (moderate) 09/18/2015    Schizoaffective disorder, chronic condition with acute exacerbation 09/19/2015    Internal hemorrhoid, bleeding 09/21/2015    Bright red blood per rectum 01/23/2016    RAPHAEL (acute kidney injury) 01/23/2016    RAPHAEL (acute kidney injury) 03/14/2016    COPD exacerbation 03/23/2016    Hyperkalemia 06/16/2016    Chronic kidney disease (CKD), stage V 06/17/2016     Past Medical History:   Diagnosis Date    Allergy     Anemia     Anxiety     Arthritis     Asthma     Bipolar affective     Cancer     CHF (congestive heart failure)     Chronic hepatitis C with cirrhosis     COPD (chronic obstructive pulmonary disease)     Coronary artery disease     Depression     Diabetes  mellitus type II     Disorder of kidney and ureter     Elevated PSA     Encounter for blood transfusion     History of back injury     Hyperlipidemia     Hypertension     Internal hemorrhoid, bleeding 9/21/2015    Pancytopenia     Schizo affective schizophrenia     Seizures     Stroke     Thyroid disease     Urinary tract infection        PAST SURGICAL HISTORY:  Past Surgical History:   Procedure Laterality Date    ELBOW BURSA SURGERY      JOINT REPLACEMENT      ORIF FEMUR FRACTURE Right 8/2015       SOCIAL HISTORY:   Social History     Social History    Marital status: Single     Spouse name: N/A    Number of children: N/A    Years of education: N/A     Occupational History    Not on file.     Social History Main Topics    Smoking status: Current Every Day Smoker     Packs/day: 0.50    Smokeless tobacco: Never Used    Alcohol use No    Drug use: No      Comment: quit 1985    Sexual activity: Not Currently     Other Topics Concern    Not on file     Social History Narrative    No narrative on file       FAMILY HISTORY:  Family History   Problem Relation Age of Onset    Hypertension Mother     Diabetes Mother     Transient ischemic attack Mother     Heart disease Mother      stent placement    Arthritis Mother     Heart disease Father     Hypertension Father     Diabetes Father     Stroke Father     Arthritis Sister     Hypertension Sister     Heart disease Maternal Grandmother     Diabetes Maternal Grandmother     Stroke Maternal Grandmother     Cancer Paternal Grandmother      breast    Heart disease Paternal Grandmother     Cancer Sister      brain    Prostate cancer Neg Hx     Kidney disease Neg Hx        CURRENTS MEDICATIONS:  Current Outpatient Prescriptions on File Prior to Visit   Medication Sig Dispense Refill    albuterol 90 mcg/actuation inhaler Inhale 1 puff into the lungs every 4 (four) hours as needed for Wheezing. 1 Inhaler 1    amlodipine (NORVASC) 10 MG  tablet Take 1 tablet (10 mg total) by mouth once daily. 90 tablet 3    carvedilol (COREG) 25 MG tablet TAKE 1 TABLET(25 MG) BY MOUTH TWICE DAILY 180 tablet 0    diclofenac (VOLTAREN) 25 MG TbEC Take 1 tablet (25 mg total) by mouth 3 (three) times daily as needed (pain). 30 tablet 0    ERGOCALCIFEROL, VITAMIN D2, (VITAMIN D ORAL) Take 2,000 Units by mouth once daily.       ferrous sulfate 325 mg (65 mg iron) Tab tablet TAKE ONE TABLET BY MOUTH ONCE DAILY WITH LUNCH 90 tablet 0    ferrous sulfate 325 mg (65 mg iron) Tab tablet TAKE 1 TABLET BY MOUTH WITH LUNCH. 90 tablet 0    fluoxetine (PROZAC) 20 MG capsule Take 1 capsule (20 mg total) by mouth once daily. 90 capsule 3    furosemide (LASIX) 80 MG tablet TK ONE T PO QAM.  1    gabapentin (NEURONTIN) 300 MG capsule Take 300 mg by mouth 3 (three) times daily.   0    hydrALAZINE (APRESOLINE) 10 MG tablet TAKE 1 TABLET(10 MG) BY MOUTH THREE TIMES DAILY 270 tablet 11    hydrocodone-acetaminophen 5-325mg (NORCO) 5-325 mg per tablet Take 1 tablet by mouth 3 (three) times daily as needed for Pain. 90 tablet 0    lorazepam (ATIVAN) 1 MG tablet Take 1 tablet (1 mg total) by mouth every 6 (six) hours as needed for Anxiety. 30 tablet 0    metoprolol tartrate (LOPRESSOR) 50 MG tablet TK ONE T PO BID. 180 tablet 3    nicotine (NICODERM CQ) 21 mg/24 hr PLACE ONE PATCH ONTO THE SKIN ONCE DAILY 28 patch 0    olanzapine (ZYPREXA) 20 MG tablet Take 20 mg by mouth nightly.       olanzapine (ZYPREXA) 5 MG tablet Take 5 mg by mouth once daily.      omeprazole (PRILOSEC) 20 MG capsule Take 1 capsule (20 mg total) by mouth once daily. 90 capsule 1    oxybutynin (DITROPAN) 5 MG Tab Take 1 tablet (5 mg total) by mouth every evening. One tablet before sleep 30 tablet 11    SEROQUEL  mg Tb24 TK ONE T PO HS.  0    tamsulosin (FLOMAX) 0.4 mg Cp24 TAKE 1 CAPSULE(0.4 MG) BY MOUTH EVERY DAY 90 capsule 0     No current facility-administered medications on file prior to visit.         ALLERGIES:  Review of patient's allergies indicates:   Allergen Reactions    Codeine      Other reaction(s): Nausea    Depakote [divalproex] Other (See Comments)     Thrombocytopenia    Haldol [haloperidol lactate] Other (See Comments)     Seizures    Methadone     Morphine     Naloxone     Opium     Propoxyphene     Stelazine [trifluoperazine]     Amoxicillin Rash       REVIEW OF SYSTEMS:  Review of Systems   Constitutional: Negative for diaphoresis, fever and weight loss.   Respiratory: Negative for shortness of breath.    Cardiovascular: Negative for chest pain.   Gastrointestinal: Negative for blood in stool.   Genitourinary: Negative for hematuria.   Endo/Heme/Allergies: Does not bruise/bleed easily.   All other systems reviewed and are negative.      OBJECTIVE:    PHYSICAL EXAMINATION:   Vitals:    10/02/17 1315   BP: (!) 151/82   Pulse: 77       Physical Exam:  Vitals reviewed.    Constitutional: He appears well-developed and well-nourished.     Eyes: Pupils are equal, round, and reactive to light. Conjunctivae and EOM are normal.     Cardiovascular: Normal distal pulses and no edema.     Abdominal: Soft.     Skin: Skin displays no rash on trunk and no rash on extremities. Skin displays no lesions on trunk and no lesions on extremities.     Psych/Behavior: He is alert. He is oriented to person, place, and time. He has a normal mood and affect.     Musculoskeletal:        Neck: Range of motion is full.     Neurological:        DTRs: Tricep reflexes are 2+ on the right side and 2+ on the left side. Bicep reflexes are 2+ on the right side and 2+ on the left side. Brachioradialis reflexes are 2+ on the right side and 2+ on the left side. Patellar reflexes are 2+ on the right side and 2+ on the left side. Achilles reflexes are 2+ on the right side and 2+ on the left side.       Back Exam     Tenderness   The patient is experiencing tenderness in the lumbar.    Range of Motion   Extension: abnormal    Flexion: normal   Lateral Bend Right: normal   Lateral Bend Left: normal   Rotation Right: normal   Rotation Left: normal     Muscle Strength   Right Quadriceps:  5/5   Left Quadriceps:  5/5   Right Hamstrings:  5/5   Left Hamstrings:  5/5     Tests   Straight leg raise right: negative  Straight leg raise left: negative    Other   Toe Walk: normal  Heel Walk: normal            SI joint:   Palpation at the right and left SI joints not painful  ANTONIETA test is negative bilaterally  Gaenslen test is negative bilaterally  Thigh thrust test is negative bilaterally    Neurologic Exam     Mental Status   Oriented to person, place, and time.   Speech: speech is normal   Level of consciousness: alert    Cranial Nerves   Cranial nerves II through XII intact.     CN III, IV, VI   Pupils are equal, round, and reactive to light.  Extraocular motions are normal.     Motor Exam   Muscle bulk: normal  Overall muscle tone: normal    Strength   Right deltoid: 5/5  Left deltoid: 5/5  Right biceps: 5/5  Left biceps: 5/5  Right triceps: 5/5  Left triceps: 5/5  Right wrist flexion: 5/5  Left wrist flexion: 5/5  Right wrist extension: 5/5  Left wrist extension: 5/5  Right interossei: 5/5  Left interossei: 5/5  Right iliopsoas: 5/5  Left iliopsoas: 5/5  Right quadriceps: 5/5  Left quadriceps: 5/5  Right hamstrin/5  Left hamstrin/5  Right anterior tibial: 5/5  Left anterior tibial: 5/5  Right posterior tibial: 5/5  Left posterior tibial: 5/5  Right peroneal: 5/5  Left peroneal: 5/5  Right gastroc: 5/5  Left gastroc: 5/5    Sensory Exam   Light touch normal.   Pinprick normal.     Gait, Coordination, and Reflexes     Gait  Gait: normal    Coordination   Finger to nose coordination: normal  Tandem walking coordination: normal    Reflexes   Right brachioradialis: 2+  Left brachioradialis: 2+  Right biceps: 2+  Left biceps: 2+  Right triceps: 2+  Left triceps: 2+  Right patellar: 2+  Left patellar: 2+  Right achilles: 2+  Left achilles:  2+  Right plantar: normal  Left plantar: normal  Right John: absent  Left John: absent  Right ankle clonus: absent  Left ankle clonus: absent        DIAGNOSTIC DATA:  I personally reviewed the following imaging:   Lumbar spine MRI 08/2017: severe L3-4 and L4-5 stenosis, moderate L2-3 stenosis    ASSESMENT:  This is a 62 y.o. male with     Problem List Items Addressed This Visit     None      Visit Diagnoses     Lumbar stenosis with neurogenic claudication    -  Primary    Relevant Orders    Ambulatory Referral to [facility] ([facility])          PLAN:  [facility] center for swimming    Follow-up in 3 months    Patient is requesting Norco but had 90 pills ordered by Dr Mcguire on 09/18/2017  I explained that I cannot order more Norco for now.         Rd Mina MD  Pager: [phone]

## 2017-10-02 NOTE — LETTER
October 2, 2017      Carla Burks MD  [address]  [address]  [facility] [address]           [facility]  [address]  [facility] [address]  Phone: [phone]          Patient: Leif Ramirez Jr.   MR Number: [id number]   YOB: 1955   Date of Visit: 10/2/2017       Dear Dr. Carla Burks:    Thank you for referring Leif Ramirez to me for evaluation. Attached you will find relevant portions of my assessment and plan of care.    If you have questions, please do not hesitate to call me. I look forward to following Leif Ramirez along with you.    Sincerely,    Rd Mina MD    Enclosure  CC:  No Recipients    If you would like to receive this communication electronically, please contact [email or web address] or [phone] to request more information on [employer] access.    For providers and/or their staff who would like to refer a patient to [facility], please contact us through our one-stop-shop provider referral line, [facility], at [phone].    If you feel you have received this communication in error or would no longer like to receive these types of communications, please e-mail [email or web address]

## 2017-10-03 NOTE — ED NOTES
Respiratory called for breathing treatment    Visit Information Date & Time Provider Department Dept. Phone Encounter #  
 10/3/2017 11:00 AM Bere Ramires MD [facility] [address] [id number] Your Appointments 10/19/2017  2:30 PM  
COMPLETE PHYSICAL with Bere Ramires MD  
[facility] Appt Note: CPE  
 [address] \[facility]\[address] [address]  
[phone]  
  
   
 [address] [facility] [address] Upcoming Health Maintenance Date Due Hepatitis C Screening 1966 DTaP/Tdap/Td series (1 - Tdap) 7/15/1985 FOBT Q 1 YEAR AGE 50-75 7/15/2014 INFLUENZA AGE 9 TO ADULT 8/1/2017 Allergies as of 10/3/2017  Review Complete On: 10/3/2017 By: Karl Duarte LPN Severity Noted Reaction Type Reactions Watermelon  10/13/2015    Itching Current Immunizations  Reviewed on 10/13/2015 Name Date Influenza Vaccine 10/1/2014 Not reviewed this visit You Were Diagnosed With   
  
 Codes Comments Kidney stone    -  Primary ICD-10-CM: N20.0 ICD-9-CM: 592.0 Flank pain     ICD-10-CM: R10.9 ICD-9-CM: 789.09 Hematuria, unspecified type     ICD-10-CM: R31.9 ICD-9-CM: 599.70 Vitals BP Pulse Temp Resp Height(growth percentile) Weight(growth percentile) 161/75 (BP 1 Location: Left arm, BP Patient Position: Sitting) 66 98.2 °F (36.8 °C) (Oral) 18 5' 8.5\" (1.74 m) 159 lb 6 oz (72.3 kg) SpO2 BMI Smoking Status 99% 23.88 kg/m2 Former Smoker Vitals History BMI and BSA Data Body Mass Index Body Surface Area  
 23.88 kg/m 2 1.87 m 2 Preferred Pharmacy Pharmacy Name Phone [facility] [address] [place] [facility] [phone] Your Updated Medication List  
  
   
This list is accurate as of: 10/3/17 11:25 AM.  Always use your most recent med list.  
  
  
  
  
 buPROPion  mg XL tablet Commonly known as:  Ashford Border Take 300 mg by mouth daily. oxyCODONE-acetaminophen 5-325 mg per tablet Commonly known as:  PERCOCET Take 1 Tab by mouth every four (4) hours as needed for Pain. Max Daily Amount: 6 Tabs. PRISTIQ 100 mg Tb24 Generic drug:  Desvenlafaxine Take 100 mg by mouth daily. traZODone 100 mg tablet Commonly known as:  Zacarias Lipps Take 200 mg by mouth nightly. Prescriptions Printed Refills  
 oxyCODONE-acetaminophen (PERCOCET) 5-325 mg per tablet 0 Sig: Take 1 Tab by mouth every four (4) hours as needed for Pain. Max Daily Amount: 6 Tabs. Class: Print Route: Oral  
  
We Performed the Following AMB POC URINALYSIS DIP STICK MANUAL W/O MICRO [76077 CPT(R)] REFERRAL TO UROLOGY [NFR470 Custom] To-Do List   
 10/03/2017 Imaging:  CT ABD PELV WO CONT Referral Information Referral ID Referred By Referred To  
  
 [id number] Phone: [phone] Fax: [phone] Visits Status Start Date End Date 1 New Request 10/3/17 10/3/18 If your referral has a status of pending review or denied, additional information will be sent to support the outcome of this decision. Patient Instructions [address] [facility] urology stone hotline - [phone] Kidney Stone: Care Instructions Your Care Instructions Kidney stones are formed when salts, minerals, and other substances normally found in the urine clump together. They can be as small as grains of sand or, rarely, as large as golf balls. While the stone is traveling through the ureter, which is the tube that carries urine from the kidney to the bladder, you will probably feel pain. The pain may be mild or very severe. You may also have some blood in your urine.  As soon as the stone reaches the bladder, any intense pain should go away. If a stone is too large to pass on its own, you may need a medical procedure to help you pass the stone. The doctor has checked you carefully, but problems can develop later. If you notice any problems or new symptoms, get medical treatment right away. Follow-up care is a key part of your treatment and safety. Be sure to make and go to all appointments, and call your doctor if you are having problems. It's also a good idea to know your test results and keep a list of the medicines you take. How can you care for yourself at home? · Drink plenty of fluids, enough so that your urine is light yellow or clear like water. If you have kidney, heart, or liver disease and have to limit fluids, talk with your doctor before you increase the amount of fluids you drink. · Take pain medicines exactly as directed. Call your doctor if you think you are having a problem with your medicine. ¨ If the doctor gave you a prescription medicine for pain, take it as prescribed. ¨ If you are not taking a prescription pain medicine, ask your doctor if you can take an over-the-counter medicine. Read and follow all instructions on the label. · Your doctor may ask you to strain your urine so that you can collect your kidney stone when it passes. You can use a kitchen strainer or a tea strainer to catch the stone. Store it in a plastic bag until you see your doctor again. Preventing future kidney stones Some changes in your diet may help prevent kidney stones. Depending on the cause of your stones, your doctor may recommend that you: · Drink plenty of fluids, enough so that your urine is light yellow or clear like water. If you have kidney, heart, or liver disease and have to limit fluids, talk with your doctor before you increase the amount of fluids you drink. · Limit coffee, tea, and alcohol. Also avoid grapefruit juice.  
· Do not take more than the recommended daily dose of vitamins C and D. 
 · Avoid antacids such as Gaviscon, Maalox, Mylanta, or Tums. · Limit the amount of salt (sodium) in your diet. · Eat a balanced diet that is not too high in protein. · Limit foods that are high in a substance called oxalate, which can cause kidney stones. These foods include dark green vegetables, rhubarb, chocolate, wheat bran, nuts, cranberries, and beans. When should you call for help? Call your doctor now or seek immediate medical care if: 
· You cannot keep down fluids. · Your pain gets worse. · You have a fever or chills. · You have new or worse pain in your back just below your rib cage (the flank area). · You have new or more blood in your urine. Watch closely for changes in your health, and be sure to contact your doctor if: 
· You do not get better as expected. Where can you learn more? Go to http://umer-yunior.info/. Enter Z163 in the search box to learn more about \"Kidney Stone: Care Instructions. \" Current as of: April 3, 2017 Content Version: 11.3 © 2981-0853 [employer]. Care instructions adapted under license by [employer] (which disclaims liability or warranty for this information). If you have questions about a medical condition or this instruction, always ask your healthcare professional. [address] any warranty or liability for your use of this information. Introducing [facility]! Whitley Jaffe introduces [employer] Shout patient portal. Now you can access parts of your medical record, email your doctor's office, and request medication refills online. 1. In your internet browser, go to [email or web address] 2. Click on the First Time User? Click Here link in the Sign In box. You will see the New Member Sign Up page. 3. Enter your [employer] Access Code exactly as it appears below. You will not need to use this code after youve completed the sign-up process.  If you do not sign up before the expiration date, you must request a new code. · Cojoin Access Code: [facility] Expires: 1/1/2018 11:25 AM 
 
4. Enter the last four digits of your Social Security Number (xxxx) and Date of Birth (mm/dd/yyyy) as indicated and click Submit. You will be taken to the next sign-up page. 5. Create a Cojoin ID. This will be your Cojoin login ID and cannot be changed, so think of one that is secure and easy to remember. 6. Create a Cojoin password. You can change your password at any time. 7. Enter your Password Reset Question and Answer. This can be used at a later time if you forget your password. 8. Enter your e-mail address. You will receive e-mail notification when new information is available in [address] 9. Click Sign Up. You can now view and download portions of your medical record. 10. Click the Download Summary menu link to download a portable copy of your medical information. If you have questions, please visit the Frequently Asked Questions section of the Cojoin website. Remember, Cojoin is NOT to be used for urgent needs. For medical emergencies, dial 911. Now available from your iPhone and Android! Please provide this summary of care documentation to your next provider. Your primary care clinician is listed as Bettina Geiger. If you have any questions after today's visit, please call [phone].

## 2017-10-04 ENCOUNTER — TELEPHONE (OUTPATIENT)
Dept: SMOKING CESSATION | Facility: CLINIC | Age: 62
End: 2017-10-04

## 2017-10-04 RX ORDER — AZITHROMYCIN 250 MG/1
TABLET, FILM COATED ORAL
Refills: 0 | COMMUNITY
Start: 2017-09-11 | End: 2017-11-17 | Stop reason: ALTCHOICE

## 2017-10-04 RX ORDER — PREDNISONE 20 MG/1
TABLET ORAL
Refills: 0 | COMMUNITY
Start: 2017-09-11 | End: 2017-11-17

## 2017-10-04 RX ORDER — MIRTAZAPINE 30 MG/1
TABLET, FILM COATED ORAL
Refills: 0 | COMMUNITY
Start: 2017-09-29 | End: 2017-11-17 | Stop reason: ALTCHOICE

## 2017-10-05 ENCOUNTER — OFFICE VISIT (OUTPATIENT)
Dept: FAMILY MEDICINE | Facility: CLINIC | Age: 62
End: 2017-10-05
Attending: FAMILY MEDICINE
Payer: MEDICARE

## 2017-10-05 ENCOUNTER — LAB VISIT (OUTPATIENT)
Dept: LAB | Facility: HOSPITAL | Age: 62
End: 2017-10-05
Attending: FAMILY MEDICINE
Payer: MEDICARE

## 2017-10-05 ENCOUNTER — TELEPHONE (OUTPATIENT)
Dept: SMOKING CESSATION | Facility: CLINIC | Age: 62
End: 2017-10-05

## 2017-10-05 VITALS
HEART RATE: 93 BPM | DIASTOLIC BLOOD PRESSURE: 89 MMHG | HEIGHT: 66 IN | SYSTOLIC BLOOD PRESSURE: 139 MMHG | OXYGEN SATURATION: 97 % | BODY MASS INDEX: 29.83 KG/M2 | WEIGHT: 185.63 LBS

## 2017-10-05 DIAGNOSIS — D69.6 THROMBOCYTOPENIA: Chronic | ICD-10-CM

## 2017-10-05 DIAGNOSIS — R31.0 GROSS HEMATURIA: ICD-10-CM

## 2017-10-05 DIAGNOSIS — N18.4 ANEMIA OF CHRONIC RENAL FAILURE, STAGE 4 (SEVERE): ICD-10-CM

## 2017-10-05 DIAGNOSIS — F32.A DEPRESSION, UNSPECIFIED DEPRESSION TYPE: ICD-10-CM

## 2017-10-05 DIAGNOSIS — M25.551 BILATERAL HIP PAIN: ICD-10-CM

## 2017-10-05 DIAGNOSIS — K74.69 COMPENSATED CIRRHOSIS RELATED TO HEPATITIS C VIRUS (HCV): Chronic | ICD-10-CM

## 2017-10-05 DIAGNOSIS — M54.42 CHRONIC BILATERAL LOW BACK PAIN WITH LEFT-SIDED SCIATICA: ICD-10-CM

## 2017-10-05 DIAGNOSIS — I10 ESSENTIAL HYPERTENSION: Chronic | ICD-10-CM

## 2017-10-05 DIAGNOSIS — R73.02 GLUCOSE INTOLERANCE (IMPAIRED GLUCOSE TOLERANCE): ICD-10-CM

## 2017-10-05 DIAGNOSIS — J43.8 OTHER EMPHYSEMA: Chronic | ICD-10-CM

## 2017-10-05 DIAGNOSIS — G89.29 CHRONIC BILATERAL LOW BACK PAIN WITH LEFT-SIDED SCIATICA: ICD-10-CM

## 2017-10-05 DIAGNOSIS — F25.0 SCHIZOAFFECTIVE DISORDER, BIPOLAR TYPE: Chronic | ICD-10-CM

## 2017-10-05 DIAGNOSIS — B19.20 COMPENSATED CIRRHOSIS RELATED TO HEPATITIS C VIRUS (HCV): Chronic | ICD-10-CM

## 2017-10-05 DIAGNOSIS — D64.9 CHRONIC ANEMIA: ICD-10-CM

## 2017-10-05 DIAGNOSIS — M48.04 THORACIC SPINAL STENOSIS: Primary | ICD-10-CM

## 2017-10-05 DIAGNOSIS — F41.9 ANXIETY: ICD-10-CM

## 2017-10-05 DIAGNOSIS — E78.5 HYPERLIPIDEMIA, UNSPECIFIED HYPERLIPIDEMIA TYPE: Chronic | ICD-10-CM

## 2017-10-05 DIAGNOSIS — D63.1 ANEMIA OF CHRONIC RENAL FAILURE, STAGE 4 (SEVERE): ICD-10-CM

## 2017-10-05 DIAGNOSIS — N18.4 CKD (CHRONIC KIDNEY DISEASE), STAGE IV: ICD-10-CM

## 2017-10-05 DIAGNOSIS — M25.552 BILATERAL HIP PAIN: ICD-10-CM

## 2017-10-05 LAB
ANION GAP SERPL CALC-SCNC: 11 MMOL/L
APTT BLDCRRT: 24.4 SEC
BASOPHILS # BLD AUTO: 0.02 K/UL
BASOPHILS NFR BLD: 0.4 %
BUN SERPL-MCNC: 43 MG/DL
CALCIUM SERPL-MCNC: 8.9 MG/DL
CHLORIDE SERPL-SCNC: 106 MMOL/L
CO2 SERPL-SCNC: 23 MMOL/L
CREAT SERPL-MCNC: 4.2 MG/DL
DIFFERENTIAL METHOD: ABNORMAL
EOSINOPHIL # BLD AUTO: 0.1 K/UL
EOSINOPHIL NFR BLD: 1.5 %
ERYTHROCYTE [DISTWIDTH] IN BLOOD BY AUTOMATED COUNT: 13.9 %
EST. GFR  (AFRICAN AMERICAN): 16 ML/MIN/1.73 M^2
EST. GFR  (NON AFRICAN AMERICAN): 14 ML/MIN/1.73 M^2
ESTIMATED AVG GLUCOSE: 91 MG/DL
GLUCOSE SERPL-MCNC: 132 MG/DL
HBA1C MFR BLD HPLC: 4.8 %
HCT VFR BLD AUTO: 26.1 %
HGB BLD-MCNC: 8.8 G/DL
INR PPP: 1
LYMPHOCYTES # BLD AUTO: 0.9 K/UL
LYMPHOCYTES NFR BLD: 17.4 %
MCH RBC QN AUTO: 29.6 PG
MCHC RBC AUTO-ENTMCNC: 33.7 G/DL
MCV RBC AUTO: 88 FL
MONOCYTES # BLD AUTO: 0.5 K/UL
MONOCYTES NFR BLD: 9 %
NEUTROPHILS # BLD AUTO: 3.8 K/UL
NEUTROPHILS NFR BLD: 71.7 %
PLATELET # BLD AUTO: 113 K/UL
PMV BLD AUTO: 8 FL
POTASSIUM SERPL-SCNC: 4.3 MMOL/L
PROTHROMBIN TIME: 10.4 SEC
RBC # BLD AUTO: 2.97 M/UL
SODIUM SERPL-SCNC: 140 MMOL/L
WBC # BLD AUTO: 5.34 K/UL

## 2017-10-05 PROCEDURE — 83036 HEMOGLOBIN GLYCOSYLATED A1C: CPT

## 2017-10-05 PROCEDURE — 85610 PROTHROMBIN TIME: CPT

## 2017-10-05 PROCEDURE — 85730 THROMBOPLASTIN TIME PARTIAL: CPT

## 2017-10-05 PROCEDURE — 36415 COLL VENOUS BLD VENIPUNCTURE: CPT

## 2017-10-05 PROCEDURE — 99214 OFFICE O/P EST MOD 30 MIN: CPT | Mod: S$PBB,,, | Performed by: FAMILY MEDICINE

## 2017-10-05 PROCEDURE — 85025 COMPLETE CBC W/AUTO DIFF WBC: CPT

## 2017-10-05 PROCEDURE — 99213 OFFICE O/P EST LOW 20 MIN: CPT | Mod: PBBFAC,PO | Performed by: FAMILY MEDICINE

## 2017-10-05 PROCEDURE — 99999 PR PBB SHADOW E&M-EST. PATIENT-LVL III: CPT | Mod: PBBFAC,,, | Performed by: FAMILY MEDICINE

## 2017-10-05 PROCEDURE — 80048 BASIC METABOLIC PNL TOTAL CA: CPT

## 2017-10-05 RX ORDER — HYDROCODONE BITARTRATE AND ACETAMINOPHEN 5; 325 MG/1; MG/1
1 TABLET ORAL 3 TIMES DAILY PRN
Qty: 90 TABLET | Refills: 0 | Status: SHIPPED | OUTPATIENT
Start: 2017-10-18 | End: 2017-10-17 | Stop reason: SDUPTHER

## 2017-10-05 RX ORDER — OXYBUTYNIN CHLORIDE 5 MG/1
5 TABLET ORAL NIGHTLY
Qty: 90 TABLET | Refills: 3 | Status: SHIPPED | OUTPATIENT
Start: 2017-10-05 | End: 2018-10-05

## 2017-10-05 NOTE — PROGRESS NOTES
Subjective:       Patient ID: Leif Ramirze Jr. is a 62 y.o. male.    Chief Complaint: Follow-up    62 yr old pleasant white male with anxiety/depression, schizoaffective disorder, Bipolar disorder, chronic Hep C with cirrhosis, CKD IV/V, chronic low back pain, B/L hip pain, BPH, HLD, pancytopenia, presents today for his routine 3 month follow up. C/o worsening left hip pain and low back pain and need pain medicine refill.    He went to ER recently and found to have worsening kidney functions - he follwos Dr. De La Rosa and will go and talk to her today.      Knee pain B/L - daily pain with instability - tried conservative approach with rest and meds and no relief - do not want any surgery - never used brace for support    HTN - chronic - controlled - on CCB and BB - - compliant - no side effects      HLD - controlled - diet alone -     LDLCALC                  88.6                05/05/2017              - lab due                  COPD - chronic - controlled      Chronic Hep C with Cirrhosis - seen hepatology - started on Harvoni and trying to be compliant      CKD IV/V - worsening - seeing nephrology - urine ok    Pancytopenia - seen Hem/Onc and never had follow up -       Chronic LBP/hip pain - he has B/L hip replacement and has pain in them every single day and he suffers and was on some pain medication until his doctor refused it - he also has back pain and it radiates to leg leg - no numbness or saddle anesthesia, bladder/bowel problem      History as below - reviewed      Health maintenance  -labs UTD  -colon screen and PSA reports UTD        Medication Refill   This is a chronic problem. The current episode started more than 1 year ago. The problem occurs constantly. The problem has been gradually improving. Associated symptoms include arthralgias and myalgias. Pertinent negatives include no chest pain, congestion, coughing, diaphoresis, rash, vomiting or weakness. Nothing aggravates the symptoms. Treatments  tried: pain medicine. The treatment provided moderate relief.   Hip Pain    The injury mechanism was a twisting injury. The pain is present in the left hip and right hip. The quality of the pain is described as aching. The pain is at a severity of 6/10. The pain is moderate. The symptoms are aggravated by movement and palpation. He has tried heat and elevation for the symptoms. The treatment provided mild relief.   Hypertension   This is a chronic problem. The current episode started more than 1 year ago. The problem is controlled. Associated symptoms include anxiety. Pertinent negatives include no chest pain or palpitations. There are no associated agents to hypertension. Risk factors for coronary artery disease include dyslipidemia and male gender. Past treatments include ACE inhibitors and central alpha agonists. The current treatment provides significant improvement. There are no compliance problems.  There is no history of angina, CAD/MI, CVA, left ventricular hypertrophy, PVD, renovascular disease, retinopathy or a thyroid problem. There is no history of chronic renal disease, hyperparathyroidism or pheochromocytoma.   Anemia   Presents for follow-up visit. Symptoms include pallor. There has been no confusion, light-headedness or palpitations. Past treatments include nothing. There is no history of alcohol abuse, cancer, chronic renal disease, dementia, hypothyroidism, malnutrition, recent illness or recent trauma. There is no past history of bone marrow exam, colonoscopy, EGD or FOBT. Compliance with medications is 51-75%.   Hyperlipidemia   This is a chronic problem. The current episode started more than 1 year ago. The problem is controlled. Recent lipid tests were reviewed and are normal. He has no history of chronic renal disease, diabetes, hypothyroidism or liver disease. There are no known factors aggravating his hyperlipidemia. Associated symptoms include myalgias. Pertinent negatives include no chest  pain. He is currently on no antihyperlipidemic treatment. The current treatment provides mild improvement of lipids. There are no compliance problems.  Risk factors for coronary artery disease include dyslipidemia, hypertension and a sedentary lifestyle.     Review of Systems   Constitutional: Negative.  Negative for activity change, diaphoresis and unexpected weight change.   HENT: Negative.  Negative for congestion, ear pain, mouth sores, rhinorrhea and voice change.    Eyes: Negative.  Negative for pain, discharge and visual disturbance.   Respiratory: Negative.  Negative for apnea, cough and wheezing.    Cardiovascular: Negative.  Negative for chest pain and palpitations.   Gastrointestinal: Negative.  Negative for abdominal distention, anal bleeding, diarrhea and vomiting.   Endocrine: Negative.  Negative for cold intolerance and polyuria.   Genitourinary: Negative.  Negative for decreased urine volume, difficulty urinating, discharge, frequency and scrotal swelling.   Musculoskeletal: Positive for arthralgias, back pain and myalgias. Negative for neck stiffness.   Skin: Positive for pallor. Negative for color change and rash.   Allergic/Immunologic: Negative.  Negative for environmental allergies and immunocompromised state.   Neurological: Negative.  Negative for dizziness, speech difficulty, weakness and light-headedness.   Hematological: Negative.    Psychiatric/Behavioral: Negative.  Negative for agitation, confusion, dysphoric mood and suicidal ideas. The patient is not nervous/anxious.        PMH/PSH/FH/SH/MED/ALLERGY reviewed    Objective:       Vitals:    10/05/17 1316   BP: 139/89   Pulse: 93       Physical Exam   Constitutional: He is oriented to person, place, and time. He appears well-developed and well-nourished. No distress.   HENT:   Head: Normocephalic and atraumatic.   Right Ear: External ear normal.   Left Ear: External ear normal.   Nose: Nose normal.   Mouth/Throat: Oropharynx is clear and  moist.   Eyes: EOM are normal. Pupils are equal, round, and reactive to light.   Neck: Normal range of motion. Neck supple. No JVD present. No tracheal deviation present. No thyromegaly present.   Cardiovascular: Normal rate, regular rhythm, normal heart sounds and intact distal pulses.  Exam reveals no gallop and no friction rub.    No murmur heard.  Pulmonary/Chest: Effort normal and breath sounds normal. No stridor. No respiratory distress. He has no wheezes. He has no rales. He exhibits no tenderness.   Abdominal: Soft. Bowel sounds are normal. He exhibits distension. He exhibits no mass. There is no tenderness. There is no rebound and no guarding. No hernia.   Musculoskeletal: Normal range of motion. He exhibits tenderness (TTP B/L hips with restricted ROM and lower lumbar spine and paralumbar spine TTP. SLRT + left). He exhibits no edema.   Lymphadenopathy:     He has no cervical adenopathy.   Neurological: He is alert and oriented to person, place, and time. He has normal reflexes. He displays normal reflexes. No cranial nerve deficit. He exhibits normal muscle tone. Coordination normal.   Skin: Skin is warm and dry. Capillary refill takes less than 2 seconds. No rash noted. He is not diaphoretic. No erythema. There is pallor.   Psychiatric: He has a normal mood and affect. His behavior is normal. Judgment and thought content normal.       Assessment:       1. Thoracic spinal stenosis    2. Chronic bilateral low back pain with left-sided sciatica    3. Bilateral hip pain    4. Schizoaffective disorder, bipolar type    5. Anxiety    6. Depression, unspecified depression type    7. Other emphysema    8. Hyperlipidemia, unspecified hyperlipidemia type    9. Essential hypertension    10. CKD (chronic kidney disease), stage IV    11. Thrombocytopenia    12. Anemia of chronic renal failure, stage 4 (severe)    13. Chronic anemia    14. 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    Diagnoses and all orders for this visit:    Thoracic spinal stenosis    Chronic bilateral low back pain with left-sided sciatica  -     hydrocodone-acetaminophen 5-325mg (NORCO) 5-325 mg per tablet; Take 1 tablet by mouth 3 (three) times daily as needed for Pain.    Bilateral hip pain  -     hydrocodone-acetaminophen 5-325mg (NORCO) 5-325 mg per tablet; Take 1 tablet by mouth 3 (three) times daily as needed for Pain.    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 One tablet before sleep      Hep C/Cirrhosis  -completed Harvoni - viral load negative  -follows hepatology    COPD  -stable  -advised to quit smoking    HTN  -controlled  -refilled meds - CCB and BB to continue    OA knee B/L  -failed conservative therapy  -trial of brace daily - rx done    CKD IV  -worsening and going towards stage 5  -follow nephrology      Anxiety/depression/schizoaffective/bipolar  -follows Dr. Dc, Psychiatry    B/L hip pain and chronic LBP  -on norco  -advised to check with hepatology for tylenol intake    Spent adequate time in obtaining history and explaining differentials    40 minutes spent during this visit of which greater than 50% devoted to face-face counseling and coordination of care regarding diagnosis and management plan    RTC 3 months

## 2017-10-08 ENCOUNTER — HOSPITAL ENCOUNTER (EMERGENCY)
Facility: HOSPITAL | Age: 62
Discharge: PSYCHIATRIC OR SUBSTANCE USE FACILITY | End: 2017-10-09
Attending: EMERGENCY MEDICINE
Payer: MEDICARE

## 2017-10-08 DIAGNOSIS — R45.1 AGITATION: ICD-10-CM

## 2017-10-08 DIAGNOSIS — R45.850 HOMICIDAL THOUGHTS: ICD-10-CM

## 2017-10-08 DIAGNOSIS — F30.9 MANIC EPISODE: Primary | ICD-10-CM

## 2017-10-08 LAB
ALBUMIN SERPL BCP-MCNC: 3.5 G/DL
ALP SERPL-CCNC: 85 U/L
ALT SERPL W/O P-5'-P-CCNC: 17 U/L
AMPHET+METHAMPHET UR QL: NEGATIVE
ANION GAP SERPL CALC-SCNC: 10 MMOL/L
APAP SERPL-MCNC: <3 UG/ML
AST SERPL-CCNC: 22 U/L
BACTERIA #/AREA URNS HPF: NORMAL /HPF
BARBITURATES UR QL SCN>200 NG/ML: NEGATIVE
BASOPHILS # BLD AUTO: 0.02 K/UL
BASOPHILS NFR BLD: 0.5 %
BENZODIAZ UR QL SCN>200 NG/ML: NEGATIVE
BILIRUB SERPL-MCNC: 0.3 MG/DL
BILIRUB UR QL STRIP: NEGATIVE
BUN SERPL-MCNC: 45 MG/DL
BZE UR QL SCN: NEGATIVE
CALCIUM SERPL-MCNC: 8.1 MG/DL
CANNABINOIDS UR QL SCN: NEGATIVE
CHLORIDE SERPL-SCNC: 106 MMOL/L
CLARITY UR: CLEAR
CO2 SERPL-SCNC: 22 MMOL/L
COLOR UR: YELLOW
CREAT SERPL-MCNC: 4.2 MG/DL
CREAT UR-MCNC: 21.3 MG/DL
DIFFERENTIAL METHOD: ABNORMAL
EOSINOPHIL # BLD AUTO: 0.1 K/UL
EOSINOPHIL NFR BLD: 2.2 %
ERYTHROCYTE [DISTWIDTH] IN BLOOD BY AUTOMATED COUNT: 14.3 %
EST. GFR  (AFRICAN AMERICAN): 16 ML/MIN/1.73 M^2
EST. GFR  (NON AFRICAN AMERICAN): 14 ML/MIN/1.73 M^2
ETHANOL SERPL-MCNC: <10 MG/DL
GLUCOSE SERPL-MCNC: 99 MG/DL
GLUCOSE UR QL STRIP: NEGATIVE
HCT VFR BLD AUTO: 25.2 %
HGB BLD-MCNC: 8.4 G/DL
HGB UR QL STRIP: ABNORMAL
HYALINE CASTS #/AREA URNS LPF: 0 /LPF
KETONES UR QL STRIP: NEGATIVE
LEUKOCYTE ESTERASE UR QL STRIP: NEGATIVE
LYMPHOCYTES # BLD AUTO: 1.1 K/UL
LYMPHOCYTES NFR BLD: 26.2 %
MCH RBC QN AUTO: 29.5 PG
MCHC RBC AUTO-ENTMCNC: 33.3 G/DL
MCV RBC AUTO: 88 FL
METHADONE UR QL SCN>300 NG/ML: NEGATIVE
MICROSCOPIC COMMENT: NORMAL
MONOCYTES # BLD AUTO: 0.3 K/UL
MONOCYTES NFR BLD: 7.7 %
NEUTROPHILS # BLD AUTO: 2.6 K/UL
NEUTROPHILS NFR BLD: 63.2 %
NITRITE UR QL STRIP: NEGATIVE
OPIATES UR QL SCN: NEGATIVE
PCP UR QL SCN>25 NG/ML: NEGATIVE
PH UR STRIP: 6 [PH] (ref 5–8)
PLATELET # BLD AUTO: 102 K/UL
PMV BLD AUTO: 8.5 FL
POTASSIUM SERPL-SCNC: 4.2 MMOL/L
PROT SERPL-MCNC: 5.7 G/DL
PROT UR QL STRIP: ABNORMAL
RBC # BLD AUTO: 2.85 M/UL
RBC #/AREA URNS HPF: 2 /HPF (ref 0–4)
SALICYLATES SERPL-MCNC: <5 MG/DL
SODIUM SERPL-SCNC: 138 MMOL/L
SP GR UR STRIP: <=1.005 (ref 1–1.03)
TOXICOLOGY INFORMATION: ABNORMAL
TSH SERPL DL<=0.005 MIU/L-ACNC: 1.01 UIU/ML
URN SPEC COLLECT METH UR: ABNORMAL
UROBILINOGEN UR STRIP-ACNC: NEGATIVE EU/DL
WBC # BLD AUTO: 4.16 K/UL
WBC #/AREA URNS HPF: 0 /HPF (ref 0–5)

## 2017-10-08 PROCEDURE — 80320 DRUG SCREEN QUANTALCOHOLS: CPT

## 2017-10-08 PROCEDURE — 80307 DRUG TEST PRSMV CHEM ANLYZR: CPT

## 2017-10-08 PROCEDURE — 85025 COMPLETE CBC W/AUTO DIFF WBC: CPT

## 2017-10-08 PROCEDURE — 99285 EMERGENCY DEPT VISIT HI MDM: CPT | Mod: 25

## 2017-10-08 PROCEDURE — 84443 ASSAY THYROID STIM HORMONE: CPT

## 2017-10-08 PROCEDURE — 81000 URINALYSIS NONAUTO W/SCOPE: CPT

## 2017-10-08 PROCEDURE — 80329 ANALGESICS NON-OPIOID 1 OR 2: CPT

## 2017-10-08 PROCEDURE — 96372 THER/PROPH/DIAG INJ SC/IM: CPT

## 2017-10-08 PROCEDURE — 80053 COMPREHEN METABOLIC PANEL: CPT

## 2017-10-08 PROCEDURE — 93005 ELECTROCARDIOGRAM TRACING: CPT

## 2017-10-08 PROCEDURE — 63600175 PHARM REV CODE 636 W HCPCS: Performed by: EMERGENCY MEDICINE

## 2017-10-08 RX ORDER — ZIPRASIDONE MESYLATE 20 MG/ML
20 INJECTION, POWDER, LYOPHILIZED, FOR SOLUTION INTRAMUSCULAR
Status: COMPLETED | OUTPATIENT
Start: 2017-10-08 | End: 2017-10-08

## 2017-10-08 RX ADMIN — ZIPRASIDONE MESYLATE 20 MG: 20 INJECTION, POWDER, LYOPHILIZED, FOR SOLUTION INTRAMUSCULAR at 06:10

## 2017-10-08 NOTE — ED PROVIDER NOTES
"Encounter Date: 10/8/2017       History     Chief Complaint   Patient presents with    Homicidal     pt presents to ED today via EMS who reports pt called police deparment stating " I want stab my neighbors" pt has history of anxiety      Leif Ramirez Jr. is a 62 y.o. M who  has a past medical history of Allergy; Anemia; Anxiety; Arthritis; Asthma; Bipolar affective; Cancer; CHF (congestive heart failure); Chronic hepatitis C with cirrhosis; COPD (chronic obstructive pulmonary disease); Coronary artery disease; Depression; Diabetes mellitus type II; Disorder of kidney and ureter; Elevated PSA; Encounter for blood transfusion; History of back injury; Hyperlipidemia; Hypertension; Internal hemorrhoid, bleeding (9/21/2015); Pancytopenia; Schizo affective schizophrenia; Seizures; Stroke; Thyroid disease; and Urinary tract infection.    The patient presents to the ED due to homicidal ideation.  Per EMS, patient initially called the police and attempt to report a possible burglary.  EMS reports patient has been agitated, threatening, and acting bizarrely.    Patient reports he "got into it" with a neighbor earlier today.  He states that he wants to kill the neighbor, because he thinks they broke into his house.  Patient reports he went to go find his done but couldn't find it.  He continues to act threateningly, stating he will kill anyone who comes near or touches them.  He is a difficult historian and exhibits tangential thoughts.  He denies current SI.    On arrival to ED, patient was agitated, yelling, and unable to be redirected.            Review of patient's allergies indicates:   Allergen Reactions    Codeine      Other reaction(s): Nausea    Depakote [divalproex] Other (See Comments)     Thrombocytopenia    Haldol [haloperidol lactate] Other (See Comments)     Seizures    Methadone     Morphine     Naloxone     Opium     Propoxyphene     Stelazine [trifluoperazine]     Amoxicillin Rash     Past " Medical History:   Diagnosis Date    Allergy     poison ivy    Anemia     Anxiety     Arthritis     Asthma     Bipolar affective     Cancer     blood and bone    CHF (congestive heart failure)     Chronic hepatitis C with cirrhosis     COPD (chronic obstructive pulmonary disease)     Coronary artery disease     Depression     Diabetes mellitus type II     Disorder of kidney and ureter     Elevated PSA     has had prostate problems but unaware of his psa level    Encounter for blood transfusion     History of back injury     Chronic back pain    Hyperlipidemia     Hypertension     Internal hemorrhoid, bleeding 9/21/2015    Pancytopenia     Schizo affective schizophrenia     Seizures     Stroke     Thyroid disease     Urinary tract infection      Past Surgical History:   Procedure Laterality Date    ELBOW BURSA SURGERY      JOINT REPLACEMENT      ORIF FEMUR FRACTURE Right 8/2015     Family History   Problem Relation Age of Onset    Hypertension Mother     Diabetes Mother     Transient ischemic attack Mother     Heart disease Mother      stent placement    Arthritis Mother     Heart disease Father     Hypertension Father     Diabetes Father     Stroke Father     Arthritis Sister     Hypertension Sister     Heart disease Maternal Grandmother     Diabetes Maternal Grandmother     Stroke Maternal Grandmother     Cancer Paternal Grandmother      breast    Heart disease Paternal Grandmother     Cancer Sister      brain    Prostate cancer Neg Hx     Kidney disease Neg Hx      Social History   Substance Use Topics    Smoking status: Current Every Day Smoker     Packs/day: 0.50    Smokeless tobacco: Never Used    Alcohol use No     Review of Systems   Unable to perform ROS: Mental status change       Physical Exam     Initial Vitals [10/08/17 1739]   BP Pulse Resp Temp SpO2   (!) 147/92 84 18 97 °F (36.1 °C) 99 %      MAP       110.33         Physical Exam    Nursing note and  vitals reviewed.  Constitutional: He appears well-developed and well-nourished. He is not diaphoretic. He appears distressed.   HENT:   Head: Normocephalic and atraumatic.   Mouth/Throat: Oropharynx is clear and moist.   Eyes: EOM are normal. Pupils are equal, round, and reactive to light.   Neck: No tracheal deviation present.   Cardiovascular: Normal rate, regular rhythm, normal heart sounds and intact distal pulses.   Pulmonary/Chest: Breath sounds normal. No stridor. No respiratory distress.   Abdominal: Soft. He exhibits no distension and no mass. There is tenderness (mild, patient states present for the last 15 years) in the epigastric area.       Musculoskeletal: Normal range of motion. He exhibits no edema.   Neurological: He is alert and oriented to person, place, and time. No cranial nerve deficit or sensory deficit.   Skin: Skin is warm and dry. Capillary refill takes less than 2 seconds. No rash noted.   Psychiatric: His mood appears anxious. His affect is labile. His speech is rapid and/or pressured and tangential. He is agitated and aggressive. He is not actively hallucinating. He expresses impulsivity. He expresses homicidal ideation. He expresses no suicidal ideation. He expresses no suicidal plans and no homicidal plans.   Rambling, incoherent speech  Threatening behavior         ED Course   Procedures  Labs Reviewed   CBC W/ AUTO DIFFERENTIAL - Abnormal; Notable for the following:        Result Value    RBC 2.85 (*)     Hemoglobin 8.4 (*)     Hematocrit 25.2 (*)     Platelets 102 (*)     MPV 8.5 (*)     All other components within normal limits   COMPREHENSIVE METABOLIC PANEL - Abnormal; Notable for the following:     CO2 22 (*)     BUN, Bld 45 (*)     Creatinine 4.2 (*)     Calcium 8.1 (*)     Total Protein 5.7 (*)     eGFR if  16 (*)     eGFR if non  14 (*)     All other components within normal limits   URINALYSIS - Abnormal; Notable for the following:      "Specific Gravity, UA <=1.005 (*)     Protein, UA 2+ (*)     Occult Blood UA Trace (*)     All other components within normal limits   DRUG SCREEN PANEL, URINE EMERGENCY - Abnormal; Notable for the following:     Creatinine, Random Ur 21.3 (*)     All other components within normal limits   ACETAMINOPHEN LEVEL - Abnormal; Notable for the following:     Acetaminophen (Tylenol), Serum <3.0 (*)     All other components within normal limits   SALICYLATE LEVEL - Abnormal; Notable for the following:     Salicylate Lvl <5.0 (*)     All other components within normal limits   TSH   ALCOHOL,MEDICAL (ETHANOL)   URINALYSIS MICROSCOPIC   VITAMIN B12   FOLATE     EKG Readings: (Independently Interpreted)   Initial Reading: No STEMI. Previous EKG: Compared with most recent EKG   Normal sinus rhythm, rate 77, no ST changes, no ischemia.          Medical Decision Making:   Initial Assessment:   62-year-old male with history of schizo affective disorder, bipolar disorder, on Prozac, Remeron, Zyprexa presents via EMS from home due to concern of homicidal ideation.  Per report, patient called the  because he thought someone broke into his house and stole his belongings.  He states he tried to find his gun but could not find it.  He states he wants to stab and killed the neighbors for potentially robbing him.  On arrival, patient agitated, aggressive, paranoid, and threatening, stating "I will kill any motherfucker who touches me."  Due to concern for acute psychosis, patient was given IM Geodon.  Differential Diagnosis:   Differential Diagnosis includes, but is not limited to:  Decompensated psychiatric disease (schizophrenia, bipolar disorder, major depression), excited delirium, medication noncompliance, substance abuse/withdrawal, intentional drug overdose, medication toxicity, APAP/ASA overdose, acute stress reaction, personality disorder, malingering, metabolic derangement    Clinical Tests:   Lab Tests: Ordered and " Reviewed  ED Management:  Labs obtained, grossly unremarkable and at baseline.  Patient medically cleared for psych evaluation and transfer if needed.  Other:   I have discussed this case with another health care provider.  At time of shift change, patient's ED workup incomplete. Oncoming ED physician to continue care. All relevant details were discussed, including pending workup and planned disposition.     Patient medically cleared, pending psych evaluation/transfer.                     ED Course      Clinical Impression:   The primary encounter diagnosis was Manic episode. Diagnoses of Agitation and Homicidal thoughts were also pertinent to this visit.                           Jsos Iraheta MD  10/09/17 6723

## 2017-10-08 NOTE — ED TRIAGE NOTES
"Pt presents to ED today via EMS who reports pt called police department stating " I want to kill my neighbor"   "

## 2017-10-08 NOTE — ED NOTES
CARDIAC: Normal rate and rhythm, no murmur heard.   PERIPHERAL VASCULAR: peripheral pulses present. Normal cap refill. No edema. Warm to touch.    RESPIRATORY:Normal rate and effort, breath sounds clear bilaterally throughout chest. Respirations are equal and unlabored no obvious signs of distress.  GASTRO: soft, bowel sounds normal, no tenderness, no abdominal distention.  MUSC: Full ROM. No bony tenderness or soft tissue tenderness. No obvious deformity.  SKIN: Skin is warm and dry, normal skin turgor, mucous membranes moist.  NEURO: 5/5 strength major flexors/extensors bilaterally. Sensory intact to light touch bilaterally. Bobbi coma scale: eyes open spontaneously-4, oriented & converses-5, obeys commands-6. No neurological abnormalities.   EYE: PERRL, both eyes: pupils brisk and reactive to light. Normal size.  ENT: EARS: no obvious drainage. NOSE: no active bleeding.

## 2017-10-09 VITALS
RESPIRATION RATE: 16 BRPM | BODY MASS INDEX: 26.48 KG/M2 | TEMPERATURE: 97 F | OXYGEN SATURATION: 100 % | DIASTOLIC BLOOD PRESSURE: 80 MMHG | SYSTOLIC BLOOD PRESSURE: 142 MMHG | HEART RATE: 69 BPM | HEIGHT: 70 IN | WEIGHT: 185 LBS

## 2017-10-09 LAB
FOLATE SERPL-MCNC: 11 NG/ML
VIT B12 SERPL-MCNC: 752 PG/ML

## 2017-10-09 PROCEDURE — 82607 VITAMIN B-12: CPT

## 2017-10-09 PROCEDURE — 25000003 PHARM REV CODE 250: Performed by: EMERGENCY MEDICINE

## 2017-10-09 PROCEDURE — 63600175 PHARM REV CODE 636 W HCPCS: Performed by: EMERGENCY MEDICINE

## 2017-10-09 PROCEDURE — 25000242 PHARM REV CODE 250 ALT 637 W/ HCPCS: Performed by: EMERGENCY MEDICINE

## 2017-10-09 PROCEDURE — 82746 ASSAY OF FOLIC ACID SERUM: CPT

## 2017-10-09 RX ORDER — FLUOXETINE HYDROCHLORIDE 20 MG/1
20 CAPSULE ORAL DAILY
Status: DISCONTINUED | OUTPATIENT
Start: 2017-10-09 | End: 2017-10-09

## 2017-10-09 RX ORDER — OLANZAPINE 2.5 MG/1
5 TABLET ORAL 2 TIMES DAILY
Status: DISCONTINUED | OUTPATIENT
Start: 2017-10-09 | End: 2017-10-09 | Stop reason: HOSPADM

## 2017-10-09 RX ORDER — OLANZAPINE 2.5 MG/1
5 TABLET ORAL DAILY
Status: DISCONTINUED | OUTPATIENT
Start: 2017-10-09 | End: 2017-10-09

## 2017-10-09 RX ORDER — ALBUTEROL SULFATE 90 UG/1
2 AEROSOL, METERED RESPIRATORY (INHALATION) EVERY 6 HOURS PRN
Status: DISCONTINUED | OUTPATIENT
Start: 2017-10-09 | End: 2017-10-09 | Stop reason: HOSPADM

## 2017-10-09 RX ORDER — GUAIFENESIN 100 MG/5ML
200 SOLUTION ORAL EVERY 4 HOURS PRN
Status: DISCONTINUED | OUTPATIENT
Start: 2017-10-09 | End: 2017-10-09

## 2017-10-09 RX ORDER — HYDROCODONE BITARTRATE AND ACETAMINOPHEN 7.5; 325 MG/1; MG/1
1 TABLET ORAL ONCE
Status: COMPLETED | OUTPATIENT
Start: 2017-10-09 | End: 2017-10-09

## 2017-10-09 RX ORDER — AMLODIPINE BESYLATE 5 MG/1
10 TABLET ORAL
Status: COMPLETED | OUTPATIENT
Start: 2017-10-09 | End: 2017-10-09

## 2017-10-09 RX ORDER — FERROUS SULFATE 325(65) MG
325 TABLET, DELAYED RELEASE (ENTERIC COATED) ORAL DAILY
Status: DISCONTINUED | OUTPATIENT
Start: 2017-10-09 | End: 2017-10-09 | Stop reason: HOSPADM

## 2017-10-09 RX ORDER — GUAIFENESIN 100 MG/5ML
200 SOLUTION ORAL EVERY 4 HOURS PRN
Status: DISCONTINUED | OUTPATIENT
Start: 2017-10-09 | End: 2017-10-09 | Stop reason: HOSPADM

## 2017-10-09 RX ORDER — ACETAMINOPHEN 325 MG/1
650 TABLET ORAL
Status: COMPLETED | OUTPATIENT
Start: 2017-10-09 | End: 2017-10-09

## 2017-10-09 RX ORDER — OXYBUTYNIN CHLORIDE 5 MG/1
5 TABLET ORAL 3 TIMES DAILY
Status: DISCONTINUED | OUTPATIENT
Start: 2017-10-09 | End: 2017-10-09

## 2017-10-09 RX ORDER — GABAPENTIN 300 MG/1
300 CAPSULE ORAL 3 TIMES DAILY
Status: DISCONTINUED | OUTPATIENT
Start: 2017-10-09 | End: 2017-10-09 | Stop reason: DRUGHIGH

## 2017-10-09 RX ORDER — FLUOXETINE HYDROCHLORIDE 20 MG/1
20 CAPSULE ORAL DAILY
Status: DISCONTINUED | OUTPATIENT
Start: 2017-10-10 | End: 2017-10-09 | Stop reason: HOSPADM

## 2017-10-09 RX ORDER — TAMSULOSIN HYDROCHLORIDE 0.4 MG/1
0.4 CAPSULE ORAL
Status: COMPLETED | OUTPATIENT
Start: 2017-10-09 | End: 2017-10-09

## 2017-10-09 RX ORDER — GABAPENTIN 300 MG/1
300 CAPSULE ORAL 2 TIMES DAILY
Status: DISCONTINUED | OUTPATIENT
Start: 2017-10-09 | End: 2017-10-09 | Stop reason: HOSPADM

## 2017-10-09 RX ORDER — PREDNISONE 20 MG/1
40 TABLET ORAL
Status: COMPLETED | OUTPATIENT
Start: 2017-10-09 | End: 2017-10-09

## 2017-10-09 RX ORDER — METOPROLOL SUCCINATE 25 MG/1
50 TABLET, EXTENDED RELEASE ORAL DAILY
Status: DISCONTINUED | OUTPATIENT
Start: 2017-10-09 | End: 2017-10-09

## 2017-10-09 RX ORDER — HYDRALAZINE HYDROCHLORIDE 10 MG/1
10 TABLET, FILM COATED ORAL
Status: COMPLETED | OUTPATIENT
Start: 2017-10-09 | End: 2017-10-09

## 2017-10-09 RX ORDER — METOPROLOL TARTRATE 50 MG/1
50 TABLET ORAL 2 TIMES DAILY
Status: DISCONTINUED | OUTPATIENT
Start: 2017-10-09 | End: 2017-10-09 | Stop reason: HOSPADM

## 2017-10-09 RX ORDER — FUROSEMIDE 40 MG/1
80 TABLET ORAL
Status: COMPLETED | OUTPATIENT
Start: 2017-10-09 | End: 2017-10-09

## 2017-10-09 RX ORDER — AZITHROMYCIN 250 MG/1
250 TABLET, FILM COATED ORAL
Status: COMPLETED | OUTPATIENT
Start: 2017-10-09 | End: 2017-10-09

## 2017-10-09 RX ADMIN — ALBUTEROL SULFATE 2 PUFF: 90 AEROSOL, METERED RESPIRATORY (INHALATION) at 09:10

## 2017-10-09 RX ADMIN — FUROSEMIDE 80 MG: 40 TABLET ORAL at 08:10

## 2017-10-09 RX ADMIN — TAMSULOSIN HYDROCHLORIDE 0.4 MG: 0.4 CAPSULE ORAL at 08:10

## 2017-10-09 RX ADMIN — AZITHROMYCIN 250 MG: 250 TABLET, FILM COATED ORAL at 08:10

## 2017-10-09 RX ADMIN — FLUOXETINE 20 MG: 20 CAPSULE ORAL at 08:10

## 2017-10-09 RX ADMIN — ACETAMINOPHEN 650 MG: 325 TABLET ORAL at 03:10

## 2017-10-09 RX ADMIN — GUAIFENESIN 200 MG: 100 SOLUTION ORAL at 03:10

## 2017-10-09 RX ADMIN — AMLODIPINE BESYLATE 10 MG: 5 TABLET ORAL at 08:10

## 2017-10-09 RX ADMIN — HYDRALAZINE HYDROCHLORIDE 10 MG: 10 TABLET ORAL at 09:10

## 2017-10-09 RX ADMIN — GABAPENTIN 300 MG: 300 CAPSULE ORAL at 09:10

## 2017-10-09 RX ADMIN — HYDROCODONE BITARTRATE AND ACETAMINOPHEN 1 TABLET: 7.5; 325 TABLET ORAL at 08:10

## 2017-10-09 RX ADMIN — FERROUS SULFATE TAB EC 325 MG (65 MG FE EQUIVALENT) 325 MG: 325 (65 FE) TABLET DELAYED RESPONSE at 08:10

## 2017-10-09 RX ADMIN — PREDNISONE 40 MG: 20 TABLET ORAL at 08:10

## 2017-10-09 RX ADMIN — METOPROLOL TARTRATE 50 MG: 50 TABLET ORAL at 09:10

## 2017-10-09 RX ADMIN — OLANZAPINE 5 MG: 2.5 TABLET, FILM COATED ORAL at 08:10

## 2017-10-09 NOTE — ED NOTES
Pt sitting up in bed eating breakfast. Pt took all am medicines without difficulty. rr even and unlabored. Sitter at bedside. Will continue to monitor.

## 2017-10-09 NOTE — ED NOTES
Report received from CONSUELO Rousseau. Pt sleeping with eyes closed. rr even and unlabored. Nadn. Sitter at bedside. Will continue to monitor.

## 2017-10-09 NOTE — PROVIDER PROGRESS NOTES - EMERGENCY DEPT.
Encounter Date: 10/8/2017    ED Physician Progress Notes        Physician Note:   Patient is medically stable and cleared for psychiatric evaluation and potential transfer as necessary.

## 2017-10-09 NOTE — ED NOTES
Pt. urinated on self and stretcher. Pt. did not communicate his need to urinate prior to urinating on self. Changed soiled linens. Pt. showered in ED. Placed pt. in new clean blue scrubs and socks. Pt. Apologized for incident, pt. acting calm and cooperative.

## 2017-10-09 NOTE — ED NOTES
Multiple facilities called for psyc placement. Pt chart faxed to all facilities with open beds.  Pending call back for acceptance.

## 2017-10-09 NOTE — ED NOTES
Pt's mental health rights signed. Placed original copy of pt's signed mental health rights with original PEC. Placed copy of pt's mental health rights into blue evan in nurses station to be scan into chart.

## 2017-10-09 NOTE — ED NOTES
Assumed care for this patient; pt remains under pec order; psych transfer pending; pt has sitter at bedside; dr aranda medically cleared pt for psych transfer; pt has yellow fall socks on and remains in paper scrub attire in room near nurses station
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
## 2017-10-17 ENCOUNTER — OFFICE VISIT (OUTPATIENT)
Dept: FAMILY MEDICINE | Facility: CLINIC | Age: 62
End: 2017-10-17
Attending: FAMILY MEDICINE
Payer: MEDICARE

## 2017-10-17 VITALS
HEIGHT: 70 IN | SYSTOLIC BLOOD PRESSURE: 139 MMHG | OXYGEN SATURATION: 94 % | BODY MASS INDEX: 26.48 KG/M2 | DIASTOLIC BLOOD PRESSURE: 89 MMHG | HEART RATE: 87 BPM | WEIGHT: 185 LBS

## 2017-10-17 DIAGNOSIS — N18.4 CKD (CHRONIC KIDNEY DISEASE), STAGE IV: ICD-10-CM

## 2017-10-17 DIAGNOSIS — M54.40 CHRONIC BILATERAL LOW BACK PAIN WITH SCIATICA, SCIATICA LATERALITY UNSPECIFIED: ICD-10-CM

## 2017-10-17 DIAGNOSIS — G89.29 CHRONIC BILATERAL LOW BACK PAIN WITH LEFT-SIDED SCIATICA: ICD-10-CM

## 2017-10-17 DIAGNOSIS — N18.4 ANEMIA OF CHRONIC RENAL FAILURE, STAGE 4 (SEVERE): ICD-10-CM

## 2017-10-17 DIAGNOSIS — F32.A DEPRESSION, UNSPECIFIED DEPRESSION TYPE: ICD-10-CM

## 2017-10-17 DIAGNOSIS — F41.9 ANXIETY: ICD-10-CM

## 2017-10-17 DIAGNOSIS — D64.9 CHRONIC ANEMIA: ICD-10-CM

## 2017-10-17 DIAGNOSIS — M17.0 PRIMARY OSTEOARTHRITIS OF BOTH KNEES: Primary | ICD-10-CM

## 2017-10-17 DIAGNOSIS — D63.1 ANEMIA OF CHRONIC RENAL FAILURE, STAGE 4 (SEVERE): ICD-10-CM

## 2017-10-17 DIAGNOSIS — J43.8 OTHER EMPHYSEMA: Chronic | ICD-10-CM

## 2017-10-17 DIAGNOSIS — G89.29 CHRONIC BILATERAL LOW BACK PAIN WITH RIGHT-SIDED SCIATICA: ICD-10-CM

## 2017-10-17 DIAGNOSIS — M54.16 LUMBAR RADICULOPATHY: ICD-10-CM

## 2017-10-17 DIAGNOSIS — G89.29 CHRONIC BILATERAL LOW BACK PAIN WITH SCIATICA, SCIATICA LATERALITY UNSPECIFIED: ICD-10-CM

## 2017-10-17 DIAGNOSIS — I10 ESSENTIAL HYPERTENSION: Chronic | ICD-10-CM

## 2017-10-17 DIAGNOSIS — M48.04 THORACIC SPINAL STENOSIS: ICD-10-CM

## 2017-10-17 DIAGNOSIS — K74.69 COMPENSATED CIRRHOSIS RELATED TO HEPATITIS C VIRUS (HCV): Chronic | ICD-10-CM

## 2017-10-17 DIAGNOSIS — F25.0 SCHIZOAFFECTIVE DISORDER, BIPOLAR TYPE: Chronic | ICD-10-CM

## 2017-10-17 DIAGNOSIS — M54.41 CHRONIC BILATERAL LOW BACK PAIN WITH RIGHT-SIDED SCIATICA: ICD-10-CM

## 2017-10-17 DIAGNOSIS — E78.5 HYPERLIPIDEMIA, UNSPECIFIED HYPERLIPIDEMIA TYPE: Chronic | ICD-10-CM

## 2017-10-17 DIAGNOSIS — B19.20 COMPENSATED CIRRHOSIS RELATED TO HEPATITIS C VIRUS (HCV): Chronic | ICD-10-CM

## 2017-10-17 DIAGNOSIS — D69.6 THROMBOCYTOPENIA: Chronic | ICD-10-CM

## 2017-10-17 DIAGNOSIS — M25.551 BILATERAL HIP PAIN: ICD-10-CM

## 2017-10-17 DIAGNOSIS — M25.552 BILATERAL HIP PAIN: ICD-10-CM

## 2017-10-17 DIAGNOSIS — M54.42 CHRONIC BILATERAL LOW BACK PAIN WITH LEFT-SIDED SCIATICA: ICD-10-CM

## 2017-10-17 PROCEDURE — 99213 OFFICE O/P EST LOW 20 MIN: CPT | Mod: PBBFAC,PO | Performed by: FAMILY MEDICINE

## 2017-10-17 PROCEDURE — 99214 OFFICE O/P EST MOD 30 MIN: CPT | Mod: S$PBB,,, | Performed by: FAMILY MEDICINE

## 2017-10-17 PROCEDURE — 99999 PR PBB SHADOW E&M-EST. PATIENT-LVL III: CPT | Mod: PBBFAC,,, | Performed by: FAMILY MEDICINE

## 2017-10-17 RX ORDER — HYDROCODONE BITARTRATE AND ACETAMINOPHEN 5; 325 MG/1; MG/1
1 TABLET ORAL 3 TIMES DAILY PRN
Qty: 90 TABLET | Refills: 0 | Status: SHIPPED | OUTPATIENT
Start: 2017-10-18 | End: 2017-11-17 | Stop reason: ALTCHOICE

## 2017-10-17 NOTE — PROGRESS NOTES
Subjective:       Patient ID: Leif Ramirez Jr. is a 62 y.o. male.    Chief Complaint: Back Pain    62 yr old pleasant white male with anxiety/depression, schizoaffective disorder, Bipolar disorder, chronic Hep C with cirrhosis, CKD IV/V, chronic low back pain, B/L hip pain, BPH, HLD, pancytopenia, presents today for his routine 3 month follow up. C/o worsening left hip pain and low back pain and need pain medicine refill. He lost his script which was given 2-3 weeks ago.  checked and he has not refilled yet.    He went to ER recently and found to have worsening kidney functions - he follwos Dr. De La Rosa and will go and talk to her today.      Knee pain B/L - daily pain with instability - tried conservative approach with rest and meds and no relief - do not want any surgery - never used brace for support    HTN - chronic - controlled - on CCB and BB - - compliant - no side effects      HLD - controlled - diet alone -     LDLCALC                  88.6                05/05/2017              - lab due                  COPD - chronic - controlled      Chronic Hep C with Cirrhosis - seen hepatology - started on Harvoni and trying to be compliant      CKD IV/V - worsening - seeing nephrology - urine ok    Pancytopenia - seen Hem/Onc and never had follow up -       Chronic LBP/hip pain - he has B/L hip replacement and has pain in them every single day and he suffers and was on some pain medication until his doctor refused it - he also has back pain and it radiates to leg leg - no numbness or saddle anesthesia, bladder/bowel problem      History as below - reviewed      Health maintenance  -labs UTD  -colon screen and PSA reports UTD        Back Pain   Pertinent negatives include no chest pain or weakness.   Medication Refill   This is a chronic problem. The current episode started more than 1 year ago. The problem occurs constantly. The problem has been gradually improving. Associated symptoms include arthralgias,  myalgias and neck pain. Pertinent negatives include no chest pain, congestion, coughing, diaphoresis, rash, vomiting or weakness. Nothing aggravates the symptoms. Treatments tried: pain medicine. The treatment provided moderate relief.   Hip Pain    The injury mechanism was a twisting injury. The pain is present in the left hip and right hip. The quality of the pain is described as aching. The pain is at a severity of 6/10. The pain is moderate. The symptoms are aggravated by movement and palpation. He has tried heat and elevation for the symptoms. The treatment provided mild relief.   Hypertension   This is a chronic problem. The current episode started more than 1 year ago. The problem is controlled. Associated symptoms include anxiety and neck pain. Pertinent negatives include no chest pain or palpitations. There are no associated agents to hypertension. Risk factors for coronary artery disease include dyslipidemia and male gender. Past treatments include ACE inhibitors and central alpha agonists. The current treatment provides significant improvement. There are no compliance problems.  There is no history of angina, CAD/MI, CVA, left ventricular hypertrophy, PVD, renovascular disease, retinopathy or a thyroid problem. There is no history of chronic renal disease, hyperparathyroidism or pheochromocytoma.   Anemia   Presents for follow-up visit. Symptoms include pallor. There has been no confusion, light-headedness or palpitations. Past treatments include nothing. There is no history of alcohol abuse, cancer, chronic renal disease, dementia, hypothyroidism, malnutrition, recent illness or recent trauma. There is no past history of bone marrow exam, colonoscopy, EGD or FOBT. Compliance with medications is 51-75%.   Hyperlipidemia   This is a chronic problem. The current episode started more than 1 year ago. The problem is controlled. Recent lipid tests were reviewed and are normal. He has no history of chronic  renal disease, diabetes, hypothyroidism or liver disease. There are no known factors aggravating his hyperlipidemia. Associated symptoms include myalgias. Pertinent negatives include no chest pain. He is currently on no antihyperlipidemic treatment. The current treatment provides mild improvement of lipids. There are no compliance problems.  Risk factors for coronary artery disease include dyslipidemia, hypertension and a sedentary lifestyle.     Review of Systems   Constitutional: Negative.  Negative for activity change, diaphoresis and unexpected weight change.   HENT: Negative.  Negative for congestion, ear pain, mouth sores, rhinorrhea and voice change.    Eyes: Negative.  Negative for pain, discharge and visual disturbance.   Respiratory: Negative.  Negative for apnea, cough and wheezing.    Cardiovascular: Negative.  Negative for chest pain and palpitations.   Gastrointestinal: Negative.  Negative for abdominal distention, anal bleeding, diarrhea and vomiting.   Endocrine: Negative.  Negative for cold intolerance and polyuria.   Genitourinary: Negative.  Negative for decreased urine volume, difficulty urinating, discharge, frequency and scrotal swelling.   Musculoskeletal: Positive for arthralgias, back pain, myalgias and neck pain. Negative for neck stiffness.   Skin: Positive for pallor. Negative for color change and rash.   Allergic/Immunologic: Negative.  Negative for environmental allergies and immunocompromised state.   Neurological: Negative.  Negative for dizziness, speech difficulty, weakness and light-headedness.   Hematological: Negative.    Psychiatric/Behavioral: Negative.  Negative for agitation, confusion, dysphoric mood and suicidal ideas. The patient is not nervous/anxious.        PMH/PSH/FH/SH/MED/ALLERGY reviewed    Objective:       Vitals:    10/17/17 1401   BP: 139/89   Pulse: 87       Physical Exam   Constitutional: He is oriented to person, place, and time. He appears well-developed and  well-nourished. No distress.   HENT:   Head: Normocephalic and atraumatic.   Right Ear: External ear normal.   Left Ear: External ear normal.   Nose: Nose normal.   Mouth/Throat: Oropharynx is clear and moist.   Eyes: EOM are normal. Pupils are equal, round, and reactive to light.   Neck: Normal range of motion. Neck supple. No JVD present. No tracheal deviation present. No thyromegaly present.   Cardiovascular: Normal rate, regular rhythm, normal heart sounds and intact distal pulses.  Exam reveals no gallop and no friction rub.    No murmur heard.  Pulmonary/Chest: Effort normal and breath sounds normal. No stridor. No respiratory distress. He has no wheezes. He has no rales. He exhibits no tenderness.   Abdominal: Soft. Bowel sounds are normal. He exhibits distension. He exhibits no mass. There is no tenderness. There is no rebound and no guarding. No hernia.   Musculoskeletal: Normal range of motion. He exhibits tenderness (TTP B/L hips with restricted ROM and lower lumbar spine and paralumbar spine TTP. SLRT + left). He exhibits no edema.   Lymphadenopathy:     He has no cervical adenopathy.   Neurological: He is alert and oriented to person, place, and time. He has normal reflexes. He displays normal reflexes. No cranial nerve deficit. He exhibits normal muscle tone. Coordination normal.   Skin: Skin is warm and dry. Capillary refill takes less than 2 seconds. No rash noted. He is not diaphoretic. No erythema. There is pallor.   Psychiatric: He has a normal mood and affect. His behavior is normal. Judgment and thought content normal.       Assessment:       1. Primary osteoarthritis of both knees    2. Chronic bilateral low back pain with left-sided sciatica    3. Bilateral hip pain    4. Chronic bilateral low back pain with sciatica, sciatica laterality unspecified    5. Chronic bilateral low back pain with right-sided sciatica    6. 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 Chronic  anemia    8. Anemia of chronic renal failure, stage 4 (severe)    9. Thrombocytopenia    10. CKD (chronic kidney disease), stage IV    11. Essential hypertension    12. Hyperlipidemia, unspecified hyperlipidemia type    13. Other emphysema    14. Depression, unspecified depression type    15. Anxiety    16. Schizoaffective disorder, bipolar type    17. Lumbar radiculopathy    18. Thoracic spinal stenosis        Plan:       Leif was seen today for back pain.    Diagnoses and all orders for this visit:    Primary osteoarthritis of both knees    Chronic bilateral low back pain with left-sided sciatica  -     hydrocodone-acetaminophen 5-325mg (NORCO) 5-325 mg per tablet; Take 1 tablet by mouth 3 (three) times daily as needed for Pain.    Bilateral hip pain  -     hydrocodone-acetaminophen 5-325mg (NORCO) 5-325 mg per tablet; Take 1 tablet by mouth 3 (three) times daily as needed for Pain.    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)    Chronic anemia    Anemia of chronic renal failure, stage 4 (severe)    Thrombocytopenia    CKD (chronic kidney disease), stage IV    Essential hypertension    Hyperlipidemia, unspecified hyperlipidemia type    Other emphysema    Depression, unspecified depression type    Anxiety    Schizoaffective disorder, bipolar type    Lumbar radiculopathy    Thoracic spinal stenosis    Hep C/Cirrhosis  -completed Harvoni - viral load negative  -follows hepatology    COPD  -stable  -advised to quit smoking    HTN  -controlled  -refilled meds - CCB and BB to continue    OA knee B/L  -failed conservative therapy  -trial of brace daily - rx done    CKD IV  -worsening and going towards stage 5  -follow nephrology      Anxiety/depression/schizoaffective/bipolar  -follows Dr. Dc, Psychiatry    B/L hip pain and chronic LBP  -on norco  -advised to  check with hepatology for tylenol intake    Spent adequate time in obtaining history and explaining differentials    40 minutes spent during this visit of which greater than 50% devoted to face-face counseling and coordination of care regarding diagnosis and management plan    RTC 3 months

## 2017-10-18 RX ORDER — HYDRALAZINE HYDROCHLORIDE 10 MG/1
TABLET, FILM COATED ORAL
Qty: 90 TABLET | Refills: 0 | Status: SHIPPED | OUTPATIENT
Start: 2017-10-18 | End: 2017-11-17 | Stop reason: ALTCHOICE

## 2017-10-22 ENCOUNTER — HOSPITAL ENCOUNTER (EMERGENCY)
Facility: HOSPITAL | Age: 62
End: 2017-10-23
Attending: EMERGENCY MEDICINE
Payer: MEDICARE

## 2017-10-22 DIAGNOSIS — F25.9 SCHIZOAFFECTIVE DISORDER, UNSPECIFIED TYPE: Primary | ICD-10-CM

## 2017-10-22 DIAGNOSIS — F22 DELUSIONS: ICD-10-CM

## 2017-10-22 DIAGNOSIS — F29 PSYCHOSIS, UNSPECIFIED PSYCHOSIS TYPE: ICD-10-CM

## 2017-10-22 DIAGNOSIS — R05.9 COUGH: ICD-10-CM

## 2017-10-22 LAB
ALBUMIN SERPL BCP-MCNC: 3.5 G/DL
ALP SERPL-CCNC: 78 U/L
ALT SERPL W/O P-5'-P-CCNC: 16 U/L
AMPHET+METHAMPHET UR QL: NEGATIVE
ANION GAP SERPL CALC-SCNC: 9 MMOL/L
APAP SERPL-MCNC: <3 UG/ML
AST SERPL-CCNC: 20 U/L
BACTERIA #/AREA URNS HPF: NORMAL /HPF
BARBITURATES UR QL SCN>200 NG/ML: NEGATIVE
BASOPHILS # BLD AUTO: 0.02 K/UL
BASOPHILS NFR BLD: 0.3 %
BENZODIAZ UR QL SCN>200 NG/ML: NEGATIVE
BILIRUB SERPL-MCNC: 0.3 MG/DL
BILIRUB UR QL STRIP: NEGATIVE
BUN SERPL-MCNC: 49 MG/DL
BZE UR QL SCN: NEGATIVE
CALCIUM SERPL-MCNC: 8.8 MG/DL
CANNABINOIDS UR QL SCN: NEGATIVE
CHLORIDE SERPL-SCNC: 103 MMOL/L
CLARITY UR: CLEAR
CO2 SERPL-SCNC: 27 MMOL/L
COLOR UR: YELLOW
CREAT SERPL-MCNC: 5.2 MG/DL
CREAT UR-MCNC: 46.5 MG/DL
DIFFERENTIAL METHOD: ABNORMAL
EOSINOPHIL # BLD AUTO: 0.1 K/UL
EOSINOPHIL NFR BLD: 1 %
ERYTHROCYTE [DISTWIDTH] IN BLOOD BY AUTOMATED COUNT: 15 %
EST. GFR  (AFRICAN AMERICAN): 12 ML/MIN/1.73 M^2
EST. GFR  (NON AFRICAN AMERICAN): 11 ML/MIN/1.73 M^2
ETHANOL SERPL-MCNC: <10 MG/DL
GLUCOSE SERPL-MCNC: 106 MG/DL
GLUCOSE UR QL STRIP: NEGATIVE
HCT VFR BLD AUTO: 25.5 %
HGB BLD-MCNC: 8.4 G/DL
HGB UR QL STRIP: ABNORMAL
HYALINE CASTS #/AREA URNS LPF: 0 /LPF
KETONES UR QL STRIP: NEGATIVE
LEUKOCYTE ESTERASE UR QL STRIP: NEGATIVE
LYMPHOCYTES # BLD AUTO: 1 K/UL
LYMPHOCYTES NFR BLD: 15.1 %
MCH RBC QN AUTO: 30 PG
MCHC RBC AUTO-ENTMCNC: 32.9 G/DL
MCV RBC AUTO: 91 FL
METHADONE UR QL SCN>300 NG/ML: NEGATIVE
MICROSCOPIC COMMENT: NORMAL
MONOCYTES # BLD AUTO: 0.4 K/UL
MONOCYTES NFR BLD: 6 %
NEUTROPHILS # BLD AUTO: 5.3 K/UL
NEUTROPHILS NFR BLD: 77.5 %
NITRITE UR QL STRIP: NEGATIVE
OPIATES UR QL SCN: NORMAL
PCP UR QL SCN>25 NG/ML: NEGATIVE
PH UR STRIP: 6 [PH] (ref 5–8)
PLATELET # BLD AUTO: 91 K/UL
PMV BLD AUTO: 9.2 FL
POTASSIUM SERPL-SCNC: 5.5 MMOL/L
PROT SERPL-MCNC: 6.4 G/DL
PROT UR QL STRIP: ABNORMAL
RBC # BLD AUTO: 2.8 M/UL
RBC #/AREA URNS HPF: 0 /HPF (ref 0–4)
SODIUM SERPL-SCNC: 139 MMOL/L
SP GR UR STRIP: 1.01 (ref 1–1.03)
SQUAMOUS #/AREA URNS HPF: 0 /HPF
TOXICOLOGY INFORMATION: NORMAL
TSH SERPL DL<=0.005 MIU/L-ACNC: 1.03 UIU/ML
URN SPEC COLLECT METH UR: ABNORMAL
UROBILINOGEN UR STRIP-ACNC: NEGATIVE EU/DL
WBC # BLD AUTO: 6.81 K/UL
WBC #/AREA URNS HPF: 0 /HPF (ref 0–5)
WBC CLUMPS URNS QL MICRO: NORMAL
YEAST URNS QL MICRO: NORMAL

## 2017-10-22 PROCEDURE — 63600175 PHARM REV CODE 636 W HCPCS: Performed by: EMERGENCY MEDICINE

## 2017-10-22 PROCEDURE — 81000 URINALYSIS NONAUTO W/SCOPE: CPT

## 2017-10-22 PROCEDURE — 80307 DRUG TEST PRSMV CHEM ANLYZR: CPT

## 2017-10-22 PROCEDURE — 82962 GLUCOSE BLOOD TEST: CPT

## 2017-10-22 PROCEDURE — 93005 ELECTROCARDIOGRAM TRACING: CPT

## 2017-10-22 PROCEDURE — 25000003 PHARM REV CODE 250: Performed by: EMERGENCY MEDICINE

## 2017-10-22 PROCEDURE — 84443 ASSAY THYROID STIM HORMONE: CPT

## 2017-10-22 PROCEDURE — 99285 EMERGENCY DEPT VISIT HI MDM: CPT | Mod: 25

## 2017-10-22 PROCEDURE — 80329 ANALGESICS NON-OPIOID 1 OR 2: CPT

## 2017-10-22 PROCEDURE — 80053 COMPREHEN METABOLIC PANEL: CPT

## 2017-10-22 PROCEDURE — 85025 COMPLETE CBC W/AUTO DIFF WBC: CPT

## 2017-10-22 PROCEDURE — 80320 DRUG SCREEN QUANTALCOHOLS: CPT

## 2017-10-22 PROCEDURE — 96372 THER/PROPH/DIAG INJ SC/IM: CPT

## 2017-10-22 RX ORDER — ZIPRASIDONE MESYLATE 20 MG/ML
20 INJECTION, POWDER, LYOPHILIZED, FOR SOLUTION INTRAMUSCULAR
Status: COMPLETED | OUTPATIENT
Start: 2017-10-22 | End: 2017-10-22

## 2017-10-22 RX ORDER — OLANZAPINE 10 MG/1
10 TABLET ORAL
Status: COMPLETED | OUTPATIENT
Start: 2017-10-22 | End: 2017-10-22

## 2017-10-22 RX ADMIN — OLANZAPINE 10 MG: 10 TABLET, FILM COATED ORAL at 06:10

## 2017-10-22 RX ADMIN — ZIPRASIDONE MESYLATE 20 MG: 20 INJECTION, POWDER, LYOPHILIZED, FOR SOLUTION INTRAMUSCULAR at 08:10

## 2017-10-22 NOTE — ED NOTES
Pt changed into paper scrubs, with this RN and and security, all pt belongings were placed in 2 pt belonging bags, labeled and placed in locked closet   Pt given water per request prior to tech walking pt to restroom to provide urine sample
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
## 2017-10-22 NOTE — ED NOTES
Packet faxed to multiple  [facility] facilities: [facility], [place] [facility], [facility], [facility], and [facility]

## 2017-10-22 NOTE — ED PROVIDER NOTES
Encounter Date: 10/22/2017       History     Chief Complaint   Patient presents with    Psychiatric Evaluation     [facility] was called due to him threatening neighbors and children with knife, he believes neighbors are stealing from him     HPI   This is a 62 y.o. male who has a past medical history of Allergy; Anemia; Anxiety; Arthritis; Asthma; Bipolar   affective; Cancer; CHF (congestive heart failure); Chronic hepatitis C with cirrhosis; COPD (chronic   obstructive pulmonary disease); Coronary artery disease; Depression; Diabetes mellitus type II; Disorder   of kidney and ureter; Elevated PSA; Encounter for blood transfusion; History of back injury; Hyperlipidemia;   Hypertension; Internal hemorrhoid, bleeding (9/21/2015); Pancytopenia; Renal disorder; Schizo affective   schizophrenia; Seizures; Stroke; Thyroid disease; and Urinary tract infection.     The patient presents to the Emergency Department with psych disorder requiring evaluation.   Pt reportedly threatening neighbors and children with a knife.  EMS took a knife off of him prior to arrival.    Patient denies threatening anybody, homicidal or suicidal ideation, hallucinations or delusions.  He states that he has not taken his medication for about a week.  No other complaints at this time.  Pt has a past surgical history that includes Elbow bursa surgery; Joint replacement; and ORIF femur   fracture (Right, 8/2015).      Review of patient's allergies indicates:   Allergen Reactions    Codeine      Other reaction(s): Nausea    Depakote [divalproex] Other (See Comments)     Thrombocytopenia    Haldol [haloperidol lactate] Other (See Comments)     Seizures    Methadone     Morphine     Naloxone     Opium     Propoxyphene     Stelazine [trifluoperazine]     Amoxicillin Rash     Past Medical History:   Diagnosis Date    Allergy     poison ivy    Anemia     Anxiety     Arthritis     Asthma     Bipolar affective     Cancer     blood and bone     CHF (congestive heart failure)     Chronic hepatitis C with cirrhosis     COPD (chronic obstructive pulmonary disease)     Coronary artery disease     Depression     Diabetes mellitus type II     Disorder of kidney and ureter     Elevated PSA     has had prostate problems but unaware of his psa level    Encounter for blood transfusion     History of back injury     Chronic back pain    Hyperlipidemia     Hypertension     Internal hemorrhoid, bleeding 9/21/2015    Pancytopenia     Renal disorder     Schizo affective schizophrenia     Seizures     Stroke     Thyroid disease     Urinary tract infection      Past Surgical History:   Procedure Laterality Date    ELBOW BURSA SURGERY      JOINT REPLACEMENT      ORIF FEMUR FRACTURE Right 8/2015     Family History   Problem Relation Age of Onset    Hypertension Mother     Diabetes Mother     Transient ischemic attack Mother     Heart disease Mother      stent placement    Arthritis Mother     Heart disease Father     Hypertension Father     Diabetes Father     Stroke Father     Arthritis Sister     Hypertension Sister     Heart disease Maternal Grandmother     Diabetes Maternal Grandmother     Stroke Maternal Grandmother     Cancer Paternal Grandmother      breast    Heart disease Paternal Grandmother     Cancer Sister      brain    Prostate cancer Neg Hx     Kidney disease Neg Hx      Social History   Substance Use Topics    Smoking status: Current Every Day Smoker     Packs/day: 0.50    Smokeless tobacco: Never Used    Alcohol use No     Review of Systems   Constitutional: Negative for fever.   HENT: Negative for sore throat.    Respiratory: Positive for cough. Negative for shortness of breath.    Cardiovascular: Negative for chest pain.   Gastrointestinal: Negative for nausea.   Genitourinary: Negative for dysuria.   Musculoskeletal: Negative for back pain.   Skin: Negative for rash.   Neurological: Positive for headaches.  Negative for weakness.   Hematological: Does not bruise/bleed easily.   Psychiatric/Behavioral: Positive for agitation and behavioral problems. Negative for suicidal ideas. The patient is nervous/anxious.        Physical Exam     Initial Vitals   BP Pulse Resp Temp SpO2   -- -- -- -- --      MAP       --         Physical Exam    Nursing note and vitals reviewed.  Constitutional: He appears well-developed and well-nourished. He is not diaphoretic. No distress.   HENT:   Head: Normocephalic and atraumatic.   Right Ear: External ear normal.   Left Ear: External ear normal.   Mouth/Throat: Oropharynx is clear and moist.   Eyes: Conjunctivae and EOM are normal. Pupils are equal, round, and reactive to light.   Neck: Normal range of motion. Neck supple.   Cardiovascular: Normal rate, regular rhythm and normal heart sounds.   No murmur heard.  Pulmonary/Chest: No respiratory distress. He has wheezes (mild, bilat expiratory).   Abdominal: Soft. Bowel sounds are normal. He exhibits no distension. There is no tenderness.   Musculoskeletal: Normal range of motion. He exhibits no edema or tenderness.   Lymphadenopathy:     He has no cervical adenopathy.   Neurological: He is alert and oriented to person, place, and time. He has normal strength and normal reflexes. No cranial nerve deficit or sensory deficit.   Skin: Skin is warm and dry.   Psychiatric: His affect is labile. His speech is rapid and/or pressured. He is hyperactive. Thought content is paranoid and delusional. He expresses impulsivity and inappropriate judgment. He expresses no homicidal and no suicidal ideation.         ED Course   Procedures  Labs Reviewed   COMPREHENSIVE METABOLIC PANEL - Abnormal; Notable for the following:        Result Value    Potassium 5.5 (*)     BUN, Bld 49 (*)     Creatinine 5.2 (*)     eGFR if  12 (*)     eGFR if non  11 (*)     All other components within normal limits   CBC W/ AUTO DIFFERENTIAL -  Abnormal; Notable for the following:     RBC 2.80 (*)     Hemoglobin 8.4 (*)     Hematocrit 25.5 (*)     RDW 15.0 (*)     Platelets 91 (*)     Gran% 77.5 (*)     Lymph% 15.1 (*)     All other components within normal limits   URINALYSIS - Abnormal; Notable for the following:     Protein, UA 3+ (*)     Occult Blood UA Trace (*)     All other components within normal limits   ACETAMINOPHEN LEVEL - Abnormal; Notable for the following:     Acetaminophen (Tylenol), Serum <3.0 (*)     All other components within normal limits   TSH   DRUG SCREEN PANEL, URINE EMERGENCY   ALCOHOL,MEDICAL (ETHANOL)   URINALYSIS MICROSCOPIC             Medical Decision Making:   Initial Assessment:   This is a 62 y.o.male patient with presentation of psychiatric illness.  Pt has history of psychiatric illness. Pt is currently not on psychiatric medication due to running out. Patient denies homicidal ideation, denies suicidal ideation, however presents due to reports of threats to neighbors. Pt has chronic cough, but otherwise no acute medical complaints. Plan for medical clearance labs, EKG, PEC, one-to-one observation.    Labs reviewed were remarkable for CRF with mild hyperkalemia. EKG independently interpreted by me was WNL. CXR no acute pulm disease. Pt is medically cleared for psychiatric evaluation.    Clinical Tests:   Lab Tests: Ordered and Reviewed  Radiological Study: Ordered and Reviewed  Medical Tests: Reviewed and Ordered              Attending Attestation:             Attending ED Notes:   Pt was re-evaluated and turned over to Dr. Sigala at 6pm in stable condition. Pt is pending psych placement.           ED Course      Clinical Impression:     1. Schizoaffective disorder, unspecified type    2. Cough    3. Psychosis, unspecified psychosis type    4. Delusions                                 Rian Jaimes MD  10/22/17 [id number]

## 2017-10-23 VITALS
RESPIRATION RATE: 18 BRPM | WEIGHT: 185 LBS | BODY MASS INDEX: 28.04 KG/M2 | HEIGHT: 68 IN | TEMPERATURE: 98 F | DIASTOLIC BLOOD PRESSURE: 73 MMHG | SYSTOLIC BLOOD PRESSURE: 140 MMHG | OXYGEN SATURATION: 91 % | HEART RATE: 101 BPM

## 2017-10-23 LAB — POCT GLUCOSE: 120 MG/DL (ref 70–110)

## 2017-10-23 PROCEDURE — 25000003 PHARM REV CODE 250: Performed by: EMERGENCY MEDICINE

## 2017-10-23 PROCEDURE — 94640 AIRWAY INHALATION TREATMENT: CPT

## 2017-10-23 PROCEDURE — 25000003 PHARM REV CODE 250: Performed by: PSYCHIATRY & NEUROLOGY

## 2017-10-23 PROCEDURE — 25000242 PHARM REV CODE 250 ALT 637 W/ HCPCS: Performed by: EMERGENCY MEDICINE

## 2017-10-23 RX ORDER — OLANZAPINE 10 MG/1
10 TABLET ORAL 2 TIMES DAILY
Status: DISCONTINUED | OUTPATIENT
Start: 2017-10-23 | End: 2017-10-23 | Stop reason: HOSPADM

## 2017-10-23 RX ORDER — LORAZEPAM 1 MG/1
2 TABLET ORAL
Status: COMPLETED | OUTPATIENT
Start: 2017-10-23 | End: 2017-10-23

## 2017-10-23 RX ORDER — IPRATROPIUM BROMIDE AND ALBUTEROL SULFATE 2.5; .5 MG/3ML; MG/3ML
3 SOLUTION RESPIRATORY (INHALATION)
Status: COMPLETED | OUTPATIENT
Start: 2017-10-23 | End: 2017-10-23

## 2017-10-23 RX ADMIN — IPRATROPIUM BROMIDE AND ALBUTEROL SULFATE 3 ML: .5; 3 SOLUTION RESPIRATORY (INHALATION) at 09:10

## 2017-10-23 RX ADMIN — LORAZEPAM 1 MG: 1 TABLET ORAL at 03:10

## 2017-10-23 RX ADMIN — OLANZAPINE 10 MG: 10 TABLET, FILM COATED ORAL at 12:10

## 2017-10-23 NOTE — ED TRIAGE NOTES
Pt. Care assumed, pt. Sleeping. Pt. Easily aroused and bedside report given. Bed in the low position, side rails elevated x 2. Security at bedside. Will continue to monitor.

## 2017-10-23 NOTE — ED NOTES
"Pt. Becoming agitated, Dr. Sigala aware. Pt. Given a meal tray. Pt. Is looking at a male staff member and states ,"He was in assisted and he messed around with my wife." Pt. Redirected.   "

## 2017-10-23 NOTE — PROVIDER PROGRESS NOTES - EMERGENCY DEPT.
Encounter Date: 10/22/2017    ED Physician Progress Notes        Physician Note:    Pt is being held in the ED awaiting psych placement.  His sat is 89% on RA and he has a hx of copd.  His lung sounds are diminished bilaterally.  He is supposed to be on inhaled albuterol and is non-compliant.  Pt will be treated with duoneb in ED.  Other v/s wnl.

## 2017-10-23 NOTE — PROVIDER PROGRESS NOTES - EMERGENCY DEPT.
Encounter Date: 10/22/2017    ED Physician Progress Notes       SCRIBE NOTE: I, Raudel Gan, am scribing for, and in the presence of,  Martin Curry MD.  Physician Statement: I, Martin Curry MD, personally performed the services described in this documentation as scribed by Raudel Gan in my presence, and it is both accurate and complete.     Physician Note:   This is a 62 y.o.male patient with presentation of psychiatric illness.  Pt has a history of psychiatric illness. He is admitted at this time with plans for placement. I assumed care of the patient during his stay starting 1800 this evening from Rian Jaimes MD        2045: The patient continues to be increasingly agitated, wanting to use the phone, and hollering obscenities. I will provide him with a dose of evening medications to help the patient with his agitation and distress.

## 2017-10-23 NOTE — ED NOTES
APPEARANCE: Alert, oriented and in no acute distress.  CARDIAC: Normal rate and rhythm, no murmur heard.   PERIPHERAL VASCULAR: peripheral pulses present. Normal cap refill. No edema. Warm to touch.    GASTRO: soft, bowel sounds normal, no tenderness, no abdominal distention.  MUSC: Full ROM. No bony tenderness or soft tissue tenderness. No obvious deformity.  SKIN: Skin is warm and dry, normal skin turgor, mucous membranes moist.  NEURO: 5/5 strength major flexors/extensors bilaterally. Sensory intact to light touch bilaterally. [place] coma scale: eyes open spontaneously-4, oriented & converses-5, obeys commands-6. No neurological abnormalities.   MENTAL STATUS: awake, alert and aware of environment.  EYE: PERRL, both eyes: pupils brisk and reactive to light. Normal size.  ENT: EARS: no obvious drainage. NOSE: no active bleeding.   BREAST: symmetrical. No masses. No tenderness.  GENITALIA: Normal external genitalia.

## 2017-10-23 NOTE — ED TRIAGE NOTES
Pt. Care assumed, pt. Sleeping and easily aroused to verbal stimulation. Bedside report given. Pt. Oxygen levels are 92% on room air. Pt. States he wears oxygen at home sometimes at night. Pt. Placed on 2 L NC.

## 2017-10-23 NOTE — ED NOTES
Pt now admitted to [facility], chart e-mailed to intake at [facility] by JANIYA Edgar

## 2017-10-23 NOTE — ED NOTES
Sister, Taylor, called for update on patient. Informed her I am unable to give out patient information. Verbalizes understanding. Taylor would like patient to call his mother, Kelly, at . Pt. Notified.

## 2017-10-23 NOTE — ED NOTES
[facility] arrived for pt transport, Security called for escort to vehicle.  2 bags of belongings given to [employer] personnel.

## 2017-10-24 NOTE — PSYCH
"IDENTIFICATION DATA:  This is a 62-year-old single white male who was brought to   ER by EMS due to aggressive behavior, paranoid delusions, threatening his   neighbors, noncompliant with medications.  This consult is requested by    for psych evaluation.  The patient is on a PEC status.    CHIEF COMPLAINT:  "I have a problem with my pharmacist, I have called 3 times   for my medication, this service is not due and I cannot sleep."    HISTORY OF PRESENT ILLNESS:  According to ER notes, the patient was agitated   hollering obscenities and threatening neighbors.  He had paranoid delusions with   flight of ideas.  The patient required Geodon and Ativan to calm him down.  The   patient was recently discharged from [facility].  The patient   states that he called pharmacy 3 times and they were messing with his medication   and did not give medicine because according to them it is not due.  The patient   states that he is not sleeping for the last 2 or 3 nights and cannot focus on   his TV and fussing with other people.  The patient states that he had problem   with his neighbor who was trespassing and he told him if he passes, he will kick   his ass.  The patient states that he believed that people across the street are   talking about him.  The patient states that he called police and they are crooked  and put him here.  The patient states that he did not feel safe over there.  The   patient states that his mom is sick.  He states that he started hearing voices   for the last 2 days and they are telling him to harm him.  The patient states   that he warned him.  He states that he had some old medication.  He took some   old medications.  His urine is positive for opioids.  He denies use of alcohol.    He takes his pain medicines now and then.  He is loud, agitated and has flight   of ideas.  He states that he is depressed because of  situation.  The patient   states that he will go to hospital this time " and outpatient.    The patient had two or three hospitalizations in the   last one month, first was probably at [place], the second was [facility].  He   was discharged from [facility] couple of weeks ago.  He is not hypersexual   today.  He has been hearing voices since age 13 and it comes and goes and lately   he is hearing few people telling him to harm his neighbor.  The patient has a   history of self conversation.  He was getting flashbacks.  The patient is not   depressed, but feels depressed.  His mood has a history of paranoid delusions   anger problem.  He required Geodon upon arrival.  He gets frequent manic   episodes, especially when he is not taking medicine.  He does not want to take   anything other than Zyprexa.  He was on Prozac also.  He has a history of   demanding and manipulative behavior too.  He believed that he has some visual   hallucinations.  He eats normal, but not sleeping good.  He has financial   difficulties.    PAST PSYCHIATRIC HISTORY:  The patient was hospitalized last time at [facility] where he did good and was discharged in a very stable condition.  The   chart indicates that he went to [facility] on 10/09/2017 and stayed there for 7   days.  The patient was at [place] for aggressive behavior before going to [facility] that was 5 or 6 weeks ago.  He sees Dr. Ray Avendano on an outpatient   basis.  He was diagnosed with schizoaffective disorder.  He had been to   virtually every psych facility in [place] including [facility],   [facility], [facility], [facility] and multiple times to [facility].  He was living in a nursing home where he had problem.  He did good on   lithium and Depakote, but developed side effects with those medications.  He is   on Prozac and Zyprexa.  He has no history of suicide, but had homicidal   tendencies towards people around him including neighbors and when he was in a   nursing home.  He lived in [facility] for a long time, but it caused lot   of problem.  He has a history of noncompliance with medication and aftercare   plans.    MEDICAL HISTORY:  The patient has hepatitis C, congestive heart failure, COPD,   back injury, chronic renal failure.  He was told that he might need dialysis.    His blood pressure 140/73 with 101 pulse.  His oxygen saturation is 90.  The   patient states that he is out of his oxygen at home and does only inhalers.    The patient's WBC is 6.8, hemoglobin 8.4, hematocrit 25.5, platelets 91, MCV 91,   sodium 139, potassium   BUN 49 and creatinine 5.2.  The patient's   AST 20, ALT 16.    ALLERGIES:  He is allergic to DEPAKOTE, HALDOL, STELAZINE, NALOXONE, MORPHINE   AND OTHER OPIOIDS.  HE IS POSITIVE FOR OPIOIDS ONLY.    MENTAL STATUS EXAMINATION:  This is a 62-year-old healthy-looking white male who   looks about his stated age.  He is alert, cooperative and oriented to day,   date, month and year.  Mood is frustrated with labile affect.  He is attentive   and organized.  He is able to recall 3 objects out of 3 immediately, 3 out of 3   after 5 minutes and events of the past.  He denies thoughts of harm to self or   others.   Insight and judgment are impaired.  He is of average intelligence   person.  He has poor impulse control.  He has flight of ideas today and very   labile.  He still has threatening attitude towards neighbors.  He has delusions   of preference.  He is hearing voices for the last 2 days, telling him to harm   his neighbor.    PSYCHIATRIC DIAGNOSES:  AXIS I:  Schizoaffective disorder, bipolar type, manic phase, opioid use   disorder in a controlled environment.  AXIS II:  Personality disorder, NOS.  AXIS III:  Hepatitis C, cirrhosis of liver, chronic kidney disease, arthritis,   chronic obstructive lung disease.  AXIS IV:  Noncompliant with medicine, medical problems, chronic mental illness,   problem with the neighbors, frequent hospitalization, noncompliant with    aftercare plan and medications.  AXIS V:  35.    RECOMMENDATIONS:  We will transfer this patient to [facility] for   stabilization.  We will restart this patient on medicines.  We will discontinue   Prozac and continue Depakote, and continue Zyprexa 10 mg twice a day.  We will   consider Depakote at this time as liver functions are normal.  The patient is   not doing good with Zyprexa alone.  We will educate about medication and   illness.    PROGNOSIS:  Fair.    ESTIMATED LENGTH OF STAY IN THE HOSPITAL:  Would be 5 days.    CRITERIA FOR DISCHARGE:  The patient will show improvement in hallucinations,   delusions or homicidal ideation, aggressive behavior and noncompliant with   medicine.    ABLE TO GIVE CONSENT:  Yes.    ASSETS:  The patient is verbal, intelligent, cognitively intact and has a place   to live.      EKATERINA/JUSTIN  dd: 10/23/2017 09:29:33 (CDT)  td: 10/23/2017 18:29:00 (CDT)  Doc ID   #[id number]  Job ID #[id number]    CC:

## 2017-11-16 ENCOUNTER — TELEPHONE (OUTPATIENT)
Dept: FAMILY MEDICINE | Facility: CLINIC | Age: 62
End: 2017-11-16

## 2017-11-16 NOTE — TELEPHONE ENCOUNTER
----- Message from Génesis Hogan sent at 11/16/2017 11:02 AM CST -----  Contact:  Carlo from [facility] health [phone]  Patient's home health nurse stated that pt's is having some liver issues.  Please advise.

## 2017-11-16 NOTE — TELEPHONE ENCOUNTER
----- Message from Donya Barros sent at 11/16/2017  2:22 PM CST -----  Contact: mr casanova with [facility] [phone]   edilberto would like to speak with you regarding patient

## 2017-11-16 NOTE — TELEPHONE ENCOUNTER
Spoke to pt and informed him that Dr. Mcguire would like for him to go to the ER. Pt refused to go. Stated that he's harassed in ER and that he don't have that much longer to live and he just wants to enjoy his home. Stated that he has been stopping blood for the last couple of days and his BP is elevated because he can not get his Metoprolol medication. Advised pt again to go to the ER and pt refused again with some agitation in his voice. Stated he is not going. Said to thank Dr. Mcguire for his concern and hung up the call.

## 2017-11-16 NOTE — TELEPHONE ENCOUNTER
Spoke to Isaac and stated that pt has a lot of fluid in his abdomen and neck, SOB and urinating blood. Isaac took pt's vitals and his 02 stat was 92% and BP was 168/94. Isaac informed pt he had to go to the ER, but pt refused. Iasac stated he was going to call the ambulance and pt started threatening Isaac. Pt has not been taking his medications as prescribed. Pt did not want to talk to anyone. Pls advise.

## 2017-11-16 NOTE — TELEPHONE ENCOUNTER
Spoke to to Isaac with [facility]. Isaac stated that pt was having some liver issues and that he was on his way to the pt's home and will call Dr. Mcguire back with an update on his condition.

## 2017-11-17 ENCOUNTER — HOSPITAL ENCOUNTER (INPATIENT)
Facility: HOSPITAL | Age: 62
LOS: 2 days | Discharge: HOME | DRG: 189 | End: 2017-11-20
Attending: EMERGENCY MEDICINE | Admitting: INTERNAL MEDICINE
Payer: MEDICARE

## 2017-11-17 ENCOUNTER — TELEPHONE (OUTPATIENT)
Dept: FAMILY MEDICINE | Facility: CLINIC | Age: 62
End: 2017-11-17

## 2017-11-17 DIAGNOSIS — R55 SYNCOPE: Primary | ICD-10-CM

## 2017-11-17 DIAGNOSIS — N18.4 ANEMIA OF CHRONIC RENAL FAILURE, STAGE 4 (SEVERE): ICD-10-CM

## 2017-11-17 DIAGNOSIS — F25.0 SCHIZOAFFECTIVE DISORDER, BIPOLAR TYPE: Chronic | ICD-10-CM

## 2017-11-17 DIAGNOSIS — J43.8 OTHER EMPHYSEMA: Chronic | ICD-10-CM

## 2017-11-17 DIAGNOSIS — F29 PSYCHOSIS, UNSPECIFIED PSYCHOSIS TYPE: ICD-10-CM

## 2017-11-17 DIAGNOSIS — J44.1 COPD EXACERBATION: ICD-10-CM

## 2017-11-17 DIAGNOSIS — D63.1 ANEMIA OF CHRONIC RENAL FAILURE, STAGE 4 (SEVERE): ICD-10-CM

## 2017-11-17 DIAGNOSIS — N18.4 CKD (CHRONIC KIDNEY DISEASE), STAGE IV: ICD-10-CM

## 2017-11-17 DIAGNOSIS — I10 ESSENTIAL HYPERTENSION: Chronic | ICD-10-CM

## 2017-11-17 DIAGNOSIS — R44.3 HALLUCINATIONS: ICD-10-CM

## 2017-11-17 DIAGNOSIS — J96.01 ACUTE RESPIRATORY FAILURE WITH HYPOXIA: ICD-10-CM

## 2017-11-17 DIAGNOSIS — I10 ACCELERATED HYPERTENSION: ICD-10-CM

## 2017-11-17 DIAGNOSIS — R06.02 SHORTNESS OF BREATH: ICD-10-CM

## 2017-11-17 LAB
ALBUMIN SERPL BCP-MCNC: 3.3 G/DL
ALP SERPL-CCNC: 78 U/L
ALT SERPL W/O P-5'-P-CCNC: 21 U/L
ANION GAP SERPL CALC-SCNC: 11 MMOL/L
AST SERPL-CCNC: 21 U/L
BASOPHILS # BLD AUTO: 0.01 K/UL
BASOPHILS NFR BLD: 0.1 %
BILIRUB SERPL-MCNC: 0.6 MG/DL
BNP SERPL-MCNC: 880 PG/ML
BUN SERPL-MCNC: 56 MG/DL
CALCIUM SERPL-MCNC: 8.5 MG/DL
CHLORIDE SERPL-SCNC: 103 MMOL/L
CO2 SERPL-SCNC: 25 MMOL/L
CREAT SERPL-MCNC: 4 MG/DL
CREAT UR-MCNC: 27.3 MG/DL
CREAT UR-MCNC: 27.3 MG/DL
DIFFERENTIAL METHOD: ABNORMAL
EOSINOPHIL # BLD AUTO: 0 K/UL
EOSINOPHIL NFR BLD: 0.4 %
EOSINOPHIL URNS QL WRIGHT STN: NORMAL
ERYTHROCYTE [DISTWIDTH] IN BLOOD BY AUTOMATED COUNT: 14.5 %
EST. GFR  (AFRICAN AMERICAN): 17 ML/MIN/1.73 M^2
EST. GFR  (NON AFRICAN AMERICAN): 15 ML/MIN/1.73 M^2
GLUCOSE SERPL-MCNC: 165 MG/DL
HCT VFR BLD AUTO: 28.3 %
HGB BLD-MCNC: 9.3 G/DL
INR PPP: 1
LYMPHOCYTES # BLD AUTO: 0.4 K/UL
LYMPHOCYTES NFR BLD: 6 %
MCH RBC QN AUTO: 29.3 PG
MCHC RBC AUTO-ENTMCNC: 32.9 G/DL
MCV RBC AUTO: 89 FL
MONOCYTES # BLD AUTO: 0.1 K/UL
MONOCYTES NFR BLD: 2.1 %
NEUTROPHILS # BLD AUTO: 6.2 K/UL
NEUTROPHILS NFR BLD: 91.3 %
PLATELET # BLD AUTO: 117 K/UL
PMV BLD AUTO: 8.6 FL
POCT GLUCOSE: 134 MG/DL (ref 70–110)
POTASSIUM SERPL-SCNC: 4.5 MMOL/L
PROCALCITONIN SERPL IA-MCNC: 0.13 NG/ML
PROT SERPL-MCNC: 5.9 G/DL
PROT UR-MCNC: 215 MG/DL
PROT/CREAT RATIO, UR: 7.88
PROTHROMBIN TIME: 10.7 SEC
RBC # BLD AUTO: 3.17 M/UL
SODIUM SERPL-SCNC: 139 MMOL/L
SODIUM UR-SCNC: 31 MMOL/L
TROPONIN I SERPL DL<=0.01 NG/ML-MCNC: 0.01 NG/ML
TROPONIN I SERPL DL<=0.01 NG/ML-MCNC: 0.01 NG/ML
WBC # BLD AUTO: 6.8 K/UL

## 2017-11-17 PROCEDURE — 84300 ASSAY OF URINE SODIUM: CPT

## 2017-11-17 PROCEDURE — 84156 ASSAY OF PROTEIN URINE: CPT

## 2017-11-17 PROCEDURE — 93010 ELECTROCARDIOGRAM REPORT: CPT | Mod: 76,,, | Performed by: INTERNAL MEDICINE

## 2017-11-17 PROCEDURE — 99285 EMERGENCY DEPT VISIT HI MDM: CPT | Mod: 25

## 2017-11-17 PROCEDURE — 80053 COMPREHEN METABOLIC PANEL: CPT

## 2017-11-17 PROCEDURE — 63600175 PHARM REV CODE 636 W HCPCS: Performed by: EMERGENCY MEDICINE

## 2017-11-17 PROCEDURE — 94640 AIRWAY INHALATION TREATMENT: CPT

## 2017-11-17 PROCEDURE — 63600175 PHARM REV CODE 636 W HCPCS: Performed by: STUDENT IN AN ORGANIZED HEALTH CARE EDUCATION/TRAINING PROGRAM

## 2017-11-17 PROCEDURE — 84484 ASSAY OF TROPONIN QUANT: CPT

## 2017-11-17 PROCEDURE — 85025 COMPLETE CBC W/AUTO DIFF WBC: CPT

## 2017-11-17 PROCEDURE — 85610 PROTHROMBIN TIME: CPT

## 2017-11-17 PROCEDURE — 83880 ASSAY OF NATRIURETIC PEPTIDE: CPT

## 2017-11-17 PROCEDURE — 25000003 PHARM REV CODE 250: Performed by: EMERGENCY MEDICINE

## 2017-11-17 PROCEDURE — 36415 COLL VENOUS BLD VENIPUNCTURE: CPT

## 2017-11-17 PROCEDURE — 25000242 PHARM REV CODE 250 ALT 637 W/ HCPCS: Performed by: EMERGENCY MEDICINE

## 2017-11-17 PROCEDURE — 93005 ELECTROCARDIOGRAM TRACING: CPT

## 2017-11-17 PROCEDURE — S4991 NICOTINE PATCH NONLEGEND: HCPCS | Performed by: STUDENT IN AN ORGANIZED HEALTH CARE EDUCATION/TRAINING PROGRAM

## 2017-11-17 PROCEDURE — 87205 SMEAR GRAM STAIN: CPT

## 2017-11-17 PROCEDURE — 25000242 PHARM REV CODE 250 ALT 637 W/ HCPCS: Performed by: STUDENT IN AN ORGANIZED HEALTH CARE EDUCATION/TRAINING PROGRAM

## 2017-11-17 PROCEDURE — 25000003 PHARM REV CODE 250: Performed by: STUDENT IN AN ORGANIZED HEALTH CARE EDUCATION/TRAINING PROGRAM

## 2017-11-17 PROCEDURE — G0378 HOSPITAL OBSERVATION PER HR: HCPCS

## 2017-11-17 PROCEDURE — 84484 ASSAY OF TROPONIN QUANT: CPT | Mod: 91

## 2017-11-17 PROCEDURE — 93010 ELECTROCARDIOGRAM REPORT: CPT | Mod: ,,, | Performed by: INTERNAL MEDICINE

## 2017-11-17 PROCEDURE — 84145 PROCALCITONIN (PCT): CPT

## 2017-11-17 PROCEDURE — 96374 THER/PROPH/DIAG INJ IV PUSH: CPT

## 2017-11-17 RX ORDER — AZITHROMYCIN 250 MG/1
500 TABLET, FILM COATED ORAL ONCE
Status: COMPLETED | OUTPATIENT
Start: 2017-11-17 | End: 2017-11-17

## 2017-11-17 RX ORDER — SODIUM CHLORIDE 0.9 % (FLUSH) 0.9 %
5 SYRINGE (ML) INJECTION
Status: DISCONTINUED | OUTPATIENT
Start: 2017-11-17 | End: 2017-11-20 | Stop reason: HOSPADM

## 2017-11-17 RX ORDER — FUROSEMIDE 10 MG/ML
80 INJECTION INTRAMUSCULAR; INTRAVENOUS 2 TIMES DAILY
Status: DISCONTINUED | OUTPATIENT
Start: 2017-11-17 | End: 2017-11-17

## 2017-11-17 RX ORDER — AZITHROMYCIN 250 MG/1
250 TABLET, FILM COATED ORAL DAILY
Status: DISCONTINUED | OUTPATIENT
Start: 2017-11-18 | End: 2017-11-18

## 2017-11-17 RX ORDER — FUROSEMIDE 10 MG/ML
80 INJECTION INTRAMUSCULAR; INTRAVENOUS
Status: COMPLETED | OUTPATIENT
Start: 2017-11-17 | End: 2017-11-17

## 2017-11-17 RX ORDER — AMLODIPINE BESYLATE 5 MG/1
10 TABLET ORAL DAILY
Status: DISCONTINUED | OUTPATIENT
Start: 2017-11-18 | End: 2017-11-20 | Stop reason: HOSPADM

## 2017-11-17 RX ORDER — HEPARIN SODIUM 5000 [USP'U]/ML
5000 INJECTION, SOLUTION INTRAVENOUS; SUBCUTANEOUS EVERY 12 HOURS
Status: DISCONTINUED | OUTPATIENT
Start: 2017-11-17 | End: 2017-11-20 | Stop reason: HOSPADM

## 2017-11-17 RX ORDER — IBUPROFEN 200 MG
16 TABLET ORAL
Status: DISCONTINUED | OUTPATIENT
Start: 2017-11-17 | End: 2017-11-17

## 2017-11-17 RX ORDER — CARVEDILOL 25 MG/1
25 TABLET ORAL 2 TIMES DAILY
Status: DISCONTINUED | OUTPATIENT
Start: 2017-11-17 | End: 2017-11-17

## 2017-11-17 RX ORDER — IPRATROPIUM BROMIDE AND ALBUTEROL SULFATE 2.5; .5 MG/3ML; MG/3ML
3 SOLUTION RESPIRATORY (INHALATION) EVERY 6 HOURS PRN
Status: DISCONTINUED | OUTPATIENT
Start: 2017-11-17 | End: 2017-11-17

## 2017-11-17 RX ORDER — IPRATROPIUM BROMIDE AND ALBUTEROL SULFATE 2.5; .5 MG/3ML; MG/3ML
3 SOLUTION RESPIRATORY (INHALATION)
Status: DISCONTINUED | OUTPATIENT
Start: 2017-11-17 | End: 2017-11-20 | Stop reason: HOSPADM

## 2017-11-17 RX ORDER — OXYBUTYNIN CHLORIDE 5 MG/1
5 TABLET ORAL NIGHTLY
Status: DISCONTINUED | OUTPATIENT
Start: 2017-11-17 | End: 2017-11-20 | Stop reason: HOSPADM

## 2017-11-17 RX ORDER — TAMSULOSIN HYDROCHLORIDE 0.4 MG/1
0.4 CAPSULE ORAL DAILY
Status: DISCONTINUED | OUTPATIENT
Start: 2017-11-18 | End: 2017-11-20 | Stop reason: HOSPADM

## 2017-11-17 RX ORDER — OLANZAPINE 15 MG/1
15 TABLET ORAL NIGHTLY
COMMUNITY
End: 2017-12-28

## 2017-11-17 RX ORDER — PREDNISONE 20 MG/1
40 TABLET ORAL DAILY
Status: DISCONTINUED | OUTPATIENT
Start: 2017-11-17 | End: 2017-11-20 | Stop reason: HOSPADM

## 2017-11-17 RX ORDER — GLUCAGON 1 MG
1 KIT INJECTION
Status: DISCONTINUED | OUTPATIENT
Start: 2017-11-17 | End: 2017-11-20 | Stop reason: HOSPADM

## 2017-11-17 RX ORDER — GABAPENTIN 300 MG/1
300 CAPSULE ORAL 3 TIMES DAILY
Status: DISCONTINUED | OUTPATIENT
Start: 2017-11-17 | End: 2017-11-20 | Stop reason: HOSPADM

## 2017-11-17 RX ORDER — IBUPROFEN 200 MG
24 TABLET ORAL
Status: DISCONTINUED | OUTPATIENT
Start: 2017-11-17 | End: 2017-11-17

## 2017-11-17 RX ORDER — PANTOPRAZOLE SODIUM 40 MG/1
40 TABLET, DELAYED RELEASE ORAL DAILY
Status: DISCONTINUED | OUTPATIENT
Start: 2017-11-18 | End: 2017-11-20 | Stop reason: HOSPADM

## 2017-11-17 RX ORDER — IBUPROFEN 200 MG
1 TABLET ORAL DAILY
Status: DISCONTINUED | OUTPATIENT
Start: 2017-11-17 | End: 2017-11-20 | Stop reason: HOSPADM

## 2017-11-17 RX ORDER — OLANZAPINE 10 MG/1
10 TABLET, ORALLY DISINTEGRATING ORAL ONCE
Status: COMPLETED | OUTPATIENT
Start: 2017-11-17 | End: 2017-11-17

## 2017-11-17 RX ORDER — IPRATROPIUM BROMIDE AND ALBUTEROL SULFATE 2.5; .5 MG/3ML; MG/3ML
3 SOLUTION RESPIRATORY (INHALATION) EVERY 4 HOURS PRN
Status: DISCONTINUED | OUTPATIENT
Start: 2017-11-17 | End: 2017-11-20 | Stop reason: HOSPADM

## 2017-11-17 RX ORDER — FUROSEMIDE 10 MG/ML
80 INJECTION INTRAMUSCULAR; INTRAVENOUS 2 TIMES DAILY
Status: DISCONTINUED | OUTPATIENT
Start: 2017-11-18 | End: 2017-11-20 | Stop reason: HOSPADM

## 2017-11-17 RX ORDER — IPRATROPIUM BROMIDE AND ALBUTEROL SULFATE 2.5; .5 MG/3ML; MG/3ML
3 SOLUTION RESPIRATORY (INHALATION)
Status: COMPLETED | OUTPATIENT
Start: 2017-11-17 | End: 2017-11-17

## 2017-11-17 RX ADMIN — FUROSEMIDE 80 MG: 10 INJECTION, SOLUTION INTRAMUSCULAR; INTRAVENOUS at 04:11

## 2017-11-17 RX ADMIN — OLANZAPINE 10 MG: 10 TABLET, ORALLY DISINTEGRATING ORAL at 06:11

## 2017-11-17 RX ADMIN — AZITHROMYCIN 500 MG: 250 TABLET, FILM COATED ORAL at 07:11

## 2017-11-17 RX ADMIN — IPRATROPIUM BROMIDE AND ALBUTEROL SULFATE 3 ML: .5; 3 SOLUTION RESPIRATORY (INHALATION) at 02:11

## 2017-11-17 RX ADMIN — NICOTINE 1 PATCH: 21 PATCH, EXTENDED RELEASE TRANSDERMAL at 07:11

## 2017-11-17 RX ADMIN — PREDNISONE 40 MG: 20 TABLET ORAL at 07:11

## 2017-11-17 RX ADMIN — IPRATROPIUM BROMIDE AND ALBUTEROL SULFATE 3 ML: .5; 3 SOLUTION RESPIRATORY (INHALATION) at 08:11

## 2017-11-17 NOTE — ED NOTES
Pt reports CP and SOB x 1 week. Pt is very vague with CC and associated symptoms. Also becomes tangential during assessment about things not pertaining to his medical care. Appears to talk to himself when alone in the room. HTN in triage. O2 sats 93-94% on RA.     APPEARANCE: Awake, alert, & oriented. No acute distress.  CARDIAC: Normal rate and rhythm. C/o L sided chest pain, non-radiating.    RESPIRATORY:Normal rate and effort, breath sounds clear bilaterally throughout chest. Respirations are even and unlabored no obvious signs of distress. C/o SOB  PERIPHERAL VASCULAR: peripheral pulses present. Normal cap refill. No edema.   GASTRO: soft, no tenderness, no abdominal distention.  MUSC: Full ROM. No bony tenderness or soft tissue tenderness. No obvious deformity.  SKIN: Skin is warm, dry, and intact. Normal skin turgor and color.  NEURO: 5/5 strength major flexors/extensors bilaterally. [place] coma scale: eyes open spontaneously-4, obeys commands-6, oriented-5. Total=15. Clear speech. No neurological abnormalities.   EENT: No c/o vision or hearing difficulties.

## 2017-11-17 NOTE — TELEPHONE ENCOUNTER
----- Message from Rebecca Hogan sent at 11/17/2017 12:15 PM CST -----  Contact: self  Pt is calling in regards to nurse calling him this morning. Pt would like to make arrangements with going into the hospital.    Pt can be reached at [phone].    Thank you
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
## 2017-11-17 NOTE — TELEPHONE ENCOUNTER
Spoke to pt and states that he will call 911 for the ambulance to bring him to the hospital since he can not move because of the broken hip.

## 2017-11-18 PROBLEM — J96.01 ACUTE RESPIRATORY FAILURE WITH HYPOXIA: Status: ACTIVE | Noted: 2017-11-18

## 2017-11-18 PROBLEM — R55 SYNCOPE: Status: ACTIVE | Noted: 2017-11-18

## 2017-11-18 LAB
ALBUMIN SERPL BCP-MCNC: 2.9 G/DL
ALP SERPL-CCNC: 60 U/L
ALT SERPL W/O P-5'-P-CCNC: 17 U/L
ANION GAP SERPL CALC-SCNC: 10 MMOL/L
AST SERPL-CCNC: 13 U/L
BASOPHILS # BLD AUTO: 0 K/UL
BASOPHILS NFR BLD: 0 %
BILIRUB SERPL-MCNC: 0.5 MG/DL
BUN SERPL-MCNC: 54 MG/DL
CALCIUM SERPL-MCNC: 8.8 MG/DL
CHLORIDE SERPL-SCNC: 105 MMOL/L
CHOLEST SERPL-MCNC: 149 MG/DL
CHOLEST/HDLC SERPL: 2.9 {RATIO}
CO2 SERPL-SCNC: 28 MMOL/L
CREAT SERPL-MCNC: 4.2 MG/DL
DIFFERENTIAL METHOD: ABNORMAL
EOSINOPHIL # BLD AUTO: 0 K/UL
EOSINOPHIL NFR BLD: 0 %
ERYTHROCYTE [DISTWIDTH] IN BLOOD BY AUTOMATED COUNT: 14.5 %
EST. GFR  (AFRICAN AMERICAN): 16 ML/MIN/1.73 M^2
EST. GFR  (NON AFRICAN AMERICAN): 14 ML/MIN/1.73 M^2
FERRITIN SERPL-MCNC: 345 NG/ML
FOLATE SERPL-MCNC: 10.9 NG/ML
GLUCOSE SERPL-MCNC: 149 MG/DL
HCT VFR BLD AUTO: 25.6 %
HDLC SERPL-MCNC: 51 MG/DL
HDLC SERPL: 34.2 %
HGB BLD-MCNC: 8.5 G/DL
IRON SERPL-MCNC: 23 UG/DL
LDLC SERPL CALC-MCNC: 83.4 MG/DL
LYMPHOCYTES # BLD AUTO: 0.2 K/UL
LYMPHOCYTES NFR BLD: 5 %
MAGNESIUM SERPL-MCNC: 2.5 MG/DL
MCH RBC QN AUTO: 29.7 PG
MCHC RBC AUTO-ENTMCNC: 33.2 G/DL
MCV RBC AUTO: 90 FL
MONOCYTES # BLD AUTO: 0.2 K/UL
MONOCYTES NFR BLD: 3.4 %
NEUTROPHILS # BLD AUTO: 4 K/UL
NEUTROPHILS NFR BLD: 91.4 %
NONHDLC SERPL-MCNC: 98 MG/DL
PHOSPHATE SERPL-MCNC: 4.7 MG/DL
PLATELET # BLD AUTO: 116 K/UL
PMV BLD AUTO: 8.7 FL
POCT GLUCOSE: 181 MG/DL (ref 70–110)
POCT GLUCOSE: 187 MG/DL (ref 70–110)
POTASSIUM SERPL-SCNC: 4.6 MMOL/L
PROT SERPL-MCNC: 5.8 G/DL
RBC # BLD AUTO: 2.86 M/UL
SATURATED IRON: 10 %
SODIUM SERPL-SCNC: 143 MMOL/L
TOTAL IRON BINDING CAPACITY: 240 UG/DL
TRANSFERRIN SERPL-MCNC: 162 MG/DL
TRIGL SERPL-MCNC: 73 MG/DL
TROPONIN I SERPL DL<=0.01 NG/ML-MCNC: 0.01 NG/ML
TSH SERPL DL<=0.005 MIU/L-ACNC: 0.51 UIU/ML
VIT B12 SERPL-MCNC: 450 PG/ML
WBC # BLD AUTO: 4.37 K/UL

## 2017-11-18 PROCEDURE — 82746 ASSAY OF FOLIC ACID SERUM: CPT

## 2017-11-18 PROCEDURE — 27000221 HC OXYGEN, UP TO 24 HOURS

## 2017-11-18 PROCEDURE — 84443 ASSAY THYROID STIM HORMONE: CPT

## 2017-11-18 PROCEDURE — 80053 COMPREHEN METABOLIC PANEL: CPT

## 2017-11-18 PROCEDURE — 93005 ELECTROCARDIOGRAM TRACING: CPT

## 2017-11-18 PROCEDURE — 83540 ASSAY OF IRON: CPT

## 2017-11-18 PROCEDURE — 25000242 PHARM REV CODE 250 ALT 637 W/ HCPCS: Performed by: STUDENT IN AN ORGANIZED HEALTH CARE EDUCATION/TRAINING PROGRAM

## 2017-11-18 PROCEDURE — 63600175 PHARM REV CODE 636 W HCPCS: Performed by: STUDENT IN AN ORGANIZED HEALTH CARE EDUCATION/TRAINING PROGRAM

## 2017-11-18 PROCEDURE — 25000003 PHARM REV CODE 250: Performed by: PSYCHIATRY & NEUROLOGY

## 2017-11-18 PROCEDURE — 83735 ASSAY OF MAGNESIUM: CPT

## 2017-11-18 PROCEDURE — 94761 N-INVAS EAR/PLS OXIMETRY MLT: CPT

## 2017-11-18 PROCEDURE — 25000003 PHARM REV CODE 250: Performed by: DERMATOLOGY

## 2017-11-18 PROCEDURE — 82728 ASSAY OF FERRITIN: CPT

## 2017-11-18 PROCEDURE — 85025 COMPLETE CBC W/AUTO DIFF WBC: CPT

## 2017-11-18 PROCEDURE — 80061 LIPID PANEL: CPT

## 2017-11-18 PROCEDURE — 84100 ASSAY OF PHOSPHORUS: CPT

## 2017-11-18 PROCEDURE — 36415 COLL VENOUS BLD VENIPUNCTURE: CPT

## 2017-11-18 PROCEDURE — 80307 DRUG TEST PRSMV CHEM ANLYZR: CPT

## 2017-11-18 PROCEDURE — 25000003 PHARM REV CODE 250: Performed by: STUDENT IN AN ORGANIZED HEALTH CARE EDUCATION/TRAINING PROGRAM

## 2017-11-18 PROCEDURE — 93010 ELECTROCARDIOGRAM REPORT: CPT | Mod: ,,, | Performed by: INTERNAL MEDICINE

## 2017-11-18 PROCEDURE — 82607 VITAMIN B-12: CPT

## 2017-11-18 PROCEDURE — S0166 INJ OLANZAPINE 2.5MG: HCPCS | Performed by: DERMATOLOGY

## 2017-11-18 PROCEDURE — 94640 AIRWAY INHALATION TREATMENT: CPT

## 2017-11-18 PROCEDURE — 11000001 HC ACUTE MED/SURG PRIVATE ROOM

## 2017-11-18 RX ORDER — OLANZAPINE 10 MG/2ML
5 INJECTION, POWDER, FOR SOLUTION INTRAMUSCULAR ONCE AS NEEDED
Status: DISCONTINUED | OUTPATIENT
Start: 2017-11-18 | End: 2017-11-18

## 2017-11-18 RX ORDER — LISINOPRIL 5 MG/1
5 TABLET ORAL DAILY
Status: DISCONTINUED | OUTPATIENT
Start: 2017-11-18 | End: 2017-11-20 | Stop reason: HOSPADM

## 2017-11-18 RX ORDER — CLONAZEPAM 0.5 MG/1
0.5 TABLET ORAL 3 TIMES DAILY
Status: DISPENSED | OUTPATIENT
Start: 2017-11-18 | End: 2017-11-19

## 2017-11-18 RX ORDER — LORAZEPAM 1 MG/1
1 TABLET ORAL EVERY 6 HOURS PRN
Status: DISCONTINUED | OUTPATIENT
Start: 2017-11-18 | End: 2017-11-20 | Stop reason: HOSPADM

## 2017-11-18 RX ORDER — OLANZAPINE 10 MG/2ML
5 INJECTION, POWDER, FOR SOLUTION INTRAMUSCULAR EVERY 4 HOURS PRN
Status: DISCONTINUED | OUTPATIENT
Start: 2017-11-18 | End: 2017-11-20 | Stop reason: HOSPADM

## 2017-11-18 RX ADMIN — IPRATROPIUM BROMIDE AND ALBUTEROL SULFATE 3 ML: .5; 3 SOLUTION RESPIRATORY (INHALATION) at 07:11

## 2017-11-18 RX ADMIN — OLANZAPINE 5 MG: 10 INJECTION, POWDER, FOR SOLUTION INTRAMUSCULAR at 09:11

## 2017-11-18 RX ADMIN — FUROSEMIDE 80 MG: 10 INJECTION, SOLUTION INTRAMUSCULAR; INTRAVENOUS at 05:11

## 2017-11-18 RX ADMIN — CLONAZEPAM 0.5 MG: 0.5 TABLET ORAL at 04:11

## 2017-11-18 RX ADMIN — GABAPENTIN 300 MG: 300 CAPSULE ORAL at 01:11

## 2017-11-18 RX ADMIN — OLANZAPINE 15 MG: 10 TABLET, FILM COATED ORAL at 08:11

## 2017-11-18 RX ADMIN — IPRATROPIUM BROMIDE AND ALBUTEROL SULFATE 3 ML: .5; 3 SOLUTION RESPIRATORY (INHALATION) at 01:11
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
## 2017-11-18 NOTE — PLAN OF CARE
Patient refusing all medications and EKG. Patient stated that he wants to see Dr Mcguire. Explained to him that his doctor at the hospital is Dr Lino and offered to call the resident to come speak to him. Patient refused saying he only wants Dr Mcguire and to be left alone so that he can get some rest. Dr Moise notified.

## 2017-11-18 NOTE — CONSULTS
"NEUROLOGY INPATIENT CONSULT EVALUATION    Reason for consult:  Psychosis, syncope    Informant:  Sister, mother, primary team       Other sources of information : past medical records, patient    CC:  "chest pain"    HPI:  Leif Ramirez is a 63yo man with schizophrenia, bipolar disorder, chronic hepatitis C with cirrhosis, COPD, CAD, depression, DM2, HLD, HTN, h/o seizures, ?stroke, thyroid disease, and unspecified heart failure who presented to [facility] on 11/17/2017 with a primary complaint of chest pain. Neurology consulted for psychosis and syncope.     History obtained via phone from patient's sister and mother. Patient was last seen by them ~1 week prior to admission, when he was noted to be angry and belligerent, but at baseline. Per family he has required psychiatric care for many years and frequently undergoes inpatient psychiatric treatment (usually every 2 months), last inpatient psych admission was 3 weeks ago at [facility]. They are unsure which medications he's on. He lives by himself at a house that his mother owns and is paranoid at baseline - has installed pad locks on all the doors in the house and does not divulge much medical information. As far as they know patient had one seizure many years ago that was medication induced (haldol listed as an allergy due to seizures).     Per chart review and primary team, patient now under PEC since this morning for aggressive behavior and suicidal threats. Patient interviewed with staff and sitter. During interview patient in restraints, awake, alert, eating lunch and belligerent, not oriented to time and answers questions with tangential and delusional answers, e.g. "my seizure was years go and caused by electrical interference because of the positives and negatives."       ROS:   Unable to obtain due to mental status    Histories:     Allergies:  Codeine; Depakote [divalproex]; Haldol [haloperidol lactate]; Methadone; Morphine; Naloxone; Opium; " Propoxyphene; Stelazine [trifluoperazine]; and Amoxicillin    Current Medications:    Current Facility-Administered Medications   Medication Dose Route Frequency Provider Last Rate Last Dose    albuterol-ipratropium 2.5mg-0.5mg/3mL nebulizer solution 3 mL  3 mL Nebulization Q4H PRN Robert Sullivan MD        albuterol-ipratropium 2.5mg-0.5mg/3mL nebulizer solution 3 mL  3 mL Nebulization Q6H WAKE Robert Sullivan MD   3 mL at 11/18/17 0701    amLODIPine tablet 10 mg  10 mg Oral Daily Robert Sullivan MD        dextrose 50% injection 12.5 g  12.5 g Intravenous PRN Robert Sullivan MD        dextrose 50% injection 25 g  25 g Intravenous PRN Robert Sullivan MD        furosemide injection 80 mg  80 mg Intravenous BID Robert Sullivan MD        gabapentin capsule 300 mg  300 mg Oral TID Robert Sullivan MD        glucagon (human recombinant) injection 1 mg  1 mg Intramuscular PRN Robert Sullivan MD        heparin (porcine) injection 5,000 Units  5,000 Units Subcutaneous Q12H Robert Sullivan MD        lisinopril tablet 5 mg  5 mg Oral Daily Robert Sullivan MD        LORazepam tablet 1 mg  1 mg Oral Q6H PRN Robert Sullivan MD        nicotine 21 mg/24 hr 1 patch  1 patch Transdermal Daily Robert Sullivan MD   1 patch at 11/17/17 1910    OLANZapine injection 5 mg  5 mg Intramuscular Q4H PRN Mundo Birmingham MD   5 mg at 11/18/17 0915    OLANZapine tablet 15 mg  15 mg Oral Nightly Robert Sullivan MD        oxybutynin tablet 5 mg  5 mg Oral QHS Robert Sullivan MD        pantoprazole EC tablet 40 mg  40 mg Oral Daily Robert Sullivan MD        predniSONE tablet 40 mg  40 mg Oral Daily Robert Sullivan MD   40 mg at 11/17/17 1908    sodium chloride 0.9% flush 5 mL  5 mL Intravenous PRN Robert Sullivan MD        tamsulosin 24 hr capsule 0.4 mg  0.4 mg Oral Daily Robert Sullivan MD           Past Medical/Surgical/Family History:  PMHx:   Past Medical History:   Diagnosis Date    Allergy     poison ivy     Anemia     Anxiety     Arthritis     Asthma     Bipolar affective     Cancer     blood and bone    CHF (congestive heart failure)     Chronic hepatitis C with cirrhosis     COPD (chronic obstructive pulmonary disease)     Coronary artery disease     Depression     Diabetes mellitus type II     Disorder of kidney and ureter     Elevated PSA     has had prostate problems but unaware of his psa level    Encounter for blood transfusion     History of back injury     Chronic back pain    Hyperlipidemia     Hypertension     Internal hemorrhoid, bleeding 9/21/2015    Pancytopenia     Renal disorder     Schizo affective schizophrenia     Seizures     Stroke     Thyroid disease     Urinary tract infection       Surgeries:   Past Surgical History:   Procedure Laterality Date    ELBOW BURSA SURGERY      JOINT REPLACEMENT      ORIF FEMUR FRACTURE Right 8/2015      Family  Hx:   Family History   Problem Relation Age of Onset    Hypertension Mother     Diabetes Mother     Transient ischemic attack Mother     Heart disease Mother      stent placement    Arthritis Mother     Heart disease Father     Hypertension Father     Diabetes Father     Stroke Father     Arthritis Sister     Hypertension Sister     Heart disease Maternal Grandmother     Diabetes Maternal Grandmother     Stroke Maternal Grandmother     Cancer Paternal Grandmother      breast    Heart disease Paternal Grandmother     Cancer Sister      brain    Prostate cancer Neg Hx     Kidney disease Neg Hx      Social History:    Substance Abuse/Dependence Histor  Tobacco: denied by family members  EtOH: denied by family members  Illicit drugs: denied by family members    Occupational/Employment History:  Occupation: on disability  Lives alone in mother's house.     Current Evaluation:     Vital Signs:   Vitals:    11/18/17 0800   BP: (!) 160/74   Pulse:    Resp:    Temp:       General: patient in restraints in bed, eating lunch  in NAD.         Neurological Exam   Exam limited by patient's hostile behaviors    Mental Status:  GCS 15, Awake, alert, not oriented to date or month    Attitude:  Hostile, belligerent    Language:  No aphasia, no dysarthria.     Thought:  Paranoid, tangential, delusional    Affect:   Angry, hostile    Cranial Nerves:  Patient tracking 180-degrees, no facial asymmetry, hearing grossly intact     Motor:  Strength: moves all extremities equally and spontaneously, in restraints    DTRs:  Did not assess due to patient's hostility    Sensation:  Did not formally assess due to patient's hostility    Cerebellar:  No adventitious movements  No head titubation  No truncal ataxia    Gait and Stand:  Unable to assess as patient in restraints      LABORATORY STUDIES:  Recent Results (from the past 24 hour(s))   CBC auto differential    Collection Time: 11/17/17  2:32 PM   Result Value Ref Range    WBC 6.80 3.90 - 12.70 K/uL    RBC 3.17 (L) 4.60 - 6.20 M/uL    Hemoglobin 9.3 (L) 14.0 - 18.0 g/dL    Hematocrit 28.3 (L) 40.0 - 54.0 %    MCV 89 82 - 98 fL    MCH 29.3 27.0 - 31.0 pg    MCHC 32.9 32.0 - 36.0 g/dL    RDW 14.5 11.5 - 14.5 %    Platelets 117 (L) 150 - 350 K/uL    MPV 8.6 (L) 9.2 - 12.9 fL    Gran # 6.2 1.8 - 7.7 K/uL    Lymph # 0.4 (L) 1.0 - 4.8 K/uL    Mono # 0.1 (L) 0.3 - 1.0 K/uL    Eos # 0.0 0.0 - 0.5 K/uL    Baso # 0.01 0.00 - 0.20 K/uL    Gran% 91.3 (H) 38.0 - 73.0 %    Lymph% 6.0 (L) 18.0 - 48.0 %    Mono% 2.1 (L) 4.0 - 15.0 %    Eosinophil% 0.4 0.0 - 8.0 %    Basophil% 0.1 0.0 - 1.9 %    Differential Method Automated    Comprehensive metabolic panel    Collection Time: 11/17/17  2:32 PM   Result Value Ref Range    Sodium 139 136 - 145 mmol/L    Potassium 4.5 3.5 - 5.1 mmol/L    Chloride 103 95 - 110 mmol/L    CO2 25 23 - 29 mmol/L    Glucose 165 (H) 70 - 110 mg/dL    BUN, Bld 56 (H) 8 - 23 mg/dL    Creatinine 4.0 (H) 0.5 - 1.4 mg/dL    Calcium 8.5 (L) 8.7 - 10.5 mg/dL    Total Protein 5.9 (L) 6.0 - 8.4 g/dL     Albumin 3.3 (L) 3.5 - 5.2 g/dL    Total Bilirubin 0.6 0.1 - 1.0 mg/dL    Alkaline Phosphatase 78 55 - 135 U/L    AST 21 10 - 40 U/L    ALT 21 10 - 44 U/L    Anion Gap 11 8 - 16 mmol/L    eGFR if African American 17 (A) >60 mL/min/1.73 m^2    eGFR if non African American 15 (A) >60 mL/min/1.73 m^2   Troponin I    Collection Time: 11/17/17  2:32 PM   Result Value Ref Range    Troponin I 0.009 0.000 - 0.026 ng/mL   Brain natriuretic peptide    Collection Time: 11/17/17  2:32 PM   Result Value Ref Range     (H) 0 - 99 pg/mL   Protime-INR    Collection Time: 11/17/17  2:32 PM   Result Value Ref Range    Prothrombin Time 10.7 9.0 - 12.5 sec    INR 1.0 0.8 - 1.2   Procalcitonin    Collection Time: 11/17/17  2:32 PM   Result Value Ref Range    Procalcitonin 0.13 <0.25 ng/mL   Creatinine, urine, random    Collection Time: 11/17/17  7:49 PM   Result Value Ref Range    Creatinine, Random Ur 27.3 23.0 - 375.0 mg/dL   Sodium, urine, random    Collection Time: 11/17/17  7:49 PM   Result Value Ref Range    Sodium Urine Random 31 20 - 250 mmol/L   Protein / creatinine ratio, urine    Collection Time: 11/17/17  7:49 PM   Result Value Ref Range    Protein, Urine Random 215 (H) 0 - 15 mg/dL    Creatinine, Random Ur 27.3 23.0 - 375.0 mg/dL    Prot/Creat Ratio, Ur 7.88 (H) 0.00 - 0.20   Basurto's Stain, Urine Random    Collection Time: 11/17/17  7:49 PM   Result Value Ref Range    Basurto's Stain, Ur No eosinophils seen No eosinophils seen   Troponin I    Collection Time: 11/17/17  8:07 PM   Result Value Ref Range    Troponin I 0.011 0.000 - 0.026 ng/mL   POCT glucose    Collection Time: 11/17/17  8:56 PM   Result Value Ref Range    POCT Glucose 134 (H) 70 - 110 mg/dL   Troponin I    Collection Time: 11/17/17 11:38 PM   Result Value Ref Range    Troponin I 0.015 0.000 - 0.026 ng/mL   CBC auto differential    Collection Time: 11/18/17  4:25 AM   Result Value Ref Range    WBC 4.37 3.90 - 12.70 K/uL    RBC 2.86 (L) 4.60 - 6.20 M/uL     Hemoglobin 8.5 (L) 14.0 - 18.0 g/dL    Hematocrit 25.6 (L) 40.0 - 54.0 %    MCV 90 82 - 98 fL    MCH 29.7 27.0 - 31.0 pg    MCHC 33.2 32.0 - 36.0 g/dL    RDW 14.5 11.5 - 14.5 %    Platelets 116 (L) 150 - 350 K/uL    MPV 8.7 (L) 9.2 - 12.9 fL    Gran # 4.0 1.8 - 7.7 K/uL    Lymph # 0.2 (L) 1.0 - 4.8 K/uL    Mono # 0.2 (L) 0.3 - 1.0 K/uL    Eos # 0.0 0.0 - 0.5 K/uL    Baso # 0.00 0.00 - 0.20 K/uL    Gran% 91.4 (H) 38.0 - 73.0 %    Lymph% 5.0 (L) 18.0 - 48.0 %    Mono% 3.4 (L) 4.0 - 15.0 %    Eosinophil% 0.0 0.0 - 8.0 %    Basophil% 0.0 0.0 - 1.9 %    Differential Method Automated    Comprehensive metabolic panel    Collection Time: 11/18/17  4:25 AM   Result Value Ref Range    Sodium 143 136 - 145 mmol/L    Potassium 4.6 3.5 - 5.1 mmol/L    Chloride 105 95 - 110 mmol/L    CO2 28 23 - 29 mmol/L    Glucose 149 (H) 70 - 110 mg/dL    BUN, Bld 54 (H) 8 - 23 mg/dL    Creatinine 4.2 (H) 0.5 - 1.4 mg/dL    Calcium 8.8 8.7 - 10.5 mg/dL    Total Protein 5.8 (L) 6.0 - 8.4 g/dL    Albumin 2.9 (L) 3.5 - 5.2 g/dL    Total Bilirubin 0.5 0.1 - 1.0 mg/dL    Alkaline Phosphatase 60 55 - 135 U/L    AST 13 10 - 40 U/L    ALT 17 10 - 44 U/L    Anion Gap 10 8 - 16 mmol/L    eGFR if African American 16 (A) >60 mL/min/1.73 m^2    eGFR if non African American 14 (A) >60 mL/min/1.73 m^2   Magnesium    Collection Time: 11/18/17  4:25 AM   Result Value Ref Range    Magnesium 2.5 1.6 - 2.6 mg/dL   Phosphorus    Collection Time: 11/18/17  4:25 AM   Result Value Ref Range    Phosphorus 4.7 (H) 2.7 - 4.5 mg/dL   TSH    Collection Time: 11/18/17  4:25 AM   Result Value Ref Range    TSH 0.505 0.400 - 4.000 uIU/mL   Lipid panel    Collection Time: 11/18/17  4:25 AM   Result Value Ref Range    Cholesterol 149 120 - 199 mg/dL    Triglycerides 73 30 - 150 mg/dL    HDL 51 40 - 75 mg/dL    LDL Cholesterol 83.4 63.0 - 159.0 mg/dL    HDL/Chol Ratio 34.2 20.0 - 50.0 %    Total Cholesterol/HDL Ratio 2.9 2.0 - 5.0    Non-HDL Cholesterol 98 mg/dL   Iron and TIBC     Collection Time: 11/18/17  4:25 AM   Result Value Ref Range    Iron 23 (L) 45 - 160 ug/dL    Transferrin 162 (L) 200 - 375 mg/dL    TIBC 240 (L) 250 - 450 ug/dL    Saturated Iron 10 (L) 20 - 50 %   Ferritin    Collection Time: 11/18/17  4:25 AM   Result Value Ref Range    Ferritin 345 (H) 20.0 - 300.0 ng/mL   Vitamin B12    Collection Time: 11/18/17  4:25 AM   Result Value Ref Range    Vitamin B-12 450 210 - 950 pg/mL   Folate    Collection Time: 11/18/17  4:25 AM   Result Value Ref Range    Folate 10.9 4.0 - 24.0 ng/mL         RADIOLOGY STUDIES:  I have personally reviewed the images performed.     HEAD CT: diffuse atrophy; no acute abnormalities      Assessment:  P     61yo man with schizophrenia, bipolar disorder, chronic hepatitis C with cirrhosis, COPD, CAD, depression, DM2, HLD, HTN, h/o seizures, ?stroke, thyroid disease, and unspecified heart failure who presented to [facility] on 11/17/2017 with a primary complaint of chest pain and now under PEC for psychosis. Neurology consulted for psychosis and syncope. History and exam limited by patient's mental status, however, patient's long-standing history of psychosis and paranoia was confirmed via phone by multiple family members and patient has had frequent inpatient psychiatric hospitalizations. Patient's current episode of psychosis consistent with psychiatric history, and neurologic exam limited but non-focal. Head CT reviewed and did not show acute abnormalities. Details of syncope unable to be obtained from patient or family, but per chart review not concerning for seizures or primary neurologic etiology.     At this time neurology will sign off and defer further management to primary team and psychiatry.     Patient seen and discussed with staff, Dr. Batista. Recommendations discussed with primary team. Please do not hesitate to re-consult should patient's neurologic status deteriorate.       Soco Salter MD  [facility]rology,  PGY-3  11/18/2017

## 2017-11-18 NOTE — PLAN OF CARE
Problem: Patient Care Overview  Goal: Plan of Care Review  Outcome: Ongoing (interventions implemented as appropriate)  Patient on oxygen with documented flow.  Will attempt to wean per O2 order protocol. Will continue to monitor.

## 2017-11-18 NOTE — H&P
"[facility] Internal Medicine History and Physical - Resident Note    Admitting Team: Medicine Team B  Attending Physician: Dr. Lino  Resident: Dr. Robert Sullivan  Interns: Dr. Mundo Birmingham    Date of Admit: 11/17/2017    Chief Complaint     Shortness of Breath (shortness of breath that started one week ago.  Patient is supposed to be on home O2 and has not been on it.)     Subjective:      History of Present Illness:  Leif Ramirez is a 62 year old man who has a past medical history of anxiety, bipolar affective disorder, chronic hepatitis C with cirrhosis, COPD, CAD, depression, DM2, HLD, HTN, schizoaffective schizophrenia, seizures, stroke, thyroid disease, and unspecified heart failure who presented to [facility]/2017 with a primary complaint of chest pain for a few days.     Patient was hostile during the interview and frequently refused to answer questions. He also constantly alluded to a delusion that there is someone inside his house who keeps pushing him down. He is angry that the police and doctors will not help him get rid of this person. He stated that "if the police won't do something about the person in my house, I will get a loaded pistol and take care of it myself." He states that he has been having chest pain for many years and that it has worsened over the past week. He states that he is only at the hospital because the police and his neighbors made him come.     Patient refused to answer any other questions.     Past Medical History:  Past Medical History:   Diagnosis Date    Allergy     poison ivy    Anemia     Anxiety     Arthritis     Asthma     Bipolar affective     Cancer     blood and bone    CHF (congestive heart failure)     Chronic hepatitis C with cirrhosis     COPD (chronic obstructive pulmonary disease)     Coronary artery disease     Depression     Diabetes mellitus type II     Disorder of kidney and ureter     Elevated PSA     has had prostate problems but " unaware of his psa level    Encounter for blood transfusion     History of back injury     Chronic back pain    Hyperlipidemia     Hypertension     Internal hemorrhoid, bleeding 9/21/2015    Pancytopenia     Renal disorder     Schizo affective schizophrenia     Seizures     Stroke     Thyroid disease     Urinary tract infection        Past Surgical History:  Past Surgical History:   Procedure Laterality Date    ELBOW BURSA SURGERY      JOINT REPLACEMENT      ORIF FEMUR FRACTURE Right 8/2015       Allergies:  Review of patient's allergies indicates:   Allergen Reactions    Codeine      Other reaction(s): Nausea    Depakote [divalproex] Other (See Comments)     Thrombocytopenia    Haldol [haloperidol lactate] Other (See Comments)     Seizures    Methadone     Morphine     Naloxone     Opium     Propoxyphene     Stelazine [trifluoperazine]     Amoxicillin Rash       Home Medications:  Prior to Admission medications    Medication Sig Start Date End Date Taking? Authorizing Provider   OLANZapine (ZYPREXA) 15 MG Tab Take 15 mg by mouth nightly.   Yes Historical Provider, MD   hydrALAZINE (APRESOLINE) 10 MG tablet TAKE 1 TABLET(10 MG) BY MOUTH THREE TIMES DAILY 10/18/17 11/17/17 Yes Wale Mcguire MD   albuterol 90 mcg/actuation inhaler Inhale 1 puff into the lungs every 4 (four) hours as needed for Wheezing. 6/8/17 6/8/18  Wale Mcguire MD   amlodipine (NORVASC) 10 MG tablet Take 1 tablet (10 mg total) by mouth once daily. 9/18/17 9/18/18  Wale Mcguire MD   carvedilol (COREG) 25 MG tablet TAKE 1 TABLET(25 MG) BY MOUTH TWICE DAILY 6/28/17   Marie Hogan MD   gabapentin (NEURONTIN) 300 MG capsule Take 300 mg by mouth 3 (three) times daily.  6/30/15   Historical Provider, MD   hydrALAZINE (APRESOLINE) 10 MG tablet TAKE 1 TABLET(10 MG) BY MOUTH THREE TIMES DAILY 9/20/16   Wale Mcguire MD   nicotine (NICODERM CQ) 21 mg/24 hr PLACE ONE PATCH ONTO THE SKIN ONCE DAILY 6/28/17   Marie MO  MD Edison   omeprazole (PRILOSEC) 20 MG capsule Take 1 capsule (20 mg total) by mouth once daily. 8/15/17 8/15/18  Wale Mcguire MD   oxybutynin (DITROPAN) 5 MG Tab Take 1 tablet (5 mg total) by mouth every evening. One tablet before sleep 10/5/17 10/5/18  Wale Mcguire MD   tamsulosin (FLOMAX) 0.4 mg Cp24 TAKE 1 CAPSULE(0.4 MG) BY MOUTH EVERY DAY 8/28/17   Wale Mcguire MD   azithromycin (Z-JEANNE) 250 MG tablet  9/11/17 11/17/17  Historical Provider, MD   diclofenac (VOLTAREN) 25 MG TbEC Take 1 tablet (25 mg total) by mouth 3 (three) times daily as needed (pain). 8/15/17 11/17/17  Wale Mcguire MD   ferrous sulfate 325 mg (65 mg iron) Tab tablet TAKE ONE TABLET BY MOUTH ONCE DAILY WITH LUNCH 11/21/16 11/17/17  Ismael Edwards MD   ferrous sulfate 325 mg (65 mg iron) Tab tablet TAKE 1 TABLET BY MOUTH WITH LUNCH. 7/14/17 11/17/17  Ismael Edwards MD   fluoxetine (PROZAC) 20 MG capsule Take 1 capsule (20 mg total) by mouth once daily. 8/31/17 11/17/17  Wale Mcguire MD   FLUVIRIN [id number] 45 mcg (15 mcg x 3)/0.5 mL Susp ADM 0.5ML IM UTD 9/14/17 11/17/17  Historical Provider, MD   furosemide (LASIX) 80 MG tablet TK ONE T PO QAM. 12/27/16 11/17/17  Historical Provider, MD   hydrocodone-acetaminophen 5-325mg (NORCO) 5-325 mg per tablet Take 1 tablet by mouth 3 (three) times daily as needed for Pain. 10/18/17 11/17/17  Wale Mcguire MD   lorazepam (ATIVAN) 1 MG tablet Take 1 tablet (1 mg total) by mouth every 6 (six) hours as needed for Anxiety. 8/2/17 11/17/17  Wale Mcguire MD   metoprolol tartrate (LOPRESSOR) 50 MG tablet TK ONE T PO BID. 5/5/17 11/17/17  Wale Mcguire MD   mirtazapine (REMERON) 30 MG tablet TK ONE T PO HS PRF INSOMNIA 9/29/17 11/17/17  Historical Provider, MD   olanzapine (ZYPREXA) 5 MG tablet Take 5 mg by mouth once daily.  11/17/17  Historical Provider, MD   predniSONE (DELTASONE) 20 MG tablet TK 2 TS PO D FOR 5 DAYS 9/11/17 11/17/17  Historical Provider, MD       Family  "History:  Family History   Problem Relation Age of Onset    Hypertension Mother     Diabetes Mother     Transient ischemic attack Mother     Heart disease Mother      stent placement    Arthritis Mother     Heart disease Father     Hypertension Father     Diabetes Father     Stroke Father     Arthritis Sister     Hypertension Sister     Heart disease Maternal Grandmother     Diabetes Maternal Grandmother     Stroke Maternal Grandmother     Cancer Paternal Grandmother      breast    Heart disease Paternal Grandmother     Cancer Sister      brain    Prostate cancer Neg Hx     Kidney disease Neg Hx        Social History:  Social History   Substance Use Topics    Smoking status: Current Every Day Smoker     Packs/day: 0.50    Smokeless tobacco: Never Used    Alcohol use No       Review of Systems:  Pertinent positives and negatives are listed in HPI.  All other systems are reviewed and are negative.    Health Maintaince :   Primary Care Physician: Dr. Mcguire    Patient refused to discuss his vaccinations and cancer screenings.      Objective:   Last 24 Hour Vital Signs:  BP  Min: 160/84  Max: 183/91  Temp  Av.4 °F (36.9 °C)  Min: 98.4 °F (36.9 °C)  Max: 98.4 °F (36.9 °C)  Pulse  Av.2  Min: 76  Max: 82  Resp  Av.3  Min: 11  Max: 18  SpO2  Av.8 %  Min: 89 %  Max: 97 %  Height  Av' 8" (172.7 cm)  Min: 5' 8" (172.7 cm)  Max: 5' 8" (172.7 cm)  Weight  Av.2 kg (205 lb 7.5 oz)  Min: 86.2 kg (190 lb)  Max: 100.2 kg (220 lb 15 oz)  Body mass index is 33.59 kg/m².  No intake/output data recorded.    Physical Examination:  General: Alert and awake in NAD  Head:  Normocephalic and atraumatic  Eyes:  PERRL; EOMI with anicteric sclera and clear conjunctivae  Mouth:  Oropharynx clear and without exudate; moist mucous membranes  Cardio:  Regular rate and rhythm with normal S1 and S2; no murmurs or rubs  Resp:  Unlabored; no rhonchi; Bilateral crackles at lung bases and occasional " expiratory wheezes  Abdom: Soft, NTND with normoactive bowel sounds  Extrem: WWP with no clubbing or cyanosis; Bilateral pitting lower extremity edema  Skin:  No rashes, lesions, or color changes  Pulses: 2+ and symmetric distally  Neuro:  AAOx3; cooperative and pleasant with no focal deficits    Laboratory:  Most Recent Data:  CBC: Lab Results   Component Value Date    WBC 6.80 11/17/2017    HGB 9.3 (L) 11/17/2017    HCT 28.3 (L) 11/17/2017     (L) 11/17/2017    MCV 89 11/17/2017    RDW 14.5 11/17/2017     BMP: Lab Results   Component Value Date     11/17/2017    K 4.5 11/17/2017     11/17/2017    CO2 25 11/17/2017    BUN 56 (H) 11/17/2017    CREATININE 4.0 (H) 11/17/2017     (H) 11/17/2017    CALCIUM 8.5 (L) 11/17/2017    MG 2.4 12/14/2016    PHOS 4.0 04/06/2017     LFTs: Lab Results   Component Value Date    PROT 5.9 (L) 11/17/2017    ALBUMIN 3.3 (L) 11/17/2017    BILITOT 0.6 11/17/2017    AST 21 11/17/2017    ALKPHOS 78 11/17/2017    ALT 21 11/17/2017     (H) 03/11/2016     Coags:   Lab Results   Component Value Date    INR 1.0 11/17/2017     Urinalysis: Lab Results   Component Value Date    LABURIN No growth 06/02/2017    COLORU Yellow 10/22/2017    SPECGRAV 1.010 10/22/2017    NITRITE Negative 10/22/2017    KETONESU Negative 10/22/2017    UROBILINOGEN Negative 10/22/2017    WBCUA 0 10/22/2017       Trended Lab Data:    Recent Labs  Lab 11/17/17  1432   WBC 6.80   HGB 9.3*   HCT 28.3*   *   MCV 89   RDW 14.5      K 4.5      CO2 25   BUN 56*   CREATININE 4.0*   *   PROT 5.9*   ALBUMIN 3.3*   BILITOT 0.6   AST 21   ALKPHOS 78   ALT 21       Trended Cardiac Data:    Recent Labs  Lab 11/17/17  1432   TROPONINI 0.009   *       Microbiology Data:  None    Other Laboratory Data:  None    Other Results:  EKG (my interpretation):   Stable t-wave inversions in leads 3 and AvF; no signs of ischemia/infarction    Radiology:  Imaging Results          X-Ray  Chest AP Portable (Final result)  Result time 11/17/17 15:04:47    Final result by Marc Mason MD (11/17/17 15:04:47)                 Impression:     Mild CHF      Electronically signed by: MARC MASON MD  Date:     11/17/17  Time:    15:04              Narrative:    AP chest    Comparison: 10/22/17    Results: The cardiac silhouette is enlarged.  Low lung volume.  Mild increased pulmonary vascularity centrally.  No large focal area of airspace disease.  No pleural effusion.  No pneumothorax.                             X-Ray Hip 2 View Right (Final result)  Result time 11/17/17 15:08:40    Final result by Yoko Sequeira MD (11/17/17 15:08:40)                 Narrative:    Views right hip 11/17/2017    Indication:  Right hip pain    Findings:  There is no acute fracture or subluxation.  Postsurgical changes related to prior fixation are noted.  The soft tissues are unremarkable.      Conclusion:  No acute change      Electronically signed by: YOKO SEQUEIRA  Date:     11/17/17  Time:    15:08                                  Assessment:     Leif Ramirez . is a 62 y.o. male with:     Plan:     Acute hypoxic respiratory failure  - possibly 2/2 undiagnosed heart failure; last echo in 2015 WNL  - chest x-ray with enlarged cardiac silhouette and increased pulmonary vasculature  - O2 sats in ED to 86% on room air; now 97% on 2L NC  - will diurese with lasix 80 mg IV BID  - holding home carvedilol 25 mg BID  - echo ordered    COPD exacerbation   - patient with 40 pack year history  - increasing shortness of breath over past week with wheezes on lung exam  - will cover with azithromycin 250 mg daily  - prednisone 40 mg daily and duonebs Q4 PRN   - will check procal    Schizophrenia   - On admission, patient endorsing delusions of someone inside his house pushing him down  - continue home olanzapine 15 mg nightly  - will consult psychiatry     CKD  - Cr 4.0 on admission  - baseline appears to be  between 3.5 (6/2/2017) and 4.3 (9/13/2017)  - will check kidney ultrasound and urine electrolytes     Hypertension  - BP to 183/91 on admission  - continue home amlodipine 10 mg daily    Hepatitis C with cirrhosis  - LFTs WNL on admission  - Completed harvoni treatment in the past; unsure of current viral load  - has followed with hepatology in the past    Anemia, unspecified   - H/H 9.3/28.3 with MCV of 89 on admission  - Last anemia workup over six months ago  - will recheck iron panel, folate, B12, and TSH    Thrombocytopenia  - platelets 117 on admission; likely 2/2 chronic hepatitis C    Hyperglycemia  - glucose elevated to 165 on admission  - A1C in October 2017 4.8    GERD  - continue home pantoprazole 40 mg daily    BPH  - continue home tamsulosin 0.4 mg daily and oxybutinin 5 mg nightly    Diet: Cardiac; NPO at midnight  Prophylaxis: Heparin   Dispo: Pending chest pain work-up    Code Status:     Full    Mundo Birmingham  [facility] Internal Medicine HO-I  U Medicine Service    [facility] Medicine Hospitalist Pager numbers:   [facility] Hospitalist Medicine Team A (Stephanie/Nathen): [phone]  [facility] Hospitalist Medicine Team B (Holland/Rina):  [phone]

## 2017-11-18 NOTE — SIGNIFICANT EVENT
"Patient's nurse called to inform me that the patient was attempting to leave the hospital. Whenever I arrived the patient was in a wheelchair and attempting to roll down the hallway toward the elevator stating the he was going to go downstairs and call a cab to take him home despite not having a phone or any money. He was acting aggressive toward all staff and was yelling and cursing. He was also accusing staff of being the cause of his medical problems. In addition, last night during my admission interview, the patient stated "If the police won't do something about the person in my house, I will get a loaded pistol and take care of it myself." We believe that the patient has been having syncopal events at home and has developed a delusion that there is someone in his home who is pushing him to the ground. Patient has a history of schizophrenia and has been noncompliant with his medications.     We felt that the patient was a danger to both himself and others and have completed a PEC for him. He would not allow himself to be placed back in his room and continued to be unable to be redirected. He was placed in four-point restraints in order to prevent him from climbing out of bed and suffering another fall. Psychiatry has been consulted and we have ordered PRN antipsychotics. The patient is currently refusing all blood draws, medications, and imaging studies.    Mundo Birmingham M.D.  [facility] Internal Medicine -I  [facility] Internal Medicine Service Team B    "

## 2017-11-18 NOTE — PLAN OF CARE
11/18/17 1452   Discharge Assessment   Assessment Type Discharge Planning Assessment   Confirmed/corrected address and phone number on facesheet? Yes   Assessment information obtained from? Patient   Prior to hospitilization cognitive status: Inappropriate Behavior   Prior to hospitalization functional status: Partially Dependent   Current cognitive status: Inappropriate Behavior   Current Functional Status: Partially Dependent   Facility Arrived From: unknown   Lives With alone   Able to Return to Prior Arrangements no   Is patient able to care for self after discharge? Unable to determine at this time (comments)   Who are your caregiver(s) and their phone number(s)? Kelly Ramirez(mother)[phone]/ pt's [phone])didn't give her name   Patient's perception of discharge disposition psychiatric hospital   Readmission Within The Last 30 Days no previous admission in last 30 days   Patient currently being followed by outpatient case management? Unable to determine (comments)   Is the patient taking medications as prescribed? no   If no, which medications is patient not taking? psych meds   Does the patient receive services at the [facility]? No   Discharge Plan A Psychiatric hospital   Discharge Plan B Psychiatric hospital   Patient/Family In Agreement With Plan unable to assess   The Sw met with the pt to complete an assessment but the pt was very hostile during this encounter,stating everybody's out to get him and he asked the Sw not to ask him any questions b/c she has all his info in front of her. The pt started screaming loudly,being very aggressive with the Sw and dismissed her out of his room. Per medical records the pt was at [facility] 3 weeks ago and has had frequent in pt psychiatric hospitalizations.
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
## 2017-11-19 PROBLEM — K21.9 GERD (GASTROESOPHAGEAL REFLUX DISEASE): Status: ACTIVE | Noted: 2017-11-19

## 2017-11-19 PROBLEM — R73.9 HYPERGLYCEMIA: Status: ACTIVE | Noted: 2017-11-19

## 2017-11-19 PROBLEM — N40.0 BPH (BENIGN PROSTATIC HYPERPLASIA): Status: ACTIVE | Noted: 2017-11-19

## 2017-11-19 LAB
ALBUMIN SERPL BCP-MCNC: 2.9 G/DL
ALP SERPL-CCNC: 53 U/L
ALT SERPL W/O P-5'-P-CCNC: 14 U/L
ANION GAP SERPL CALC-SCNC: 11 MMOL/L
AST SERPL-CCNC: 14 U/L
BASOPHILS # BLD AUTO: 0.01 K/UL
BASOPHILS NFR BLD: 0.2 %
BILIRUB SERPL-MCNC: 0.4 MG/DL
BUN SERPL-MCNC: 54 MG/DL
CALCIUM SERPL-MCNC: 8.7 MG/DL
CHLORIDE SERPL-SCNC: 106 MMOL/L
CO2 SERPL-SCNC: 27 MMOL/L
CREAT SERPL-MCNC: 4.2 MG/DL
DIFFERENTIAL METHOD: ABNORMAL
EOSINOPHIL # BLD AUTO: 0 K/UL
EOSINOPHIL NFR BLD: 0.7 %
ERYTHROCYTE [DISTWIDTH] IN BLOOD BY AUTOMATED COUNT: 14.7 %
EST. GFR  (AFRICAN AMERICAN): 16 ML/MIN/1.73 M^2
EST. GFR  (NON AFRICAN AMERICAN): 14 ML/MIN/1.73 M^2
GLUCOSE SERPL-MCNC: 103 MG/DL
HCT VFR BLD AUTO: 25.7 %
HGB BLD-MCNC: 8.3 G/DL
LYMPHOCYTES # BLD AUTO: 0.8 K/UL
LYMPHOCYTES NFR BLD: 18.3 %
MAGNESIUM SERPL-MCNC: 2.1 MG/DL
MCH RBC QN AUTO: 29.2 PG
MCHC RBC AUTO-ENTMCNC: 32.3 G/DL
MCV RBC AUTO: 91 FL
MONOCYTES # BLD AUTO: 0.3 K/UL
MONOCYTES NFR BLD: 7.6 %
NEUTROPHILS # BLD AUTO: 3.3 K/UL
NEUTROPHILS NFR BLD: 73 %
PHOSPHATE SERPL-MCNC: 3.8 MG/DL
PLATELET # BLD AUTO: 125 K/UL
PMV BLD AUTO: 8.7 FL
POTASSIUM SERPL-SCNC: 3.8 MMOL/L
PROT SERPL-MCNC: 5.6 G/DL
RBC # BLD AUTO: 2.84 M/UL
SODIUM SERPL-SCNC: 144 MMOL/L
TROPONIN I SERPL DL<=0.01 NG/ML-MCNC: 0.02 NG/ML
WBC # BLD AUTO: 4.49 K/UL

## 2017-11-19 PROCEDURE — 25000242 PHARM REV CODE 250 ALT 637 W/ HCPCS: Performed by: STUDENT IN AN ORGANIZED HEALTH CARE EDUCATION/TRAINING PROGRAM

## 2017-11-19 PROCEDURE — 63600175 PHARM REV CODE 636 W HCPCS: Performed by: STUDENT IN AN ORGANIZED HEALTH CARE EDUCATION/TRAINING PROGRAM

## 2017-11-19 PROCEDURE — 99900035 HC TECH TIME PER 15 MIN (STAT)

## 2017-11-19 PROCEDURE — 94640 AIRWAY INHALATION TREATMENT: CPT

## 2017-11-19 PROCEDURE — 36415 COLL VENOUS BLD VENIPUNCTURE: CPT

## 2017-11-19 PROCEDURE — 83735 ASSAY OF MAGNESIUM: CPT

## 2017-11-19 PROCEDURE — 25000003 PHARM REV CODE 250: Performed by: STUDENT IN AN ORGANIZED HEALTH CARE EDUCATION/TRAINING PROGRAM

## 2017-11-19 PROCEDURE — 84100 ASSAY OF PHOSPHORUS: CPT

## 2017-11-19 PROCEDURE — S4991 NICOTINE PATCH NONLEGEND: HCPCS | Performed by: STUDENT IN AN ORGANIZED HEALTH CARE EDUCATION/TRAINING PROGRAM

## 2017-11-19 PROCEDURE — S0166 INJ OLANZAPINE 2.5MG: HCPCS | Performed by: DERMATOLOGY

## 2017-11-19 PROCEDURE — 11000001 HC ACUTE MED/SURG PRIVATE ROOM

## 2017-11-19 PROCEDURE — 85025 COMPLETE CBC W/AUTO DIFF WBC: CPT

## 2017-11-19 PROCEDURE — 84484 ASSAY OF TROPONIN QUANT: CPT

## 2017-11-19 PROCEDURE — 94761 N-INVAS EAR/PLS OXIMETRY MLT: CPT

## 2017-11-19 PROCEDURE — 80053 COMPREHEN METABOLIC PANEL: CPT

## 2017-11-19 PROCEDURE — 25000003 PHARM REV CODE 250: Performed by: DERMATOLOGY

## 2017-11-19 RX ORDER — HYDRALAZINE HYDROCHLORIDE 20 MG/ML
5 INJECTION INTRAMUSCULAR; INTRAVENOUS ONCE
Status: COMPLETED | OUTPATIENT
Start: 2017-11-19 | End: 2017-11-19

## 2017-11-19 RX ADMIN — OLANZAPINE 5 MG: 10 INJECTION, POWDER, FOR SOLUTION INTRAMUSCULAR at 04:11

## 2017-11-19 RX ADMIN — GABAPENTIN 300 MG: 300 CAPSULE ORAL at 08:11

## 2017-11-19 RX ADMIN — OLANZAPINE 5 MG: 10 INJECTION, POWDER, FOR SOLUTION INTRAMUSCULAR at 03:11

## 2017-11-19 RX ADMIN — TAMSULOSIN HYDROCHLORIDE 0.4 MG: 0.4 CAPSULE ORAL at 09:11

## 2017-11-19 RX ADMIN — HYDRALAZINE HYDROCHLORIDE 5 MG: 20 INJECTION INTRAMUSCULAR; INTRAVENOUS at 05:11

## 2017-11-19 RX ADMIN — IPRATROPIUM BROMIDE AND ALBUTEROL SULFATE 3 ML: .5; 3 SOLUTION RESPIRATORY (INHALATION) at 07:11

## 2017-11-19 RX ADMIN — FUROSEMIDE 80 MG: 10 INJECTION, SOLUTION INTRAMUSCULAR; INTRAVENOUS at 09:11

## 2017-11-19 RX ADMIN — IPRATROPIUM BROMIDE AND ALBUTEROL SULFATE 3 ML: .5; 3 SOLUTION RESPIRATORY (INHALATION) at 01:11

## 2017-11-19 RX ADMIN — GABAPENTIN 300 MG: 300 CAPSULE ORAL at 04:11

## 2017-11-19 RX ADMIN — OXYBUTYNIN CHLORIDE 5 MG: 5 TABLET ORAL at 08:11

## 2017-11-19 RX ADMIN — PREDNISONE 40 MG: 20 TABLET ORAL at 09:11

## 2017-11-19 RX ADMIN — AMLODIPINE BESYLATE 10 MG: 5 TABLET ORAL at 09:11

## 2017-11-19 RX ADMIN — PANTOPRAZOLE SODIUM 40 MG: 40 TABLET, DELAYED RELEASE ORAL at 09:11

## 2017-11-19 RX ADMIN — OLANZAPINE 15 MG: 10 TABLET, FILM COATED ORAL at 08:11

## 2017-11-19 RX ADMIN — IPRATROPIUM BROMIDE AND ALBUTEROL SULFATE 3 ML: .5; 3 SOLUTION RESPIRATORY (INHALATION) at 09:11

## 2017-11-19 RX ADMIN — HEPARIN SODIUM 5000 UNITS: 5000 INJECTION, SOLUTION INTRAVENOUS; SUBCUTANEOUS at 08:11

## 2017-11-19 RX ADMIN — FUROSEMIDE 80 MG: 10 INJECTION, SOLUTION INTRAMUSCULAR; INTRAVENOUS at 05:11

## 2017-11-19 RX ADMIN — LISINOPRIL 5 MG: 5 TABLET ORAL at 09:11

## 2017-11-19 RX ADMIN — LORAZEPAM 1 MG: 1 TABLET ORAL at 01:11

## 2017-11-19 NOTE — NURSING
Pt screaming because he was woke up for his vital signs.  The sitter states that he has been awake for a while.  /123. Pt is beating his chest and refusing Clonazepam because it makes his heart race.  Pt is yelling and cursing. Olanzapine 5mg IM given. Dr. Alfaro called and told about pt's pressure and agitation.  We will retake the BP when the pt calms down.    05:20: Pt called stating he is know having chest pain.  He will not rate it.  BP            190/110.  Pt is still agitated.  Dr. Alfaro called again.  Pt refuses to allow an EKG.    0529: Dr. Alfaro in room to see pt.  Pt is a bit calmer when talking to him.    0535: Hydralazine 5mg IVP given as ordered. Pt still yelling.    0615:  Pt sleeping quietly. /70.

## 2017-11-19 NOTE — PLAN OF CARE
Problem: Patient Care Overview  Goal: Plan of Care Review  Outcome: Ongoing (interventions implemented as appropriate)  Pt on room air in no apparent distress.  Breathing tx. Refused, no distress noted.  Will cont. To monitor.

## 2017-11-19 NOTE — NURSING
Pt yelling about how people are beating him up, the doctors don't know what they are doing, everybody lies and we are trying to sneak medicines by him.  He refuses to take everything but olanzapine po. He then spit out part of the pills after they dissolved in his mouth.  Olanzipine 5mg IM given. Pt is cursing and being verbally abusive. Pt told me not to come back in his room then called me back in within a few minutes.  Sitter at the bedside.

## 2017-11-19 NOTE — PLAN OF CARE
Problem: Patient Care Overview  Goal: Plan of Care Review  Outcome: Ongoing (interventions implemented as appropriate)  Pt was extremely agitated at the beginning and end of the shift.  Pt has been screaming using profanity and calling doctors and staff liars.  He refused several of his PO meds throughout the night. Pt slept for a few hours after getting Olanzapine 5mg IM. Pt was disturbing the other pts during the night.  Pt complained of CP this morning.  He refused to allow an EKG to be done.  Pt received Hydralazine 5mg IVP for his high BP which was effective.

## 2017-11-19 NOTE — PLAN OF CARE
Problem: Patient Care Overview  Goal: Plan of Care Review  Outcome: Ongoing (interventions implemented as appropriate)  Pt safety maintained. Bed in low and locked position. PEC sitter remains at bedside at all times. Pt given ativan po for elevated manic behavior. No acute distress noted.  Will continue to monitor.

## 2017-11-19 NOTE — SIGNIFICANT EVENT
Patient's nurse called to report patient awake and reporting chest pain. Upon entering room, patient was upright and yelling. He stated that he is very upset because he was sleeping well until the nurses woke him. When asked about the chest pain, patient reports that he has had it since he awoke. Patient was unable to be redirected. He refused to let me examine him and stated he will not have an EKG done. Patient received his Olanzapine PRN around 0430.      Dale Alfaro M.D.  [facility] Internal Medicine HO-1

## 2017-11-19 NOTE — CONSULTS
[facility]iology Consult Note        Attending Physician: Dr. Sykes  Fellow: Dr. Taylor    Date of Admit: 11/17/2017    Reason for Consult     Eval for HF    Subjective:      History of Present Illness:  Leif Ramirez Jr. is a 62 y.o. WM with pmh of schizophrenia, bipolar d/o, chronic hep C with cirrhosis, COPD, CAD?, DMII?, HLD, HTN, h/o seizures?, CVA? And CKD presents with SOB.    The patient is more cooperative but still tangential with inappropriate behavorial outbursts during my exam. The hx was obtained from chart review and the patient.    The patient is always SOB and states its his anxiety but does note increased dyspnea out of proportion to his baseline this past week. He denies orthopnea but endorses increased LE swelling these past few days.    Pt refused his ECHO and my exam yesterday.    Past Medical History:  Past Medical History:   Diagnosis Date    Allergy     poison ivy    Anemia     Anxiety     Arthritis     Asthma     Bipolar affective     Cancer     blood and bone    CHF (congestive heart failure)     Chronic hepatitis C with cirrhosis     COPD (chronic obstructive pulmonary disease)     Coronary artery disease     Depression     Diabetes mellitus type II     Disorder of kidney and ureter     Elevated PSA     has had prostate problems but unaware of his psa level    Encounter for blood transfusion     History of back injury     Chronic back pain    Hyperlipidemia     Hypertension     Internal hemorrhoid, bleeding 9/21/2015    Pancytopenia     Renal disorder     Schizo affective schizophrenia     Seizures     Stroke     Thyroid disease     Urinary tract infection        Past Surgical History:  Past Surgical History:   Procedure Laterality Date    ELBOW BURSA SURGERY      JOINT REPLACEMENT      ORIF FEMUR FRACTURE Right 8/2015       Allergies:  Review of patient's allergies indicates:   Allergen Reactions    Codeine      Other reaction(s): Nausea     Depakote [divalproex] Other (See Comments)     Thrombocytopenia    Haldol [haloperidol lactate] Other (See Comments)     Seizures    Methadone     Morphine     Naloxone     Opium     Propoxyphene     Stelazine [trifluoperazine]     Amoxicillin Rash       Home Medications:  Prior to Admission medications    Medication Sig Start Date End Date Taking? Authorizing Provider   OLANZapine (ZYPREXA) 15 MG Tab Take 15 mg by mouth nightly.   Yes Historical Provider, MD   albuterol 90 mcg/actuation inhaler Inhale 1 puff into the lungs every 4 (four) hours as needed for Wheezing. 6/8/17 6/8/18  Wale Mcguire MD   amlodipine (NORVASC) 10 MG tablet Take 1 tablet (10 mg total) by mouth once daily. 9/18/17 9/18/18  Wale Mcguire MD   carvedilol (COREG) 25 MG tablet TAKE 1 TABLET(25 MG) BY MOUTH TWICE DAILY 6/28/17   Marie Hogan MD   gabapentin (NEURONTIN) 300 MG capsule Take 300 mg by mouth 3 (three) times daily.  6/30/15   Historical Provider, MD   hydrALAZINE (APRESOLINE) 10 MG tablet TAKE 1 TABLET(10 MG) BY MOUTH THREE TIMES DAILY 9/20/16   Wale Mcguire MD   nicotine (NICODERM CQ) 21 mg/24 hr PLACE ONE PATCH ONTO THE SKIN ONCE DAILY 6/28/17   Marie Hogan MD   omeprazole (PRILOSEC) 20 MG capsule Take 1 capsule (20 mg total) by mouth once daily. 8/15/17 8/15/18  Wale Mcguire MD   oxybutynin (DITROPAN) 5 MG Tab Take 1 tablet (5 mg total) by mouth every evening. One tablet before sleep 10/5/17 10/5/18  Wale Mcguire MD   tamsulosin (FLOMAX) 0.4 mg Cp24 TAKE 1 CAPSULE(0.4 MG) BY MOUTH EVERY DAY 8/28/17   Wale Mcguire MD       Family History:  Family History   Problem Relation Age of Onset    Hypertension Mother     Diabetes Mother     Transient ischemic attack Mother     Heart disease Mother      stent placement    Arthritis Mother     Heart disease Father     Hypertension Father     Diabetes Father     Stroke Father     Arthritis Sister     Hypertension Sister     Heart disease Maternal  Grandmother     Diabetes Maternal Grandmother     Stroke Maternal Grandmother     Cancer Paternal Grandmother      breast    Heart disease Paternal Grandmother     Cancer Sister      brain    Prostate cancer Neg Hx     Kidney disease Neg Hx        Social History:  Social History   Substance Use Topics    Smoking status: Current Every Day Smoker     Packs/day: 0.50    Smokeless tobacco: Never Used    Alcohol use No       Review of Systems:  Pertinent items are noted in HPI. All other systems are reviewed and are negative.       Objective:   Last 24 Hour Vital Signs:  BP  Min: 137/96  Max: 190/110  Temp  Av.9 °F (37.2 °C)  Min: 97.3 °F (36.3 °C)  Max: 100.7 °F (38.2 °C)  Pulse  Av.1  Min: 77  Max: 105  Resp  Av.7  Min: 12  Max: 20  SpO2  Av.5 %  Min: 95 %  Max: 96 %  Body mass index is 29.73 kg/m².  I/O last 3 completed shifts:  In: 865 [P.O.:865]  Out: 1100 [Urine:1100]    Physical Examination:  GEN: Alert, oriented to person and place  HEENT: JVP ~ 8, partially edentulous, PERRL  CV: RRR, AMIRA cresc-descres II/VI RUSB with rads to carotid. A2/P2 heard and not late peaking. No parvus tardus on exam  RESP: BBS, CTAB  ABD: NT/ND/S  EXTREM:+2 DP's, no LE edema  Neuro: emotional labile, tangential      Laboratory:  Most Recent Data:  CBC: Lab Results   Component Value Date    WBC 4.49 2017    HGB 8.3 (L) 2017    HCT 25.7 (L) 2017     (L) 2017    MCV 91 2017    RDW 14.7 (H) 2017     BMP: Lab Results   Component Value Date     2017    K 3.8 2017     2017    CO2 27 2017    BUN 54 (H) 2017    CREATININE 4.2 (H) 2017     2017    CALCIUM 8.7 2017    MG 2.1 2017    PHOS 3.8 2017     LFTs: Lab Results   Component Value Date    PROT 5.6 (L) 2017    ALBUMIN 2.9 (L) 2017    BILITOT 0.4 2017    AST 14 2017    ALKPHOS 53 (L) 2017    ALT 14 2017      (H) 03/11/2016     Coags:   Lab Results   Component Value Date    INR 1.0 11/17/2017     FLP: Lab Results   Component Value Date    CHOL 149 11/18/2017    HDL 51 11/18/2017    LDLCALC 83.4 11/18/2017    TRIG 73 11/18/2017    CHOLHDL 34.2 11/18/2017     DM: Lab Results   Component Value Date    HGBA1C 4.8 10/05/2017    HGBA1C 4.7 04/15/2015    HGBA1C 4.8 12/18/2012    LDLCALC 83.4 11/18/2017    CREATININE 4.2 (H) 11/19/2017     Thyroid: Lab Results   Component Value Date    TSH 0.505 11/18/2017     Anemia: Lab Results   Component Value Date    IRON 23 (L) 11/18/2017    TIBC 240 (L) 11/18/2017    FERRITIN 345 (H) 11/18/2017    GOMSCODA53 450 11/18/2017    FOLATE 10.9 11/18/2017     Cardiac: Lab Results   Component Value Date    TROPONINI 0.018 11/19/2017     (H) 11/17/2017     Urinalysis: Lab Results   Component Value Date    LABURIN No growth 06/02/2017    COLORU Yellow 10/22/2017    SPECGRAV 1.010 10/22/2017    NITRITE Negative 10/22/2017    KETONESU Negative 10/22/2017    UROBILINOGEN Negative 10/22/2017    WBCUA 0 10/22/2017       Trended Lab Data:    Recent Labs  Lab 11/17/17  1432 11/18/17  0425 11/19/17  0705   WBC 6.80 4.37 4.49   HGB 9.3* 8.5* 8.3*   HCT 28.3* 25.6* 25.7*   * 116* 125*   MCV 89 90 91   RDW 14.5 14.5 14.7*    143 144   K 4.5 4.6 3.8    105 106   CO2 25 28 27   BUN 56* 54* 54*   CREATININE 4.0* 4.2* 4.2*   * 149* 103   PROT 5.9* 5.8* 5.6*   ALBUMIN 3.3* 2.9* 2.9*   BILITOT 0.6 0.5 0.4   AST 21 13 14   ALKPHOS 78 60 53*   ALT 21 17 14       Trended Cardiac Data:    Recent Labs  Lab 11/17/17  1432 11/17/17 2007 11/17/17  2338 11/19/17  0705   TROPONINI 0.009 0.011 0.015 0.018   *  --   --   --      Other Results:  EKG (my interpretation): NSR    Radiology:  Imaging Results          CT Head Without Contrast (Final result)  Result time 11/18/17 11:49:14    Final result by Chetna Saeed MD (11/18/17 11:49:14)                 Impression:      Stable, advanced cerebral volume loss with no acute findings today.      Electronically signed by: HARSHA DO MD  Date:     11/18/17  Time:    11:49              Narrative:    Comparison: 12/29/15.    Technique: Contiguous 5 mm axial images were obtained from the vertex to the skull base without the administration of contrast.  Sagittal and coronal reformats were also submitted.      Findings: There is advanced cerebral volume loss affecting the frontoparietal regions which is unchanged.  There is associated compensatory dilation of the bilateral lateral ventricles.  There is normal brain parenchymal attenuation with no intra-axial or extra-axial mass or hemorrhage.  There is no evidence for acute ischemia or infarct.  The gray-white matter differentiation is preserved.  The CSF containing spaces maintained normal size, symmetry and volume.  The pneumatized aspect of the skull and skull base show no abnormalities.  The osseous and soft tissue structures are normal.                             US Retroperitoneal Complete (Final result)  Result time 11/17/17 20:15:29    Final result by Ruben Gallo MD (11/17/17 20:15:29)                 Impression:        There is sequela bilateral chronic renal disease, noting the right kidney is slightly smaller than the left, with increased resistive index, could reflect acute on chronic process.              Electronically signed by: RUBEN GALLO MD  Date:     11/17/17  Time:    20:15              Narrative:    Comparison: CT 2/26/2017, ultrasound 6/9/2016    Findings:    The right kidney measures 9.3 cm, the left measures 10.0 cm.  There is no hydronephrosis bilaterally.  There is a subcentimeter cyst within the right kidney, upper pole.  There is mildly increased renal echogenicity.  There is no nephrolithiasis.  Color flow and Doppler evaluation  demonstrates decreased perfusion of the kidneys bilaterally with resistive index of 0.72 on the right and 0.65 on the  left.  Limited images of the urinary bladder are unremarkable.                             US Lower Extremity Veins Bilateral (Final result)  Result time 11/17/17 19:50:41    Final result by Berhane Doss MD (11/17/17 19:50:41)                 Impression:        No evidence of acute deep venous thrombosis bilaterally.          Electronically signed by: BERHANE DOSS MD  Date:     11/17/17  Time:    19:50              Narrative:    Comparison: None    Clinical history: Rule out DVT    Findings:    Duplex and color flow Doppler evaluation does not reveal any evidence of acute venous thrombosis in the common femoral, superficial femoral, greater saphenous, popliteal, peroneal, anterior tibial and posterior tibial veins bilaterally.  There is no reflux to suggest valvular incompetence.                             X-Ray Chest AP Portable (Final result)  Result time 11/17/17 15:04:47    Final result by Marc Mason MD (11/17/17 15:04:47)                 Impression:     Mild CHF      Electronically signed by: MARC MASON MD  Date:     11/17/17  Time:    15:04              Narrative:    AP chest    Comparison: 10/22/17    Results: The cardiac silhouette is enlarged.  Low lung volume.  Mild increased pulmonary vascularity centrally.  No large focal area of airspace disease.  No pleural effusion.  No pneumothorax.                             X-Ray Hip 2 View Right (Final result)  Result time 11/17/17 15:08:40    Final result by Yoko Sequeira MD (11/17/17 15:08:40)                 Narrative:    Views right hip 11/17/2017    Indication:  Right hip pain    Findings:  There is no acute fracture or subluxation.  Postsurgical changes related to prior fixation are noted.  The soft tissues are unremarkable.      Conclusion:  No acute change      Electronically signed by: YOKO SEQUEIRA  Date:     11/17/17  Time:    15:08                                Assessment:     Leif Ramirez  is a 62 y.o. male  wtih pmh of schizophrenia, bipolar d/o, chronic hep C with cirrhosis, COPD, CAD?, DMII?, HLD, HTN, h/o seizures?, CVA? And CKD presents with SOB 2/2 to CHF.     Plan:     -Will f/u echo. I suspect HFpEF 2/2 to poor renal fxn. Continue diuresis and try to obtain accurate I/O's if possible. Will start GDMT is LV dysfxn is present. Needs a nephrologist to f/u closely  -Needs BP control, goal is < 140/90  -Please re-order ECHO for tomorrow - pt willing to do.    Will be available if EF nml, otherwise will continue to follow to maximize GDMT if LV dysfxn is present.     Clark Taylor MD  [facility] Cardiology Service   Pager # [phone]
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
## 2017-11-20 VITALS
WEIGHT: 195.56 LBS | BODY MASS INDEX: 29.64 KG/M2 | OXYGEN SATURATION: 100 % | SYSTOLIC BLOOD PRESSURE: 153 MMHG | DIASTOLIC BLOOD PRESSURE: 99 MMHG | RESPIRATION RATE: 20 BRPM | HEART RATE: 89 BPM | TEMPERATURE: 98 F | HEIGHT: 68 IN

## 2017-11-20 LAB
ALBUMIN SERPL BCP-MCNC: 3.2 G/DL
ALP SERPL-CCNC: 63 U/L
ALT SERPL W/O P-5'-P-CCNC: 17 U/L
ANION GAP SERPL CALC-SCNC: 12 MMOL/L
AST SERPL-CCNC: 17 U/L
BASOPHILS # BLD AUTO: 0 K/UL
BASOPHILS NFR BLD: 0 %
BILIRUB SERPL-MCNC: 0.3 MG/DL
BUN SERPL-MCNC: 57 MG/DL
CALCIUM SERPL-MCNC: 8.7 MG/DL
CHLORIDE SERPL-SCNC: 100 MMOL/L
CO2 SERPL-SCNC: 28 MMOL/L
CREAT SERPL-MCNC: 4.3 MG/DL
DIFFERENTIAL METHOD: ABNORMAL
EOSINOPHIL # BLD AUTO: 0 K/UL
EOSINOPHIL NFR BLD: 0 %
ERYTHROCYTE [DISTWIDTH] IN BLOOD BY AUTOMATED COUNT: 14.4 %
EST. GFR  (AFRICAN AMERICAN): 16 ML/MIN/1.73 M^2
EST. GFR  (NON AFRICAN AMERICAN): 14 ML/MIN/1.73 M^2
GLUCOSE SERPL-MCNC: 135 MG/DL
HCT VFR BLD AUTO: 25.8 %
HGB BLD-MCNC: 8.5 G/DL
LYMPHOCYTES # BLD AUTO: 0.4 K/UL
LYMPHOCYTES NFR BLD: 7.3 %
MAGNESIUM SERPL-MCNC: 2.1 MG/DL
MCH RBC QN AUTO: 29.9 PG
MCHC RBC AUTO-ENTMCNC: 32.9 G/DL
MCV RBC AUTO: 91 FL
MONOCYTES # BLD AUTO: 0.4 K/UL
MONOCYTES NFR BLD: 7.5 %
NEUTROPHILS # BLD AUTO: 4.7 K/UL
NEUTROPHILS NFR BLD: 85 %
PHOSPHATE SERPL-MCNC: 4.6 MG/DL
PLATELET # BLD AUTO: 125 K/UL
PMV BLD AUTO: 8.9 FL
POCT GLUCOSE: 113 MG/DL (ref 70–110)
POTASSIUM SERPL-SCNC: 3.8 MMOL/L
PROT SERPL-MCNC: 6 G/DL
RBC # BLD AUTO: 2.84 M/UL
RETIRED EF AND QEF - SEE NOTES: 60 (ref 55–65)
SODIUM SERPL-SCNC: 140 MMOL/L
WBC # BLD AUTO: 5.49 K/UL

## 2017-11-20 PROCEDURE — 85025 COMPLETE CBC W/AUTO DIFF WBC: CPT

## 2017-11-20 PROCEDURE — S4991 NICOTINE PATCH NONLEGEND: HCPCS | Performed by: STUDENT IN AN ORGANIZED HEALTH CARE EDUCATION/TRAINING PROGRAM

## 2017-11-20 PROCEDURE — 94640 AIRWAY INHALATION TREATMENT: CPT

## 2017-11-20 PROCEDURE — 80053 COMPREHEN METABOLIC PANEL: CPT

## 2017-11-20 PROCEDURE — 84100 ASSAY OF PHOSPHORUS: CPT

## 2017-11-20 PROCEDURE — 36415 COLL VENOUS BLD VENIPUNCTURE: CPT

## 2017-11-20 PROCEDURE — 86580 TB INTRADERMAL TEST: CPT | Performed by: INTERNAL MEDICINE

## 2017-11-20 PROCEDURE — 94761 N-INVAS EAR/PLS OXIMETRY MLT: CPT

## 2017-11-20 PROCEDURE — 63600175 PHARM REV CODE 636 W HCPCS: Performed by: INTERNAL MEDICINE

## 2017-11-20 PROCEDURE — 93307 TTE W/O DOPPLER COMPLETE: CPT

## 2017-11-20 PROCEDURE — 25000242 PHARM REV CODE 250 ALT 637 W/ HCPCS: Performed by: STUDENT IN AN ORGANIZED HEALTH CARE EDUCATION/TRAINING PROGRAM

## 2017-11-20 PROCEDURE — 63600175 PHARM REV CODE 636 W HCPCS: Performed by: STUDENT IN AN ORGANIZED HEALTH CARE EDUCATION/TRAINING PROGRAM

## 2017-11-20 PROCEDURE — 83735 ASSAY OF MAGNESIUM: CPT

## 2017-11-20 PROCEDURE — 25000003 PHARM REV CODE 250: Performed by: STUDENT IN AN ORGANIZED HEALTH CARE EDUCATION/TRAINING PROGRAM

## 2017-11-20 RX ORDER — FUROSEMIDE 80 MG/1
80 TABLET ORAL DAILY
COMMUNITY
End: 2017-12-14 | Stop reason: SDUPTHER

## 2017-11-20 RX ADMIN — HEPARIN SODIUM 5000 UNITS: 5000 INJECTION, SOLUTION INTRAVENOUS; SUBCUTANEOUS at 09:11

## 2017-11-20 RX ADMIN — NICOTINE 1 PATCH: 21 PATCH, EXTENDED RELEASE TRANSDERMAL at 09:11

## 2017-11-20 RX ADMIN — IPRATROPIUM BROMIDE AND ALBUTEROL SULFATE 3 ML: .5; 3 SOLUTION RESPIRATORY (INHALATION) at 02:11

## 2017-11-20 RX ADMIN — LISINOPRIL 5 MG: 5 TABLET ORAL at 09:11

## 2017-11-20 RX ADMIN — FUROSEMIDE 80 MG: 10 INJECTION, SOLUTION INTRAMUSCULAR; INTRAVENOUS at 09:11

## 2017-11-20 RX ADMIN — IPRATROPIUM BROMIDE AND ALBUTEROL SULFATE 3 ML: .5; 3 SOLUTION RESPIRATORY (INHALATION) at 07:11

## 2017-11-20 RX ADMIN — PREDNISONE 40 MG: 20 TABLET ORAL at 09:11

## 2017-11-20 RX ADMIN — PANTOPRAZOLE SODIUM 40 MG: 40 TABLET, DELAYED RELEASE ORAL at 09:11

## 2017-11-20 RX ADMIN — AMLODIPINE BESYLATE 10 MG: 5 TABLET ORAL at 09:11

## 2017-11-20 RX ADMIN — TUBERCULIN PURIFIED PROTEIN DERIVATIVE 5 UNITS: 5 INJECTION INTRADERMAL at 04:11

## 2017-11-20 RX ADMIN — LORAZEPAM 1 MG: 1 TABLET ORAL at 12:11

## 2017-11-20 RX ADMIN — TAMSULOSIN HYDROCHLORIDE 0.4 MG: 0.4 CAPSULE ORAL at 09:11

## 2017-11-20 RX ADMIN — LORAZEPAM 1 MG: 1 TABLET ORAL at 09:11

## 2017-11-20 RX ADMIN — GABAPENTIN 300 MG: 300 CAPSULE ORAL at 06:11

## 2017-11-20 RX ADMIN — GABAPENTIN 300 MG: 300 CAPSULE ORAL at 02:11

## 2017-11-20 NOTE — DISCHARGE INSTRUCTIONS
Kidney Failure, Coping with (English) View Edit Remove  Kidney Failure, Treatment Options for (English) View Edit Remove  4 Steps for Eating Healthier (English) View Edit Remove  Eating Healthy on the Run (English) View Edit Remove  Eating Heart-Healthy Foods (English) View Edit Remove  Eating Out, Healthy Tips for (English) View Edit Remove  Eating Out, More Healthy Tips (English) View Edit Remove  Chronic Obstructive Pulmonary Disease (COPD), Discharge Instructions (English) View Edit Remove

## 2017-11-20 NOTE — PLAN OF CARE
Problem: Patient Care Overview  Goal: Plan of Care Review  Pt on room air with SpO2 100 %. Treatment given with no adverse reactions. No respiratory distress noted. Will continue to monitor.

## 2017-11-20 NOTE — CONSULTS
Discharge this patient to home when medically stable.  Patient has reached his baseline.  Follow up with Dr. Avendano

## 2017-11-20 NOTE — NURSING
Pt was PEC today @ 0804. Pt attempted to leave. Md at bedside. Md pecd pt. Security called pt yelling in hallway. Pt was placed in four point soft restraints. Will continue to monitor.
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
## 2017-11-20 NOTE — NURSING
Pt was loud and boisterous at the beginning of the shift. He was angry, delusional and extremely uncooperative at times. The pt did have some lucid moments when he did make sense and I was able to hold a normal conversation with him for about 5 minutes. He spoke about the economy and the banking system. Then, returned to his delusional state. I did spend some time talking with the patient and was able to gain his trust.  I did have to agree with some of the things he was saying so that he was more welcoming to my presence. He did state that he liked me and enjoyed talking to me. I was able to get him to take his medications earlier in the night but he did not want his TB test at that time. I asked him to be quiet and to go to sleep because it was getting late. I told him I needed time to find the required ninjas that we needed to keep us safe.He seem to relate to Ninja's and safety and became more trusting of me and not so loud and angry. He agreed and I gave him ativan 1 mg po and asked him to lay down and go to sleep and he did as I asked. 0241 Pt remains asleep at this time and sitter remains at bedside. No further incidents since I medicated pt with po ativan.  0444 Placed TB skin test to pts right lower forearm without incident.

## 2017-11-20 NOTE — NURSING
"D/C IV site. No active bleeding noted. Discharge instruction sheets and educational printouts given to pt and discussed thoroughly with patient. Patient verbalized clear understanding of all discussed. Patient d/c'd via w/c with all of personal belongings to front of hospital for friend to  to bring home via car. At present no distress noted. Patient remains at "baseline" psych status- cont to have hallucinations and delusions.   "

## 2017-11-20 NOTE — PLAN OF CARE
Per Dr. Dc, pt at baseline and can discharge home when medically cleared. TN spoke to primary team who informed TN they are going to discharge pt home today.Pt has appt scheduled with his psychiatrist (Dr. Avendano) tomorrow and prefers to keep appt. PCP appt Monday . TN called pt's sister Lakisha at pt's request to come pick him up but Lakisha informed Tn she is sick and unable to leave house. Tn informed pt and pt then requested pt call his friend Donis([phone]). Donis informed Tn he can  pt at 4pm if pt at entrance of hospital. Tn informed CONSUELO Ascencio floor nurse and she informed TN she will have someone escort pt down for that time.      TN informed pt of PCC ; LACE 81, hx: COPD.  Pt REFUSES PCC appt and prefers to see his PCP on Monday as scheduled.     11/20/17 1433   Final Note   Assessment Type Final Discharge Note   Discharge Disposition Home   What phone number can be called within the next 1-3 days to see how you are doing after discharge? [phone]   Hospital Follow Up  Appt(s) scheduled? Yes   Discharge plans and expectations educations in teach back method with documentation complete? Yes   Right Care Referral Info   Post Acute Recommendation No Care

## 2017-11-21 ENCOUNTER — OUTPATIENT CASE MANAGEMENT (OUTPATIENT)
Dept: ADMINISTRATIVE | Facility: OTHER | Age: 62
End: 2017-11-21

## 2017-11-21 LAB
AMPHETAMINES SERPL QL: NEGATIVE
BARBITURATES SERPL QL SCN: NEGATIVE
BENZODIAZ SERPL QL: NEGATIVE
CANNABINOIDS SERPL QL: NEGATIVE
COCAINE, BLOOD: NEGATIVE
ETHANOL SERPL-MCNC: NEGATIVE MG/DL
METHADONE SERPL QL SCN: NEGATIVE
OPIATES SERPL QL SCN: POSITIVE
PCP SERPL QL SCN: NEGATIVE
PROPOXYPH SERPL QL: NEGATIVE
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
## 2017-11-21 NOTE — PROGRESS NOTES
Thank you for the referral. The following patient has been assigned to Debbie Donaldson RN with [facility] for high risk screening.    Reason for referral: Other emphysema      Please contact [facility] at [phone] with any questions.    Thank you,  Jenny Guzman
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
## 2017-11-21 NOTE — PROGRESS NOTES
[facility] CARDIOLOGY Progress Note    Subjective:      Leif Ramirez Jr. is a 62 y.o. male who is being followed by the [facility] Cardiology service for dyspnea.     Objective:   Last 24 Hour Vital Signs:  BP  Min: 153/99  Max: 153/99  Temp  Av.3 °F (36.8 °C)  Min: 98.3 °F (36.8 °C)  Max: 98.3 °F (36.8 °C)  Pulse  Av.5  Min: 89  Max: 90  Resp  Av  Min: 20  Max: 20  SpO2  Av %  Min: 100 %  Max: 100 %  No intake/output data recorded.    Physical Examination:  Regular rhythm.  Clear lungs.  No edema.  Good pulses.    Laboratory:  Most Recent Data:  CBC: Lab Results   Component Value Date    WBC 5.49 2017    HGB 8.5 (L) 2017    HCT 25.8 (L) 2017     (L) 2017    MCV 91 2017    RDW 14.4 2017     BMP: Lab Results   Component Value Date     2017    K 3.8 2017     2017    CO2 28 2017    BUN 57 (H) 2017     (H) 2017    CALCIUM 8.7 2017    MG 2.1 2017    PHOS 4.6 (H) 2017     LFTs: Lab Results   Component Value Date    PROT 6.0 2017    ALBUMIN 3.2 (L) 2017    BILITOT 0.3 2017    AST 17 2017    ALKPHOS 63 2017    ALT 17 2017     (H) 2016     Coags:   Lab Results   Component Value Date    INR 1.0 2017     FLP: Lab Results   Component Value Date    CHOL 149 2017    HDL 51 2017    LDLCALC 83.4 2017    TRIG 73 2017    CHOLHDL 34.2 2017     DM: Lab Results   Component Value Date    HGBA1C 4.8 10/05/2017    HGBA1C 4.7 04/15/2015    HGBA1C 4.8 2012    LDLCALC 83.4 2017    CREATININE 4.3 (H) 2017     Thyroid: Lab Results   Component Value Date    TSH 0.505 2017     Anemia: Lab Results   Component Value Date    IRON 23 (L) 2017    TIBC 240 (L) 2017    FERRITIN 345 (H) 2017    OXPOQQGG29 450 2017    FOLATE 10.9 2017     Cardiac: Lab Results   Component Value Date    TROPONINI  0.018 11/19/2017     (H) 11/17/2017     Urinalysis: Lab Results   Component Value Date    LABURIN No growth 06/02/2017    COLORU Yellow 10/22/2017    SPECGRAV 1.010 10/22/2017    NITRITE Negative 10/22/2017    KETONESU Negative 10/22/2017    UROBILINOGEN Negative 10/22/2017       Trended Lab Data:    Recent Labs  Lab 11/18/17  0425 11/19/17  0705 11/20/17  0255   WBC 4.37 4.49 5.49   HGB 8.5* 8.3* 8.5*   HCT 25.6* 25.7* 25.8*   * 125* 125*   MCV 90 91 91   RDW 14.5 14.7* 14.4    144 140   K 4.6 3.8 3.8    106 100   CO2 28 27 28   BUN 54* 54* 57*   * 103 135*   CALCIUM 8.8 8.7 8.7   PROT 5.8* 5.6* 6.0   ALBUMIN 2.9* 2.9* 3.2*   BILITOT 0.5 0.4 0.3   AST 13 14 17   ALKPHOS 60 53* 63   ALT 17 14 17       Trended Cardiac Data:    Recent Labs  Lab 11/17/17  1432 11/17/17 2007 11/17/17  2338 11/19/17  0705   TROPONINI 0.009 0.011 0.015 0.018   *  --   --   --          Other Results:  Radiology:  X-ray Hip 2 View Right    Result Date: 11/17/2017  Views right hip 11/17/2017 Indication: Right hip pain Findings: There is no acute fracture or subluxation.  Postsurgical changes related to prior fixation are noted.  The soft tissues are unremarkable.  Conclusion: No acute change Electronically signed by: YOKO BRYANT Date:     11/17/17 Time:    15:08     Ct Head Without Contrast    Result Date: 11/18/2017  Comparison: 12/29/15. Technique: Contiguous 5 mm axial images were obtained from the vertex to the skull base without the administration of contrast.  Sagittal and coronal reformats were also submitted.  Findings: There is advanced cerebral volume loss affecting the frontoparietal regions which is unchanged.  There is associated compensatory dilation of the bilateral lateral ventricles.  There is normal brain parenchymal attenuation with no intra-axial or extra-axial mass or hemorrhage.  There is no evidence for acute ischemia or infarct.  The gray-white matter differentiation is  preserved.  The CSF containing spaces maintained normal size, symmetry and volume.  The pneumatized aspect of the skull and skull base show no abnormalities.  The osseous and soft tissue structures are normal.     Stable, advanced cerebral volume loss with no acute findings today. Electronically signed by: HARSHA DO MD Date:     11/18/17 Time:    11:49     Us Retroperitoneal Complete    Result Date: 11/17/2017  Comparison: CT 2/26/2017, ultrasound 6/9/2016 Findings: The right kidney measures 9.3 cm, the left measures 10.0 cm.  There is no hydronephrosis bilaterally.  There is a subcentimeter cyst within the right kidney, upper pole.  There is mildly increased renal echogenicity.  There is no nephrolithiasis.  Color flow and Doppler evaluation  demonstrates decreased perfusion of the kidneys bilaterally with resistive index of 0.72 on the right and 0.65 on the left.  Limited images of the urinary bladder are unremarkable.    There is sequela bilateral chronic renal disease, noting the right kidney is slightly smaller than the left, with increased resistive index, could reflect acute on chronic process.  Electronically signed by: RUBEN DSOS MD Date:     11/17/17 Time:    20:15     X-ray Chest Ap Portable    Result Date: 11/17/2017  AP chest Comparison: 10/22/17 Results: The cardiac silhouette is enlarged.  Low lung volume.  Mild increased pulmonary vascularity centrally.  No large focal area of airspace disease.  No pleural effusion.  No pneumothorax.     Mild CHF Electronically signed by: MARC CHAWLA MD Date:     11/17/17 Time:    15:04     X-ray Chest Ap Portable    Result Date: 10/22/2017  XR Chest 10/22/17 16:46:10 Accession #: [id number] CLINICAL INDICATION: 62 year old M with  cough TECHNIQUE: Single frontal chest radiograph. COMPARISON:  09/13/2017 FINDINGS: The cardiomediastinal silhouette is upper normal in size and midline. Atherosclerotic calcification of the aortic arch. Pulmonary  vascularity appears within normal limits. The lungs appear stable, again noting a 4-5 cm cyst in the right lung base. No confluent pulmonary parenchymal opacity. No pleural fluid. No pneumothorax.    Stable exam. No compelling evidence of active cardiopulmonary disease. Electronically signed by: OLE ANDERSON MD Date:     10/22/17 Time:    16:53     Us Lower Extremity Veins Bilateral    Result Date: 11/17/2017  Comparison: None Clinical history: Rule out DVT Findings: Duplex and color flow Doppler evaluation does not reveal any evidence of acute venous thrombosis in the common femoral, superficial femoral, greater saphenous, popliteal, peroneal, anterior tibial and posterior tibial veins bilaterally.  There is no reflux to suggest valvular incompetence.    No evidence of acute deep venous thrombosis bilaterally. Electronically signed by: RUEBN DOSS MD Date:     11/17/17 Time:    19:50          Assessment:     Leif Ramirez Jr. is a 62 y.o.male with  Patient Active Problem List    Diagnosis Date Noted    Fall 11/19/2017    Hyperglycemia 11/19/2017    GERD (gastroesophageal reflux disease) 11/19/2017    BPH (benign prostatic hyperplasia) 11/19/2017    Syncope 11/18/2017    Acute respiratory failure with hypoxia 11/18/2017    Shortness of breath 11/17/2017    Thoracic spinal stenosis 09/11/2017    Lumbar radiculopathy 09/11/2017    Chronic back pain 05/05/2017    Chronic anemia 05/05/2017    Anemia of chronic renal failure, stage 4 (severe) 09/20/2016    Primary osteoarthritis of both knees 09/20/2016    Neck pain 07/21/2016    Bilateral low back pain with sciatica 07/21/2016    Upper extremity weakness 07/21/2016    Weakness of both lower extremities 07/21/2016    Decreased functional mobility 07/21/2016    Weakness 06/16/2016    CKD (chronic kidney disease), stage IV 06/09/2016    Bilateral hip pain 02/17/2016    Chronic low back pain 02/17/2016    Leukopenia 01/23/2016    Accelerated  hypertension 12/30/2015    Pancytopenia 12/30/2015    Hallucinations 12/29/2015    Psychosis 09/21/2015    Essential hypertension 09/17/2015    Aftercare for healing traumatic closed fracture of right hip 07/06/2015    Greater trochanteric bursitis of right hip 07/06/2015    Schizoaffective disorder, bipolar type 07/06/2015    Thrombocytopenia 04/14/2015    COPD (chronic obstructive pulmonary disease) 04/14/2015    Anxiety     Depression     Arthritis     History of back injury     Hyperlipidemia     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) 07/19/2012        Plan:     1.  Dyspnea:  Cardiac imaging done today which shows normal LVEF and:  CONCLUSIONS     1 - Normal left ventricular systolic function (EF 60-65%).     2 - No wall motion abnormalities.     3 - Eccentric hypertrophy.     4 - Severe left atrial enlargement.     5 - Indeterminate LV diastolic function.     6 - Normal right ventricular systolic function .     7 - Mild aortic sclerosis without stenosis.     Patient is feeling better and primary team has plans to discharge home.  Anemia is a concern with H/H of 8.5 / 26, although it has been this way for a few days.    Tyler Mchugh MD

## 2017-11-22 ENCOUNTER — OUTPATIENT CASE MANAGEMENT (OUTPATIENT)
Dept: ADMINISTRATIVE | Facility: OTHER | Age: 62
End: 2017-11-22

## 2017-11-22 NOTE — PROGRESS NOTES
First attempt to perform initial assessment for OCPM; left voice message to return call.  DAINA Donaldson, OPCM-RN

## 2017-11-27 ENCOUNTER — OUTPATIENT CASE MANAGEMENT (OUTPATIENT)
Dept: ADMINISTRATIVE | Facility: OTHER | Age: 62
End: 2017-11-27

## 2017-11-27 DIAGNOSIS — I10 ACCELERATED HYPERTENSION: ICD-10-CM

## 2017-11-27 DIAGNOSIS — I10 ESSENTIAL HYPERTENSION: Chronic | ICD-10-CM

## 2017-11-27 RX ORDER — AMLODIPINE BESYLATE 10 MG/1
10 TABLET ORAL DAILY
Qty: 90 TABLET | Refills: 1 | Status: SHIPPED | OUTPATIENT
Start: 2017-11-27 | End: 2017-12-11 | Stop reason: SDUPTHER

## 2017-11-27 NOTE — TELEPHONE ENCOUNTER
Spoke to pt and states that he needs refills on his Mirtazapine 30mg QHS PRN and Prozac 20mg QD. Pt was discontinued on the medication on 11/17/17 by hospitalist, Dr. Robert Sullivan. Pt states that he needs both medications. Pt is requesting to speak to Dr. Mcguire. Pls advise.

## 2017-11-27 NOTE — LETTER
November 27, 2017    Leif Ramirez Jr.  [address]  [place] [address]             [facility] [address] [place] [address] Dear Leif,    I work with [facility]'s Outpatient Case Management Department. I have been unsuccessful at reaching you to follow-up to see how you have been doing. If you require any future assistance or if any new concerns or problems arise, please do not hesitate to call.     The Outpatient Case Management Department can be reached at [phone] from 8:00AM to 4:30 PM on Monday thru Friday. [facility] also has a program where a nurse is available 24/7 to answer questions or provide medical advice, their number is [phone].    Thanks,      Debbie Donaldson,  RN  Outpatient Case Management

## 2017-11-27 NOTE — TELEPHONE ENCOUNTER
----- Message from Esau Dhaliwal sent at 11/27/2017  1:33 PM CST -----  Contact: self/[phone]  He had an appointment at 1:40pm but he couldn't make it because he could not walk he needs to speak to the nurse asap.

## 2017-11-27 NOTE — PROGRESS NOTES
2 nd attempt to complete initial assessment for OPCM; left message requesting a return call.  As this is my second unsuccessful attempt to complete follow-up for OPCM, I will mail a letter with my contact information.  DAINA Donaldson, OPCM-RN

## 2017-11-28 NOTE — PHYSICIAN QUERY
"PT Name: Leif Ramirez Jr.  MR #: [id number]    Physician Query Form - Heart  Condition Clarification     CDS/: Rizwana Rees RN CDI     Contact information: [phone]  This form is a permanent document in the medical record.     Query Date: November 28, 2017    By submitting this query, we are merely seeking further clarification of documentation. Please utilize your independent clinical judgment when addressing the question(s) below.    The medical record contains the following   Indicators     Supporting Clinical Findings Location in Medical Record   X  Labs 11/17   X EF     X Radiology findings His chest x-ray has mild pulmonary edema. ER MD Notes   X Echo Results On limited bedside echo, there are a few scattered B-lines bilaterally,  However his estimated global systolic function is not significantly depressed.  He has not had a cardiac echo since 2015.      1 - Normal left ventricular systolic function (EF 60-65%).     2 - No wall motion abnormalities.     3 - Eccentric hypertrophy.     4 - Severe left atrial enlargement.     5 - Indeterminate LV diastolic function.     6 - Normal right ventricular systolic function .     7 - Mild aortic sclerosis without stenosis.    ER MD Notes             Echo 11/20    "Ascites" documented     X "SOB" or "BROWN" documented shortness of breath that started one week ago.  increasing shortness of breath over past week with wheezes on lung exam ER MD Notes   X "Hypoxia" documented O2 sats in ED to 86% on room air; now 97% on 2L NC   ER MD Notes   X Heart Failure documented Past medical history :     CHF   H&P   X "Edema" documented Bilateral pitting lower extremity edema   H&P note    Diuretics/Meds will diurese with lasix 80 mg IV BID   H&P note   X Other:  patient with 40 pack year history    Acute hypoxic respiratory failure  - possibly 2/2 undiagnosed heart failure; last echo in 2015 WNL  - chest x-ray with enlarged cardiac silhouette and increased pulmonary " vasculature  - O2 sats in ED to 86% on room air; now 97% on 2L NC  - will diurese with lasix 80 mg IV BID  - holding home carvedilol 25 mg BID  - echo ordered     ER MD Notes    H&P       Provider, please specify diagnosis or diagnoses associated with above clinical findings.                               [  ] Acute Systolic Heart Failure ( EF < 40)*  [  ] Acute on Chronic Systolic Heart Failure ( EF < 40)*  [  ] Chronic Systolic Heart Failure (EF < 40)*  [  ] Acute Diastolic Heart Failure ( EF > 40)*  [  ] Acute on Chronic Diastolic Heart Failure( EF > 40)*  [  ] Chronic Diastolic Heart Failure (EF > 40)*  [  ] Acute Combined Systolic and Diastolic Heart Failure  [  ] Acute on Chronic Combined Systolic and Diastolic Heart Failure  [  ] Chronic Combined Systolic and Diastolic Heart Failure  [  ] Other Type of Heart Failure (please specify type): _________________________  [  ] Heart Failure Ruled Out  [  ] Other (please specify): ___________________________________  [ x ] Clinically Undetermined            *American Heart Association                                                                                                          Please document in your progress notes daily for the duration of treatment until resolved and include in your discharge summary.

## 2017-12-04 ENCOUNTER — OUTPATIENT CASE MANAGEMENT (OUTPATIENT)
Dept: ADMINISTRATIVE | Facility: OTHER | Age: 62
End: 2017-12-04

## 2017-12-04 NOTE — PROGRESS NOTES
Talked to sister Taylor to perform initial assessment.  Taylor stated that she does not have permission to speak about patient on his behalf.  This RN called both home and cell numbers and was unable to reach.  Case closed and will dis-enroll patient at this time.  DAINA Donaldson, OPCM-RN

## 2017-12-11 ENCOUNTER — TELEPHONE (OUTPATIENT)
Dept: FAMILY MEDICINE | Facility: CLINIC | Age: 62
End: 2017-12-11

## 2017-12-11 DIAGNOSIS — I10 ESSENTIAL HYPERTENSION: Chronic | ICD-10-CM

## 2017-12-11 DIAGNOSIS — I10 ACCELERATED HYPERTENSION: ICD-10-CM

## 2017-12-11 RX ORDER — AMLODIPINE BESYLATE 10 MG/1
10 TABLET ORAL DAILY
Qty: 90 TABLET | Refills: 0 | Status: SHIPPED | OUTPATIENT
Start: 2017-12-11 | End: 2017-12-14 | Stop reason: SDUPTHER

## 2017-12-11 NOTE — TELEPHONE ENCOUNTER
Spoke to pt's sister, Lakisha and informed her I can not discuss her brother's chart as to why he was in the hospital. I told her to have the patient call us regarding a nursing home admittance. I could not discuss the patient with her.

## 2017-12-11 NOTE — TELEPHONE ENCOUNTER
----- Message from Debbie Donaldson RN sent at 12/11/2017  1:09 PM CST -----  Hi. This is Debbie Donaldson RN. I am with the Outpatient case management team with [facility]. I received a referral on the above patient. I spoke with patient's sister Taylor [place] today on the telephone.  Her phone number is [phone].      Taylor is asking for someone from Dr. Mcguire's office to call her in regards to her brother.  Please advise.        Please notify patient of your recommendations.     Thank you,  Debbie Donaldson R.N.  Outpatient Complex Case Manager

## 2017-12-12 ENCOUNTER — TELEPHONE (OUTPATIENT)
Dept: FAMILY MEDICINE | Facility: CLINIC | Age: 62
End: 2017-12-12

## 2017-12-12 NOTE — TELEPHONE ENCOUNTER
Spoke to pt's friend, Eleonora and scheduled an appt with Dr. Mcguire to fill out paperwork from [facility].

## 2017-12-12 NOTE — TELEPHONE ENCOUNTER
----- Message from Shasta Boston sent at 12/12/2017  8:22 AM CST -----  Contact: Self/ [phone]  Patient called in requesting to speak with you. Patient prefers to speak with a nurse.     Please call and advise.

## 2017-12-14 ENCOUNTER — TELEPHONE (OUTPATIENT)
Dept: FAMILY MEDICINE | Facility: CLINIC | Age: 62
End: 2017-12-14

## 2017-12-14 DIAGNOSIS — I10 ESSENTIAL HYPERTENSION: Chronic | ICD-10-CM

## 2017-12-14 DIAGNOSIS — I10 ACCELERATED HYPERTENSION: ICD-10-CM

## 2017-12-14 RX ORDER — FUROSEMIDE 80 MG/1
80 TABLET ORAL DAILY
Qty: 90 TABLET | Refills: 3 | Status: SHIPPED | OUTPATIENT
Start: 2017-12-14

## 2017-12-14 RX ORDER — CARVEDILOL 25 MG/1
TABLET ORAL
Qty: 180 TABLET | Refills: 3 | Status: SHIPPED | OUTPATIENT
Start: 2017-12-14 | End: 2017-12-14 | Stop reason: SDUPTHER

## 2017-12-14 RX ORDER — CARVEDILOL 25 MG/1
TABLET ORAL
Qty: 180 TABLET | Refills: 3 | Status: SHIPPED | OUTPATIENT
Start: 2017-12-14

## 2017-12-14 RX ORDER — HYDRALAZINE HYDROCHLORIDE 10 MG/1
TABLET, FILM COATED ORAL
Qty: 270 TABLET | Refills: 11 | Status: SHIPPED | OUTPATIENT
Start: 2017-12-14 | End: 2018-03-15

## 2017-12-14 RX ORDER — AMLODIPINE BESYLATE 10 MG/1
10 TABLET ORAL DAILY
Qty: 90 TABLET | Refills: 0 | Status: SHIPPED | OUTPATIENT
Start: 2017-12-14 | End: 2018-12-14

## 2017-12-14 NOTE — TELEPHONE ENCOUNTER
Spoke to pt and states that he's taking the Amlodipine 3 times a day and requesting to speak to Dr. Mcguire. States once will not help with his Coronary disease. Pls advise.

## 2017-12-14 NOTE — TELEPHONE ENCOUNTER
Informed pt that the Amlodipine will NEVER EVER be given three times/day and he can take ONCE a day along with other medications. Pt states that he is only taking the Amlodipine because the other BP meds makes him sick and he knows what best for him. IT DOES not prevent coronary artery disease by itself however it will help decrease BP and help his heart and prevent stroke. Pt did not verbalized understanding and demanded for Dr. Mcguire to call him cause no seems to understand what is going on with his body and if he able to walk he would come to the office. Pt does not want to speak to me again until Dr. Mcguire returns his call. Pls advise.

## 2017-12-14 NOTE — TELEPHONE ENCOUNTER
----- Message from James Haque sent at 12/14/2017  2:53 PM CST -----  Contact: [phone]/self  Pt states he is out of blood pressure medicine and is afraid he is going to have a stroke.  Please advise

## 2017-12-14 NOTE — TELEPHONE ENCOUNTER
Please inform amlodipine will NEVER EVER be given three times/day and he can take ONCE a day along with other medications. IT DOES not prevent coronary artery disease by itself however it will help decrease BP and help his heart and prevent stroke

## 2017-12-14 NOTE — TELEPHONE ENCOUNTER
Spoke to pt and stated that he needed his Amlodipine 10mg refilled. Pt was informed that his Amlodipine was sent in on 12/11. Called pharmacy and spoke to pharmacist, Keila and stated that pt wants an early filled. Stated that he doesn't know where is bottle went. Keila stated that he informed the pt that if he needed an early fill it would be over $160 or he can get a 2 wks supply for $32-33. Called pt back and stated that he was told at discharge to take 3 tabs daily and that's why he ran out of medication. Pls advise.

## 2017-12-15 NOTE — TELEPHONE ENCOUNTER
called n spoke to pt - advised to take norvasc 10 mg daily - he would like to speak to cardiology - referral done

## 2017-12-19 ENCOUNTER — OFFICE VISIT (OUTPATIENT)
Dept: CARDIOLOGY | Facility: CLINIC | Age: 62
End: 2017-12-19
Attending: FAMILY MEDICINE
Payer: MEDICARE

## 2017-12-19 VITALS
SYSTOLIC BLOOD PRESSURE: 153 MMHG | HEIGHT: 70 IN | DIASTOLIC BLOOD PRESSURE: 76 MMHG | HEART RATE: 81 BPM | BODY MASS INDEX: 26.04 KG/M2 | OXYGEN SATURATION: 95 % | WEIGHT: 181.88 LBS

## 2017-12-19 DIAGNOSIS — R55 SYNCOPE, UNSPECIFIED SYNCOPE TYPE: ICD-10-CM

## 2017-12-19 DIAGNOSIS — R07.9 CHEST PAIN, UNSPECIFIED TYPE: ICD-10-CM

## 2017-12-19 DIAGNOSIS — I10 ESSENTIAL HYPERTENSION: Primary | Chronic | ICD-10-CM

## 2017-12-19 DIAGNOSIS — E78.2 MIXED HYPERLIPIDEMIA: Chronic | ICD-10-CM

## 2017-12-19 PROCEDURE — 99999 PR PBB SHADOW E&M-EST. PATIENT-LVL III: CPT | Mod: PBBFAC,,, | Performed by: INTERNAL MEDICINE

## 2017-12-19 PROCEDURE — 99204 OFFICE O/P NEW MOD 45 MIN: CPT | Mod: S$PBB,,, | Performed by: INTERNAL MEDICINE

## 2017-12-19 PROCEDURE — 99213 OFFICE O/P EST LOW 20 MIN: CPT | Mod: PBBFAC,PO | Performed by: INTERNAL MEDICINE

## 2017-12-19 NOTE — PROGRESS NOTES
Subjective:   Patient ID:  Leif Ramirez Jr. is a 62 y.o. male who presents for evaluation of Follow-up      HPI: CC : Chest Pain    63 y/o male with multiple medically problems including liver cirrhosis, Hep C. Schizophrenia, CRF, HTN, COPD and CHF present with complaints of chest pain that started about 9 months ago. Pain is located in left chest with radiation to under left arm. Pain is sharp and moderate in intensity, and intermittent in timing with each episode lasting a few minutes. Pain is not associated with exertion and does occur at rest. There is no association with food. There is no changes in quality or severity with positional changes. Patient said nothing makes pain better. Patient has family history of CAD/MI.  Chest pain is not associated with SOB, nausea, sweating, food, fevers/chills, coughing. He was however recently admitted for acute hypoxia and treated for CHF. Echo showed normal ef with possible DD. BP mildly elevated.   No ischemic w/u done, he however had CRF and is not ready to be on HD.   He does have schizophrenia and goes on a tangent frequently.   He continues to smoke 1 pck daily and have been smoking for 50 years.    Past Medical History:   Diagnosis Date    Allergy     poison ivy    Anemia     Anxiety     Arthritis     Asthma     Bipolar affective     Cancer     blood and bone    CHF (congestive heart failure)     Chronic hepatitis C with cirrhosis     COPD (chronic obstructive pulmonary disease)     Coronary artery disease     Depression     Diabetes mellitus type II     Disorder of kidney and ureter     Elevated PSA     has had prostate problems but unaware of his psa level    Encounter for blood transfusion     History of back injury     Chronic back pain    Hyperlipidemia     Hypertension     Internal hemorrhoid, bleeding 9/21/2015    Pancytopenia     Renal disorder     Schizo affective schizophrenia     Seizures     Stroke     Thyroid disease      Urinary tract infection        Past Surgical History:   Procedure Laterality Date    ELBOW BURSA SURGERY      JOINT REPLACEMENT      ORIF FEMUR FRACTURE Right 8/2015       Social History   Substance Use Topics    Smoking status: Current Every Day Smoker     Packs/day: 0.50    Smokeless tobacco: Never Used    Alcohol use No       Family History   Problem Relation Age of Onset    Hypertension Mother     Diabetes Mother     Transient ischemic attack Mother     Heart disease Mother      stent placement    Arthritis Mother     Heart disease Father     Hypertension Father     Diabetes Father     Stroke Father     Arthritis Sister     Hypertension Sister     Heart disease Maternal Grandmother     Diabetes Maternal Grandmother     Stroke Maternal Grandmother     Cancer Paternal Grandmother      breast    Heart disease Paternal Grandmother     Cancer Sister      brain    Prostate cancer Neg Hx     Kidney disease Neg Hx        Patient's Medications   New Prescriptions    No medications on file   Previous Medications    ALBUTEROL 90 MCG/ACTUATION INHALER    Inhale 1 puff into the lungs every 4 (four) hours as needed for Wheezing.    AMLODIPINE (NORVASC) 10 MG TABLET    Take 1 tablet (10 mg total) by mouth once daily.    CARVEDILOL (COREG) 25 MG TABLET    TAKE 1 TABLET(25 MG) BY MOUTH TWICE DAILY    FUROSEMIDE (LASIX) 80 MG TABLET    Take 1 tablet (80 mg total) by mouth once daily.    GABAPENTIN (NEURONTIN) 300 MG CAPSULE    Take 300 mg by mouth 3 (three) times daily.     HYDRALAZINE (APRESOLINE) 10 MG TABLET    TAKE 1 TABLET(10 MG) BY MOUTH THREE TIMES DAILY    OLANZAPINE (ZYPREXA) 15 MG TAB    Take 15 mg by mouth nightly.    OMEPRAZOLE (PRILOSEC) 20 MG CAPSULE    Take 1 capsule (20 mg total) by mouth once daily.    OXYBUTYNIN (DITROPAN) 5 MG TAB    Take 1 tablet (5 mg total) by mouth every evening. One tablet before sleep    TAMSULOSIN (FLOMAX) 0.4 MG CP24    TAKE 1 CAPSULE(0.4 MG) BY MOUTH EVERY DAY    Modified Medications    No medications on file   Discontinued Medications    No medications on file        Review of patient's allergies indicates:   Allergen Reactions    Codeine      Other reaction(s): Nausea    Depakote [divalproex] Other (See Comments)     Thrombocytopenia    Haldol [haloperidol lactate] Other (See Comments)     Seizures    Methadone     Morphine     Naloxone     Opium     Propoxyphene     Stelazine [trifluoperazine]     Amoxicillin Rash       Review of Systems   Constitution: Negative.   HENT: Negative.    Eyes: Negative.    Cardiovascular: Negative.    Respiratory: Negative.    Endocrine: Negative.    Hematologic/Lymphatic: Negative.    Skin: Negative.    Musculoskeletal: Negative.    Gastrointestinal: Negative.    Neurological: Negative.    Psychiatric/Behavioral: Negative.    Allergic/Immunologic: Negative.      Objective:   Physical Exam   Constitutional: He is oriented to person, place, and time. He appears well-developed and well-nourished. No distress.   Examination of the digits showed no clubbing or cyanosis   HENT:   Head: Normocephalic and atraumatic.   Eyes: Conjunctivae are normal. Pupils are equal, round, and reactive to light. Right eye exhibits no discharge.   Neck: Normal range of motion. Neck supple. No JVD present. No thyromegaly present.   No carotid bruits   Cardiovascular: Normal rate, regular rhythm, S1 normal, S2 normal, normal heart sounds, intact distal pulses and normal pulses.  PMI is not displaced.  Exam reveals no gallop, no friction rub and no opening snap.    No murmur heard.  Pulmonary/Chest: Effort normal and breath sounds normal. No respiratory distress. He has no wheezes. He has no rales. He exhibits no tenderness.   Abdominal: Soft. Bowel sounds are normal. He exhibits no distension and no mass. There is no tenderness. There is no guarding.   No hepatosplenomegaly   Musculoskeletal: Normal range of motion. He exhibits no edema or tenderness.    Lymphadenopathy:     He has no cervical adenopathy.   Neurological: He is alert and oriented to person, place, and time.   Skin: Skin is warm. No rash noted. He is not diaphoretic. No erythema.   Psychiatric: He has a normal mood and affect.   Nursing note and vitals reviewed.      Lab Results   Component Value Date    WBC 5.49 11/20/2017    HGB 8.5 (L) 11/20/2017    HCT 25.8 (L) 11/20/2017    MCV 91 11/20/2017     (L) 11/20/2017         Chemistry        Component Value Date/Time     11/20/2017 0255    K 3.8 11/20/2017 0255     11/20/2017 0255    CO2 28 11/20/2017 0255    BUN 57 (H) 11/20/2017 0255    CREATININE 4.3 (H) 11/20/2017 0255     (H) 11/20/2017 0255        Component Value Date/Time    CALCIUM 8.7 11/20/2017 0255    ALKPHOS 63 11/20/2017 0255    AST 17 11/20/2017 0255    ALT 17 11/20/2017 0255    BILITOT 0.3 11/20/2017 0255    ESTGFRAFRICA 16 (A) 11/20/2017 0255    EGFRNONAA 14 (A) 11/20/2017 0255            Lab Results   Component Value Date    CHOL 149 11/18/2017    CHOL 167 05/05/2017    CHOL 169 09/22/2016     Lab Results   Component Value Date    HDL 51 11/18/2017    HDL 34 (L) 05/05/2017    HDL 30 (L) 09/22/2016     Lab Results   Component Value Date    LDLCALC 83.4 11/18/2017    LDLCALC 88.6 05/05/2017    LDLCALC 107.2 09/22/2016     Lab Results   Component Value Date    TRIG 73 11/18/2017    TRIG 222 (H) 05/05/2017    TRIG 159 (H) 09/22/2016     Lab Results   Component Value Date    CHOLHDL 34.2 11/18/2017    CHOLHDL 20.4 05/05/2017    CHOLHDL 17.8 (L) 09/22/2016       Lab Results   Component Value Date    TSH 0.505 11/18/2017       Lab Results   Component Value Date    HGBA1C 4.8 10/05/2017       ECGs reviewed-NSR with LVH  LABS reviewed  Imaging including Echoes reviewed-normal ef with indeterminate Diastolic function    Assessment:     1. Essential hypertension    2. Chest pain, unspecified type    3. Mixed hyperlipidemia    4. Syncope, unspecified syncope type         Plan:   Patient currently euvolemic  Patient is having atypical chest pain. Will not do ischemic w/u considering anemia and CRF.  Continue current medications. Asa 81 mg po daily  Low salt diet  Activity as tolerated  F/u in 3 months    Clinic time spent with patient discussing and treating medical condition including reviewing images, ECG, labs and medications > 40 minutes.

## 2017-12-29 ENCOUNTER — TELEPHONE (OUTPATIENT)
Dept: FAMILY MEDICINE | Facility: CLINIC | Age: 62
End: 2017-12-29

## 2017-12-29 NOTE — TELEPHONE ENCOUNTER
----- Message from Gifty Velasquez sent at 12/29/2017  1:36 PM CST -----  Contact: [phone]  Patient called in returning your call. Pt stated he got a call it might be from Dr Mcguire's office Please advise

## 2018-01-10 ENCOUNTER — TELEPHONE (OUTPATIENT)
Dept: FAMILY MEDICINE | Facility: CLINIC | Age: 63
End: 2018-01-10

## 2018-01-10 DIAGNOSIS — M54.41 CHRONIC BILATERAL LOW BACK PAIN WITH RIGHT-SIDED SCIATICA: ICD-10-CM

## 2018-01-10 DIAGNOSIS — M54.5 CHRONIC LOW BACK PAIN, UNSPECIFIED BACK PAIN LATERALITY, WITH SCIATICA PRESENCE UNSPECIFIED: ICD-10-CM

## 2018-01-10 DIAGNOSIS — G89.29 CHRONIC LOW BACK PAIN, UNSPECIFIED BACK PAIN LATERALITY, WITH SCIATICA PRESENCE UNSPECIFIED: ICD-10-CM

## 2018-01-10 DIAGNOSIS — M54.16 LUMBAR RADICULOPATHY: ICD-10-CM

## 2018-01-10 DIAGNOSIS — M48.04 THORACIC SPINAL STENOSIS: Primary | ICD-10-CM

## 2018-01-10 DIAGNOSIS — M25.551 BILATERAL HIP PAIN: ICD-10-CM

## 2018-01-10 DIAGNOSIS — M25.552 BILATERAL HIP PAIN: ICD-10-CM

## 2018-01-10 DIAGNOSIS — G89.29 CHRONIC BILATERAL LOW BACK PAIN WITH RIGHT-SIDED SCIATICA: ICD-10-CM

## 2018-01-10 RX ORDER — HYDROCODONE BITARTRATE AND ACETAMINOPHEN 5; 325 MG/1; MG/1
1 TABLET ORAL EVERY 8 HOURS PRN
Qty: 90 TABLET | Refills: 0 | Status: SHIPPED | OUTPATIENT
Start: 2018-01-10 | End: 2018-02-08 | Stop reason: SDUPTHER

## 2018-01-10 NOTE — TELEPHONE ENCOUNTER
----- Message from Sagrario Oconnor sent at 1/10/2018  8:04 AM CST -----  Contact: self/[phone]  Patient is requesting a refill on their medication.  Patient requests a callback today as he is out of the medication.    Please call and advise.    hydrocodone-acetaminophen 5-325mg

## 2018-01-10 NOTE — TELEPHONE ENCOUNTER
----- Message from Zarina Santiago sent at 1/10/2018  9:05 AM CST -----  Contact: [phone]/self  Patient requesting to speak with you regarding a refill for hydrocodone-acetaminophen 5-325mg (NORCO) 5-325 mg per tablet. Please call and advise.

## 2018-01-24 ENCOUNTER — OFFICE VISIT (OUTPATIENT)
Dept: PODIATRY | Facility: CLINIC | Age: 63
End: 2018-01-24
Payer: MEDICARE

## 2018-01-24 VITALS
HEIGHT: 70 IN | DIASTOLIC BLOOD PRESSURE: 84 MMHG | HEART RATE: 74 BPM | BODY MASS INDEX: 25.91 KG/M2 | WEIGHT: 181 LBS | SYSTOLIC BLOOD PRESSURE: 150 MMHG

## 2018-01-24 DIAGNOSIS — L84 CORN OR CALLUS: ICD-10-CM

## 2018-01-24 DIAGNOSIS — B35.1 ONYCHOMYCOSIS DUE TO DERMATOPHYTE: ICD-10-CM

## 2018-01-24 DIAGNOSIS — M20.42 HAMMER TOES OF BOTH FEET: ICD-10-CM

## 2018-01-24 DIAGNOSIS — E11.49 TYPE II DIABETES MELLITUS WITH NEUROLOGICAL MANIFESTATIONS: Primary | ICD-10-CM

## 2018-01-24 DIAGNOSIS — B19.20 COMPENSATED CIRRHOSIS RELATED TO HEPATITIS C VIRUS (HCV): Chronic | ICD-10-CM

## 2018-01-24 DIAGNOSIS — K74.69 COMPENSATED CIRRHOSIS RELATED TO HEPATITIS C VIRUS (HCV): Chronic | ICD-10-CM

## 2018-01-24 DIAGNOSIS — N18.4 CKD (CHRONIC KIDNEY DISEASE), STAGE IV: ICD-10-CM

## 2018-01-24 DIAGNOSIS — M20.41 HAMMER TOES OF BOTH FEET: ICD-10-CM

## 2018-01-24 PROCEDURE — 11721 DEBRIDE NAIL 6 OR MORE: CPT | Mod: PBBFAC,59,PO | Performed by: PODIATRIST

## 2018-01-24 PROCEDURE — 11056 PARNG/CUTG B9 HYPRKR LES 2-4: CPT | Mod: S$PBB,Q9,, | Performed by: PODIATRIST

## 2018-01-24 PROCEDURE — 99999 PR PBB SHADOW E&M-EST. PATIENT-LVL III: CPT | Mod: PBBFAC,,, | Performed by: PODIATRIST

## 2018-01-24 PROCEDURE — 99213 OFFICE O/P EST LOW 20 MIN: CPT | Mod: S$PBB,25,, | Performed by: PODIATRIST

## 2018-01-24 PROCEDURE — 99213 OFFICE O/P EST LOW 20 MIN: CPT | Mod: PBBFAC,PO | Performed by: PODIATRIST

## 2018-01-24 NOTE — PROGRESS NOTES
Subjective:      Patient ID: Leif Ramirez Jr. is a 62 y.o. male.    Chief Complaint: Follow-up (PCP last seen 10/17/2017)    Leif is a 62 y.o. male who presents to the clinic for evaluation and treatment of high risk feet. Leif has a past medical history of Allergy; Anemia; Anxiety; Arthritis; Asthma; Bipolar affective; Cancer; CHF (congestive heart failure); Chronic hepatitis C with cirrhosis; COPD (chronic obstructive pulmonary disease); Coronary artery disease; Depression; Diabetes mellitus type II; Disorder of kidney and ureter; Elevated PSA; Encounter for blood transfusion; History of back injury; Hyperlipidemia; Hypertension; Internal hemorrhoid, bleeding (9/21/2015); Pancytopenia; Renal disorder; Schizo affective schizophrenia; Seizures; Stroke; Thyroid disease; and Urinary tract infection. The patient's chief complaint is long, thick toenails. History of right DFU, no recent wounds.  This patient has documented high risk feet requiring routine maintenance secondary to peripheral neuropathy.    PCP: Wale Mcguire MD    Date Last Seen by PCP: 8/21/17      Hemoglobin A1C   Date Value Ref Range Status   10/05/2017 4.8 4.0 - 5.6 % Final     Comment:     According to ADA guidelines, hemoglobin A1c <7.0% represents  optimal control in non-pregnant diabetic patients. Different  metrics may apply to specific patient populations.   Standards of Medical Care in Diabetes-2016.  For the purpose of screening for the presence of diabetes:  <5.7%     Consistent with the absence of diabetes  5.7-6.4%  Consistent with increasing risk for diabetes   (prediabetes)  >or=6.5%  Consistent with diabetes  Currently, no consensus exists for use of hemoglobin A1c  for diagnosis of diabetes for children.  This Hemoglobin A1c assay has significant interference with fetal   hemoglobin   (HbF). The results are invalid for patients with abnormal amounts of   HbF,   including those with known Hereditary Persistence   of Fetal  Hemoglobin. Heterozygous hemoglobin variants (HbAS, HbAC,   HbAD, HbAE, HbA2) do not significantly interfere with this assay;   however, presence of multiple variants in a sample may impact the %   interference.     04/15/2015 4.7 4.5 - 6.2 % Final   12/18/2012 4.8 4.0 - 6.2 % Final       Review of Systems   Constitution: Negative for chills, fever and malaise/fatigue.   Cardiovascular: Negative for chest pain, leg swelling, orthopnea and palpitations.   Respiratory: Negative for cough, shortness of breath and wheezing.    Skin: Positive for color change, dry skin and nail changes. Negative for itching, poor wound healing and rash.   Musculoskeletal: Negative for arthritis, gout, joint pain, joint swelling, muscle weakness and myalgias.   Neurological: Positive for disturbances in coordination, numbness, paresthesias and sensory change. Negative for dizziness, focal weakness and tremors.           Objective:        Physical Exam   Cardiovascular:   Dorsalis pedis and posterior tibial pulses are diminished Bilaterally. Toes are cool to touch. Feet are warm proximally.There is decreased digital hair. Skin is atrophic, slightly hyperpigmented, and mildly edematous       Musculoskeletal:   Musculoskeletal:  Muscle strength is 5/5 in all groups bilaterally.  Metatarsophalangeal and subtalar range of motion are within normal limits without crepitus bilaterally. There is limitation of ankle dorsiflexion with knees extended and flexed Bilaterally.    Reducible extensor and flexor contractures at the MTPJ and PIPJ of toes 2-5, bilat.          Neurological:   O[place]-Hai 5.07 monofilamant testing is diminished both feet. Sharp/dull sensation diminished Bilaterally. Light touch absent Bilaterally.       Skin:   Toenails 1-5 bilaterally are elongated by 2-3 mm, thickened by 2-3 mm, discolored/yellowed, dystrophic, brittle with subungual debris. No incurvation. Mild xerosis noted, bilat. No open wounds.      Hyperkeratotic  "lesions at the following locations:   left plantar 2nd toe and plantar 2nd MTPJ, Right.                 Assessment:       Encounter Diagnoses   Name Primary?   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  Authorized by: DEMIAN HANSEN JR.     Time out: Immediately prior to procedure a "time out" was called to verify the correct patient, procedure, equipment, support staff and site/side marked as required.    Consent Done?:  Yes (Verbal)  Hyperkeratotic Skin Lesions?: Yes    Number of trimmed lesions:  2  Location(s):  Left 2nd Toe and Left 2nd Metatarsal Head    Nail Care Type:  Debride  Location(s): All  (Left 1st Toe, Left 3rd Toe, Left 2nd Toe, Left 4th Toe, Left 5th Toe, Right 1st Toe, Right 2nd Toe, Right 3rd Toe, Right 4th Toe and Right 5th Toe)  Patient tolerance:  Patient tolerated the procedure well with no immediate complications      ADA Risk Classification: LOPS with or without deformity - 1: rtc 3-6 months     I counseled the patient on his conditions, their implications and medical management.    - Shoe inspection. Diabetic Foot Education. Patient reminded of the importance of good nutrition and blood sugar control to help prevent " podiatric complications of diabetes. Patient instructed on proper foot hygeine. We discussed wearing proper shoe gear, daily foot inspections, never walking without protective shoe gear, never putting sharp instruments to feet.     - px as above    - Discussed importance of supportive shoes with accommodative toe box to reduce pressure and irritation to forefoot    Follow-up in about 5 months (around 6/24/2018).

## 2018-01-24 NOTE — PROGRESS NOTES
Patient Leif Ramirez Jr., MRN [id number], was dependent on dialysis (ICD10 Z99.2) at the time of this visit on 1/24/18. This addendum is made to the medical record on 01/24/2018.

## 2018-01-24 NOTE — PATIENT INSTRUCTIONS
Diabetes: Inspecting Your Feet    Diabetes increases your chances of developing foot problems. So inspect your feet every day. This helps you find small skin irritations before they become serious ulcers or infections. If you have trouble seeing the bottoms of your feet, use a mirror or ask a family member or friend to help.  How to check your feet  Below are tips to help you look for foot problems. Try to check your feet at the same time each day, such as when you get out of bed in the morning:  · Check the top of each foot. The tops of toes, back of the heel, and outer edge of the foot can get a lot of rubbing from poor-fitting shoes.  · Check the bottom of each foot. Daily wear and tear often leads to problems at pressure spots.  · Check the toes and nails. Fungal infections often occur between toes. Toenail problems can also be a sign of fungal infections or lead to breaks in the skin.  · Check your shoes, too. Loose objects inside a shoe can injure the foot. Use your hand to feel inside your shoes for things like demetrius, loose stitching, or rough areas that could irritate your skin.  Warning signs  Look for any color changes in the foot. Redness with streaks can signal a severe infection, which needs immediate medical attention. Tell your healthcare provider right away if you have any of these problems:  · Swelling, sometimes with color changes, may be a sign of poor blood flow or infection. Symptoms include tenderness and an increase in the size of your foot.  · Warm or hot areas on your feet may be signs of infection. A foot that is cold may not be getting enough blood.  · Sensations such as burning, tingling, or pins and needles can be signs of a problem. Also check for areas that may be numb.  · Hot spots are caused by friction or pressure. Look for hot spots in areas that get a lot of rubbing. Hot spots can turn into blisters, calluses, or sores.  · Cracks and sores are caused by dry or irritated  skin. They are a sign that the skin is breaking down, which can lead to infection.  · Toenail problems to watch for include nails growing into the skin (ingrown toenail) and causing redness or pain. Thick, yellow, or discolored nails can signal a fungal infection.  · Drainage and odor can develop from untreated sores and ulcers. Call your healthcare provider right away if you notice white or yellow drainage, bleeding, or unpleasant odor.   Date Last Reviewed: 6/1/2016 © 2000-2017 [employer]. [address] [place] [address]. All rights reserved. This information is not intended as a substitute for professional medical care. Always follow your healthcare professional's instructions.        Long-Term Complications of Diabetes    Diabetes can cause health problems over time. These are called complications. They are more likely to happen if your blood sugar is often too high. Over time, high blood sugar can damage blood vessels in your body. It is important to keep your blood sugar in your target range. This can help prevent or delay complications from diabetes.  Possible complications  Complications of diabetes include:  · Eye problems, including damage to the blood vessels in the eyes (retinopathy), pressure in the eye (glaucoma), and clouding of the eyes lens (a cataract). Eye problems can eventually lead to irreversible blindness.   · Tooth and gum problems (periodontal disease), causing loss of teeth and bone  · Blood vessel (vascular) disease leading to circulation problems, heart attack or stroke, or a need for amputation of a limb   · Problems with sexual function leading to erectile dysfunction in men and sexual discomfort in women   · Kidney disease (nephropathy) can eventually lead to kidney failure, which may require dialysis or kidney transplant   · Nerve problems (neuropathy), causing pain or loss of feeling in your feet and other parts of your body, potentially leading to an  amputation of a limb   · High blood pressure (hypertension), putting strain on your heart and blood vessels  · Serious infections, possibly leading to loss of toes, feet, or limbs  How to avoid complications  The serious consequences of these complications may be avoidable for most people with diabetes by managing your blood glucose, blood pressure, and cholesterol levels. This can help you feel better and stay healthy. You can manage diabetes by tracking your blood sugar. You can also eat healthy and exercise to avoid gaining weight. And you should take medicine if directed by your healthcare provider.  Date Last Reviewed: 5/1/2016 © 2000-2017 [employer].. [address] [place] [address], [place] [address]. All rights reserved. This information is not intended as a substitute for professional medical care. Always follow your healthcare professional's instructions.        What Are Corns and Calluses?    Corns and calluses are your bodys response to friction or pressure against the skin. If your foot rubs the inside of your shoe, the affected area of skin thickens. Or, if a bone is not in the normal position, skin caught between bone and shoe or bone and ground builds up. In either case, the outer layer of skin thickens to protect the foot from unusual pressure. In many cases, corns and calluses look bad but are not harmful. However, more severe corns and calluses may become infected, destroy healthy tissue, or affect foot movement.    Corns  Corns usually grow on top of the foot, often at the toe joint. Corns can range from a slight thickening of skin to a painful soft or hard bump. They often form on top of buckled toe joints (hammer toes). If your toes curl under, corns may grow on the tips of the toes. You may also get a corn on the end of a toe if it rubs against your shoe. Corns can also grow between toes, often between the first and second toes.    Calluses  Calluses grow on the bottom of the foot or on  the outer edge of a toe or heel. A callus may spread across the ball of your foot. This type of callus is usually due to a problem with a metatarsal (the long bone at the base of a toe, near the ball of the foot). A pinch callus may grow along the outer edge of the heel or the big toe. Some calluses press up into the foot instead of spreading on the outside. A callus may form a central core or plug of tissue where pressure is greatest.  Date Last Reviewed: 9/21/2015 © 2000-2017 [employer]. [address] [place] [address]. All rights reserved. This information is not intended as a substitute for professional medical care. Always follow your healthcare professional's instructions.        Treating Corns and Calluses  If your corns or calluses are mild, reducing friction may help. Different shoes, moleskin patches, or soft pads may be all the treatment you need. In more severe cases, treating tissue buildup may require your doctors care. Sometimes custom-made shoe inserts (orthotics) or special pads are prescribed to reduce friction and pressure.    Change shoes  If you have corns, your doctor may suggest wearing shoes that have more toe room. This way, buckled joints are less likely to be pinched against the top of the shoe. If you have calluses, wearing a cushioned insole, arch support, or heel counter can help reduce friction.  Visit your doctor  In some cases, your doctor may trim away the outer layers of skin that make up the corn or callus. For a painful corn, medicine may be injected beneath the built-up tissue.  Wear orthotics  Orthotics are specially made to meet the needs of your feet. They cushion calluses or divert pressure away from these problem areas. Worn as directed, orthotics help limit existing problems and prevent new ones from forming.  If you need surgery  If a bone or joint is out of place, certain parts of your foot may be under too much pressure. This can cause severe  corns and calluses. In such cases, surgery may be the best way to correct the problem.  Outpatient procedures  In most cases, surgery to improve bone position is an outpatient procedure. Your doctor may cut away excess bone, reposition prominent bones, or even fuse joints. Sometimes tendons or ligaments are cut to reduce tension on a bone or joint. Your doctor will talk with you about the procedure that is best suited to your needs.  Date Last Reviewed: 7/1/2016 © 2000-2017 [employer]. [address] [place] [address]. All rights reserved. This information is not intended as a substitute for professional medical care. Always follow your healthcare professional's instructions.        Treating Mallet, Hammer, and Claw Toes  Definitions  A hammer toe has an abnormal bend in the middle joint of your toe (toe is bent upward at joint).  Mallet toe affects the joint nearest your toenail (toe is bent downward at joint).  Claw toe affects the joint at the ball of your foot (toe is bent upward at joint), as well as both toe joints (toe is bent downward at both joints).  Hammer toe and mallet toe are most likely to occur in the toe next to your big toe.  Causes  The most common cause for all 3 deformities is poorly fitting shoes and tight shoes, especially high heels for women. Trauma and nerve damage from various diseases like diabetes may also cause these deformities.  Treatment  Buying shoes with more room in the toes, filing down corns and calluses, and padding, taping, or strapping the toe most often relieve the pain. Toe stretching and exercises may also be helpful. If these steps dont work, you may need surgery to straighten your toes.  Shoes  Buy low-heeled shoes with plenty of room in the front. This keeps your toes from being jammed against the end of your shoe. It also keeps your shoe from rubbing the tops of your toes.  Corns and calluses    To file down a corn or callus, soak your foot in  warm water. This softens the hard skin. Dry your foot. Then gently rub the corn or callus with a pumice stone or nail file.  Pads and splints  If you still have pain, you may need to put a pad or splint on your toe. This helps take pressure off the painful corn or callus.  · For a mallet toe, you can put a gel pad on the toe. This keeps the tip of the toe from rubbing against the bottom of the shoe.  · For a hammer or claw toe, you can put a felt or foam pad over the bent joint. This keeps the toe from rubbing on the top of the shoe.  · For a hammer or claw toe that is still flexible, you can put a splint on the toe. This keeps it straight so it doesn't rub on the top of the shoe.    Date Last Reviewed: 9/29/2015 © 2000-2017 [employer]. [address] [place] [address], [place] [address]. All rights reserved. This information is not intended as a substitute for professional medical care. Always follow your healthcare professional's instructions.

## 2018-01-29 ENCOUNTER — OFFICE VISIT (OUTPATIENT)
Dept: FAMILY MEDICINE | Facility: CLINIC | Age: 63
End: 2018-01-29
Attending: FAMILY MEDICINE
Payer: MEDICARE

## 2018-01-29 VITALS
HEART RATE: 70 BPM | BODY MASS INDEX: 27.17 KG/M2 | HEIGHT: 70 IN | SYSTOLIC BLOOD PRESSURE: 130 MMHG | TEMPERATURE: 99 F | DIASTOLIC BLOOD PRESSURE: 79 MMHG | WEIGHT: 189.81 LBS | OXYGEN SATURATION: 99 %

## 2018-01-29 DIAGNOSIS — D61.818 PANCYTOPENIA: Chronic | ICD-10-CM

## 2018-01-29 DIAGNOSIS — J96.01 ACUTE RESPIRATORY FAILURE WITH HYPOXIA: ICD-10-CM

## 2018-01-29 DIAGNOSIS — Z11.1 SCREENING-PULMONARY TB: ICD-10-CM

## 2018-01-29 DIAGNOSIS — F25.0 SCHIZOAFFECTIVE DISORDER, BIPOLAR TYPE: Chronic | ICD-10-CM

## 2018-01-29 DIAGNOSIS — M48.04 THORACIC SPINAL STENOSIS: ICD-10-CM

## 2018-01-29 DIAGNOSIS — M54.41 CHRONIC BILATERAL LOW BACK PAIN WITH RIGHT-SIDED SCIATICA: ICD-10-CM

## 2018-01-29 DIAGNOSIS — D63.1 ANEMIA OF CHRONIC RENAL FAILURE, STAGE 5: ICD-10-CM

## 2018-01-29 DIAGNOSIS — B18.2 CHRONIC HEPATITIS C WITHOUT HEPATIC COMA: ICD-10-CM

## 2018-01-29 DIAGNOSIS — B19.20 COMPENSATED CIRRHOSIS RELATED TO HEPATITIS C VIRUS (HCV): Chronic | ICD-10-CM

## 2018-01-29 DIAGNOSIS — K21.9 GASTROESOPHAGEAL REFLUX DISEASE, ESOPHAGITIS PRESENCE NOT SPECIFIED: ICD-10-CM

## 2018-01-29 DIAGNOSIS — F33.9 MAJOR DEPRESSION, RECURRENT, CHRONIC: ICD-10-CM

## 2018-01-29 DIAGNOSIS — M54.16 LUMBAR RADICULOPATHY: ICD-10-CM

## 2018-01-29 DIAGNOSIS — G89.29 CHRONIC BILATERAL LOW BACK PAIN WITH RIGHT-SIDED SCIATICA: ICD-10-CM

## 2018-01-29 DIAGNOSIS — J43.8 OTHER EMPHYSEMA: Chronic | ICD-10-CM

## 2018-01-29 DIAGNOSIS — I10 ESSENTIAL HYPERTENSION: Primary | Chronic | ICD-10-CM

## 2018-01-29 DIAGNOSIS — D69.6 THROMBOCYTOPENIA: Chronic | ICD-10-CM

## 2018-01-29 DIAGNOSIS — K74.69 COMPENSATED CIRRHOSIS RELATED TO HEPATITIS C VIRUS (HCV): Chronic | ICD-10-CM

## 2018-01-29 DIAGNOSIS — F29 PSYCHOSIS, UNSPECIFIED PSYCHOSIS TYPE: ICD-10-CM

## 2018-01-29 DIAGNOSIS — N18.5 CKD (CHRONIC KIDNEY DISEASE), STAGE V: ICD-10-CM

## 2018-01-29 DIAGNOSIS — N18.5 ANEMIA OF CHRONIC RENAL FAILURE, STAGE 5: ICD-10-CM

## 2018-01-29 DIAGNOSIS — E78.2 MIXED HYPERLIPIDEMIA: Chronic | ICD-10-CM

## 2018-01-29 DIAGNOSIS — F41.9 ANXIETY: ICD-10-CM

## 2018-01-29 PROCEDURE — 99999 PR PBB SHADOW E&M-EST. PATIENT-LVL V: CPT | Mod: PBBFAC,,, | Performed by: FAMILY MEDICINE

## 2018-01-29 PROCEDURE — 99215 OFFICE O/P EST HI 40 MIN: CPT | Mod: PBBFAC,PO | Performed by: FAMILY MEDICINE

## 2018-01-29 PROCEDURE — 99214 OFFICE O/P EST MOD 30 MIN: CPT | Mod: S$PBB,,, | Performed by: FAMILY MEDICINE

## 2018-01-29 RX ORDER — PREDNISONE 20 MG/1
TABLET ORAL
Qty: 10 TABLET | Refills: 0 | Status: SHIPPED | OUTPATIENT
Start: 2018-01-29 | End: 2018-02-08 | Stop reason: SDUPTHER

## 2018-01-30 ENCOUNTER — TELEPHONE (OUTPATIENT)
Dept: PAIN MEDICINE | Facility: CLINIC | Age: 63
End: 2018-01-30

## 2018-01-30 NOTE — TELEPHONE ENCOUNTER
Called pt back at cell and work #.  The cell is his aunts number and he was not there at the moment.  Called the brother in laws phone.  No answer. Will wait for pt to call back.

## 2018-01-30 NOTE — TELEPHONE ENCOUNTER
----- Message from Bisi Shaffer sent at 1/30/2018  9:23 AM CST -----  Contact: Self [phone]  Patient is calling to get refills on his medication sent to [email or web address] [id number]  [address]  [address] [facility] AT [facility] [phone] (Phone)  [phone] (Fax)    1. Metrix strips for his glucose meter

## 2018-01-30 NOTE — TELEPHONE ENCOUNTER
----- Message from Brenda Ivy sent at 1/30/2018 11:12 AM CST -----  Contact: self / [phone]  Patient is requesting a call back. Please advise

## 2018-02-01 ENCOUNTER — TELEPHONE (OUTPATIENT)
Dept: PAIN MEDICINE | Facility: CLINIC | Age: 63
End: 2018-02-01

## 2018-02-01 NOTE — TELEPHONE ENCOUNTER
Pt called requesting someone to review the MRI L spine with him.  I explained that neurologist, Dr. Howard Burleson is the MD that ordered this and Dr. Burks was out today.  Pt states he has some concerns with his hip and he could wait until his upcoming appt with Dr. burks on Monday 2/5/18.  Pt verbalized understanding of date/time.

## 2018-02-01 NOTE — TELEPHONE ENCOUNTER
----- Message from Emilie Ruggiero sent at 2/1/2018  8:21 AM CST -----  No. [phone]   Please call patient with MRI results from last year.

## 2018-02-05 ENCOUNTER — TELEPHONE (OUTPATIENT)
Dept: FAMILY MEDICINE | Facility: CLINIC | Age: 63
End: 2018-02-05

## 2018-02-05 ENCOUNTER — OFFICE VISIT (OUTPATIENT)
Dept: PAIN MEDICINE | Facility: CLINIC | Age: 63
End: 2018-02-05
Payer: MEDICARE

## 2018-02-05 VITALS
SYSTOLIC BLOOD PRESSURE: 130 MMHG | DIASTOLIC BLOOD PRESSURE: 79 MMHG | BODY MASS INDEX: 27.24 KG/M2 | WEIGHT: 189.81 LBS | HEART RATE: 81 BPM

## 2018-02-05 DIAGNOSIS — M48.04 THORACIC SPINAL STENOSIS: Primary | ICD-10-CM

## 2018-02-05 DIAGNOSIS — M54.16 LUMBAR RADICULOPATHY: ICD-10-CM

## 2018-02-05 DIAGNOSIS — G89.4 CHRONIC PAIN SYNDROME: ICD-10-CM

## 2018-02-05 DIAGNOSIS — M48.061 SPINAL STENOSIS OF LUMBAR REGION WITHOUT NEUROGENIC CLAUDICATION: ICD-10-CM

## 2018-02-05 PROCEDURE — 99213 OFFICE O/P EST LOW 20 MIN: CPT | Mod: PBBFAC,PO | Performed by: ANESTHESIOLOGY

## 2018-02-05 PROCEDURE — 99999 PR PBB SHADOW E&M-EST. PATIENT-LVL III: CPT | Mod: PBBFAC,,, | Performed by: ANESTHESIOLOGY

## 2018-02-05 PROCEDURE — 99213 OFFICE O/P EST LOW 20 MIN: CPT | Mod: S$PBB,,, | Performed by: ANESTHESIOLOGY

## 2018-02-05 NOTE — TELEPHONE ENCOUNTER
I know he is a tough one - we will NOT give more than what he gets in a month - I clearly told him

## 2018-02-05 NOTE — TELEPHONE ENCOUNTER
----- Message from Carla Burks MD sent at 2/5/2018  3:26 PM CST -----  Kana Echevarria,      I saw this pt today. He has extensive psychiatry hx. I told him I do not recommend further dose escalation. He was running out, I told him he folow up on the 10th with you as scheduled. If you want to  increase to 7.5 from 5 mg I can back you up , but he is a tough one

## 2018-02-05 NOTE — PROGRESS NOTES
Chronic patient Established Note (Follow up visit)      SUBJECTIVE:    Leif Ramirez Jr. presents to the clinic for a follow-up appointment for bilateral hip and leg pain. Since the last visit, Leif Ramirez Jr. states the pain has been persistant. Current pain intensity is 0/10.  He has extensive psychiatric history requiring multiple psychiatric admission. He has history of Schizophrenia and schizoaffective disorder.  He has multiple co-morbidities including chronic hepatitis C  I referred him to neurosurgery for severe lumbar spinals stenosis and thoracic spinal stenosis, he saw  but he is afraid of having surgery.  He is on Norco 5-325 mg TID proscribed by his PCP     I discussed MRI results with him, which was already dicussed with him at the previous  office visit as well as with neurosurgery     Pain Disability Index Review:  Last 3 PDI Scores 2/5/2018 9/11/2017 7/6/2015   Pain Disability Index (PDI) 58 65 20       Pain Medications:     Opioids: Norco  - Adjuvant Medications: None  - Anti-Coagulants: Aspirin  - Others: See Medication Card    Opioid Contract: no     report:  Reviewed and consistent with medication use as prescribed.    Pain Procedures: Hip Injections by Dr. Kim Dash    Physical Therapy/Home Exercise: yes    Imaging: MRI Lumbar Spine Without Contrast 8/25/17  Narrative      Technique: Sagittal T1, sagittal T2, sagittal STIR, axial T1, and axial T2 weighted images of the lumbar spine were obtained without contrast.    Comparison: Lumbar spine radiograph 7/6/2016    Findings:    There is mild grade 1 anterolisthesis of L4 on L5.  The vertebral body heights are well maintained, with no fracture.  There is no marrow signal abnormality suspicious for infiltrative process.  There is intervertebral disc space height loss at L1-L2, L2-L3, L4-L5, and L5-S1.  The conus is normal in appearance and terminates at the L1-L2 level.      T11-T12: There is a broad-based  circumferential disc bulge with central disc protrusion. There is resulting mass effect upon the cord and moderate spinal canal stenosis. There is flattening of the cord which appears somewhat decreased in volume with associated abnormal T2 signal hyperintensity suggestive of encephalomalacia or edema. There is mild bilateral neural foraminal narrowing.    T12-L1: There is no focal disc herniation. There is no significant spinal canal stenosis or neural foraminal narrowing.    L1-L2: There is a circumferential disc bulge, ligamentum flavum thickening, and bilateral facet arthropathy.  No significant central canal or neural foraminal narrowing.    L2-L3: There is a circumferential disc bulge, ligamentum flavum thickening, and bilateral facet arthropathy.  There is moderate spinal canal stenosis. There is moderate to severe bilateral neural foraminal narrowing.    L3-L4: There is a circumferential disc bulge, ligamentum flavum thickening, and bilateral facet arthropathy.  There is severe spinal canal stenosis. There is moderate to severe bilateral neural foraminal narrowing.    L4-L5: There is a circumferential disc bulge, ligamentum flavum thickening, and bilateral facet arthropathy. There is severe spinal canal stenosis. There is  severe right-sided and moderate left-sided neural foraminal narrowing.    L5-S1: There is a circumferential disc bulge and bilateral facet arthropathy.  There is mild spinal canal stenosis. There is moderate left-sided and mild right-sided neural foraminal narrowing.    Limited views of the posterior abdomen demonstrate bilateral T2 hyperintense renal lesions which are incompletely characterized but may represent renal cysts.   Impression          1. Broad-based circumferential disc bulge with central disc protrusion at T11-T12. There is associated mass effect/flattening of the cord with abnormal T2 signal hyperintensity suggestive of cord edema or encephalomalacia.    2. Advanced  multilevel degenerative changes of the lumbar spine noting severe spinal canal stenosis at L3-4 and L4-5. There is additional bilateral neural foraminal narrowing at multiple levels as discussed above.    3. Small bilateral T2 hyperintense renal lesions which are incompletely characterized, but may represent small renal cysts.    This report has been flagged in the [facility] medical record.      Electronically signed by: SISI KIM  Date: 08/26/17  Time: 03:34             10/16/12 Xray Hip Left        Result Narrative      DATE OF EXAM: Oct 16 2012     KDX 0153 - HIP COMPLETE LEFT:   [id number]    CLINICAL HISTORY: POST OP    ICD 9 CODE(S): ()    CPT 4 CODE(S)/MODIFIER(S): ()    Comparison: 10/13/12    Technique: AP and frog leg lateral radiographs of the left hip.    Findings: Interval left femoral ORIF is noted with a long intramedullary   nail and proximal inter-locking pin. No acute complication demonstrated.   Some degree of displacement and angulation remains at the fracture site   but there is improved alignment with loss of previously noted varus   angulation. Displaced lesser trochanteric fracture fragment again noted.   Expected postoperative air in the left hip soft tissues and overlying   skin staples.      Impression:  1. Interval left proximal femoral ORIF with improved alignment. No   radiographically apparent acute complication.  ______________________________________     Electronically signed by: Sukhjinder Mckinnon MD  Date: 10/16/12  Time: 23:21         : TIMOTHY  Transcribe Date/Time: Oct 16 2012 11:21P  Dictated by : SUKHJINDER MCKINNON,           Allergies:   Review of patient's allergies indicates:   Allergen Reactions    Codeine      Other reaction(s): Nausea    Depakote [divalproex] Other (See Comments)     Thrombocytopenia    Haldol [haloperidol lactate] Other (See Comments)     Seizures    Methadone     Morphine     Naloxone     Opium     Propoxyphene     Stelazine  [trifluoperazine]     Amoxicillin Rash       Current Medications:   Current Outpatient Prescriptions   Medication Sig Dispense Refill    albuterol 90 mcg/actuation inhaler Inhale 1 puff into the lungs every 4 (four) hours as needed for Wheezing. 1 Inhaler 1    amLODIPine (NORVASC) 10 MG tablet Take 1 tablet (10 mg total) by mouth once daily. 90 tablet 0    blood sugar diagnostic Strp 1 strip by Misc.(Non-Drug; Combo Route) route 2 (two) times daily. 200 strip 0    carvedilol (COREG) 25 MG tablet TAKE 1 TABLET(25 MG) BY MOUTH TWICE DAILY 180 tablet 3    diclofenac (VOLTAREN) 25 MG TbEC TK 1 T PO  TID PRF ARTHRITIC PAIN  0    ferrous sulfate 325 mg (65 mg iron) Tab tablet TK 1 T PO  BID  0    FLUoxetine (PROZAC) 20 MG capsule TK 1 C PO QD  0    furosemide (LASIX) 80 MG tablet Take 1 tablet (80 mg total) by mouth once daily. 90 tablet 3    gabapentin (NEURONTIN) 300 MG capsule Take 300 mg by mouth 3 (three) times daily.   0    hydrALAZINE (APRESOLINE) 10 MG tablet TAKE 1 TABLET(10 MG) BY MOUTH THREE TIMES DAILY 270 tablet 11    hydrocodone-acetaminophen 5-325mg (NORCO) 5-325 mg per tablet Take 1 tablet by mouth every 8 (eight) hours as needed for Pain. 90 tablet 0    LORazepam (ATIVAN) 1 MG tablet TK 1 T PO BID  0    OLANZapine (ZYPREXA) 5 MG tablet TK 1 T PO BID  0    omeprazole (PRILOSEC) 20 MG capsule Take 1 capsule (20 mg total) by mouth once daily. 90 capsule 1    oxybutynin (DITROPAN) 5 MG Tab Take 1 tablet (5 mg total) by mouth every evening. One tablet before sleep 90 tablet 3    predniSONE (DELTASONE) 20 MG tablet TK 1 T PO QD 10 tablet 0    tamsulosin (FLOMAX) 0.4 mg Cp24 TAKE 1 CAPSULE(0.4 MG) BY MOUTH EVERY DAY 90 capsule 0     No current facility-administered medications for this visit.        REVIEW OF SYSTEMS:    GENERAL:  No weight loss, malaise or fevers.  HEENT:  + headaches, + bronchitis .  NECK:  + neck   RESPIRATORY: + COPD + smoker  CARDIOVASCULAR: + CAD,  Negative for chest  pain, leg swelling or palpitations.  GI:  Negative for abdominal discomfort, blood in stools or black stools or change in bowel habits.  MUSCULOSKELETAL:  See HPI.  SKIN:  Negative for lesions, rash, and itching.  PSYCH:  See HPI, denies SI  HEMATOLOGY/LYMPHOLOGY:  Negative for prolonged bleeding, bruising easily or swollen nodes.  NEURO:   No history of headaches, syncope, paralysis, + history of seizures  All other reviewed and negative other than HPI.  Past Medical History:  Past Medical History:   Diagnosis Date    RAPHAEL (acute kidney injury)     Allergy     poison ivy    Anemia     Anxiety     Arthritis     Asthma     Bipolar affective     BPH (benign prostatic hyperplasia)     Cancer     blood and bone    CHF (congestive heart failure)     Chronic hepatitis C with cirrhosis     CKD (chronic kidney disease) stage 5, GFR less than 15 ml/min     COPD (chronic obstructive pulmonary disease)     Coronary artery disease     Depression     Diabetes mellitus type II     Disorder of kidney and ureter     Edema     Elevated PSA     has had prostate problems but unaware of his psa level    Encounter for blood transfusion     Hematuria     History of back injury     Chronic back pain    Hyperlipidemia     Hypertension     Internal hemorrhoid, bleeding 9/21/2015    Pancytopenia     Proteinuria     Renal cyst, right     Renal disorder     Schizo affective schizophrenia     Seizures     Stroke     Thyroid disease     Urinary tract infection        Past Surgical History:  Past Surgical History:   Procedure Laterality Date    ELBOW BURSA SURGERY      JOINT REPLACEMENT      ORIF FEMUR FRACTURE Right 8/2015       Family History:  Family History   Problem Relation Age of Onset    Hypertension Mother     Diabetes Mother     Transient ischemic attack Mother     Heart disease Mother      stent placement    Arthritis Mother     Heart disease Father     Hypertension Father     Diabetes Father  "    Stroke Father     Arthritis Sister     Hypertension Sister     Heart disease Maternal Grandmother     Diabetes Maternal Grandmother     Stroke Maternal Grandmother     Cancer Paternal Grandmother      breast    Heart disease Paternal Grandmother     Cancer Sister      brain    Prostate cancer Neg Hx     Kidney disease Neg Hx        Social History:  Social History     Social History    Marital status: Single     Spouse name: N/A    Number of children: N/A    Years of education: N/A     Social History Main Topics    Smoking status: Current Every Day Smoker     Packs/day: 0.50    Smokeless tobacco: Never Used    Alcohol use No    Drug use: No      Comment: quit 1985    Sexual activity: Not Currently     Other Topics Concern    None     Social History Narrative    None       OBJECTIVE:    /79   Pulse 81   Wt 86.1 kg (189 lb 13.1 oz)   BMI 27.24 kg/m²     PHYSICAL EXAMINATION:   not performed     ASSESSMENT: 62 y.o. year old male with low back , bilateral hip  pain, consistent with      1. Thoracic spinal stenosis    2. Lumbar radiculopathy    3. Spinal stenosis of lumbar region without neurogenic claudication    4. Chronic pain syndrome            PLAN:     -had a long discussion with patient. I did not have the time to do physical exam as patient chose to leave before being able to do it. I told patient that surgery is an option but he is not ready to have it done. He wanted me to recommend further increase in opioid medications. I told him that he should follow guidelines . He is prescribed Norco 5-325 mg TID by his pcp . He said he " run out". I told him that he should comply with directions of use of his medications prescribed by . He is at high risk given his extensive psychiatric hx and concomitant use of benzodiazepines. If any increase in the dose it should be minimal maybe go from 5 to 7.5 mg TID , and set expectations that further escalation would be very minimal if " "any. He is any higher risk due to the reasons  above.  Pt got upset saying " he will suffer till February 10". I did communicate with his prescribing pcp that if he feels appropriate to increase to 7.5 TID, but with close monitoring of his condition. The [place] [employer] website for prescription monitoring was consulted today, and it does not suggest any deviations in conflict with the patient's controlled substance contract with our clinic. We will continue current therapy with frequent monitoring of the controlled substance database, and urine drug screens on followup.   - RTC as needed      The above plan and management options were discussed at length with patient. Patient is in agreement with the above and verbalized understanding.    Carla Burks  02/05/2018    "

## 2018-02-05 NOTE — PROGRESS NOTES
Chronic patient Established Note (Follow up visit)      SUBJECTIVE:    Leif Ramirez Jr. presents to the clinic for a follow-up appointment for bilateral hip and leg pain. Since the last visit, Leif Ramirez Jr. states the pain has been {PAIN - I/W/P:08931}. Current pain intensity is {GEN PAIN SCALE HI:86963}.    Pain Disability Index Review:  Last 3 PDI Scores 9/11/2017 7/6/2015   Pain Disability Index (PDI) 65 20       Pain Medications:     Opioids: Norco  - Adjuvant Medications: None  - Anti-Coagulants: Aspirin  - Others: See Medication Card     Opioid Contract: no      report:  Reviewed and consistent with medication use as prescribed.     Pain Procedures: Hip Injections by Dr. Kim Dash     Physical Therapy/Home Exercise: yes     Imaging: MRI Lumbar Spine Without Contrast 8/25/17  Narrative      Technique: Sagittal T1, sagittal T2, sagittal STIR, axial T1, and axial T2 weighted images of the lumbar spine were obtained without contrast.    Comparison: Lumbar spine radiograph 7/6/2016    Findings:    There is mild grade 1 anterolisthesis of L4 on L5.  The vertebral body heights are well maintained, with no fracture.  There is no marrow signal abnormality suspicious for infiltrative process.  There is intervertebral disc space height loss at L1-L2, L2-L3, L4-L5, and L5-S1.  The conus is normal in appearance and terminates at the L1-L2 level.      T11-T12: There is a broad-based circumferential disc bulge with central disc protrusion. There is resulting mass effect upon the cord and moderate spinal canal stenosis. There is flattening of the cord which appears somewhat decreased in volume with associated abnormal T2 signal hyperintensity suggestive of encephalomalacia or edema. There is mild bilateral neural foraminal narrowing.    T12-L1: There is no focal disc herniation. There is no significant spinal canal stenosis or neural foraminal narrowing.    L1-L2: There is a circumferential disc bulge,  ligamentum flavum thickening, and bilateral facet arthropathy.  No significant central canal or neural foraminal narrowing.    L2-L3: There is a circumferential disc bulge, ligamentum flavum thickening, and bilateral facet arthropathy.  There is moderate spinal canal stenosis. There is moderate to severe bilateral neural foraminal narrowing.    L3-L4: There is a circumferential disc bulge, ligamentum flavum thickening, and bilateral facet arthropathy.  There is severe spinal canal stenosis. There is moderate to severe bilateral neural foraminal narrowing.    L4-L5: There is a circumferential disc bulge, ligamentum flavum thickening, and bilateral facet arthropathy. There is severe spinal canal stenosis. There is  severe right-sided and moderate left-sided neural foraminal narrowing.    L5-S1: There is a circumferential disc bulge and bilateral facet arthropathy.  There is mild spinal canal stenosis. There is moderate left-sided and mild right-sided neural foraminal narrowing.    Limited views of the posterior abdomen demonstrate bilateral T2 hyperintense renal lesions which are incompletely characterized but may represent renal cysts.   Impression          1. Broad-based circumferential disc bulge with central disc protrusion at T11-T12. There is associated mass effect/flattening of the cord with abnormal T2 signal hyperintensity suggestive of cord edema or encephalomalacia.    2. Advanced multilevel degenerative changes of the lumbar spine noting severe spinal canal stenosis at L3-4 and L4-5. There is additional bilateral neural foraminal narrowing at multiple levels as discussed above.    3. Small bilateral T2 hyperintense renal lesions which are incompletely characterized, but may represent small renal cysts.    This report has been flagged in the [facility] medical record.      Electronically signed by: SISI KIM  Date: 08/26/17  Time: 03:34             10/16/12 Xray Hip Left        Result Narrative      DATE OF  EXAM: Oct 16 2012     KDX 0153 - HIP COMPLETE LEFT:   [id number]    CLINICAL HISTORY: POST OP    ICD 9 CODE(S): ()    CPT 4 CODE(S)/MODIFIER(S): ()    Comparison: 10/13/12    Technique: AP and frog leg lateral radiographs of the left hip.    Findings: Interval left femoral ORIF is noted with a long intramedullary   nail and proximal inter-locking pin. No acute complication demonstrated.   Some degree of displacement and angulation remains at the fracture site   but there is improved alignment with loss of previously noted varus   angulation. Displaced lesser trochanteric fracture fragment again noted.   Expected postoperative air in the left hip soft tissues and overlying   skin staples.      Impression:  1. Interval left proximal femoral ORIF with improved alignment. No   radiographically apparent acute complication.  ______________________________________     Electronically signed by: Sukhjinder Mckinnon MD  Date: 10/16/12  Time: 23:21         : TIMOTHY  Transcribe Date/Time: Oct 16 2012 11:21P  Dictated by : SUKHJINDER MCKINNON,          Allergies:   Review of patient's allergies indicates:   Allergen Reactions    Codeine      Other reaction(s): Nausea    Depakote [divalproex] Other (See Comments)     Thrombocytopenia    Haldol [haloperidol lactate] Other (See Comments)     Seizures    Methadone     Morphine     Naloxone     Opium     Propoxyphene     Stelazine [trifluoperazine]     Amoxicillin Rash       Current Medications:   Current Outpatient Prescriptions   Medication Sig Dispense Refill    albuterol 90 mcg/actuation inhaler Inhale 1 puff into the lungs every 4 (four) hours as needed for Wheezing. 1 Inhaler 1    amLODIPine (NORVASC) 10 MG tablet Take 1 tablet (10 mg total) by mouth once daily. 90 tablet 0    blood sugar diagnostic Strp 1 strip by Misc.(Non-Drug; Combo Route) route 2 (two) times daily. 200 strip 0    carvedilol (COREG) 25 MG tablet TAKE 1 TABLET(25 MG) BY MOUTH TWICE DAILY 180  tablet 3    diclofenac (VOLTAREN) 25 MG TbEC TK 1 T PO  TID PRF ARTHRITIC PAIN  0    ferrous sulfate 325 mg (65 mg iron) Tab tablet TK 1 T PO  BID  0    FLUoxetine (PROZAC) 20 MG capsule TK 1 C PO QD  0    furosemide (LASIX) 80 MG tablet Take 1 tablet (80 mg total) by mouth once daily. 90 tablet 3    gabapentin (NEURONTIN) 300 MG capsule Take 300 mg by mouth 3 (three) times daily.   0    hydrALAZINE (APRESOLINE) 10 MG tablet TAKE 1 TABLET(10 MG) BY MOUTH THREE TIMES DAILY 270 tablet 11    hydrocodone-acetaminophen 5-325mg (NORCO) 5-325 mg per tablet Take 1 tablet by mouth every 8 (eight) hours as needed for Pain. 90 tablet 0    LORazepam (ATIVAN) 1 MG tablet TK 1 T PO BID  0    OLANZapine (ZYPREXA) 5 MG tablet TK 1 T PO BID  0    omeprazole (PRILOSEC) 20 MG capsule Take 1 capsule (20 mg total) by mouth once daily. 90 capsule 1    oxybutynin (DITROPAN) 5 MG Tab Take 1 tablet (5 mg total) by mouth every evening. One tablet before sleep 90 tablet 3    predniSONE (DELTASONE) 20 MG tablet TK 1 T PO QD 10 tablet 0    tamsulosin (FLOMAX) 0.4 mg Cp24 TAKE 1 CAPSULE(0.4 MG) BY MOUTH EVERY DAY 90 capsule 0     No current facility-administered medications for this visit.        REVIEW OF SYSTEMS:    GENERAL:  No weight loss, malaise or fevers.  HEENT:  Negative for frequent or significant headaches.  NECK:  Negative for lumps, goiter, pain and significant neck swelling.  RESPIRATORY:  Negative for cough, wheezing or shortness of breath.  CARDIOVASCULAR:  Negative for chest pain, leg swelling or palpitations.  GI:  Negative for abdominal discomfort, blood in stools or black stools or change in bowel habits.  MUSCULOSKELETAL:  See HPI.  SKIN:  Negative for lesions, rash, and itching.  PSYCH:  Negative for sleep disturbance, mood disorder and recent psychosocial stressors.  HEMATOLOGY/LYMPHOLOGY:  Negative for prolonged bleeding, bruising easily or swollen nodes.  NEURO:   No history of headaches, syncope, paralysis,  seizures or tremors.  All other reviewed and negative other than HPI.    Past Medical History:  Past Medical History:   Diagnosis Date    RAPHAEL (acute kidney injury)     Allergy     poison ivy    Anemia     Anxiety     Arthritis     Asthma     Bipolar affective     BPH (benign prostatic hyperplasia)     Cancer     blood and bone    CHF (congestive heart failure)     Chronic hepatitis C with cirrhosis     CKD (chronic kidney disease) stage 5, GFR less than 15 ml/min     COPD (chronic obstructive pulmonary disease)     Coronary artery disease     Depression     Diabetes mellitus type II     Disorder of kidney and ureter     Edema     Elevated PSA     has had prostate problems but unaware of his psa level    Encounter for blood transfusion     Hematuria     History of back injury     Chronic back pain    Hyperlipidemia     Hypertension     Internal hemorrhoid, bleeding 9/21/2015    Pancytopenia     Proteinuria     Renal cyst, right     Renal disorder     Schizo affective schizophrenia     Seizures     Stroke     Thyroid disease     Urinary tract infection        Past Surgical History:  Past Surgical History:   Procedure Laterality Date    ELBOW BURSA SURGERY      JOINT REPLACEMENT      ORIF FEMUR FRACTURE Right 8/2015       Family History:  Family History   Problem Relation Age of Onset    Hypertension Mother     Diabetes Mother     Transient ischemic attack Mother     Heart disease Mother      stent placement    Arthritis Mother     Heart disease Father     Hypertension Father     Diabetes Father     Stroke Father     Arthritis Sister     Hypertension Sister     Heart disease Maternal Grandmother     Diabetes Maternal Grandmother     Stroke Maternal Grandmother     Cancer Paternal Grandmother      breast    Heart disease Paternal Grandmother     Cancer Sister      brain    Prostate cancer Neg Hx     Kidney disease Neg Hx        Social History:  Social History      Social History    Marital status: Single     Spouse name: N/A    Number of children: N/A    Years of education: N/A     Social History Main Topics    Smoking status: Current Every Day Smoker     Packs/day: 0.50    Smokeless tobacco: Never Used    Alcohol use No    Drug use: No      Comment: quit 1985    Sexual activity: Not Currently     Other Topics Concern    Not on file     Social History Narrative    No narrative on file       OBJECTIVE:    There were no vitals taken for this visit.    PHYSICAL EXAMINATION:    General appearance: Well appearing, in no acute distress, alert and oriented x3.  Psych:  Mood and affect appropriate.  Skin: Skin color, texture, turgor normal, no rashes or lesions, in both upper and lower body.  Head/face:  Atraumatic, normocephalic. No palpable lymph nodes  Neck: No pain to palpation over the cervical paraspinous muscles. Spurling Negative. No pain with neck flexion, extension, or lateral flexion. .  Cor: RRR  Pulm: CTA  GI: Abdomen soft and non-tender.  Back: Straight leg raising in the sitting and supine positions is negative to radicular pain. No pain to palpation over the spine or costovertebral angles. Normal range of motion without pain reproduction.  Extremities: Peripheral joint ROM is full and pain free without obvious instability or laxity in all four extremities. No deformities, edema, or skin discoloration. Good capillary refill.  Musculoskeletal: Shoulder, hip, sacroiliac and knee provocative maneuvers are negative. Bilateral upper and lower extremity strength is normal and symmetric.  No atrophy or tone abnormalities are noted.  Neuro: Bilateral upper and lower extremity coordination and muscle stretch reflexes are physiologic and symmetric.  Plantar response are downgoing. No loss of sensation is noted.  Gait: Normal.    ASSESSMENT: 62 y.o. year old male with *** pain, consistent with ***     No diagnosis found.      PLAN:     {PLAN:74573}  - RTC ***  -  Counseled patient regarding the importance of {:77398}.    The above plan and management options were discussed at length with patient. Patient is in agreement with the above and verbalized understanding.    Leeanne Iyer  02/05/2018

## 2018-02-06 ENCOUNTER — TELEPHONE (OUTPATIENT)
Dept: FAMILY MEDICINE | Facility: CLINIC | Age: 63
End: 2018-02-06

## 2018-02-06 NOTE — TELEPHONE ENCOUNTER
----- Message from Janene Perea sent at 2/6/2018 10:12 AM CST -----  Contact: [phone] self  Patient called in returning your call. Please advise.

## 2018-02-08 ENCOUNTER — TELEPHONE (OUTPATIENT)
Dept: FAMILY MEDICINE | Facility: CLINIC | Age: 63
End: 2018-02-08

## 2018-02-08 ENCOUNTER — OFFICE VISIT (OUTPATIENT)
Dept: UROLOGY | Facility: CLINIC | Age: 63
End: 2018-02-08
Payer: MEDICARE

## 2018-02-08 VITALS — DIASTOLIC BLOOD PRESSURE: 91 MMHG | SYSTOLIC BLOOD PRESSURE: 192 MMHG | HEIGHT: 70 IN

## 2018-02-08 DIAGNOSIS — J96.01 ACUTE RESPIRATORY FAILURE WITH HYPOXIA: ICD-10-CM

## 2018-02-08 DIAGNOSIS — G89.29 CHRONIC BILATERAL LOW BACK PAIN WITH RIGHT-SIDED SCIATICA: ICD-10-CM

## 2018-02-08 DIAGNOSIS — M54.41 CHRONIC BILATERAL LOW BACK PAIN WITH RIGHT-SIDED SCIATICA: ICD-10-CM

## 2018-02-08 DIAGNOSIS — J43.8 OTHER EMPHYSEMA: Primary | Chronic | ICD-10-CM

## 2018-02-08 DIAGNOSIS — N32.81 OAB (OVERACTIVE BLADDER): ICD-10-CM

## 2018-02-08 DIAGNOSIS — M54.16 LUMBAR RADICULOPATHY: ICD-10-CM

## 2018-02-08 DIAGNOSIS — M25.552 BILATERAL HIP PAIN: ICD-10-CM

## 2018-02-08 DIAGNOSIS — M54.5 CHRONIC LOW BACK PAIN, UNSPECIFIED BACK PAIN LATERALITY, WITH SCIATICA PRESENCE UNSPECIFIED: ICD-10-CM

## 2018-02-08 DIAGNOSIS — G89.29 CHRONIC LOW BACK PAIN, UNSPECIFIED BACK PAIN LATERALITY, WITH SCIATICA PRESENCE UNSPECIFIED: ICD-10-CM

## 2018-02-08 DIAGNOSIS — M25.551 BILATERAL HIP PAIN: ICD-10-CM

## 2018-02-08 DIAGNOSIS — N40.0 BENIGN PROSTATIC HYPERPLASIA WITHOUT LOWER URINARY TRACT SYMPTOMS: Primary | ICD-10-CM

## 2018-02-08 DIAGNOSIS — R31.0 GROSS HEMATURIA: ICD-10-CM

## 2018-02-08 DIAGNOSIS — M48.04 THORACIC SPINAL STENOSIS: ICD-10-CM

## 2018-02-08 LAB
BILIRUB SERPL-MCNC: ABNORMAL MG/DL
BLOOD URINE, POC: ABNORMAL
COLOR, POC UA: YELLOW
GLUCOSE UR QL STRIP: ABNORMAL
KETONES UR QL STRIP: ABNORMAL
LEUKOCYTE ESTERASE URINE, POC: ABNORMAL
NITRITE, POC UA: ABNORMAL
PH, POC UA: 7
PROTEIN, POC: ABNORMAL
SPECIFIC GRAVITY, POC UA: 1
UROBILINOGEN, POC UA: ABNORMAL

## 2018-02-08 PROCEDURE — 99212 OFFICE O/P EST SF 10 MIN: CPT | Mod: PBBFAC,PO | Performed by: UROLOGY

## 2018-02-08 PROCEDURE — 99214 OFFICE O/P EST MOD 30 MIN: CPT | Mod: S$PBB,,, | Performed by: UROLOGY

## 2018-02-08 PROCEDURE — 81002 URINALYSIS NONAUTO W/O SCOPE: CPT | Mod: PBBFAC,PO | Performed by: UROLOGY

## 2018-02-08 PROCEDURE — 99999 PR PBB SHADOW E&M-EST. PATIENT-LVL II: CPT | Mod: PBBFAC,,, | Performed by: UROLOGY

## 2018-02-08 RX ORDER — OXYBUTYNIN CHLORIDE 5 MG/1
5 TABLET ORAL 3 TIMES DAILY
Qty: 90 TABLET | Refills: 12 | Status: SHIPPED | OUTPATIENT
Start: 2018-02-08 | End: 2018-03-10

## 2018-02-08 RX ORDER — HYDROCODONE BITARTRATE AND ACETAMINOPHEN 5; 325 MG/1; MG/1
1 TABLET ORAL EVERY 8 HOURS PRN
Qty: 90 TABLET | Refills: 0 | Status: SHIPPED | OUTPATIENT
Start: 2018-02-08 | End: 2018-02-12 | Stop reason: SDUPTHER

## 2018-02-08 RX ORDER — PREDNISONE 20 MG/1
TABLET ORAL
Qty: 10 TABLET | Refills: 0 | Status: SHIPPED | OUTPATIENT
Start: 2018-02-08 | End: 2018-06-06

## 2018-02-08 NOTE — TELEPHONE ENCOUNTER
----- Message from Emilie Ruggiero sent at 2/8/2018  3:03 PM CST -----  No. [phone]    Patient is ready to schedule an appointment with a pulmonologist.   Please call.

## 2018-02-08 NOTE — TELEPHONE ENCOUNTER
Pt also requesting a referral for a nurse to come into his home to help with daily chores in his home and he needs help caring for himself. Pls advise.

## 2018-02-09 ENCOUNTER — CLINICAL SUPPORT (OUTPATIENT)
Dept: SMOKING CESSATION | Facility: CLINIC | Age: 63
End: 2018-02-09
Payer: COMMERCIAL

## 2018-02-09 ENCOUNTER — TELEPHONE (OUTPATIENT)
Dept: FAMILY MEDICINE | Facility: CLINIC | Age: 63
End: 2018-02-09

## 2018-02-09 ENCOUNTER — NURSE TRIAGE (OUTPATIENT)
Dept: ADMINISTRATIVE | Facility: CLINIC | Age: 63
End: 2018-02-09

## 2018-02-09 DIAGNOSIS — F17.200 NICOTINE DEPENDENCE: Primary | ICD-10-CM

## 2018-02-09 PROCEDURE — 99407 BEHAV CHNG SMOKING > 10 MIN: CPT | Mod: S$GLB,,,

## 2018-02-09 NOTE — PROGRESS NOTES
"This patient was last seen by me May of last year in follow-up for history of BPH and overactive bladder on tamsulosin and oxybutynin.  Patient has history of negative workup for gross hematuria.  Noncontrast CT scan was obtained due to chronic renal failure.  Patient and  had a few more episodes gross hematuria and was recommended to proceed with retrograde pyelography however the patient decided not to proceed with this.    Patient comes to the office complaining of frequency and urgency.  He has no dysuria fevers or chills.  He has been taking his Flomax however has been not taking his oxybutynin.  Patient reports no further gross hematuria    Physical exam reveals reveals a well-developed well-nourished patient  in no acute distress.  Patient is alert and oriented ×3 with somewhat atypical mood and affect.  Respiratory effort is normal and there is no peripheral edema.  Skin is normal to inspection and palpation    BP (!) 192/91 (BP Location: Left arm, BP Method: Medium (Automatic)) Comment: No meds prior to appointment  Ht 5' 10" (1.778 m)   Review of Systems  General ROS: negative for chills, fever or weight loss  Respiratory ROS: no cough, shortness of breath, or wheezing  Cardiovascular ROS: no chest pain or dyspnea on exertion  Musculoskeletal ROS: negative for gait disturbance or muscular weakness    Family History   Problem Relation Age of Onset    Hypertension Mother     Diabetes Mother     Transient ischemic attack Mother     Heart disease Mother      stent placement    Arthritis Mother     Heart disease Father     Hypertension Father     Diabetes Father     Stroke Father     Arthritis Sister     Hypertension Sister     Heart disease Maternal Grandmother     Diabetes Maternal Grandmother     Stroke Maternal Grandmother     Cancer Paternal Grandmother      breast    Heart disease Paternal Grandmother     Cancer Sister      brain    Prostate cancer Neg Hx     Kidney disease Neg Hx  "     Past Medical History:   Diagnosis Date    RAPHAEL (acute kidney injury)     Allergy     poison ivy    Anemia     Anxiety     Arthritis     Asthma     Bipolar affective     BPH (benign prostatic hyperplasia)     Cancer     blood and bone    CHF (congestive heart failure)     Chronic hepatitis C with cirrhosis     CKD (chronic kidney disease) stage 5, GFR less than 15 ml/min     COPD (chronic obstructive pulmonary disease)     Coronary artery disease     Depression     Diabetes mellitus type II     Disorder of kidney and ureter     Edema     Elevated PSA     has had prostate problems but unaware of his psa level    Encounter for blood transfusion     Hematuria     History of back injury     Chronic back pain    Hyperlipidemia     Hypertension     Internal hemorrhoid, bleeding 9/21/2015    Pancytopenia     Proteinuria     Renal cyst, right     Renal disorder     Schizo affective schizophrenia     Seizures     Stroke     Thyroid disease     Urinary tract infection      Family History   Problem Relation Age of Onset    Hypertension Mother     Diabetes Mother     Transient ischemic attack Mother     Heart disease Mother      stent placement    Arthritis Mother     Heart disease Father     Hypertension Father     Diabetes Father     Stroke Father     Arthritis Sister     Hypertension Sister     Heart disease Maternal Grandmother     Diabetes Maternal Grandmother     Stroke Maternal Grandmother     Cancer Paternal Grandmother      breast    Heart disease Paternal Grandmother     Cancer Sister      brain    Prostate cancer Neg Hx     Kidney disease Neg Hx      Social History   Substance Use Topics    Smoking status: Current Every Day Smoker     Packs/day: 0.50    Smokeless tobacco: Never Used    Alcohol use No       Impression:      ICD-10-CM ICD-9-CM    1. Benign prostatic hyperplasia without lower urinary tract symptoms N40.0 600.00 POCT URINE DIPSTICK WITHOUT  MICROSCOPE   2. OAB (overactive bladder) N32.81 596.51    3. Gross hematuria R31.0 599.71        Plan:    #1 and 2.  BPH and overactive bladder.  Plan.  I recommend to the patient that he continue his tamsulosin and restart taking his oxybutynin.  Follow-up 3 months    #3.  Gross hematuria, history of.  Plan.  I again discussed proceeding with retrograde pyelography which would require general anesthesia.  Patient states that he does not want to proceed with this workup at this time.    Today I spent 25 minutes with the patient, more than 50% of which was spent counseling and coordinating care concerning treatment of issues as detailed above.    PLEASE NOTE:  Please be advised that portions of this note were dictated using voice recognition software and may contain dictation related errors in spelling/grammar/appropriate pronouns/syntax or other errors that might have not been found and or corrected on text review.

## 2018-02-09 NOTE — TELEPHONE ENCOUNTER
----- Message from Katie Marie sent at 2/8/2018  4:54 PM CST -----  Contact: [phone]/self  Patient is requesting a call back about home health. Please advise.

## 2018-02-09 NOTE — TELEPHONE ENCOUNTER
Spoke to pt and was requesting Home Health so that the nurse can come and help clean his house due to a 2 year urine stench in his home. Informed pt that the Home Health nurse does not clean pt's home.

## 2018-02-10 NOTE — TELEPHONE ENCOUNTER
Reason for Disposition   No answer.  First attempt to contact caller.  Follow-up call scheduled within 15 minutes.     No answer x 2 attempts.   Second attempt to contact family AND no contact made.  Answering service notified.    Protocols used: ST NO CONTACT OR DUPLICATE CONTACT CALL-A-AH

## 2018-02-12 ENCOUNTER — OFFICE VISIT (OUTPATIENT)
Dept: FAMILY MEDICINE | Facility: CLINIC | Age: 63
End: 2018-02-12
Attending: FAMILY MEDICINE
Payer: MEDICARE

## 2018-02-12 VITALS
DIASTOLIC BLOOD PRESSURE: 88 MMHG | HEIGHT: 70 IN | OXYGEN SATURATION: 95 % | HEART RATE: 86 BPM | BODY MASS INDEX: 29.07 KG/M2 | SYSTOLIC BLOOD PRESSURE: 139 MMHG | WEIGHT: 203.06 LBS | TEMPERATURE: 98 F

## 2018-02-12 DIAGNOSIS — M25.552 BILATERAL HIP PAIN: ICD-10-CM

## 2018-02-12 DIAGNOSIS — R35.0 URINARY FREQUENCY: Primary | ICD-10-CM

## 2018-02-12 DIAGNOSIS — B19.20 COMPENSATED CIRRHOSIS RELATED TO HEPATITIS C VIRUS (HCV): Chronic | ICD-10-CM

## 2018-02-12 DIAGNOSIS — N40.0 BENIGN PROSTATIC HYPERPLASIA, UNSPECIFIED WHETHER LOWER URINARY TRACT SYMPTOMS PRESENT: ICD-10-CM

## 2018-02-12 DIAGNOSIS — M54.5 CHRONIC LOW BACK PAIN, UNSPECIFIED BACK PAIN LATERALITY, WITH SCIATICA PRESENCE UNSPECIFIED: ICD-10-CM

## 2018-02-12 DIAGNOSIS — M54.16 LUMBAR RADICULOPATHY: ICD-10-CM

## 2018-02-12 DIAGNOSIS — M25.551 BILATERAL HIP PAIN: ICD-10-CM

## 2018-02-12 DIAGNOSIS — M48.04 THORACIC SPINAL STENOSIS: ICD-10-CM

## 2018-02-12 DIAGNOSIS — M54.41 CHRONIC BILATERAL LOW BACK PAIN WITH RIGHT-SIDED SCIATICA: ICD-10-CM

## 2018-02-12 DIAGNOSIS — Z23 IMMUNIZATION DUE: ICD-10-CM

## 2018-02-12 DIAGNOSIS — B18.2 CHRONIC HEPATITIS C WITHOUT HEPATIC COMA: ICD-10-CM

## 2018-02-12 DIAGNOSIS — M70.62 TROCHANTERIC BURSITIS OF LEFT HIP: ICD-10-CM

## 2018-02-12 DIAGNOSIS — G89.29 CHRONIC BILATERAL LOW BACK PAIN WITH RIGHT-SIDED SCIATICA: ICD-10-CM

## 2018-02-12 DIAGNOSIS — N18.5 CKD (CHRONIC KIDNEY DISEASE), STAGE V: ICD-10-CM

## 2018-02-12 DIAGNOSIS — J43.8 OTHER EMPHYSEMA: Chronic | ICD-10-CM

## 2018-02-12 DIAGNOSIS — K74.69 COMPENSATED CIRRHOSIS RELATED TO HEPATITIS C VIRUS (HCV): Chronic | ICD-10-CM

## 2018-02-12 DIAGNOSIS — I10 ESSENTIAL HYPERTENSION: Chronic | ICD-10-CM

## 2018-02-12 DIAGNOSIS — E78.2 MIXED HYPERLIPIDEMIA: Chronic | ICD-10-CM

## 2018-02-12 DIAGNOSIS — G89.29 CHRONIC LOW BACK PAIN, UNSPECIFIED BACK PAIN LATERALITY, WITH SCIATICA PRESENCE UNSPECIFIED: ICD-10-CM

## 2018-02-12 PROCEDURE — 20610 DRAIN/INJ JOINT/BURSA W/O US: CPT | Mod: PBBFAC,PO | Performed by: FAMILY MEDICINE

## 2018-02-12 PROCEDURE — 99214 OFFICE O/P EST MOD 30 MIN: CPT | Mod: 25,S$PBB,, | Performed by: FAMILY MEDICINE

## 2018-02-12 PROCEDURE — 99999 PR PBB SHADOW E&M-EST. PATIENT-LVL III: CPT | Mod: PBBFAC,,, | Performed by: FAMILY MEDICINE

## 2018-02-12 PROCEDURE — 20610 DRAIN/INJ JOINT/BURSA W/O US: CPT | Mod: S$PBB,LT,, | Performed by: FAMILY MEDICINE

## 2018-02-12 PROCEDURE — 90686 IIV4 VACC NO PRSV 0.5 ML IM: CPT | Mod: PBBFAC,PO

## 2018-02-12 PROCEDURE — G0008 ADMIN INFLUENZA VIRUS VAC: HCPCS | Mod: PBBFAC,PO

## 2018-02-12 PROCEDURE — 99213 OFFICE O/P EST LOW 20 MIN: CPT | Mod: PBBFAC,PO,25 | Performed by: FAMILY MEDICINE

## 2018-02-12 RX ORDER — TRIAMCINOLONE ACETONIDE 40 MG/ML
20 INJECTION, SUSPENSION INTRA-ARTICULAR; INTRAMUSCULAR
Status: COMPLETED | OUTPATIENT
Start: 2018-02-12 | End: 2018-02-12

## 2018-02-12 RX ORDER — HYDROCODONE BITARTRATE AND ACETAMINOPHEN 5; 325 MG/1; MG/1
1 TABLET ORAL EVERY 8 HOURS PRN
Qty: 90 TABLET | Refills: 0 | Status: SHIPPED | OUTPATIENT
Start: 2018-02-12

## 2018-02-12 RX ADMIN — TRIAMCINOLONE ACETONIDE 20 MG: 40 INJECTION, SUSPENSION INTRA-ARTICULAR; INTRAMUSCULAR at 11:02

## 2018-02-12 NOTE — PROGRESS NOTES
Subjective:       Patient ID: Leif Ramirez Jr. is a 62 y.o. male.    Chief Complaint: Flank Pain; Flu Vaccine; Medication Refill (loss Percocet Prescription); and Hip Pain (Left side)    62 yr old pleasant white male with anxiety/depression, schizoaffective disorder, Bipolar disorder, chronic Hep C with cirrhosis, CKD IV/V, chronic low back pain, B/L hip pain, BPH, HLD, pancytopenia, presents today for his routine 3 month follow up. C/o worsening left hip pain and low back pain and need pain medicine refill. He is also c/o urinary frequency. No dysuria.        Knee pain B/L - daily pain with instability - tried conservative approach with rest and meds and no relief - do not want any surgery - never used brace for support    HTN - chronic - controlled - on CCB and BB - - compliant - no side effects    Anxiety/depression/schizoaffective and bipolar disorder - follows Dr. Ray Avendano, outside Psychiatrist - no SI/HI      HLD - controlled - diet alone -      LDLCALC                  83.4                11/18/2017                      - lab due                  COPD - chronic - controlled      Chronic Hep C with Cirrhosis - seen hepatology - started on Harvoni and trying to be compliant      CKD IV/V - worsening - seeing nephrology - urine ok    Pancytopenia - seen Hem/Onc and never had follow up -       Chronic LBP/hip pain - he has B/L hip replacement and has pain in them every single day and he suffers and was on some pain medication until his doctor refused it - he also has back pain and it radiates to leg leg - no numbness or saddle anesthesia, bladder/bowel problem      History as below - reviewed      Health maintenance  -labs UTD  -colon screen and PSA reports UTD        Medication Refill   This is a chronic problem. The current episode started more than 1 year ago. The problem occurs constantly. The problem has been gradually improving. Associated symptoms include arthralgias, myalgias and neck pain.  Pertinent negatives include no chest pain, congestion, coughing, diaphoresis, rash, vomiting or weakness. Nothing aggravates the symptoms. Treatments tried: pain medicine. The treatment provided moderate relief.   Back Pain   Pertinent negatives include no chest pain or weakness.   Hip Pain    The injury mechanism was a twisting injury. The pain is present in the left hip and right hip. The quality of the pain is described as aching. The pain is at a severity of 6/10. The pain is moderate. The symptoms are aggravated by movement and palpation. He has tried heat and elevation for the symptoms. The treatment provided mild relief.   Hypertension   This is a chronic problem. The current episode started more than 1 year ago. The problem is controlled. Associated symptoms include anxiety and neck pain. Pertinent negatives include no chest pain or palpitations. There are no associated agents to hypertension. Risk factors for coronary artery disease include dyslipidemia and male gender. Past treatments include ACE inhibitors and central alpha agonists. The current treatment provides significant improvement. There are no compliance problems.  There is no history of angina, CAD/MI, CVA, left ventricular hypertrophy, PVD, renovascular disease or retinopathy. There is no history of chronic renal disease, hyperparathyroidism, pheochromocytoma or a thyroid problem.   Anemia   Presents for follow-up visit. Symptoms include pallor. There has been no confusion, light-headedness or palpitations. Past treatments include nothing. There is no history of alcohol abuse, cancer, chronic renal disease, dementia, hypothyroidism, malnutrition, recent illness or recent trauma. There is no past history of bone marrow exam, colonoscopy, EGD or FOBT. Compliance with medications is 51-75%.   Hyperlipidemia   This is a chronic problem. The current episode started more than 1 year ago. The problem is controlled. Recent lipid tests were reviewed and  are normal. He has no history of chronic renal disease, diabetes, hypothyroidism or liver disease. There are no known factors aggravating his hyperlipidemia. Associated symptoms include myalgias. Pertinent negatives include no chest pain. He is currently on no antihyperlipidemic treatment. The current treatment provides mild improvement of lipids. There are no compliance problems.  Risk factors for coronary artery disease include dyslipidemia, hypertension and a sedentary lifestyle.     Review of Systems   Constitutional: Negative.  Negative for activity change, diaphoresis and unexpected weight change.   HENT: Negative.  Negative for congestion, ear pain, mouth sores, rhinorrhea and voice change.    Eyes: Negative.  Negative for pain, discharge and visual disturbance.   Respiratory: Negative.  Negative for apnea, cough and wheezing.    Cardiovascular: Negative.  Negative for chest pain and palpitations.   Gastrointestinal: Negative.  Negative for abdominal distention, anal bleeding, diarrhea and vomiting.   Endocrine: Negative.  Negative for cold intolerance and polyuria.   Genitourinary: Negative.  Negative for decreased urine volume, difficulty urinating, discharge, frequency and scrotal swelling.   Musculoskeletal: Positive for arthralgias, back pain, myalgias and neck pain. Negative for neck stiffness.   Skin: Positive for pallor. Negative for color change and rash.   Allergic/Immunologic: Negative.  Negative for environmental allergies and immunocompromised state.   Neurological: Negative.  Negative for dizziness, speech difficulty, weakness and light-headedness.   Hematological: Negative.    Psychiatric/Behavioral: Negative.  Negative for agitation, confusion, dysphoric mood and suicidal ideas. The patient is not nervous/anxious.        PMH/PSH/FH/SH/MED/ALLERGY reviewed    Objective:       Vitals:    02/12/18 1048   BP: 139/88   Pulse: 86   Temp: 98.4 °F (36.9 °C)       Physical Exam   Constitutional: He is  oriented to person, place, and time. He appears well-developed and well-nourished. No distress.   HENT:   Head: Normocephalic and atraumatic.   Right Ear: External ear normal.   Left Ear: External ear normal.   Nose: Nose normal.   Mouth/Throat: Oropharynx is clear and moist.   Eyes: EOM are normal. Pupils are equal, round, and reactive to light.   Neck: Normal range of motion. Neck supple. No JVD present. No tracheal deviation present. No thyromegaly present.   Cardiovascular: Normal rate, regular rhythm, normal heart sounds and intact distal pulses.  Exam reveals no gallop and no friction rub.    No murmur heard.  Pulmonary/Chest: Effort normal and breath sounds normal. No stridor. No respiratory distress. He has no wheezes. He has no rales. He exhibits no tenderness.   Abdominal: Soft. Bowel sounds are normal. He exhibits distension. He exhibits no mass. There is no tenderness. There is no rebound and no guarding. No hernia.   Musculoskeletal: Normal range of motion. He exhibits tenderness (TTP B/L hips with restricted ROM and lower lumbar spine and paralumbar spine TTP. SLRT + left). He exhibits no edema.   Lymphadenopathy:     He has no cervical adenopathy.   Neurological: He is alert and oriented to person, place, and time. He has normal reflexes. He displays normal reflexes. No cranial nerve deficit. He exhibits normal muscle tone. Coordination normal.   Skin: Skin is warm and dry. Capillary refill takes less than 2 seconds. No rash noted. He is not diaphoretic. No erythema. There is pallor.   Psychiatric: He has a normal mood and affect. His behavior is normal. Judgment and thought content normal.       Assessment:       1. Urinary frequency    2. Thoracic spinal stenosis    3. Lumbar radiculopathy    4. Chronic bilateral low back pain with right-sided sciatica    5. Bilateral hip pain    6. Chronic low back pain, unspecified back pain laterality, with sciatica presence unspecified    7. Other emphysema     8. CKD (chronic kidney disease), stage V    9. Benign prostatic hyperplasia, unspecified whether lower urinary tract symptoms present    10. Essential hypertension    11. Mixed hyperlipidemia    12. 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)    13. Chronic hepatitis C without hepatic coma    14. Immunization due        Plan:       Leif was seen today for flank pain, flu vaccine, medication refill and hip pain.    Diagnoses and all orders for this visit:    Urinary frequency  -     CBC auto differential; Future  -     Comprehensive metabolic panel; Future  -     Urinalysis; Future  -     Urine culture; Future    Thoracic spinal stenosis  -     hydrocodone-acetaminophen 5-325mg (NORCO) 5-325 mg per tablet; Take 1 tablet by mouth every 8 (eight) hours as needed for Pain.    Lumbar radiculopathy  -     hydrocodone-acetaminophen 5-325mg (NORCO) 5-325 mg per tablet; Take 1 tablet by mouth every 8 (eight) hours as needed for Pain.    Chronic bilateral low back pain with right-sided sciatica  -     hydrocodone-acetaminophen 5-325mg (NORCO) 5-325 mg per tablet; Take 1 tablet by mouth every 8 (eight) hours as needed for Pain.    Bilateral hip pain  -     hydrocodone-acetaminophen 5-325mg (NORCO) 5-325 mg per tablet; Take 1 tablet by mouth every 8 (eight) hours as needed for Pain.    Chronic low back pain, unspecified back pain laterality, with sciatica presence unspecified  -     hydrocodone-acetaminophen 5-325mg (NORCO) 5-325 mg per tablet; Take 1 tablet by mouth every 8 (eight) hours as needed for Pain.    Other emphysema    CKD (chronic kidney disease), stage V    Benign prostatic hyperplasia, unspecified whether lower urinary tract symptoms present    Essential hypertension  -     CBC auto differential; Future  -     Comprehensive metabolic panel; Future    Mixed hyperlipidemia  -     CBC auto differential; Future  -     Comprehensive metabolic panel; Future    Compensated cirrhosis related to hepatitis  C virus (HCV)    Chronic hepatitis C without hepatic coma    Immunization due  -     Influenza - Quadrivalent (3 years & older) (PF)      Hep C/Cirrhosis  -completed Harvoni - viral load negative  -follows hepatology    Trochanteric bursitis left  -failure of conservative approach    Steroid injection - after informed consent, area draped in standard fashion. Adequate topical anesthesia obtained with topical spray and 20 mg kenalog mixed with 1.5 cc 2% lidocaine without epi injected into left trochanteric bursa. Minimal bleeding and no other immediate complications. Pt tolerated well. Post procedure precautions given.    COPD  -stable  -advised to quit smoking    HTN  -controlled  -refilled meds - CCB and BB to continue    OA knee B/L  -failed conservative therapy  -trial of brace daily - rx done    CKD V  -worsening  -follow nephrology  -labs      Anxiety/depression/schizoaffective/bipolar  -follows Dr. Bell Psychiatrist    B/L hip pain and chronic LBP  -on norco  -advised to check with hepatology for tylenol intake    Spent adequate time in obtaining history and explaining differentials    40 minutes spent during this visit of which greater than 50% devoted to face-face counseling and coordination of care regarding diagnosis and management plan    RTC 3 months or prn

## 2018-02-14 ENCOUNTER — TELEPHONE (OUTPATIENT)
Dept: FAMILY MEDICINE | Facility: CLINIC | Age: 63
End: 2018-02-14

## 2018-02-14 NOTE — TELEPHONE ENCOUNTER
----- Message from Lynne Pelaez sent at 2/14/2018  2:55 PM CST -----  Contact: self, [phone]  Patient requests to speak with nurse, states he is having chest issues, severe bronchitis and shortness of breath.Please advise.

## 2018-02-15 ENCOUNTER — HOSPITAL ENCOUNTER (EMERGENCY)
Facility: HOSPITAL | Age: 63
Discharge: HOME | End: 2018-02-15
Attending: EMERGENCY MEDICINE
Payer: MEDICARE

## 2018-02-15 VITALS
BODY MASS INDEX: 30.31 KG/M2 | HEART RATE: 70 BPM | SYSTOLIC BLOOD PRESSURE: 140 MMHG | WEIGHT: 200 LBS | OXYGEN SATURATION: 93 % | TEMPERATURE: 98 F | RESPIRATION RATE: 20 BRPM | HEIGHT: 68 IN | DIASTOLIC BLOOD PRESSURE: 72 MMHG

## 2018-02-15 DIAGNOSIS — R51.9 ACUTE NONINTRACTABLE HEADACHE, UNSPECIFIED HEADACHE TYPE: Primary | ICD-10-CM

## 2018-02-15 DIAGNOSIS — R11.0 NAUSEA: ICD-10-CM

## 2018-02-15 LAB — POCT GLUCOSE: 148 MG/DL (ref 70–110)

## 2018-02-15 PROCEDURE — 25000003 PHARM REV CODE 250: Performed by: EMERGENCY MEDICINE

## 2018-02-15 PROCEDURE — 99284 EMERGENCY DEPT VISIT MOD MDM: CPT | Mod: 25

## 2018-02-15 PROCEDURE — 82962 GLUCOSE BLOOD TEST: CPT

## 2018-02-15 RX ORDER — BUTALBITAL, ACETAMINOPHEN AND CAFFEINE 50; 325; 40 MG/1; MG/1; MG/1
1 TABLET ORAL EVERY 6 HOURS PRN
Qty: 14 TABLET | Refills: 0 | Status: SHIPPED | OUTPATIENT
Start: 2018-02-15 | End: 2018-03-17

## 2018-02-15 RX ORDER — ONDANSETRON 4 MG/1
4 TABLET, FILM COATED ORAL EVERY 6 HOURS PRN
Qty: 10 TABLET | Refills: 0 | Status: SHIPPED | OUTPATIENT
Start: 2018-02-15 | End: 2018-05-30 | Stop reason: SDUPTHER

## 2018-02-15 RX ORDER — BUTALBITAL, ACETAMINOPHEN AND CAFFEINE 50; 325; 40 MG/1; MG/1; MG/1
1 TABLET ORAL
Status: COMPLETED | OUTPATIENT
Start: 2018-02-15 | End: 2018-02-15

## 2018-02-15 RX ORDER — ONDANSETRON 4 MG/1
4 TABLET, ORALLY DISINTEGRATING ORAL
Status: COMPLETED | OUTPATIENT
Start: 2018-02-15 | End: 2018-02-15

## 2018-02-15 RX ADMIN — BUTALBITAL, ACETAMINOPHEN, AND CAFFEINE 1 TABLET: 50; 325; 40 TABLET ORAL at 04:02

## 2018-02-15 RX ADMIN — ONDANSETRON 4 MG: 4 TABLET, ORALLY DISINTEGRATING ORAL at 04:02

## 2018-02-15 NOTE — ED NOTES
"Pt. ambulatory to Ed room with steady gait. Pt reports nausea that began tonight and generalized HA that began yesterday. Pt states he is concerned tonight about his sugar being higher than normal. Took his sugar tonight at home which read "200". Denies vision changes. Denies neck pain. Denies fever, chills. Denies diarrhea. Denies abd pain. Denies CP. Denies SOB. Denies dizziness or light headedness. Pt. AAOx4. NAD.  "

## 2018-02-15 NOTE — ED NOTES
"Upon discharge disposition, pt. appears sedated. Arouses to repeated stimuli and gentle shaking. Displays slurred and incomprehensible speech, unsteady gait. Attempted to ambulate pt around nurses station but legs were too weak and almost gave out. Pt. admits to taking " all psych medication" prior to arrival and also states he drove here by himself. Lives approx 20 minutes away. Unable to get a ride from friends/ family at this time. Pt. assisted back to Ed bed. Notified KATYA Urena that pt is too sedated to drive himself home at this time.   "

## 2018-02-15 NOTE — ED NOTES
Pt up to nurses station complaining that he is ready to go. Ambulating without any issues with cane. Pt is alerta nd oriented. Keely CORDERO informed and we will discharge pt home.

## 2018-02-15 NOTE — ED PROVIDER NOTES
"Encounter Date: 2/15/2018       History     Chief Complaint   Patient presents with    Nausea     pt c/o nausea and dizziness. Pt states "My dibetes has been acting up for marge past couple of days and has me feeling bad."     62-year-old male present see [facility] department concerned about his blood sugar.  He reports he checked it a few times last week, and it was running somewhat high for him in the 200s.  Reports he is also had some "clear urine", which she believes means.  There is too much sugar and it.  His complaints include nausea and a mild, generalized headache.  This headache started yesterday and is somewhat worse today.  No exacerbating or alleviating factors reported, he does not report taking any medication for this pain.  It is similar to prior headaches.  No other symptoms reported at this time.          Review of patient's allergies indicates:   Allergen Reactions    Codeine      Other reaction(s): Nausea    Depakote [divalproex] Other (See Comments)     Thrombocytopenia    Haldol [haloperidol lactate] Other (See Comments)     Seizures    Methadone     Morphine     Naloxone     Opium     Propoxyphene     Stelazine [trifluoperazine]     Amoxicillin Rash     Past Medical History:   Diagnosis Date    RAPHAEL (acute kidney injury)     Allergy     poison ivy    Anemia     Anxiety     Arthritis     Asthma     Bipolar affective     BPH (benign prostatic hyperplasia)     Cancer     blood and bone    CHF (congestive heart failure)     Chronic hepatitis C with cirrhosis     CKD (chronic kidney disease) stage 5, GFR less than 15 ml/min     COPD (chronic obstructive pulmonary disease)     Coronary artery disease     Depression     Diabetes mellitus type II     Disorder of kidney and ureter     Edema     Elevated PSA     has had prostate problems but unaware of his psa level    Encounter for blood transfusion     Hematuria     History of back injury     Chronic back pain    " Hyperlipidemia     Hypertension     Internal hemorrhoid, bleeding 9/21/2015    Pancytopenia     Proteinuria     Renal cyst, right     Renal disorder     Schizo affective schizophrenia     Seizures     Stroke     Thyroid disease     Urinary tract infection      Past Surgical History:   Procedure Laterality Date    ELBOW BURSA SURGERY      JOINT REPLACEMENT      ORIF FEMUR FRACTURE Right 8/2015     Family History   Problem Relation Age of Onset    Hypertension Mother     Diabetes Mother     Transient ischemic attack Mother     Heart disease Mother      stent placement    Arthritis Mother     Heart disease Father     Hypertension Father     Diabetes Father     Stroke Father     Arthritis Sister     Hypertension Sister     Heart disease Maternal Grandmother     Diabetes Maternal Grandmother     Stroke Maternal Grandmother     Cancer Paternal Grandmother      breast    Heart disease Paternal Grandmother     Cancer Sister      brain    Prostate cancer Neg Hx     Kidney disease Neg Hx      Social History   Substance Use Topics    Smoking status: Current Every Day Smoker     Packs/day: 0.50    Smokeless tobacco: Never Used    Alcohol use No     Review of Systems   Constitutional: Negative for chills, fatigue and fever.   HENT: Negative for congestion, sore throat and voice change.    Eyes: Negative for photophobia, pain and redness.   Respiratory: Negative for cough, choking and shortness of breath.    Cardiovascular: Negative for chest pain, palpitations and leg swelling.   Gastrointestinal: Positive for nausea. Negative for abdominal pain and vomiting.   Endocrine: Negative for polydipsia, polyphagia and polyuria.   Genitourinary: Negative for dysuria, frequency and urgency.   Musculoskeletal: Negative for back pain, neck pain and neck stiffness.   Neurological: Positive for headaches. Negative for seizures, speech difficulty, light-headedness and numbness.   All other systems reviewed  and are negative.      Physical Exam     Initial Vitals [02/15/18 0242]   BP Pulse Resp Temp SpO2   (!) 146/70 77 18 97.7 °F (36.5 °C) (!) 94 %      MAP       95.33         Physical Exam    Nursing note and vitals reviewed.  Constitutional: He appears well-developed and well-nourished. No distress.   HENT:   Head: Normocephalic and atraumatic.   Right Ear: External ear normal.   Eyes: Conjunctivae and EOM are normal. Pupils are equal, round, and reactive to light.   Neck: Normal range of motion. Neck supple. No tracheal deviation present.   Cardiovascular: Normal rate, regular rhythm, normal heart sounds and intact distal pulses.   Pulmonary/Chest: Breath sounds normal. No respiratory distress. He has no wheezes. He has no rhonchi. He has no rales.   Abdominal: Soft. Bowel sounds are normal. He exhibits no distension. There is no tenderness.   Musculoskeletal: Normal range of motion. He exhibits no tenderness.   Neurological: He is alert and oriented to person, place, and time. He has normal strength. No cranial nerve deficit or sensory deficit.   Skin: Skin is warm and dry. Capillary refill takes less than 2 seconds.         ED Course   Procedures  Labs Reviewed   POCT GLUCOSE - Abnormal; Notable for the following:        Result Value    POCT Glucose 148 (*)     All other components within normal limits   POCT GLUCOSE MONITORING CONTINUOUS             Medical Decision Making:   Initial Assessment:   62-year-old male presents emergency department complaining of nausea, headache  Differential Diagnosis:   Hyperglycemia, dehydration, migraine, tension headache, cluster headache  Clinical Tests:   Lab Tests: Reviewed       <> Summary of Lab: CBG within normal limits  ED Management:  Patient given Zofran and Fioricet.  He is tolerating by mouth.  We discussed disposition including discharge with prescription for Zofran and Fioricet, instructions to follow-up with primary care physician, reasons to return to the  emergency room.  Patient expressed good understanding and is comfortable with discharge at this time.                   ED Course      Clinical Impression:   The primary encounter diagnosis was Acute nonintractable headache, unspecified headache type. A diagnosis of Nausea was also pertinent to this visit.    Disposition:   Disposition: Discharged  Condition: Stable                        Janak Brewer MD  02/15/18 0739

## 2018-02-15 NOTE — ED NOTES
Pt. ambulatory to and from ED restroom. Steady gait. No injury or falls noted. Reports improvement of nausea and HA. Denies any pain a this time.

## 2018-02-16 ENCOUNTER — TELEPHONE (OUTPATIENT)
Dept: FAMILY MEDICINE | Facility: CLINIC | Age: 63
End: 2018-02-16

## 2018-02-16 NOTE — TELEPHONE ENCOUNTER
Spoke to pt and states that he's having difficulty breathing. Pt was at that time advised to go to the ER for evaluation for Shortness of Breath. Pt stated that he was not going to the [facility] ER. They do not do anything there. He was seen in the ER for elevated Glucose and the doctors or nurses did not even take his Glucose level. Pt was again advised to go to the ER. Pt stated vehemently that he was not going to the ER. He will just lay at home and die. I again told the pt to go to the ER and he stated he did not have a way to go. Informed him to dial 9-1-1 and have the ambulance come to his home to bring him. Stated that he was not going to call and pt stated to pray for him and hung up the phone.    I certify as stated above.

## 2018-02-16 NOTE — TELEPHONE ENCOUNTER
"----- Message from Sagrario Oconnor sent at 2/16/2018  7:31 AM CST -----  Contact: self/[phone]  Patient called to speak with your office about his hospital visit on yesterday.  He says whatever the doctor prescribed is "killing" him and he wants to know what in the "you-know-what" he gave him.    Please call and advise as soon as possible per his request.  "

## 2018-02-20 ENCOUNTER — TELEPHONE (OUTPATIENT)
Dept: SMOKING CESSATION | Facility: CLINIC | Age: 63
End: 2018-02-20

## 2018-03-02 DIAGNOSIS — J43.9 PULMONARY EMPHYSEMA, UNSPECIFIED EMPHYSEMA TYPE: Primary | ICD-10-CM

## 2018-03-05 ENCOUNTER — HOSPITAL ENCOUNTER (OUTPATIENT)
Dept: RADIOLOGY | Facility: HOSPITAL | Age: 63
Discharge: HOME | End: 2018-03-05
Attending: INTERNAL MEDICINE
Payer: MEDICARE

## 2018-03-05 ENCOUNTER — HOSPITAL ENCOUNTER (OUTPATIENT)
Dept: PULMONOLOGY | Facility: CLINIC | Age: 63
Discharge: HOME | End: 2018-03-05
Payer: MEDICARE

## 2018-03-05 ENCOUNTER — OFFICE VISIT (OUTPATIENT)
Dept: PULMONOLOGY | Facility: CLINIC | Age: 63
End: 2018-03-05
Payer: MEDICARE

## 2018-03-05 VITALS
DIASTOLIC BLOOD PRESSURE: 78 MMHG | SYSTOLIC BLOOD PRESSURE: 134 MMHG | WEIGHT: 183 LBS | BODY MASS INDEX: 28.72 KG/M2 | OXYGEN SATURATION: 97 % | HEART RATE: 77 BPM | HEIGHT: 67 IN

## 2018-03-05 DIAGNOSIS — J43.9 PULMONARY EMPHYSEMA, UNSPECIFIED EMPHYSEMA TYPE: ICD-10-CM

## 2018-03-05 DIAGNOSIS — F25.0 SCHIZOAFFECTIVE DISORDER, BIPOLAR TYPE: ICD-10-CM

## 2018-03-05 DIAGNOSIS — F17.200 NICOTINE DEPENDENCE, UNCOMPLICATED, UNSPECIFIED NICOTINE PRODUCT TYPE: ICD-10-CM

## 2018-03-05 DIAGNOSIS — F17.200 SMOKER: Primary | ICD-10-CM

## 2018-03-05 LAB
PRE FEV1 FVC: 66
PRE FEV1: 2.07
PRE FVC: 3.15
PREDICTED FEV1 FVC: 80
PREDICTED FEV1: 2.92
PREDICTED FVC: 3.63

## 2018-03-05 PROCEDURE — 94010 BREATHING CAPACITY TEST: CPT | Mod: PBBFAC | Performed by: INTERNAL MEDICINE

## 2018-03-05 PROCEDURE — 99204 OFFICE O/P NEW MOD 45 MIN: CPT | Mod: 25,S$PBB,, | Performed by: INTERNAL MEDICINE

## 2018-03-05 PROCEDURE — 99213 OFFICE O/P EST LOW 20 MIN: CPT | Mod: PBBFAC | Performed by: INTERNAL MEDICINE

## 2018-03-05 PROCEDURE — 71046 X-RAY EXAM CHEST 2 VIEWS: CPT | Mod: TC,FY

## 2018-03-05 PROCEDURE — 99999 PR PBB SHADOW E&M-EST. PATIENT-LVL III: CPT | Mod: PBBFAC,,, | Performed by: INTERNAL MEDICINE

## 2018-03-05 PROCEDURE — 71046 X-RAY EXAM CHEST 2 VIEWS: CPT | Mod: 26,,, | Performed by: RADIOLOGY

## 2018-03-05 PROCEDURE — 94010 BREATHING CAPACITY TEST: CPT | Mod: 26,S$PBB,, | Performed by: INTERNAL MEDICINE

## 2018-03-05 NOTE — PROGRESS NOTES
Subjective:       Patient ID: Leif Ramirez Jr. is a 63 y.o. male.    Chief Complaint: COPD and Shortness of Breath    HPI   62 yo male referred for assessment of COPD. Has smoked 1-2 ppd for over 50 years and is still smoking today. He has hx of several mental illnesses and probably smokes to those conditions. In spite of smoking more the 725,000 cigarettes in his life!!!!His PFT's are remarkably good. His FVC is normal and Fev-1: 66%  @2.07 liters. Sa02 95%. Chest x-ray: No active disease, minimal hyperinflation. His breathing function and oxygen saturation certainly do not fit the profile that I expected to see. He is to see the counselor for smoker cessation to try and help him quit. I am not very optimistic.  Review of Systems   Constitutional: Negative.    HENT: Negative.    Eyes: Negative.    Respiratory: Negative.         Heavy smoker   Cardiovascular: Negative.    Genitourinary: Negative.    Musculoskeletal: Negative.    Skin: Negative.    Gastrointestinal: Negative.         GERD's   Neurological: Negative.    Psychiatric/Behavioral: Negative.         Schizoaffective disorder       Objective:      Physical Exam   Constitutional: He is oriented to person, place, and time. He appears well-developed and well-nourished.   HENT:   Head: Normocephalic and atraumatic.   Right Ear: External ear normal.   Left Ear: External ear normal.   Eyes: Conjunctivae and EOM are normal. Pupils are equal, round, and reactive to light.   Neck: Normal range of motion. Neck supple.   Cardiovascular: Normal rate, regular rhythm and normal heart sounds.    Pulmonary/Chest: Effort normal and breath sounds normal.   Clear without wheezes or rales. Sa02: 95%   Abdominal: Soft. Bowel sounds are normal.   Musculoskeletal: Normal range of motion.   Neurological: He is alert and oriented to person, place, and time. He has normal reflexes.   Skin: Skin is warm and dry.   Psychiatric: He has a normal mood and affect. His behavior is  normal. Judgment and thought content normal.   Flat affect         Assessment:       No diagnosis found.    Outpatient Encounter Prescriptions as of 3/5/2018   Medication Sig Dispense Refill    albuterol 90 mcg/actuation inhaler Inhale 1 puff into the lungs every 4 (four) hours as needed for Wheezing. 1 Inhaler 1    amLODIPine (NORVASC) 10 MG tablet Take 1 tablet (10 mg total) by mouth once daily. 90 tablet 0    blood sugar diagnostic Strp 1 strip by Misc.(Non-Drug; Combo Route) route 2 (two) times daily. 200 strip 0    butalbital-acetaminophen-caffeine -40 mg (FIORICET, ESGIC) -40 mg per tablet Take 1 tablet by mouth every 6 (six) hours as needed for Pain. 14 tablet 0    carvedilol (COREG) 25 MG tablet TAKE 1 TABLET(25 MG) BY MOUTH TWICE DAILY 180 tablet 3    diclofenac (VOLTAREN) 25 MG TbEC TK 1 T PO  TID PRF ARTHRITIC PAIN  0    ferrous sulfate 325 mg (65 mg iron) Tab tablet TK 1 T PO  BID  0    FLUoxetine (PROZAC) 20 MG capsule TK 1 C PO QD  0    furosemide (LASIX) 80 MG tablet Take 1 tablet (80 mg total) by mouth once daily. 90 tablet 3    gabapentin (NEURONTIN) 300 MG capsule Take 300 mg by mouth 3 (three) times daily.   0    hydrALAZINE (APRESOLINE) 10 MG tablet TAKE 1 TABLET(10 MG) BY MOUTH THREE TIMES DAILY 270 tablet 11    hydrocodone-acetaminophen 5-325mg (NORCO) 5-325 mg per tablet Take 1 tablet by mouth every 8 (eight) hours as needed for Pain. 90 tablet 0    LORazepam (ATIVAN) 1 MG tablet TK 1 T PO BID  0    OLANZapine (ZYPREXA) 5 MG tablet TK 1 T PO BID  0    omeprazole (PRILOSEC) 20 MG capsule Take 1 capsule (20 mg total) by mouth once daily. 90 capsule 1    ondansetron (ZOFRAN) 4 MG tablet Take 1 tablet (4 mg total) by mouth every 6 (six) hours as needed. 10 tablet 0    oxybutynin (DITROPAN) 5 MG Tab Take 1 tablet (5 mg total) by mouth every evening. One tablet before sleep 90 tablet 3    oxybutynin (DITROPAN) 5 MG Tab Take 1 tablet (5 mg total) by mouth 3 (three) times  daily. 90 tablet 12    predniSONE (DELTASONE) 20 MG tablet TK 1 T PO QD 10 tablet 0    tamsulosin (FLOMAX) 0.4 mg Cp24 TAKE 1 CAPSULE(0.4 MG) BY MOUTH EVERY DAY 90 capsule 0     No facility-administered encounter medications on file as of 3/5/2018.      No orders of the defined types were placed in this encounter.    Plan:       Mild obstructive lung disease  Nicotine Addiction

## 2018-03-05 NOTE — LETTER
March 5, 2018      Wale Mcguire MD  [address]  [address]  [facility] [address]           [facility]  [address]  [place] [address]  Phone: [phone]          Patient: Leif Ramirez Jr.   MR Number: [id number]   YOB: 1955   Date of Visit: 3/5/2018       Dear Dr. Wale Mcguire:    Thank you for referring Leif Ramirez to me for evaluation. Attached you will find relevant portions of my assessment and plan of care.    If you have questions, please do not hesitate to call me. I look forward to following Leif Ramirez along with you.    Sincerely,    Kamran Abraham MD    Enclosure  CC:  No Recipients    If you would like to receive this communication electronically, please contact [email or web address] or [phone] to request more information on [facility] access.    For providers and/or their staff who would like to refer a patient to [facility], please contact us through our one-stop-shop provider referral line, [facility], at [phone].    If you feel you have received this communication in error or would no longer like to receive these types of communications, please e-mail [email or web address]

## 2018-03-09 ENCOUNTER — TELEPHONE (OUTPATIENT)
Dept: FAMILY MEDICINE | Facility: CLINIC | Age: 63
End: 2018-03-09

## 2018-03-19 ENCOUNTER — TELEPHONE (OUTPATIENT)
Dept: FAMILY MEDICINE | Facility: CLINIC | Age: 63
End: 2018-03-19

## 2018-03-19 NOTE — TELEPHONE ENCOUNTER
----- Message from Bisi Shaffer sent at 3/19/2018  1:52 PM CDT -----  Contact: Self [phone]  Patient is calling to talk to nurse concerning to schedule a hospital follow up for this week. Please advice

## 2018-03-21 RX ORDER — BUTALBITAL, ACETAMINOPHEN AND CAFFEINE 50; 325; 40 MG/1; MG/1; MG/1
TABLET ORAL
Refills: 0 | COMMUNITY
Start: 2018-02-15

## 2018-03-22 ENCOUNTER — TELEPHONE (OUTPATIENT)
Dept: FAMILY MEDICINE | Facility: CLINIC | Age: 63
End: 2018-03-22

## 2018-03-22 ENCOUNTER — OFFICE VISIT (OUTPATIENT)
Dept: FAMILY MEDICINE | Facility: CLINIC | Age: 63
End: 2018-03-22
Attending: FAMILY MEDICINE
Payer: MEDICARE

## 2018-03-22 VITALS
TEMPERATURE: 99 F | DIASTOLIC BLOOD PRESSURE: 74 MMHG | HEIGHT: 67 IN | SYSTOLIC BLOOD PRESSURE: 130 MMHG | OXYGEN SATURATION: 98 % | HEART RATE: 76 BPM | BODY MASS INDEX: 28.72 KG/M2 | WEIGHT: 183 LBS

## 2018-03-22 DIAGNOSIS — D69.6 THROMBOCYTOPENIA: Chronic | ICD-10-CM

## 2018-03-22 DIAGNOSIS — F25.0 SCHIZOAFFECTIVE DISORDER, BIPOLAR TYPE: Chronic | ICD-10-CM

## 2018-03-22 DIAGNOSIS — D61.818 PANCYTOPENIA: Chronic | ICD-10-CM

## 2018-03-22 DIAGNOSIS — N18.5 ANEMIA OF CHRONIC RENAL FAILURE, STAGE 5: ICD-10-CM

## 2018-03-22 DIAGNOSIS — N18.5 CKD (CHRONIC KIDNEY DISEASE), STAGE V: ICD-10-CM

## 2018-03-22 DIAGNOSIS — K74.69 COMPENSATED CIRRHOSIS RELATED TO HEPATITIS C VIRUS (HCV): Chronic | ICD-10-CM

## 2018-03-22 DIAGNOSIS — J43.8 OTHER EMPHYSEMA: Chronic | ICD-10-CM

## 2018-03-22 DIAGNOSIS — D64.9 CHRONIC ANEMIA: ICD-10-CM

## 2018-03-22 DIAGNOSIS — B18.2 CHRONIC HEPATITIS C WITHOUT HEPATIC COMA: ICD-10-CM

## 2018-03-22 DIAGNOSIS — D72.819 LEUKOPENIA, UNSPECIFIED TYPE: ICD-10-CM

## 2018-03-22 DIAGNOSIS — F33.9 MAJOR DEPRESSION, RECURRENT, CHRONIC: Primary | ICD-10-CM

## 2018-03-22 DIAGNOSIS — D63.1 ANEMIA OF CHRONIC RENAL FAILURE, STAGE 5: ICD-10-CM

## 2018-03-22 DIAGNOSIS — B19.20 COMPENSATED CIRRHOSIS RELATED TO HEPATITIS C VIRUS (HCV): Chronic | ICD-10-CM

## 2018-03-22 PROCEDURE — 99999 PR PBB SHADOW E&M-EST. PATIENT-LVL V: CPT | Mod: PBBFAC,,, | Performed by: FAMILY MEDICINE

## 2018-03-22 PROCEDURE — 99214 OFFICE O/P EST MOD 30 MIN: CPT | Mod: S$PBB,,, | Performed by: FAMILY MEDICINE

## 2018-03-22 PROCEDURE — 99215 OFFICE O/P EST HI 40 MIN: CPT | Mod: PBBFAC,PO | Performed by: FAMILY MEDICINE

## 2018-03-22 NOTE — PROGRESS NOTES
Subjective:       Patient ID: Leif Ramirez Jr. is a 63 y.o. male.    Chief Complaint: Hospital Follow Up and Hospice Paperwork    62 yr old pleasant white male with anxiety/depression, schizoaffective disorder, Bipolar disorder, chronic Hep C with cirrhosis, CKD IV/V, chronic low back pain, B/L hip pain, BPH, HLD, pancytopenia, presents today for his routine 3 month follow up. He is also requesting for HOSPICE placement.      Knee pain B/L - daily pain with instability - tried conservative approach with rest and meds and no relief - do not want any surgery - never used brace for support    HTN - chronic - controlled - on CCB and BB - - compliant - no side effects    Anxiety/depression/schizoaffective and bipolar disorder - follows Dr. Ray Avendano, outside Psychiatrist - no SI/HI      HLD - controlled - diet alone -      LDLCALC                  83.4                11/18/2017                                         COPD - chronic - controlled      Chronic Hep C with Cirrhosis - seen hepatology - recent viral load negative                 CKD V - worsening - was seeing nephrology - urine ok - declines dialysis - want to go Hospice        Pancytopenia - seen Hem/Onc and never had follow up -       Chronic LBP/hip pain - he has B/L hip replacement and has pain in them every single day and he suffers and was on some pain medication until his doctor refused it - he also has back pain and it radiates to leg leg - no numbness or saddle anesthesia, bladder/bowel problem      History as below - reviewed      Health maintenance  -labs UTD  -colon screen and PSA reports UTD        Medication Refill   This is a chronic problem. The current episode started more than 1 year ago. The problem occurs constantly. The problem has been gradually improving. Associated symptoms include arthralgias, myalgias and neck pain. Pertinent negatives include no chest pain, congestion, coughing, diaphoresis, fever, headaches, numbness, rash,  vomiting or weakness. Nothing aggravates the symptoms. Treatments tried: pain medicine. The treatment provided moderate relief.   Back Pain   This is a chronic problem. The current episode started more than 1 year ago. The problem occurs constantly. The problem is unchanged. The pain is present in the thoracic spine, sacro-iliac and lumbar spine. The quality of the pain is described as aching. The pain does not radiate. The pain is at a severity of 7/10. The pain is severe. Stiffness is present in the morning. Pertinent negatives include no chest pain, fever, headaches, numbness, paresthesias, pelvic pain, tingling or weakness. He has tried bed rest and analgesics for the symptoms. The treatment provided moderate relief.   Hip Pain    The injury mechanism was a twisting injury. The pain is present in the left hip and right hip. The quality of the pain is described as aching. The pain is at a severity of 6/10. The pain is moderate. Pertinent negatives include no numbness or tingling. The symptoms are aggravated by movement and palpation. He has tried heat and elevation for the symptoms. The treatment provided mild relief.   Hypertension   This is a chronic problem. The current episode started more than 1 year ago. The problem is controlled. Associated symptoms include anxiety and neck pain. Pertinent negatives include no chest pain, headaches or palpitations. There are no associated agents to hypertension. Risk factors for coronary artery disease include dyslipidemia and male gender. Past treatments include ACE inhibitors and central alpha agonists. The current treatment provides significant improvement. There are no compliance problems.  There is no history of angina, CAD/MI, CVA, left ventricular hypertrophy, PVD, renovascular disease or retinopathy. There is no history of chronic renal disease, hyperparathyroidism, pheochromocytoma or a thyroid problem.   Anemia   Presents for follow-up visit. Symptoms include  pallor. There has been no confusion, fever, light-headedness, palpitations or paresthesias. Past treatments include nothing. There is no history of alcohol abuse, cancer, chronic renal disease, dementia, hypothyroidism, malnutrition, recent illness or recent trauma. There is no past history of bone marrow exam, colonoscopy, EGD or FOBT. Compliance with medications is 51-75%.   Hyperlipidemia   This is a chronic problem. The current episode started more than 1 year ago. The problem is controlled. Recent lipid tests were reviewed and are normal. He has no history of chronic renal disease, diabetes, hypothyroidism or liver disease. There are no known factors aggravating his hyperlipidemia. Associated symptoms include myalgias. Pertinent negatives include no chest pain. He is currently on no antihyperlipidemic treatment. The current treatment provides mild improvement of lipids. There are no compliance problems.  Risk factors for coronary artery disease include dyslipidemia, hypertension and a sedentary lifestyle.     Review of Systems   Constitutional: Negative.  Negative for activity change, diaphoresis, fever and unexpected weight change.   HENT: Negative.  Negative for congestion, ear pain, mouth sores, rhinorrhea and voice change.    Eyes: Negative.  Negative for pain, discharge and visual disturbance.   Respiratory: Negative.  Negative for apnea, cough and wheezing.    Cardiovascular: Negative.  Negative for chest pain and palpitations.   Gastrointestinal: Negative.  Negative for abdominal distention, anal bleeding, diarrhea and vomiting.   Endocrine: Negative.  Negative for cold intolerance and polyuria.   Genitourinary: Negative.  Negative for decreased urine volume, difficulty urinating, discharge, frequency, pelvic pain and scrotal swelling.   Musculoskeletal: Positive for arthralgias, back pain, myalgias and neck pain. Negative for neck stiffness.   Skin: Positive for pallor. Negative for color change and  rash.   Allergic/Immunologic: Negative.  Negative for environmental allergies and immunocompromised state.   Neurological: Negative.  Negative for dizziness, tingling, speech difficulty, weakness, light-headedness, numbness, headaches and paresthesias.   Hematological: Negative.    Psychiatric/Behavioral: Negative.  Negative for agitation, confusion, dysphoric mood and suicidal ideas. The patient is not nervous/anxious.        PMH/PSH/FH/SH/MED/ALLERGY reviewed    Objective:       Vitals:    03/22/18 1417   BP: 130/74   Pulse: 76   Temp: 99.4 °F (37.4 °C)       Physical Exam   Constitutional: He is oriented to person, place, and time. He appears well-developed and well-nourished. No distress.   HENT:   Head: Normocephalic and atraumatic.   Right Ear: External ear normal.   Left Ear: External ear normal.   Nose: Nose normal.   Mouth/Throat: Oropharynx is clear and moist.   Eyes: EOM are normal. Pupils are equal, round, and reactive to light.   Neck: Normal range of motion. Neck supple. No JVD present. No tracheal deviation present. No thyromegaly present.   Cardiovascular: Normal rate, regular rhythm, normal heart sounds and intact distal pulses.  Exam reveals no gallop and no friction rub.    No murmur heard.  Pulmonary/Chest: Effort normal and breath sounds normal. No stridor. No respiratory distress. He has no wheezes. He has no rales. He exhibits no tenderness.   Abdominal: Soft. Bowel sounds are normal. He exhibits distension. He exhibits no mass. There is no tenderness. There is no rebound and no guarding. No hernia.   Musculoskeletal: Normal range of motion. He exhibits tenderness (TTP B/L hips with restricted ROM and lower lumbar spine and paralumbar spine TTP. SLRT + left). He exhibits no edema.   Lymphadenopathy:     He has no cervical adenopathy.   Neurological: He is alert and oriented to person, place, and time. He has normal reflexes. He displays normal reflexes. No cranial nerve deficit. He exhibits  normal muscle tone. Coordination normal.   Skin: Skin is warm and dry. Capillary refill takes less than 2 seconds. No rash noted. He is not diaphoretic. No erythema. There is pallor.   Psychiatric: He has a normal mood and affect. His behavior is normal. Judgment and thought content normal.       Assessment:       1. Major depression, recurrent, chronic    2. Other emphysema    3. CKD (chronic kidney disease), stage V    4. Thrombocytopenia    5. Anemia of chronic renal failure, stage 5    6. Chronic anemia    7. 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 Chronic hepatitis C without hepatic coma    9. Pancytopenia    10. Leukopenia, unspecified type    11. Schizoaffective disorder, bipolar type        Plan:       Leif was seen today for hospital follow up and hospice paperwork.    Diagnoses and all orders for this visit:    Major depression, recurrent, chronic  -     Ambulatory referral to Hospice    Other emphysema  -     Ambulatory referral to Hospice    CKD (chronic kidney disease), stage V  -     Ambulatory referral to Hospice    Thrombocytopenia  -     Ambulatory referral to Hospice    Anemia of chronic renal failure, stage 5  -     Ambulatory referral to Hospice    Chronic anemia  -     Ambulatory referral to Hospice    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)  -     Ambulatory referral to Hospice    Chronic hepatitis C without hepatic coma  -     Ambulatory referral to Hospice    Pancytopenia  -     Ambulatory referral to Hospice    Leukopenia, unspecified type  -     Ambulatory referral to Hospice    Schizoaffective disorder, bipolar type  -     Ambulatory referral to Hospice    Hep C/Cirrhosis  -completed Harvoni - viral load negative  -follows hepatology    COPD  -stable  -advised to quit smoking    HTN  -controlled  -refilled meds - CCB and BB to continue    OA knee B/L  -failed conservative therapy  -trial of brace daily - rx done    CKD V or  ESRD  -worsening  -follow nephrology  -refer HOSPICE      Anxiety/depression/schizoaffective/bipolar  -follows Dr. Bell Psychiatrist    B/L hip pain and chronic LBP  -on norco  -advised to check with hepatology for tylenol intake    Spent adequate time in obtaining history and explaining differentials    40 minutes spent during this visit of which greater than 50% devoted to face-face counseling and coordination of care regarding diagnosis and management plan    RTC 3 months or prn

## 2018-03-22 NOTE — TELEPHONE ENCOUNTER
----- Message from Jaden Rodriguez MA sent at 3/22/2018 11:48 AM CDT -----  Ms. Clarke from [facility] came in requesting  A referral / order for the above named patient so that he can receive services form their facility. She also need his medical records.  She left  Paperwork that needs to filled out     To contact her, she left a business card .

## 2018-03-22 NOTE — TELEPHONE ENCOUNTER
----- Message from James Haque sent at 3/22/2018  4:24 PM CDT -----  Contact: [phone] or [phone]/self  Pt requesting to speak with you concerning hospice orders.  Please call and advise

## 2018-03-22 NOTE — TELEPHONE ENCOUNTER
Spoke to pt and states that he did authorize [facility] to obtain an evaluation and admission order for him. Pt has an appt with Dr. Mcguire for today and paperwork will be completed during his visit.

## 2018-03-23 ENCOUNTER — TELEPHONE (OUTPATIENT)
Dept: FAMILY MEDICINE | Facility: CLINIC | Age: 63
End: 2018-03-23

## 2018-03-25 ENCOUNTER — NURSE TRIAGE (OUTPATIENT)
Dept: ADMINISTRATIVE | Facility: CLINIC | Age: 63
End: 2018-03-25

## 2018-03-25 NOTE — TELEPHONE ENCOUNTER
LM x2 at 1126am at [phone]    Reason for Disposition   Message left on identified answering machine.    Protocols used: ST NO CONTACT OR DUPLICATE CONTACT CALL-A-AH

## 2018-03-25 NOTE — TELEPHONE ENCOUNTER
Reason for Disposition   Side (flank) or back pain present    Additional Information   Negative: Taking Coumadin (warfarin) or other strong blood thinner, or known bleeding disorder (e.g., thrombocytopenia)     [Unable to obtain]    Protocols used: ST URINE - BLOOD IN-A-AH

## 2018-04-04 ENCOUNTER — TELEPHONE (OUTPATIENT)
Dept: FAMILY MEDICINE | Facility: CLINIC | Age: 63
End: 2018-04-04

## 2018-04-04 NOTE — TELEPHONE ENCOUNTER
Called n spoke to DR DURBIN - pt scheduled appt with her so she wanted to find out if pt is in hospice care or need opinion on dialysis coz he refused that in the past

## 2018-04-04 NOTE — TELEPHONE ENCOUNTER
----- Message from Gifty Velasquez sent at 4/4/2018 11:20 AM CDT -----  Contact: [phone]/Dr Shara Olmedo its requesting to speak with Dr Mcguire . Please advise

## 2018-04-04 NOTE — TELEPHONE ENCOUNTER
----- Message from Katie Marie sent at 4/4/2018 12:07 PM CDT -----  Contact: Dr. Shara Olmedo called in returning your call.  Call her cell phone. Please advise.

## 2018-05-01 ENCOUNTER — HOSPITAL ENCOUNTER (EMERGENCY)
Facility: HOSPITAL | Age: 63
Discharge: HOME | End: 2018-05-01
Attending: EMERGENCY MEDICINE
Payer: MEDICARE

## 2018-05-01 VITALS
DIASTOLIC BLOOD PRESSURE: 92 MMHG | HEART RATE: 65 BPM | SYSTOLIC BLOOD PRESSURE: 167 MMHG | HEIGHT: 67 IN | OXYGEN SATURATION: 93 % | WEIGHT: 183 LBS | RESPIRATION RATE: 16 BRPM | TEMPERATURE: 97 F | BODY MASS INDEX: 28.72 KG/M2

## 2018-05-01 DIAGNOSIS — K59.00 CONSTIPATION, UNSPECIFIED CONSTIPATION TYPE: ICD-10-CM

## 2018-05-01 DIAGNOSIS — R11.2 NAUSEA & VOMITING: ICD-10-CM

## 2018-05-01 DIAGNOSIS — R53.83 FATIGUE: ICD-10-CM

## 2018-05-01 DIAGNOSIS — E86.0 DEHYDRATION: Primary | ICD-10-CM

## 2018-05-01 LAB
ALBUMIN SERPL BCP-MCNC: 4.2 G/DL
ALP SERPL-CCNC: 85 U/L
ALT SERPL W/O P-5'-P-CCNC: 17 U/L
AMPHET+METHAMPHET UR QL: NEGATIVE
ANION GAP SERPL CALC-SCNC: 10 MMOL/L
AST SERPL-CCNC: 22 U/L
BACTERIA #/AREA URNS HPF: NORMAL /HPF
BARBITURATES UR QL SCN>200 NG/ML: NEGATIVE
BASOPHILS # BLD AUTO: 0.02 K/UL
BASOPHILS NFR BLD: 0.5 %
BENZODIAZ UR QL SCN>200 NG/ML: NEGATIVE
BILIRUB SERPL-MCNC: 0.7 MG/DL
BILIRUB UR QL STRIP: NEGATIVE
BNP SERPL-MCNC: 126 PG/ML
BUN SERPL-MCNC: 75 MG/DL
BZE UR QL SCN: NEGATIVE
CALCIUM SERPL-MCNC: 9.4 MG/DL
CANNABINOIDS UR QL SCN: NEGATIVE
CHLORIDE SERPL-SCNC: 102 MMOL/L
CLARITY UR: CLEAR
CO2 SERPL-SCNC: 23 MMOL/L
COLOR UR: YELLOW
CREAT SERPL-MCNC: 5.7 MG/DL
CREAT UR-MCNC: 31.8 MG/DL
DIFFERENTIAL METHOD: ABNORMAL
EOSINOPHIL # BLD AUTO: 0.1 K/UL
EOSINOPHIL NFR BLD: 2.3 %
ERYTHROCYTE [DISTWIDTH] IN BLOOD BY AUTOMATED COUNT: 13.7 %
EST. GFR  (AFRICAN AMERICAN): 11 ML/MIN/1.73 M^2
EST. GFR  (NON AFRICAN AMERICAN): 10 ML/MIN/1.73 M^2
ETHANOL SERPL-MCNC: <10 MG/DL
GLUCOSE SERPL-MCNC: 103 MG/DL
GLUCOSE UR QL STRIP: NEGATIVE
HCT VFR BLD AUTO: 26.7 %
HGB BLD-MCNC: 9.6 G/DL
HGB UR QL STRIP: ABNORMAL
HYALINE CASTS #/AREA URNS LPF: 0 /LPF
KETONES UR QL STRIP: NEGATIVE
LEUKOCYTE ESTERASE UR QL STRIP: NEGATIVE
LYMPHOCYTES # BLD AUTO: 1 K/UL
LYMPHOCYTES NFR BLD: 22 %
MCH RBC QN AUTO: 29.5 PG
MCHC RBC AUTO-ENTMCNC: 36 G/DL
MCV RBC AUTO: 82 FL
METHADONE UR QL SCN>300 NG/ML: NEGATIVE
MICROSCOPIC COMMENT: NORMAL
MONOCYTES # BLD AUTO: 0.5 K/UL
MONOCYTES NFR BLD: 12 %
NEUTROPHILS # BLD AUTO: 2.8 K/UL
NEUTROPHILS NFR BLD: 63.2 %
NITRITE UR QL STRIP: NEGATIVE
OPIATES UR QL SCN: NORMAL
PCP UR QL SCN>25 NG/ML: NEGATIVE
PH UR STRIP: 6 [PH] (ref 5–8)
PLATELET # BLD AUTO: 90 K/UL
PMV BLD AUTO: 8.4 FL
POCT GLUCOSE: 100 MG/DL (ref 70–110)
POTASSIUM SERPL-SCNC: 4.2 MMOL/L
PROT SERPL-MCNC: 7 G/DL
PROT UR QL STRIP: ABNORMAL
RBC # BLD AUTO: 3.25 M/UL
RBC #/AREA URNS HPF: 2 /HPF (ref 0–4)
SODIUM SERPL-SCNC: 135 MMOL/L
SP GR UR STRIP: 1.01 (ref 1–1.03)
SQUAMOUS #/AREA URNS HPF: 1 /HPF
TOXICOLOGY INFORMATION: NORMAL
URN SPEC COLLECT METH UR: ABNORMAL
UROBILINOGEN UR STRIP-ACNC: NEGATIVE EU/DL
WBC # BLD AUTO: 4.41 K/UL
WBC #/AREA URNS HPF: 0 /HPF (ref 0–5)

## 2018-05-01 PROCEDURE — 96361 HYDRATE IV INFUSION ADD-ON: CPT

## 2018-05-01 PROCEDURE — 80307 DRUG TEST PRSMV CHEM ANLYZR: CPT

## 2018-05-01 PROCEDURE — 83880 ASSAY OF NATRIURETIC PEPTIDE: CPT

## 2018-05-01 PROCEDURE — 93005 ELECTROCARDIOGRAM TRACING: CPT

## 2018-05-01 PROCEDURE — 25000003 PHARM REV CODE 250: Performed by: EMERGENCY MEDICINE

## 2018-05-01 PROCEDURE — 80320 DRUG SCREEN QUANTALCOHOLS: CPT

## 2018-05-01 PROCEDURE — 82962 GLUCOSE BLOOD TEST: CPT

## 2018-05-01 PROCEDURE — 81000 URINALYSIS NONAUTO W/SCOPE: CPT

## 2018-05-01 PROCEDURE — 80053 COMPREHEN METABOLIC PANEL: CPT

## 2018-05-01 PROCEDURE — 99285 EMERGENCY DEPT VISIT HI MDM: CPT | Mod: 25

## 2018-05-01 PROCEDURE — 85025 COMPLETE CBC W/AUTO DIFF WBC: CPT

## 2018-05-01 PROCEDURE — 96360 HYDRATION IV INFUSION INIT: CPT

## 2018-05-01 RX ORDER — DICYCLOMINE HYDROCHLORIDE 20 MG/1
20 TABLET ORAL 3 TIMES DAILY PRN
Qty: 14 TABLET | Refills: 0 | Status: SHIPPED | OUTPATIENT
Start: 2018-05-01 | End: 2018-05-31

## 2018-05-01 RX ORDER — ONDANSETRON 4 MG/1
4 TABLET, ORALLY DISINTEGRATING ORAL
Status: COMPLETED | OUTPATIENT
Start: 2018-05-01 | End: 2018-05-01

## 2018-05-01 RX ORDER — HYDROCODONE BITARTRATE AND ACETAMINOPHEN 7.5; 325 MG/1; MG/1
1 TABLET ORAL
Status: COMPLETED | OUTPATIENT
Start: 2018-05-01 | End: 2018-05-01

## 2018-05-01 RX ORDER — ACETAMINOPHEN 325 MG/1
650 TABLET ORAL
Status: COMPLETED | OUTPATIENT
Start: 2018-05-01 | End: 2018-05-01

## 2018-05-01 RX ORDER — ONDANSETRON 4 MG/1
4 TABLET, FILM COATED ORAL EVERY 6 HOURS PRN
Qty: 10 TABLET | Refills: 0 | Status: SHIPPED | OUTPATIENT
Start: 2018-05-01 | End: 2018-05-30 | Stop reason: SDUPTHER

## 2018-05-01 RX ORDER — LACTULOSE 10 G/15ML
10 SOLUTION ORAL 2 TIMES DAILY PRN
Qty: 150 ML | Refills: 0 | Status: SHIPPED | OUTPATIENT
Start: 2018-05-01

## 2018-05-01 RX ADMIN — SODIUM CHLORIDE 1000 ML: 0.9 INJECTION, SOLUTION INTRAVENOUS at 09:05

## 2018-05-01 RX ADMIN — LIDOCAINE HYDROCHLORIDE: 20 SOLUTION ORAL; TOPICAL at 11:05

## 2018-05-01 RX ADMIN — HYDROCODONE BITARTRATE AND ACETAMINOPHEN 1 TABLET: 7.5; 325 TABLET ORAL at 02:05

## 2018-05-01 RX ADMIN — ACETAMINOPHEN 650 MG: 325 TABLET ORAL at 11:05

## 2018-05-01 RX ADMIN — ONDANSETRON 4 MG: 4 TABLET, ORALLY DISINTEGRATING ORAL at 11:05

## 2018-05-01 NOTE — PROGRESS NOTES
made phone contact with Ms Davalos to check status of discharge plan.  Ms Davalos reported that they will have a sitter at patient's home starting at 5:00pm today.  Ms Davalos reported she has already spoke with the nurse in the emergency room regarding discharge plan to home.

## 2018-05-01 NOTE — PROGRESS NOTES
received phone call from utilization nurse, Alisia Perez stating patient is admitted from home with home hospice care.  He is currently ready for discharge from the emergency pending acceptance into [facility]'s nursing home.  The home hospice agency has been working on nursing home placement at [facility].     made phone contact with [facility]'s admit coordinator, Annie and inquired regarding status of nursing home placement.  Annie reported she has not received any paper work from the hospice agency for nursing home placement.

## 2018-05-01 NOTE — ED NOTES
Per pt sister, pt on home hospice due to refusing dialysis, lives by himself and has no one to care for him.  Marilu with [facility] called [phone]), referred to Tricia Davalos for NH placement status.

## 2018-05-01 NOTE — ED NOTES
Beverley from [facility] called, still awaiting word from [facility] for placement.

## 2018-05-01 NOTE — ED PROVIDER NOTES
"Encounter Date: 5/1/2018    SCRIBE #1 NOTE: I, Tato Duke, am scribing for, and in the presence of, Dr. Brewer.       History     Chief Complaint   Patient presents with    Fatigue     generalized weakness abd fatigue with decrease appetite for a week.  Patient is not complaint with medications.     62 y/o M brought to the ED by EMS. EMS reports patient c/o fatigue, decreased appetite. On exam, patient reports feeling nauseated, denies emesis. Reports onset yesterday, constant, without exacerbating or alleviating factors. He also reports feeling "cold" since arriving here. No other complaints at this time.       The history is provided by the patient.     Review of patient's allergies indicates:   Allergen Reactions    Codeine      Other reaction(s): Nausea    Depakote [divalproex] Other (See Comments)     Thrombocytopenia    Haldol [haloperidol lactate] Other (See Comments)     Seizures    Methadone     Morphine     Naloxone     Opium     Propoxyphene     Stelazine [trifluoperazine]     Amoxicillin Rash     Past Medical History:   Diagnosis Date    RAPHAEL (acute kidney injury)     Allergy     poison ivy    Anemia     Anxiety     Arthritis     Asthma     Bipolar affective     BPH (benign prostatic hyperplasia)     Cancer     blood and bone    CHF (congestive heart failure)     Chronic hepatitis C with cirrhosis     CKD (chronic kidney disease) stage 5, GFR less than 15 ml/min     COPD (chronic obstructive pulmonary disease)     Coronary artery disease     Depression     Diabetes mellitus type II     Disorder of kidney and ureter     Edema     Elevated PSA     has had prostate problems but unaware of his psa level    Encounter for blood transfusion     Hematuria     History of back injury     Chronic back pain    Hyperlipidemia     Hypertension     Internal hemorrhoid, bleeding 9/21/2015    Pancytopenia     Proteinuria     Renal cyst, right     Renal disorder     Schizo affective " schizophrenia     Seizures     Stroke     Thyroid disease     Urinary tract infection      Past Surgical History:   Procedure Laterality Date    ELBOW BURSA SURGERY      JOINT REPLACEMENT      ORIF FEMUR FRACTURE Right 8/2015     Family History   Problem Relation Age of Onset    Hypertension Mother     Diabetes Mother     Transient ischemic attack Mother     Heart disease Mother      stent placement    Arthritis Mother     Heart disease Father     Hypertension Father     Diabetes Father     Stroke Father     Arthritis Sister     Hypertension Sister     Heart disease Maternal Grandmother     Diabetes Maternal Grandmother     Stroke Maternal Grandmother     Cancer Paternal Grandmother      breast    Heart disease Paternal Grandmother     Cancer Sister      brain    Prostate cancer Neg Hx     Kidney disease Neg Hx      Social History   Substance Use Topics    Smoking status: Current Every Day Smoker     Packs/day: 0.50     Years: 50.00    Smokeless tobacco: Never Used    Alcohol use No     Review of Systems   Constitutional: Positive for fatigue. Negative for chills and fever.   HENT: Negative for congestion, sore throat and voice change.    Eyes: Negative for photophobia, pain and redness.   Respiratory: Negative for cough, choking and shortness of breath.    Cardiovascular: Negative for chest pain, palpitations and leg swelling.   Gastrointestinal: Positive for nausea. Negative for abdominal pain and vomiting.   Genitourinary: Negative for dysuria, frequency and urgency.   Musculoskeletal: Negative for back pain, neck pain and neck stiffness.   Neurological: Negative for seizures, speech difficulty, light-headedness, numbness and headaches.   All other systems reviewed and are negative.      Physical Exam     Initial Vitals [05/01/18 0825]   BP Pulse Resp Temp SpO2   (!) 197/96 67 18 97 °F (36.1 °C) 97 %      MAP       129.67         Physical Exam    Nursing note and vitals  reviewed.  Constitutional: He appears well-developed and well-nourished. No distress.   HENT:   Head: Normocephalic and atraumatic.   Oropharynx clear; Dry MM   Eyes: Conjunctivae and EOM are normal. Pupils are equal, round, and reactive to light.   Neck: Normal range of motion. Neck supple. No tracheal deviation present.   Cardiovascular: Normal rate, regular rhythm, normal heart sounds and intact distal pulses.   Pulmonary/Chest: Breath sounds normal. No respiratory distress. He has no wheezes. He has no rhonchi. He has no rales.   Abdominal: Soft. Bowel sounds are normal. He exhibits no distension. There is no tenderness.   Musculoskeletal: Normal range of motion. He exhibits no edema or tenderness.   Neurological: He is alert and oriented to person, place, and time. He has normal strength. No cranial nerve deficit or sensory deficit.   Skin: Skin is warm and dry. Capillary refill takes less than 2 seconds.         ED Course   Procedures  Labs Reviewed   CBC W/ AUTO DIFFERENTIAL - Abnormal; Notable for the following:        Result Value    RBC 3.25 (*)     Hemoglobin 9.6 (*)     Hematocrit 26.7 (*)     Platelets 90 (*)     MPV 8.4 (*)     All other components within normal limits   COMPREHENSIVE METABOLIC PANEL - Abnormal; Notable for the following:     Sodium 135 (*)     BUN, Bld 75 (*)     Creatinine 5.7 (*)     eGFR if  11 (*)     eGFR if non  10 (*)     All other components within normal limits   URINALYSIS - Abnormal; Notable for the following:     Protein, UA 2+ (*)     Occult Blood UA 1+ (*)     All other components within normal limits   B-TYPE NATRIURETIC PEPTIDE - Abnormal; Notable for the following:      (*)     All other components within normal limits   DRUG SCREEN PANEL, URINE EMERGENCY   ALCOHOL,MEDICAL (ETHANOL)   URINALYSIS MICROSCOPIC   POCT GLUCOSE   POCT GLUCOSE MONITORING CONTINUOUS     EKG Readings: (Independently Interpreted)   Initial Reading: No  STEMI. Previous EKG: Compared with most recent EKG Previous EKG Date: 11/18/17 (Nonspecific change). Rhythm: Normal Sinus Rhythm. Heart Rate: 67. Ectopy: No Ectopy. Conduction: LVH. ST Segments: Normal ST Segments. T Waves Flipped: V5 and V6. Axis: Left Axis Deviation.       X-Rays:   Independently Interpreted Readings:   Other Readings:  I have visualized all imaging for this patient, radiology has done the interpretation.      Imaging Results          X-Ray Abdomen AP 1 View (KUB) (Final result)  Result time 05/01/18 10:34:50    Final result by Rian Fontenot DO (05/01/18 10:34:50)                 Impression:      Please see above      Electronically signed by: Rian Fontenot DO  Date:    05/01/2018  Time:    10:34             Narrative:    EXAMINATION:  XR ABDOMEN AP 1 VIEW    CLINICAL HISTORY:  Nausea with vomiting, unspecified    TECHNIQUE:  Single AP View of the abdomen was performed.    COMPARISON:  03/23/2016    FINDINGS:  There is scattered gas and fecal filled loops of bowel within the abdomen pelvis with gas projected over the rectum.  Overall nonspecific bowel gas pattern.  Bilateral femoral intramedullary audie and screw device is partially visualized prominent probable phlebolith in the left aspect of the pelvis.  Question vascular calcification in the left upper abdomen unchanged.  Further evaluation as warranted clinically                               X-Ray Chest AP Portable (Final result)  Result time 05/01/18 09:33:46    Final result by Rian Fontenot DO (05/01/18 09:33:46)                 Impression:      Please see above      Electronically signed by: Rian Fontenot DO  Date:    05/01/2018  Time:    09:33             Narrative:    EXAMINATION:  XR CHEST AP PORTABLE    CLINICAL HISTORY:  Other fatigue    TECHNIQUE:  Single frontal view of the chest was performed.    COMPARISON:  03/05/2018    FINDINGS:  Small lung volumes likely related to poor inspiratory effort with nonspecific elevation of the  right lung base.  There is no large lung consolidation.  No large pleural effusion or pneumothorax.  Atherosclerotic aorta.  Clinical correlation and follow-up advised                                  Medical Decision Making:   History:   Old Medical Records: I decided to obtain old medical records.  Initial Assessment:   62 y/o male presents to the ED due to nausea and chills today.  Differential Diagnosis:   Diverticulitis, cholecystitis, pancreatitis, appendicitis, UTI, dehydration, constipation, obstruction, electrolyte dyscrasia  Independently Interpreted Test(s):   I have ordered and independently interpreted X-rays - see prior notes.  I have ordered and independently interpreted EKG Reading(s) - see prior notes  Clinical Tests:   Lab Tests: Reviewed       <> Summary of Lab: Benign  ED Management:  Patient given IV fluid, Zofran, GI cocktail.  He is tolerating by mouth with stable vital signs.  I informed him of his negative workup as well as plan to discharge with prescriptions for Zofran, Bentyl, lactulose, instructions to increase oral hydration with Gatorade G2 or Powerade light, follow-up with primary care physician, reasons to return to the emergency room.  Patient expressed good understanding and is comfortable with discharge at this time.                      Clinical Impression:   The primary encounter diagnosis was Dehydration. Diagnoses of Fatigue, Nausea & vomiting, and Constipation, unspecified constipation type were also pertinent to this visit.    Disposition:   Disposition: Discharged  Condition: Stable       I, Dr. Janak Brewer, personally performed the services described in this documentation. All medical record entries made by the scribe were at my direction and in my presence.  I have reviewed the chart and agree that the record reflects my personal performance and is accurate and complete. Janak Brewer MD.  11:34 AM 05/01/2018     Scribe Attestation                 Janak LAMA  MD Daniella  05/01/18 1136

## 2018-05-01 NOTE — ED NOTES
Per Tricia Davalos ([phone]) hospice director, waiting on final acceptance from [facility].

## 2018-05-01 NOTE — PROGRESS NOTES
made phone contact with [facility] representative, Tricia Davalos at [phone] and informed her  Sunil's admit coordinator, Annie has not received a referral from her agency for nursing home placement.   informed Ms Davalos that [facility] is not going to accept patient today.   informed Ms. Davalos that patient is not meeting criteria to be admitted to hospital or stay in the emergency room.   inquired about patient being admitted to a hospice agency or a facility that they have a contract with for respite care.  Ms. Davalos reported that they have a contract with [facility] for respite care.  However, patient's sister does not want patient placed at [facility] because it is too far.   informed Ms. Davalos if sister [facility] not [facility] she will have to take patient home and provide 24 hour care.  Ms. Davalos said she will make phone contact with the sister and follow up with  regarding discharge plan.

## 2018-05-01 NOTE — ED TRIAGE NOTES
"Patient arrived to ED via EMS complaining of nausea; states he has " been nauseated for forever." " I am really sick".  "

## 2018-05-23 DIAGNOSIS — J44.1 COPD EXACERBATION: ICD-10-CM

## 2018-05-23 RX ORDER — BUDESONIDE 180 UG/1
AEROSOL, POWDER RESPIRATORY (INHALATION)
Qty: 1 EACH | Refills: 0 | Status: SHIPPED | OUTPATIENT
Start: 2018-05-23

## 2018-05-23 RX ORDER — ALBUTEROL SULFATE 90 UG/1
1 AEROSOL, METERED RESPIRATORY (INHALATION) EVERY 4 HOURS PRN
Qty: 1 INHALER | Refills: 1 | OUTPATIENT
Start: 2018-05-23 | End: 2019-05-23

## 2018-05-23 NOTE — TELEPHONE ENCOUNTER
----- Message from Katie Marie sent at 5/23/2018  3:25 PM CDT -----  Contact: [phone]/self  Patient is requesting a call back regarding getting a shot and medicine for his COPD. Please advise.

## 2018-05-24 ENCOUNTER — TELEPHONE (OUTPATIENT)
Dept: FAMILY MEDICINE | Facility: CLINIC | Age: 63
End: 2018-05-24

## 2018-05-24 NOTE — TELEPHONE ENCOUNTER
----- Message from Génesis Hogan sent at 5/24/2018 12:18 PM CDT -----  Contact: [phone]/self  Patient called in requesting to speak with you. Patient prefers to speak with a nurse. Please advise.

## 2018-05-29 ENCOUNTER — CLINICAL SUPPORT (OUTPATIENT)
Dept: SMOKING CESSATION | Facility: CLINIC | Age: 63
End: 2018-05-29
Payer: COMMERCIAL

## 2018-05-29 DIAGNOSIS — F17.200 NICOTINE DEPENDENCE: Primary | ICD-10-CM

## 2018-05-29 PROCEDURE — 99407 BEHAV CHNG SMOKING > 10 MIN: CPT | Mod: S$GLB,,,

## 2018-05-29 NOTE — PROGRESS NOTES
Successful contact with patient regarding tobacco cessation quit #2. Pt states, he was unable to become tobacco free during the program.  Pt currently smoking a half pack/day, he's going through several medical issues, and he's determined to quit.  Pt's scheduled for quit #3 on 6/6/2018. Will resolve episode #2 and follow up with him in 3-4 months regarding quit #3.

## 2018-05-30 RX ORDER — ONDANSETRON 4 MG/1
4 TABLET, FILM COATED ORAL EVERY 6 HOURS PRN
Qty: 30 TABLET | Refills: 0 | OUTPATIENT
Start: 2018-05-30

## 2018-06-06 ENCOUNTER — CLINICAL SUPPORT (OUTPATIENT)
Dept: SMOKING CESSATION | Facility: CLINIC | Age: 63
End: 2018-06-06
Payer: COMMERCIAL

## 2018-06-06 ENCOUNTER — OFFICE VISIT (OUTPATIENT)
Dept: FAMILY MEDICINE | Facility: CLINIC | Age: 63
End: 2018-06-06
Attending: FAMILY MEDICINE
Payer: MEDICARE

## 2018-06-06 VITALS
BODY MASS INDEX: 26.96 KG/M2 | WEIGHT: 171.75 LBS | OXYGEN SATURATION: 99 % | HEIGHT: 67 IN | HEART RATE: 63 BPM | SYSTOLIC BLOOD PRESSURE: 130 MMHG | TEMPERATURE: 98 F | DIASTOLIC BLOOD PRESSURE: 80 MMHG

## 2018-06-06 DIAGNOSIS — J43.8 OTHER EMPHYSEMA: Chronic | ICD-10-CM

## 2018-06-06 DIAGNOSIS — F41.9 ANXIETY: ICD-10-CM

## 2018-06-06 DIAGNOSIS — F25.0 SCHIZOAFFECTIVE DISORDER, BIPOLAR TYPE: Chronic | ICD-10-CM

## 2018-06-06 DIAGNOSIS — F29 PSYCHOSIS, UNSPECIFIED PSYCHOSIS TYPE: ICD-10-CM

## 2018-06-06 DIAGNOSIS — M16.12 PRIMARY OSTEOARTHRITIS OF LEFT HIP: ICD-10-CM

## 2018-06-06 DIAGNOSIS — N18.5 CKD (CHRONIC KIDNEY DISEASE), STAGE V: ICD-10-CM

## 2018-06-06 DIAGNOSIS — N18.5 ANEMIA OF CHRONIC RENAL FAILURE, STAGE 5: ICD-10-CM

## 2018-06-06 DIAGNOSIS — F17.210 CIGARETTE SMOKER: Primary | ICD-10-CM

## 2018-06-06 DIAGNOSIS — F33.9 MAJOR DEPRESSION, RECURRENT, CHRONIC: ICD-10-CM

## 2018-06-06 DIAGNOSIS — K74.69 COMPENSATED CIRRHOSIS RELATED TO HEPATITIS C VIRUS (HCV): Chronic | ICD-10-CM

## 2018-06-06 DIAGNOSIS — M25.552 LEFT HIP PAIN: Primary | ICD-10-CM

## 2018-06-06 DIAGNOSIS — D63.1 ANEMIA OF CHRONIC RENAL FAILURE, STAGE 5: ICD-10-CM

## 2018-06-06 DIAGNOSIS — F32.A DEPRESSION, UNSPECIFIED DEPRESSION TYPE: ICD-10-CM

## 2018-06-06 DIAGNOSIS — B19.20 COMPENSATED CIRRHOSIS RELATED TO HEPATITIS C VIRUS (HCV): Chronic | ICD-10-CM

## 2018-06-06 DIAGNOSIS — B18.2 CHRONIC HEPATITIS C WITHOUT HEPATIC COMA: ICD-10-CM

## 2018-06-06 PROCEDURE — 99214 OFFICE O/P EST MOD 30 MIN: CPT | Mod: GW,S$PBB,25,ICN | Performed by: FAMILY MEDICINE

## 2018-06-06 PROCEDURE — 20610 DRAIN/INJ JOINT/BURSA W/O US: CPT | Mod: PBBFAC,PO | Performed by: FAMILY MEDICINE

## 2018-06-06 PROCEDURE — 99403 PREV MED CNSL INDIV APPRX 45: CPT | Mod: S$GLB,,,

## 2018-06-06 PROCEDURE — 20610 DRAIN/INJ JOINT/BURSA W/O US: CPT | Mod: S$PBB,GW,LT, | Performed by: FAMILY MEDICINE

## 2018-06-06 PROCEDURE — 99999 PR PBB SHADOW E&M-EST. PATIENT-LVL I: CPT | Mod: PBBFAC,,,

## 2018-06-06 PROCEDURE — 99999 PR PBB SHADOW E&M-EST. PATIENT-LVL III: CPT | Mod: PBBFAC,,, | Performed by: FAMILY MEDICINE

## 2018-06-06 PROCEDURE — 99213 OFFICE O/P EST LOW 20 MIN: CPT | Mod: PBBFAC,PO | Performed by: FAMILY MEDICINE

## 2018-06-06 RX ORDER — IBUPROFEN 200 MG
1 TABLET ORAL DAILY
Qty: 14 PATCH | Refills: 0 | Status: SHIPPED | OUTPATIENT
Start: 2018-06-06

## 2018-06-06 RX ORDER — TRIAMCINOLONE ACETONIDE 40 MG/ML
20 INJECTION, SUSPENSION INTRA-ARTICULAR; INTRAMUSCULAR
Status: COMPLETED | OUTPATIENT
Start: 2018-06-06 | End: 2018-06-06

## 2018-06-06 RX ORDER — OLANZAPINE 5 MG/1
TABLET ORAL
Qty: 180 TABLET | Refills: 3 | Status: SHIPPED | OUTPATIENT
Start: 2018-06-06

## 2018-06-06 RX ADMIN — TRIAMCINOLONE ACETONIDE 20 MG: 40 INJECTION, SUSPENSION INTRA-ARTICULAR; INTRAMUSCULAR at 02:06

## 2018-06-06 NOTE — PROGRESS NOTES
Subjective:       Patient ID: Leif Ramirez Jr. is a 63 y.o. male.    Chief Complaint: Hip Pain and Hypertension    62 yr old pleasant white male with anxiety/depression, schizoaffective disorder, Bipolar disorder, chronic Hep C with cirrhosis, CKD IV/V, chronic low back pain, B/L hip pain, BPH, HLD, pancytopenia, presents today for his routine 3 month follow up. C/o worsening left hip pain and low back pain and need injection.      Knee pain B/L - daily pain with instability - tried conservative approach with rest and meds and no relief - do not want any surgery - never used brace for support    HTN - chronic - controlled - on CCB and BB - - compliant - no side effects    Anxiety/depression/schizoaffective and bipolar disorder - follows Dr. Ray Avendano, outside Psychiatrist - no SI/HI      HLD - controlled - diet alone -      LDLCALC                  83.4                11/18/2017                      - lab due                  COPD - chronic - controlled      Chronic Hep C with Cirrhosis - seen hepatology - started on Harvoni and trying to be compliant      CKD IV/V - worsening - seeing nephrology - urine ok    Pancytopenia - seen Hem/Onc and never had follow up -       Chronic LBP/hip pain - he has B/L hip replacement and has pain in them every single day and he suffers and was on some pain medication until his doctor refused it - he also has back pain and it radiates to leg leg - no numbness or saddle anesthesia, bladder/bowel problem      History as below - reviewed      Health maintenance  -labs UTD  -colon screen and PSA reports UTD        Medication Refill   This is a chronic problem. The current episode started more than 1 year ago. The problem occurs constantly. The problem has been gradually improving. Associated symptoms include arthralgias, myalgias and neck pain. Pertinent negatives include no chest pain, congestion, coughing, diaphoresis, rash, vomiting or weakness. Nothing aggravates the  symptoms. Treatments tried: pain medicine. The treatment provided moderate relief.   Back Pain   Pertinent negatives include no chest pain or weakness.   Hip Pain    The injury mechanism was a twisting injury. The pain is present in the left hip and right hip. The quality of the pain is described as aching. The pain is at a severity of 6/10. The pain is moderate. The symptoms are aggravated by movement and palpation. He has tried heat and elevation for the symptoms. The treatment provided mild relief.   Hypertension   This is a chronic problem. The current episode started more than 1 year ago. The problem is controlled. Associated symptoms include anxiety and neck pain. Pertinent negatives include no chest pain or palpitations. There are no associated agents to hypertension. Risk factors for coronary artery disease include dyslipidemia and male gender. Past treatments include ACE inhibitors and central alpha agonists. The current treatment provides significant improvement. There are no compliance problems.  There is no history of angina, CAD/MI, CVA, left ventricular hypertrophy, PVD or retinopathy. There is no history of chronic renal disease, hyperparathyroidism, pheochromocytoma, renovascular disease or a thyroid problem.   Anemia   Presents for follow-up visit. Symptoms include pallor. There has been no confusion, light-headedness or palpitations. Past treatments include nothing. There is no history of alcohol abuse, cancer, chronic renal disease, dementia, hypothyroidism, malnutrition, recent illness or recent trauma. There is no past history of bone marrow exam, colonoscopy, EGD or FOBT. Compliance with medications is 51-75%.   Hyperlipidemia   This is a chronic problem. The current episode started more than 1 year ago. The problem is controlled. Recent lipid tests were reviewed and are normal. He has no history of chronic renal disease, diabetes, hypothyroidism or liver disease. There are no known factors  aggravating his hyperlipidemia. Associated symptoms include myalgias. Pertinent negatives include no chest pain. He is currently on no antihyperlipidemic treatment. The current treatment provides mild improvement of lipids. There are no compliance problems.  Risk factors for coronary artery disease include dyslipidemia, hypertension and a sedentary lifestyle.     Review of Systems   Constitutional: Negative.  Negative for activity change, diaphoresis and unexpected weight change.   HENT: Negative.  Negative for congestion, ear pain, mouth sores, rhinorrhea and voice change.    Eyes: Negative.  Negative for pain, discharge and visual disturbance.   Respiratory: Negative.  Negative for apnea, cough and wheezing.    Cardiovascular: Negative.  Negative for chest pain and palpitations.   Gastrointestinal: Negative.  Negative for abdominal distention, anal bleeding, diarrhea and vomiting.   Endocrine: Negative.  Negative for cold intolerance and polyuria.   Genitourinary: Negative.  Negative for decreased urine volume, difficulty urinating, discharge, frequency and scrotal swelling.   Musculoskeletal: Positive for arthralgias, back pain, myalgias and neck pain. Negative for neck stiffness.   Skin: Positive for pallor. Negative for color change and rash.   Allergic/Immunologic: Negative.  Negative for environmental allergies and immunocompromised state.   Neurological: Negative.  Negative for dizziness, speech difficulty, weakness and light-headedness.   Hematological: Negative.    Psychiatric/Behavioral: Negative.  Negative for agitation, confusion, dysphoric mood and suicidal ideas. The patient is not nervous/anxious.        PMH/PSH/FH/SH/MED/ALLERGY reviewed    Objective:       Vitals:    06/06/18 1314   BP: 130/80   Pulse: 63   Temp: 98.4 °F (36.9 °C)       Physical Exam   Constitutional: He is oriented to person, place, and time. He appears well-developed and well-nourished. No distress.   HENT:   Head: Normocephalic  and atraumatic.   Right Ear: External ear normal.   Left Ear: External ear normal.   Nose: Nose normal.   Mouth/Throat: Oropharynx is clear and moist.   Eyes: EOM are normal. Pupils are equal, round, and reactive to light.   Neck: Normal range of motion. Neck supple. No JVD present. No tracheal deviation present. No thyromegaly present.   Cardiovascular: Normal rate, regular rhythm, normal heart sounds and intact distal pulses.  Exam reveals no gallop and no friction rub.    No murmur heard.  Pulmonary/Chest: Effort normal and breath sounds normal. No stridor. No respiratory distress. He has no wheezes. He has no rales. He exhibits no tenderness.   Abdominal: Soft. Bowel sounds are normal. He exhibits distension. He exhibits no mass. There is no tenderness. There is no rebound and no guarding. No hernia.   Musculoskeletal: Normal range of motion. He exhibits tenderness (TTP B/L hips with restricted ROM and lower lumbar spine and paralumbar spine TTP. SLRT + left). He exhibits no edema.   Lymphadenopathy:     He has no cervical adenopathy.   Neurological: He is alert and oriented to person, place, and time. He has normal reflexes. He displays normal reflexes. No cranial nerve deficit. He exhibits normal muscle tone. Coordination normal.   Skin: Skin is warm and dry. Capillary refill takes less than 2 seconds. No rash noted. He is not diaphoretic. No erythema. There is pallor.   Psychiatric: He has a normal mood and affect. His behavior is normal. Judgment and thought content normal.       Assessment:       1. Left hip pain    2. Schizoaffective disorder, bipolar type    3. Anxiety    4. Depression, unspecified depression type    5. Psychosis, unspecified psychosis type    6. Major depression, recurrent, chronic    7. CKD (chronic kidney disease), stage V    8. 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 Other emphysema    10. Chronic hepatitis C without hepatic coma    11. Anemia of chronic renal  failure, stage 5    12. Primary osteoarthritis of left hip        Plan:       Leif was seen today for hip pain and hypertension.    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 Adequate topical anesthesia obtained with topical spray and 20 mg kenalog mixed with 1.5 cc 2% lidocaine without epi injected into left trochanteric bursa. Minimal bleeding and no other immediate complications. Pt tolerated well. Post procedure precautions given.    COPD  -stable  -advised to quit smoking    Hep C/Cirrhosis  -completed Harvoni - viral load negative  -follows hepatology        HTN  -controlled  -refilled meds - CCB and BB to continue    OA knee B/L  -failed conservative therapy  -trial of brace daily - rx done    CKD V  -worsening  -follow nephrology  -labs      Anxiety/depression/schizoaffective/bipolar  -follows Dr. Bell Psychiatrist    B/L hip pain and chronic LBP  -on norco  -advised to check with hepatology for tylenol intake    Spent adequate time in obtaining history and explaining differentials    40 minutes spent during this visit of which greater than 50% devoted to face-face counseling and coordination of care regarding diagnosis and management plan    RTC 3 months or prn

## 2018-06-13 ENCOUNTER — LAB VISIT (OUTPATIENT)
Dept: LAB | Facility: HOSPITAL | Age: 63
End: 2018-06-13
Attending: INTERNAL MEDICINE
Payer: OTHER MISCELLANEOUS

## 2018-06-13 ENCOUNTER — TELEPHONE (OUTPATIENT)
Dept: FAMILY MEDICINE | Facility: CLINIC | Age: 63
End: 2018-06-13

## 2018-06-13 ENCOUNTER — CLINICAL SUPPORT (OUTPATIENT)
Dept: SMOKING CESSATION | Facility: CLINIC | Age: 63
End: 2018-06-13
Payer: COMMERCIAL

## 2018-06-13 DIAGNOSIS — B19.20 COMPENSATED CIRRHOSIS RELATED TO HEPATITIS C VIRUS (HCV): Chronic | ICD-10-CM

## 2018-06-13 DIAGNOSIS — K74.69 COMPENSATED CIRRHOSIS RELATED TO HEPATITIS C VIRUS (HCV): Chronic | ICD-10-CM

## 2018-06-13 DIAGNOSIS — F17.210 HEAVY CIGARETTE SMOKER (20-39 PER DAY): Primary | ICD-10-CM

## 2018-06-13 DIAGNOSIS — N18.5 CKD (CHRONIC KIDNEY DISEASE), STAGE V: ICD-10-CM

## 2018-06-13 DIAGNOSIS — N40.0 BENIGN PROSTATIC HYPERPLASIA WITHOUT LOWER URINARY TRACT SYMPTOMS: ICD-10-CM

## 2018-06-13 DIAGNOSIS — D63.1 ANEMIA OF CHRONIC RENAL FAILURE, STAGE 5: ICD-10-CM

## 2018-06-13 DIAGNOSIS — I10 ESSENTIAL HYPERTENSION: Chronic | ICD-10-CM

## 2018-06-13 DIAGNOSIS — N18.5 ANEMIA OF CHRONIC RENAL FAILURE, STAGE 5: ICD-10-CM

## 2018-06-13 DIAGNOSIS — E87.5 HYPERKALEMIA: ICD-10-CM

## 2018-06-13 DIAGNOSIS — N25.81 SECONDARY HYPERPARATHYROIDISM OF RENAL ORIGIN: ICD-10-CM

## 2018-06-13 LAB
25(OH)D3+25(OH)D2 SERPL-MCNC: 28 NG/ML
ALBUMIN SERPL BCP-MCNC: 3.9 G/DL
ALBUMIN SERPL BCP-MCNC: 3.9 G/DL
ALP SERPL-CCNC: 87 U/L
ALT SERPL W/O P-5'-P-CCNC: 15 U/L
ANION GAP SERPL CALC-SCNC: 8 MMOL/L
ANION GAP SERPL CALC-SCNC: 8 MMOL/L
AST SERPL-CCNC: 18 U/L
BASOPHILS # BLD AUTO: 0.02 K/UL
BASOPHILS NFR BLD: 0.6 %
BILIRUB SERPL-MCNC: 0.3 MG/DL
BUN SERPL-MCNC: 66 MG/DL
BUN SERPL-MCNC: 66 MG/DL
CALCIUM SERPL-MCNC: 8.9 MG/DL
CHLORIDE SERPL-SCNC: 109 MMOL/L
CHLORIDE SERPL-SCNC: 109 MMOL/L
CO2 SERPL-SCNC: 23 MMOL/L
CO2 SERPL-SCNC: 23 MMOL/L
CREAT SERPL-MCNC: 5.8 MG/DL
CREAT SERPL-MCNC: 5.8 MG/DL
DIFFERENTIAL METHOD: ABNORMAL
EOSINOPHIL # BLD AUTO: 0.1 K/UL
EOSINOPHIL NFR BLD: 1.9 %
ERYTHROCYTE [DISTWIDTH] IN BLOOD BY AUTOMATED COUNT: 14.6 %
EST. GFR  (AFRICAN AMERICAN): 11 ML/MIN/1.73 M^2
EST. GFR  (AFRICAN AMERICAN): 11 ML/MIN/1.73 M^2
EST. GFR  (NON AFRICAN AMERICAN): 9 ML/MIN/1.73 M^2
EST. GFR  (NON AFRICAN AMERICAN): 9 ML/MIN/1.73 M^2
FERRITIN SERPL-MCNC: 241 NG/ML
GLUCOSE SERPL-MCNC: 91 MG/DL
GLUCOSE SERPL-MCNC: 91 MG/DL
HCT VFR BLD AUTO: 24.8 %
HGB BLD-MCNC: 8.4 G/DL
IRON SERPL-MCNC: 74 UG/DL
LYMPHOCYTES # BLD AUTO: 0.7 K/UL
LYMPHOCYTES NFR BLD: 21.7 %
MCH RBC QN AUTO: 29.7 PG
MCHC RBC AUTO-ENTMCNC: 33.9 G/DL
MCV RBC AUTO: 88 FL
MONOCYTES # BLD AUTO: 0.5 K/UL
MONOCYTES NFR BLD: 14.6 %
NEUTROPHILS # BLD AUTO: 1.9 K/UL
NEUTROPHILS NFR BLD: 61.2 %
PHOSPHATE SERPL-MCNC: 5.4 MG/DL
PLATELET # BLD AUTO: 70 K/UL
PMV BLD AUTO: 8 FL
POTASSIUM SERPL-SCNC: 5.1 MMOL/L
POTASSIUM SERPL-SCNC: 5.1 MMOL/L
PROT SERPL-MCNC: 6.5 G/DL
PTH-INTACT SERPL-MCNC: 216.6 PG/ML
RBC # BLD AUTO: 2.83 M/UL
SATURATED IRON: 28 %
SODIUM SERPL-SCNC: 140 MMOL/L
SODIUM SERPL-SCNC: 140 MMOL/L
TOTAL IRON BINDING CAPACITY: 262 UG/DL
TRANSFERRIN SERPL-MCNC: 177 MG/DL
WBC # BLD AUTO: 3.09 K/UL

## 2018-06-13 PROCEDURE — 80053 COMPREHEN METABOLIC PANEL: CPT

## 2018-06-13 PROCEDURE — 36415 COLL VENOUS BLD VENIPUNCTURE: CPT

## 2018-06-13 PROCEDURE — 82306 VITAMIN D 25 HYDROXY: CPT

## 2018-06-13 PROCEDURE — 99999 PR PBB SHADOW E&M-EST. PATIENT-LVL I: CPT | Mod: PBBFAC,,,

## 2018-06-13 PROCEDURE — 90853 GROUP PSYCHOTHERAPY: CPT | Mod: S$GLB,,,

## 2018-06-13 PROCEDURE — 84100 ASSAY OF PHOSPHORUS: CPT

## 2018-06-13 PROCEDURE — 85025 COMPLETE CBC W/AUTO DIFF WBC: CPT

## 2018-06-13 PROCEDURE — 82728 ASSAY OF FERRITIN: CPT

## 2018-06-13 PROCEDURE — 83540 ASSAY OF IRON: CPT

## 2018-06-13 PROCEDURE — 83970 ASSAY OF PARATHORMONE: CPT

## 2018-06-13 NOTE — TELEPHONE ENCOUNTER
----- Message from Sagrario Oconnor sent at 6/13/2018  1:03 PM CDT -----  Contact: Self/[phone]  Glenda called to state speak with Bushra in regard to paperwork needed for his  assistance.    Please call [phone] and advise.

## 2018-06-14 ENCOUNTER — TELEPHONE (OUTPATIENT)
Dept: FAMILY MEDICINE | Facility: CLINIC | Age: 63
End: 2018-06-14

## 2018-06-14 NOTE — TELEPHONE ENCOUNTER
----- Message from Bisi Shaffer sent at 6/14/2018 12:49 PM CDT -----  Contact: Self [phone] or [phone]  Patient is calling to talk to nurse concerning personal questions. Please advice

## 2018-06-14 NOTE — TELEPHONE ENCOUNTER
Pt states that he has an appt with a Psychiatrist and his  would like to speak to Dr. Mcguire regarding filling out paperwork. Pt states that he signed a Medical Release with his . Pt will give his  the office number to call Dr. Mcguire's office to discuss the paperwork.

## 2018-06-18 ENCOUNTER — OFFICE VISIT (OUTPATIENT)
Dept: SURGERY | Facility: CLINIC | Age: 63
End: 2018-06-18
Payer: MEDICARE

## 2018-06-18 VITALS
HEART RATE: 82 BPM | TEMPERATURE: 98 F | WEIGHT: 179 LBS | BODY MASS INDEX: 25.62 KG/M2 | HEIGHT: 70 IN | DIASTOLIC BLOOD PRESSURE: 83 MMHG | SYSTOLIC BLOOD PRESSURE: 158 MMHG

## 2018-06-18 DIAGNOSIS — F25.0 SCHIZOAFFECTIVE DISORDER, BIPOLAR TYPE: Chronic | ICD-10-CM

## 2018-06-18 DIAGNOSIS — N18.5 CKD (CHRONIC KIDNEY DISEASE), STAGE V: Primary | ICD-10-CM

## 2018-06-18 DIAGNOSIS — N18.6 ESRD (END STAGE RENAL DISEASE): ICD-10-CM

## 2018-06-18 PROCEDURE — 99999 PR PBB SHADOW E&M-EST. PATIENT-LVL IV: CPT | Mod: PBBFAC,,, | Performed by: STUDENT IN AN ORGANIZED HEALTH CARE EDUCATION/TRAINING PROGRAM

## 2018-06-18 PROCEDURE — 99204 OFFICE O/P NEW MOD 45 MIN: CPT | Mod: GW,S$PBB,, | Performed by: STUDENT IN AN ORGANIZED HEALTH CARE EDUCATION/TRAINING PROGRAM

## 2018-06-18 PROCEDURE — 99214 OFFICE O/P EST MOD 30 MIN: CPT | Mod: PBBFAC,PO | Performed by: STUDENT IN AN ORGANIZED HEALTH CARE EDUCATION/TRAINING PROGRAM

## 2018-06-18 RX ORDER — OLANZAPINE 5 MG/1
TABLET ORAL
Qty: 180 TABLET | Refills: 3 | OUTPATIENT
Start: 2018-06-18

## 2018-06-18 NOTE — TELEPHONE ENCOUNTER
Covering for Dr. Mcguire.  I see that Dr. Mcguire had mentioned that he sees an outside psychiatrist.  He should be getting his Zyprexa from his psychiatrist

## 2018-06-18 NOTE — TELEPHONE ENCOUNTER
Patient is requesting a refill for the below named medications:     Invega 3mg tablets which is not listed in current medications.    OLANZapine 5 mg tablets     Patient would like for medications to be sent to [facility]. Please advise

## 2018-06-18 NOTE — PROGRESS NOTES
Leif Ramirez Jr.  06/18/2018    HPI:  Patient is a 63 y.o. male with multiple medical comorbities: CKD stage 5, COPD (recent evaluation by Dr. Abraham 3/2018), CHF (last seen 12/2017 by Dr. Serrano), CAD, T2DM (last A1c 4.8 10/2017), HTN, HLD, hep C cirrhosis s/p Harvoni tx completion 1.5 years ago (was listed on liver txp list prior to this), CVA, schizoaffective disorder, bilateral hip replacement, pancytopenia (eval-ed by heme-onc lost to follow-up), chronic tobacco abuse, prior IVDU quit 1985 who presents to clinic evaluation for vascular access.    He was lost to follow-up over the last 11 months due to opposition of RRT. As such, he was on home hospice due to refusal. On questioning today, he is still open to dialysis.     Right handed    no MI  (+) stroke  Tobacco use: 1 ppd >50 years     Past Medical History:   Diagnosis Date    RAPHAEL (acute kidney injury)     Allergy     poison ivy    Anemia     Anxiety     Arthritis     Asthma     Bipolar affective     BPH (benign prostatic hyperplasia)     Cancer     blood and bone    CHF (congestive heart failure)     Chronic hepatitis C with cirrhosis     CKD (chronic kidney disease) stage 5, GFR less than 15 ml/min     COPD (chronic obstructive pulmonary disease)     Coronary artery disease     Depression     Diabetes mellitus type II     Disorder of kidney and ureter     Edema     Elevated PSA     has had prostate problems but unaware of his psa level    Encounter for blood transfusion     Hematuria     History of back injury     Chronic back pain    Hyperlipidemia     Hypertension     Internal hemorrhoid, bleeding 9/21/2015    Pancytopenia     Proteinuria     Renal cyst, right     Renal disorder     Schizo affective schizophrenia     Seizures     Stroke     Thyroid disease     Urinary tract infection      Past Surgical History:   Procedure Laterality Date    ELBOW BURSA SURGERY      JOINT REPLACEMENT      ORIF FEMUR FRACTURE Right  8/2015     Family History   Problem Relation Age of Onset    Hypertension Mother     Diabetes Mother     Transient ischemic attack Mother     Heart disease Mother         stent placement    Arthritis Mother     Heart disease Father     Hypertension Father     Diabetes Father     Stroke Father     Arthritis Sister     Hypertension Sister     Heart disease Maternal Grandmother     Diabetes Maternal Grandmother     Stroke Maternal Grandmother     Cancer Paternal Grandmother         breast    Heart disease Paternal Grandmother     Cancer Sister         brain    Prostate cancer Neg Hx     Kidney disease Neg Hx      Social History     Social History    Marital status: Single     Spouse name: N/A    Number of children: N/A    Years of education: N/A     Occupational History    Not on file.     Social History Main Topics    Smoking status: Current Every Day Smoker     Packs/day: 0.50     Years: 50.00    Smokeless tobacco: Never Used    Alcohol use No    Drug use: No      Comment: quit 1985    Sexual activity: Not Currently     Other Topics Concern    Not on file     Social History Narrative    No narrative on file     Current Outpatient Prescriptions on File Prior to Visit   Medication Sig    albuterol 90 mcg/actuation inhaler Inhale 1 puff into the lungs every 4 (four) hours as needed for Wheezing.    amLODIPine (NORVASC) 10 MG tablet Take 1 tablet (10 mg total) by mouth once daily.    blood sugar diagnostic Strp 1 strip by Misc.(Non-Drug; Combo Route) route 2 (two) times daily.    butalbital-acetaminophen-caffeine -40 mg (FIORICET, ESGIC) -40 mg per tablet TK 1 T PO Q 6 H FOR PAIN    carvedilol (COREG) 25 MG tablet TAKE 1 TABLET(25 MG) BY MOUTH TWICE DAILY    diclofenac (VOLTAREN) 25 MG TbEC TK 1 T PO  TID PRF ARTHRITIC PAIN    FLUoxetine (PROZAC) 20 MG capsule TK 1 C PO QD    furosemide (LASIX) 80 MG tablet Take 1 tablet (80 mg total) by mouth once daily.    gabapentin  (NEURONTIN) 100 MG capsule 1 capsule once daily.    hydrALAZINE (APRESOLINE) 50 MG tablet 1 tablet 2 (two) times daily.    LORazepam (ATIVAN) 0.5 MG tablet 1 tablet nightly.    OLANZapine (ZYPREXA) 5 MG tablet TK 1 T PO BID    omeprazole (PRILOSEC) 20 MG capsule Take 1 capsule (20 mg total) by mouth once daily.    ondansetron (ZOFRAN) 4 MG tablet Take 1 tablet (4 mg total) by mouth every 6 (six) hours as needed.    oxybutynin (DITROPAN) 5 MG Tab Take 1 tablet (5 mg total) by mouth every evening. One tablet before sleep    PULMICORT FLEXHALER 180 mcg/actuation AePB INHALE 1 PUFF PO BID    tamsulosin (FLOMAX) 0.4 mg Cp24 TAKE 1 CAPSULE(0.4 MG) BY MOUTH EVERY DAY    bumetanide (BUMEX) 1 MG tablet 1 tablet once daily.    cloNIDine (CATAPRES) 0.1 MG tablet 1 tablet 3 (three) times daily.    ferrous sulfate 325 mg (65 mg iron) Tab tablet TK 1 T PO  BID    hydrocodone-acetaminophen 5-325mg (NORCO) 5-325 mg per tablet Take 1 tablet by mouth every 8 (eight) hours as needed for Pain.    isosorbide mononitrate (IMDUR) 30 MG 24 hr tablet 1 tablet once daily.    labetalol (NORMODYNE) 100 MG tablet 1 tablet 2 (two) times daily.    lactulose (CHRONULAC) 20 gram/30 mL Soln Take 15 mLs (10 g total) by mouth 2 (two) times daily as needed (Constipation).    nicotine (NICODERM CQ) 21 mg/24 hr Place 1 patch onto the skin once daily.     No current facility-administered medications on file prior to visit.        REVIEW OF SYSTEMS:  General: negative; ENT: negative; Allergy and Immunology: negative; Hematological and Lymphatic: Negative; Endocrine: negative; Respiratory: no cough, shortness of breath, or wheezing; Cardiovascular: no chest pain or dyspnea on exertion; Gastrointestinal: no abdominal pain/back, change in bowel habits, or bloody stools; Genito-Urinary: no dysuria, trouble voiding, or hematuria; Musculoskeletal: negative  Neurological: no TIA or stroke symptoms    PHYSICAL EXAM:      Pulse: 82  Temp: 98.3 °F  (36.8 °C)      General appearance:  Alert, well-appearing, and in no distress.  Oriented to person, place, and time   Neurological: Somewhat slurred speech, no focal findings noted; CN II - XII grossly intact           Musculoskeletal: Digits/nail without cyanosis/clubbing.  Normal muscle strength/tone.                 Neck: Supple, no significant adenopathy; thyroid is not enlarged, prior excisions for skin cancer in midline after right neck                Chest:  Non labored             Cardiac: Normal rate and regular rhythm          Abdomen: Soft, nontender, nondistended, no masses      No rebound tenderness noted     Obese     Pulsatile aortic mass is not palpable.      Extremities:   2+ radial, adequate veins on physical exam     2+ pedal pulses palpable     5/5 motor BL UE and LE, sensation grossly intact      No pedal edema     No ulcerations    LAB RESULTS:  Lab Results   Component Value Date    K 5.1 06/13/2018    K 5.1 06/13/2018    K 4.2 05/01/2018    CREATININE 5.8 (H) 06/13/2018    CREATININE 5.8 (H) 06/13/2018    CREATININE 5.7 (H) 05/01/2018     Lab Results   Component Value Date    WBC 3.09 (L) 06/13/2018    WBC 4.41 05/01/2018    WBC 5.49 11/20/2017    HCT 24.8 (L) 06/13/2018    HCT 26.7 (L) 05/01/2018    HCT 25.8 (L) 11/20/2017    PLT 70 (L) 06/13/2018    PLT 90 (L) 05/01/2018     (L) 11/20/2017     Lab Results   Component Value Date    HGBA1C 4.8 10/05/2017    HGBA1C 4.7 04/15/2015    HGBA1C 4.8 12/18/2012     IMAGING:    IMP/PLAN:  63 y.o. male with a history of CKD stage V, in need of HD access.    - to OR for left Cinthia fistula, local block/ MAC  - consent obtained    Christine Mobley MD  General Surgery, PGY-II  Pager: [phone]

## 2018-06-20 ENCOUNTER — TELEPHONE (OUTPATIENT)
Dept: SURGERY | Facility: CLINIC | Age: 63
End: 2018-06-20

## 2018-06-20 ENCOUNTER — CLINICAL SUPPORT (OUTPATIENT)
Dept: SMOKING CESSATION | Facility: CLINIC | Age: 63
End: 2018-06-20
Payer: COMMERCIAL

## 2018-06-20 ENCOUNTER — TELEPHONE (OUTPATIENT)
Dept: FAMILY MEDICINE | Facility: CLINIC | Age: 63
End: 2018-06-20

## 2018-06-20 DIAGNOSIS — M54.41 CHRONIC BILATERAL LOW BACK PAIN WITH RIGHT-SIDED SCIATICA: ICD-10-CM

## 2018-06-20 DIAGNOSIS — G89.29 CHRONIC BILATERAL LOW BACK PAIN WITH RIGHT-SIDED SCIATICA: ICD-10-CM

## 2018-06-20 DIAGNOSIS — F17.210 HEAVY CIGARETTE SMOKER (20-39 PER DAY): Primary | ICD-10-CM

## 2018-06-20 PROCEDURE — 90853 GROUP PSYCHOTHERAPY: CPT | Mod: S$GLB,,,

## 2018-06-20 PROCEDURE — 99999 PR PBB SHADOW E&M-EST. PATIENT-LVL I: CPT | Mod: PBBFAC,,,

## 2018-06-20 NOTE — TELEPHONE ENCOUNTER
"----- Message from Gifty Velasquez sent at 6/20/2018 12:54 PM CDT -----  Contact: [phone]/ self   Pt its requesting for Dr Hernandez to set him up for home health ASAP, states he can't do anything by him self  . Please advise        06/20/18                                1351  Contacted patient regarding request for home health to inform him that he would need to follow up with his primary care provider for evaluation for home health due to not having a request for home health related to surgery, but the due to not being able to do anything by himself.  Patient states "yes I do. I have a preadmit appointment scheduled, and a surgery date coming up." Patient asked if he needed home health due to not being able to do anything by himself? Patient confirmed that home health was requested due to not being able to do anything by himself. Patient states "my surgeon informed me that he would get him home health." Dr. Hernandez has no denies said promise of home health, and sees no need for home health from a surgical standpoint presently or post operatively per Dr. Hernandez. Patient informed that he would need to contact Dr. Mcguire's office regarding home health orders and evaluation for home health. Patient states "my doctor's out of town baby, he's out to lunch." Patient instructed to call anyway, and that maybe he can be routed to Dr. Mcguire's covering staff. Patient states "thanks for nothing."     "

## 2018-06-20 NOTE — TELEPHONE ENCOUNTER
Spoke to pt and requesting Home Health orders. Pt states that he needs an Aide to clean his home, does his dishes and grocery shop for him. Informed pt that he needs a PCA worker. Pls advise.

## 2018-06-20 NOTE — TELEPHONE ENCOUNTER
----- Message from Zarina Santiago sent at 6/20/2018  1:56 PM CDT -----  Contact: [phone]/self  Patient requesting to speak with you regarding home health orders. Please advise.

## 2018-06-21 ENCOUNTER — TELEPHONE (OUTPATIENT)
Dept: SURGERY | Facility: CLINIC | Age: 63
End: 2018-06-21

## 2018-06-21 NOTE — TELEPHONE ENCOUNTER
Left msg for CB to inform pt of his Home Health request. Ok, so there is definitely a misunderstanding on his end about what home health can provide-- and these are definitely NOT covered services.  It sounds like he does need some assistance.  Would he be interested in a case management referral?  Maybe they can help him sort out what he qualifies for and help find good community resources for his needs.  Please let me know about case management referral thanks.

## 2018-06-21 NOTE — TELEPHONE ENCOUNTER
----- Message from Joanie Clark sent at 6/21/2018 12:28 PM CDT -----  Contact: Taylor [phone]  Taylor would like to speak with you about discussing surgery and disabilities of the patient as soon as possible. Please advise    06/21/18                               1576  Contacted Taylor. Someone answered the phone, and states that she isn't in right now.

## 2018-06-21 NOTE — PROGRESS NOTES
Smoking Cessation Group Session #3    Site: [facility]  Date:  6/13/18  Clinical Status of Patient: Outpatient   Length of Service and Code: 60 minutes - 72376   Number in Attendance: 9  Group Activities/Focus of Group:  completion of TCRS (Tobacco Cessation Rating Scale) reviewed strategies, controlling environment, cues, triggers, new goals set. Introduced high risk situations with preparation interventions, caffeine similarities with withdrawal issues of habit and nicotine, Alcohol, Understanding urges, cravings, stress and relaxation. Open discussion with intervention discussion.      Specific session focus: Managing habitual behavior    Target symptoms:  withdrawal and medication side effects             The following were rated moderate (3) to severe (4) on TCRS:       Moderate 3: Desire Crave tobacco/Eplained as withdrawal     Severe 4:   none  Patient's Response to Intervention: Patient continue to smoke between 20-30 cpd.  Patient seemed stressed and not focused on information given during group.  Patient set a goal of 20 cpd this week and we reviewed strategies to help obtain the goal.  Patient continue to use 21 mg nicotine patches that he had since last year.  Patient was advised to discontinue use and to  the patches sent to his pharmacy last week.  The patient denies any abnormal behavioral or mental changes at this time. The patient will continue with group therapy sessions and medication monitoring by CTTS. Prescribed medication management will be by physician.     Progress Toward Goals and Other Mental Status Changes: The patient denies any abnormal behavioral or mental changes at this time.     Interval History:     Diagnosis: Z72.0  Plan: The patient will continue with group therapy sessions and medication regimen prescribed with management by physician or by the [facility] Provider. Patient will inform Smoking Cessation Counselor of symptoms as rated high on TCRS.    Return  to Clinic: 1 week    Quit Date:    Planned Quit Date:

## 2018-06-22 RX ORDER — PREDNISONE 20 MG/1
TABLET ORAL
Qty: 10 TABLET | Refills: 0 | OUTPATIENT
Start: 2018-06-22

## 2018-06-25 NOTE — PROGRESS NOTES
Smoking Cessation Group Session #4    Site: [facility]  Date:  6/20/18  Clinical Status of Patient: Outpatient   Length of Service and Code: 60 minutes - 15742   Number in Attendance: 5  Group Activities/Focus of Group:  Completion of TCRS (Tobacco Cessation Rating Scale) reviewed strategies, habitual behavior, stress, and high risk situations. Introduced stress with addition interventions, SOLVE, relaxation with interventions, nutrition, exercise, weight gain, and the importance of rewarding oneself for accomplishments toward becoming tobacco free. Open discussion of all items with interventions.       Specific session focus: Weight management    Target symptoms:  withdrawal and medication side effects             The following were rated moderate (3) to severe (4) on TCRS:       Moderate 3: none     Severe 4:   none  Patient's Response to Intervention: Patient is smoking 20-30 cpd this week.  Patient set a goal of 25 cpd.  Patient reports having stress which interferes with his ability to quit.  Patient reports using patches that he had from last enrollment  In the program.  Patient was advise to discontinue use and to  patches order on intake visit as discussed at that time.  The patient denies any abnormal behavioral or mental changes at this time. The patient will continue with group therapy sessions and medication monitoring by CTTS. Prescribed medication management will be by physician.       Progress Toward Goals and Other Mental Status Changes: The patient denies any abnormal behavioral or mental changes at this time.     Interval History:     Diagnosis: Z72.0  Plan: The patient will continue with group therapy sessions and medication regimen prescribed with management by physician or by the [facility] Provider. Patient will inform Smoking Cessation Counselor of symptoms as rated high on TCRS.    Return to Clinic: 1 week    Quit Date:    Planned Quit Date:

## 2018-06-27 ENCOUNTER — TELEPHONE (OUTPATIENT)
Dept: FAMILY MEDICINE | Facility: CLINIC | Age: 63
End: 2018-06-27

## 2018-06-27 ENCOUNTER — CLINICAL SUPPORT (OUTPATIENT)
Dept: SMOKING CESSATION | Facility: CLINIC | Age: 63
End: 2018-06-27
Payer: COMMERCIAL

## 2018-06-27 DIAGNOSIS — F17.210 HEAVY SMOKER (MORE THAN 20 CIGARETTES PER DAY): Primary | ICD-10-CM

## 2018-06-27 PROCEDURE — 99999 PR PBB SHADOW E&M-EST. PATIENT-LVL I: CPT | Mod: PBBFAC,,,

## 2018-06-27 PROCEDURE — 90853 GROUP PSYCHOTHERAPY: CPT | Mod: S$GLB,,,

## 2018-06-27 NOTE — Clinical Note
Patient is smoking 25 -30 cpd.  Patient will attempt 20 cpd this week.  Patient remains on prescribed tobacco cessation medication regimen of 21 mg patch without any negative side effects at this time.

## 2018-06-27 NOTE — TELEPHONE ENCOUNTER
----- Message from Janene Perea sent at 6/27/2018 10:16 AM CDT -----  Contact: [phone] or [phone]  Patient called in requesting to speak with you. Patient prefers to speak with a nurse. Please advise.

## 2018-06-29 ENCOUNTER — TELEPHONE (OUTPATIENT)
Dept: FAMILY MEDICINE | Facility: CLINIC | Age: 63
End: 2018-06-29

## 2018-06-29 NOTE — TELEPHONE ENCOUNTER
----- Message from Emilie Ruggiero sent at 6/29/2018  7:09 AM CDT -----  No. [phone] or [phone]   Patient has a bad kidney.  He has questions.

## 2018-07-01 NOTE — PROGRESS NOTES
Smoking Cessation Group Session #5    Site: [facility]  Date:  6/27/18  Clinical Status of Patient: Outpatient   Length of Service and Code: 60 minutes - 33937   Number in Attendance: 9  Group Activities/Focus of Group:  Completion of TCRS (Tobacco Cessation Rating Scale) reviewed strategies, habitual behavior, high risks situations, understanding urges and cravings, stress and relaxation with open discussion and additional interventions, Introduced lapses, relapses, understanding them and analyzing the situation of a lapse, conflict issues that may be linked to a lapse.       Specific session focus: Relapse and Lapse    Target symptoms:  withdrawal and medication side effects             The following were rated moderate (3) to severe (4) on TCRS:       Moderate 3: none     Severe 4:   none  Patient's Response to Intervention: Patient is smoking 25 -30 cpd.  Patient will attempt 20 cpd this week.  Patient remains on prescribed tobacco cessation medication regimen of 21 mg patch without any negative side effects at this time.The patient denies any abnormal behavioral or mental changes at this time. The patient will continue with group therapy sessions and medication monitoring by CTTS. Prescribed medication management will be by physician.          Progress Toward Goals and Other Mental Status Changes: The patient denies any abnormal behavioral or mental changes at this time.     Interval History:     Diagnosis: Z72.0  Plan: The patient will continue with group therapy sessions and medication regimen prescribed with management by physician or by the [facility] Provider. Patient will inform Smoking Cessation Counselor of symptoms as rated high on TCRS.    Return to Clinic: 1 week    Quit Date:    Planned Quit Date:

## 2018-07-02 ENCOUNTER — ANESTHESIA EVENT (OUTPATIENT)
Dept: SURGERY | Facility: HOSPITAL | Age: 63
End: 2018-07-02

## 2018-07-05 ENCOUNTER — ANESTHESIA (OUTPATIENT)
Dept: SURGERY | Facility: HOSPITAL | Age: 63
End: 2018-07-05

## 2018-07-11 ENCOUNTER — TELEPHONE (OUTPATIENT)
Dept: SURGERY | Facility: CLINIC | Age: 63
End: 2018-07-11

## 2018-07-11 NOTE — TELEPHONE ENCOUNTER
----- Message from Lynne Pelaez sent at 7/11/2018  2:14 PM CDT -----  Contact: Dr. Kathy Ruiz, [phone]  States doctor wants patient to have vein mapping, would like to know if you want to schedule it or should they.         07/11/2018       1625  Attempted to contact Sara regarding the above message. No answer. Phone eventually became busy after constantly ringing.

## 2018-07-14 ENCOUNTER — HOSPITAL ENCOUNTER (INPATIENT)
Facility: HOSPITAL | Age: 63
LOS: 1 days | Discharge: HOME | DRG: 190 | End: 2018-07-15
Attending: EMERGENCY MEDICINE | Admitting: INTERNAL MEDICINE
Payer: MEDICARE

## 2018-07-14 DIAGNOSIS — R79.89 ELEVATED TROPONIN: ICD-10-CM

## 2018-07-14 DIAGNOSIS — E78.2 MIXED HYPERLIPIDEMIA: Chronic | ICD-10-CM

## 2018-07-14 DIAGNOSIS — J44.1 COPD EXACERBATION: ICD-10-CM

## 2018-07-14 DIAGNOSIS — R07.9 CHEST PAIN: ICD-10-CM

## 2018-07-14 DIAGNOSIS — J44.9 CHRONIC OBSTRUCTIVE PULMONARY DISEASE, UNSPECIFIED COPD TYPE: Primary | ICD-10-CM

## 2018-07-14 DIAGNOSIS — R06.02 SHORTNESS OF BREATH: ICD-10-CM

## 2018-07-14 DIAGNOSIS — D69.6 THROMBOCYTOPENIA: ICD-10-CM

## 2018-07-14 PROBLEM — E11.9 TYPE 2 DIABETES MELLITUS WITHOUT COMPLICATION, WITHOUT LONG-TERM CURRENT USE OF INSULIN: Status: ACTIVE | Noted: 2018-07-14

## 2018-07-14 PROBLEM — R10.9 ABDOMINAL PAIN: Status: ACTIVE | Noted: 2018-07-14

## 2018-07-14 PROBLEM — D63.8 ANEMIA OF CHRONIC DISEASE: Status: ACTIVE | Noted: 2018-07-14

## 2018-07-14 PROBLEM — N40.0 BPH (BENIGN PROSTATIC HYPERPLASIA): Status: ACTIVE | Noted: 2018-07-14

## 2018-07-14 PROBLEM — E55.9 VITAMIN D DEFICIENCY: Status: ACTIVE | Noted: 2018-07-14

## 2018-07-14 LAB
25(OH)D3+25(OH)D2 SERPL-MCNC: 35 NG/ML
ALBUMIN SERPL BCP-MCNC: 3.2 G/DL
ALP SERPL-CCNC: 73 U/L
ALT SERPL W/O P-5'-P-CCNC: 34 U/L
ANION GAP SERPL CALC-SCNC: 9 MMOL/L
APTT BLDCRRT: 26.3 SEC
AST SERPL-CCNC: 21 U/L
BACTERIA #/AREA URNS HPF: NORMAL /HPF
BASOPHILS # BLD AUTO: 0.02 K/UL
BASOPHILS NFR BLD: 0.6 %
BILIRUB SERPL-MCNC: 0.4 MG/DL
BILIRUB UR QL STRIP: NEGATIVE
BNP SERPL-MCNC: 42 PG/ML
BUN SERPL-MCNC: 78 MG/DL
CALCIUM SERPL-MCNC: 8.3 MG/DL
CHLORIDE SERPL-SCNC: 111 MMOL/L
CLARITY UR: CLEAR
CO2 SERPL-SCNC: 22 MMOL/L
COLOR UR: YELLOW
CREAT SERPL-MCNC: 5.4 MG/DL
DIFFERENTIAL METHOD: ABNORMAL
EOSINOPHIL # BLD AUTO: 0.1 K/UL
EOSINOPHIL NFR BLD: 1.4 %
ERYTHROCYTE [DISTWIDTH] IN BLOOD BY AUTOMATED COUNT: 14.7 %
EST. GFR  (AFRICAN AMERICAN): 12 ML/MIN/1.73 M^2
EST. GFR  (NON AFRICAN AMERICAN): 10 ML/MIN/1.73 M^2
ESTIMATED AVG GLUCOSE: 82 MG/DL
FOLATE SERPL-MCNC: 7.1 NG/ML
GLUCOSE SERPL-MCNC: 119 MG/DL
GLUCOSE UR QL STRIP: ABNORMAL
HBA1C MFR BLD HPLC: 4.5 %
HCT VFR BLD AUTO: 23.9 %
HGB BLD-MCNC: 8.3 G/DL
HGB UR QL STRIP: ABNORMAL
HYALINE CASTS #/AREA URNS LPF: 0 /LPF
INR PPP: 0.9
KETONES UR QL STRIP: NEGATIVE
LEUKOCYTE ESTERASE UR QL STRIP: NEGATIVE
LYMPHOCYTES # BLD AUTO: 0.8 K/UL
LYMPHOCYTES NFR BLD: 20.9 %
MAGNESIUM SERPL-MCNC: 2.1 MG/DL
MCH RBC QN AUTO: 29.9 PG
MCHC RBC AUTO-ENTMCNC: 34.7 G/DL
MCV RBC AUTO: 86 FL
MICROSCOPIC COMMENT: NORMAL
MONOCYTES # BLD AUTO: 0.3 K/UL
MONOCYTES NFR BLD: 9.2 %
NEUTROPHILS # BLD AUTO: 2.4 K/UL
NEUTROPHILS NFR BLD: 67.6 %
NITRITE UR QL STRIP: NEGATIVE
PH UR STRIP: 6 [PH] (ref 5–8)
PHOSPHATE SERPL-MCNC: 4.9 MG/DL
PLATELET # BLD AUTO: 52 K/UL
PMV BLD AUTO: 9 FL
POTASSIUM SERPL-SCNC: 3.8 MMOL/L
PROCALCITONIN SERPL IA-MCNC: 0.14 NG/ML
PROT SERPL-MCNC: 5.2 G/DL
PROT UR QL STRIP: ABNORMAL
PROTHROMBIN TIME: 9.9 SEC
RBC # BLD AUTO: 2.78 M/UL
RBC #/AREA URNS HPF: 1 /HPF (ref 0–4)
SODIUM SERPL-SCNC: 142 MMOL/L
SP GR UR STRIP: 1.01 (ref 1–1.03)
SQUAMOUS #/AREA URNS HPF: 1 /HPF
TROPONIN I SERPL DL<=0.01 NG/ML-MCNC: 0.03 NG/ML
TROPONIN I SERPL DL<=0.01 NG/ML-MCNC: 0.03 NG/ML
URN SPEC COLLECT METH UR: ABNORMAL
UROBILINOGEN UR STRIP-ACNC: NEGATIVE EU/DL
VIT B12 SERPL-MCNC: 688 PG/ML
WBC # BLD AUTO: 3.59 K/UL
WBC #/AREA URNS HPF: 1 /HPF (ref 0–5)

## 2018-07-14 PROCEDURE — 82746 ASSAY OF FOLIC ACID SERUM: CPT

## 2018-07-14 PROCEDURE — 11000001 HC ACUTE MED/SURG PRIVATE ROOM

## 2018-07-14 PROCEDURE — 63600175 PHARM REV CODE 636 W HCPCS: Performed by: PHYSICIAN ASSISTANT

## 2018-07-14 PROCEDURE — 94640 AIRWAY INHALATION TREATMENT: CPT

## 2018-07-14 PROCEDURE — 94761 N-INVAS EAR/PLS OXIMETRY MLT: CPT

## 2018-07-14 PROCEDURE — 25000242 PHARM REV CODE 250 ALT 637 W/ HCPCS: Performed by: STUDENT IN AN ORGANIZED HEALTH CARE EDUCATION/TRAINING PROGRAM

## 2018-07-14 PROCEDURE — 82306 VITAMIN D 25 HYDROXY: CPT

## 2018-07-14 PROCEDURE — 84484 ASSAY OF TROPONIN QUANT: CPT

## 2018-07-14 PROCEDURE — 83735 ASSAY OF MAGNESIUM: CPT

## 2018-07-14 PROCEDURE — 83880 ASSAY OF NATRIURETIC PEPTIDE: CPT

## 2018-07-14 PROCEDURE — 36415 COLL VENOUS BLD VENIPUNCTURE: CPT

## 2018-07-14 PROCEDURE — 87040 BLOOD CULTURE FOR BACTERIA: CPT | Mod: 59

## 2018-07-14 PROCEDURE — 25000242 PHARM REV CODE 250 ALT 637 W/ HCPCS: Performed by: PHYSICIAN ASSISTANT

## 2018-07-14 PROCEDURE — 96374 THER/PROPH/DIAG INJ IV PUSH: CPT

## 2018-07-14 PROCEDURE — 93005 ELECTROCARDIOGRAM TRACING: CPT

## 2018-07-14 PROCEDURE — S4991 NICOTINE PATCH NONLEGEND: HCPCS | Performed by: STUDENT IN AN ORGANIZED HEALTH CARE EDUCATION/TRAINING PROGRAM

## 2018-07-14 PROCEDURE — 82607 VITAMIN B-12: CPT

## 2018-07-14 PROCEDURE — 25000003 PHARM REV CODE 250: Performed by: STUDENT IN AN ORGANIZED HEALTH CARE EDUCATION/TRAINING PROGRAM

## 2018-07-14 PROCEDURE — 81000 URINALYSIS NONAUTO W/SCOPE: CPT

## 2018-07-14 PROCEDURE — 84484 ASSAY OF TROPONIN QUANT: CPT | Mod: 91

## 2018-07-14 PROCEDURE — 85025 COMPLETE CBC W/AUTO DIFF WBC: CPT

## 2018-07-14 PROCEDURE — 80053 COMPREHEN METABOLIC PANEL: CPT

## 2018-07-14 PROCEDURE — 85730 THROMBOPLASTIN TIME PARTIAL: CPT

## 2018-07-14 PROCEDURE — 63600175 PHARM REV CODE 636 W HCPCS: Performed by: STUDENT IN AN ORGANIZED HEALTH CARE EDUCATION/TRAINING PROGRAM

## 2018-07-14 PROCEDURE — 83036 HEMOGLOBIN GLYCOSYLATED A1C: CPT

## 2018-07-14 PROCEDURE — 25000003 PHARM REV CODE 250: Performed by: PHYSICIAN ASSISTANT

## 2018-07-14 PROCEDURE — 96361 HYDRATE IV INFUSION ADD-ON: CPT

## 2018-07-14 PROCEDURE — 84100 ASSAY OF PHOSPHORUS: CPT

## 2018-07-14 PROCEDURE — 99284 EMERGENCY DEPT VISIT MOD MDM: CPT | Mod: 25

## 2018-07-14 PROCEDURE — 84145 PROCALCITONIN (PCT): CPT

## 2018-07-14 PROCEDURE — 85610 PROTHROMBIN TIME: CPT

## 2018-07-14 RX ORDER — PANTOPRAZOLE SODIUM 40 MG/1
40 TABLET, DELAYED RELEASE ORAL DAILY
Status: DISCONTINUED | OUTPATIENT
Start: 2018-07-14 | End: 2018-07-15 | Stop reason: HOSPADM

## 2018-07-14 RX ORDER — IBUPROFEN 200 MG
1 TABLET ORAL DAILY
Status: DISCONTINUED | OUTPATIENT
Start: 2018-07-14 | End: 2018-07-15 | Stop reason: HOSPADM

## 2018-07-14 RX ORDER — SODIUM CHLORIDE 0.9 % (FLUSH) 0.9 %
3 SYRINGE (ML) INJECTION
Status: DISCONTINUED | OUTPATIENT
Start: 2018-07-14 | End: 2018-07-15 | Stop reason: HOSPADM

## 2018-07-14 RX ORDER — ISOSORBIDE MONONITRATE 30 MG/1
30 TABLET, EXTENDED RELEASE ORAL DAILY
Status: DISCONTINUED | OUTPATIENT
Start: 2018-07-14 | End: 2018-07-15 | Stop reason: HOSPADM

## 2018-07-14 RX ORDER — IPRATROPIUM BROMIDE AND ALBUTEROL SULFATE 2.5; .5 MG/3ML; MG/3ML
3 SOLUTION RESPIRATORY (INHALATION)
Status: COMPLETED | OUTPATIENT
Start: 2018-07-14 | End: 2018-07-14

## 2018-07-14 RX ORDER — GABAPENTIN 100 MG/1
100 CAPSULE ORAL DAILY
Status: DISCONTINUED | OUTPATIENT
Start: 2018-07-14 | End: 2018-07-15 | Stop reason: HOSPADM

## 2018-07-14 RX ORDER — METHYLPREDNISOLONE SOD SUCC 125 MG
125 VIAL (EA) INJECTION
Status: COMPLETED | OUTPATIENT
Start: 2018-07-14 | End: 2018-07-14

## 2018-07-14 RX ORDER — FERROUS SULFATE 325(65) MG
325 TABLET, DELAYED RELEASE (ENTERIC COATED) ORAL DAILY
Status: DISCONTINUED | OUTPATIENT
Start: 2018-07-14 | End: 2018-07-15 | Stop reason: HOSPADM

## 2018-07-14 RX ORDER — AMLODIPINE BESYLATE 5 MG/1
10 TABLET ORAL DAILY
Status: DISCONTINUED | OUTPATIENT
Start: 2018-07-14 | End: 2018-07-15 | Stop reason: HOSPADM

## 2018-07-14 RX ORDER — BUMETANIDE 0.5 MG/1
1 TABLET ORAL DAILY
Status: DISCONTINUED | OUTPATIENT
Start: 2018-07-14 | End: 2018-07-15 | Stop reason: HOSPADM

## 2018-07-14 RX ORDER — NAPROXEN SODIUM 220 MG/1
325 TABLET, FILM COATED ORAL DAILY
Status: DISCONTINUED | OUTPATIENT
Start: 2018-07-14 | End: 2018-07-15 | Stop reason: HOSPADM

## 2018-07-14 RX ORDER — ERGOCALCIFEROL 1.25 MG/1
50000 CAPSULE ORAL
Status: DISCONTINUED | OUTPATIENT
Start: 2018-07-14 | End: 2018-07-15 | Stop reason: HOSPADM

## 2018-07-14 RX ORDER — IPRATROPIUM BROMIDE AND ALBUTEROL SULFATE 2.5; .5 MG/3ML; MG/3ML
3 SOLUTION RESPIRATORY (INHALATION) EVERY 4 HOURS
Status: DISCONTINUED | OUTPATIENT
Start: 2018-07-14 | End: 2018-07-15 | Stop reason: HOSPADM

## 2018-07-14 RX ORDER — SEVELAMER CARBONATE 800 MG/1
800 TABLET, FILM COATED ORAL
Status: DISCONTINUED | OUTPATIENT
Start: 2018-07-14 | End: 2018-07-15 | Stop reason: HOSPADM

## 2018-07-14 RX ORDER — ONDANSETRON 4 MG/1
4 TABLET, ORALLY DISINTEGRATING ORAL
Status: COMPLETED | OUTPATIENT
Start: 2018-07-14 | End: 2018-07-14

## 2018-07-14 RX ORDER — HYDRALAZINE HYDROCHLORIDE 25 MG/1
50 TABLET, FILM COATED ORAL 3 TIMES DAILY
Status: DISCONTINUED | OUTPATIENT
Start: 2018-07-14 | End: 2018-07-15 | Stop reason: HOSPADM

## 2018-07-14 RX ORDER — TAMSULOSIN HYDROCHLORIDE 0.4 MG/1
0.4 CAPSULE ORAL DAILY
Status: DISCONTINUED | OUTPATIENT
Start: 2018-07-14 | End: 2018-07-15 | Stop reason: HOSPADM

## 2018-07-14 RX ORDER — CARVEDILOL 25 MG/1
25 TABLET ORAL 2 TIMES DAILY
Status: DISCONTINUED | OUTPATIENT
Start: 2018-07-14 | End: 2018-07-15 | Stop reason: HOSPADM

## 2018-07-14 RX ADMIN — MOXIFLOXACIN HYDROCHLORIDE 400 MG: 400 INJECTION, SOLUTION INTRAVENOUS at 01:07

## 2018-07-14 RX ADMIN — CARVEDILOL 25 MG: 25 TABLET, FILM COATED ORAL at 09:07

## 2018-07-14 RX ADMIN — AMLODIPINE BESYLATE 10 MG: 5 TABLET ORAL at 01:07

## 2018-07-14 RX ADMIN — TAMSULOSIN HYDROCHLORIDE 0.4 MG: 0.4 CAPSULE ORAL at 01:07

## 2018-07-14 RX ADMIN — ASPIRIN 81 MG 324 MG: 81 TABLET ORAL at 01:07

## 2018-07-14 RX ADMIN — BUMETANIDE 1 MG: 1 TABLET ORAL at 01:07

## 2018-07-14 RX ADMIN — METHYLPREDNISOLONE SODIUM SUCCINATE 125 MG: 125 INJECTION, POWDER, FOR SOLUTION INTRAMUSCULAR; INTRAVENOUS at 08:07

## 2018-07-14 RX ADMIN — ERGOCALCIFEROL 50000 UNITS: 1.25 CAPSULE ORAL at 09:07

## 2018-07-14 RX ADMIN — NICOTINE 1 PATCH: 21 PATCH, EXTENDED RELEASE TRANSDERMAL at 01:07

## 2018-07-14 RX ADMIN — ONDANSETRON 4 MG: 4 TABLET, ORALLY DISINTEGRATING ORAL at 08:07

## 2018-07-14 RX ADMIN — IPRATROPIUM BROMIDE AND ALBUTEROL SULFATE 3 ML: .5; 3 SOLUTION RESPIRATORY (INHALATION) at 08:07

## 2018-07-14 RX ADMIN — GABAPENTIN 100 MG: 100 CAPSULE ORAL at 01:07

## 2018-07-14 RX ADMIN — HYDRALAZINE HYDROCHLORIDE 50 MG: 25 TABLET, FILM COATED ORAL at 09:07

## 2018-07-14 RX ADMIN — SEVELAMER CARBONATE 800 MG: 800 TABLET, FILM COATED ORAL at 05:07

## 2018-07-14 RX ADMIN — FERROUS SULFATE TAB EC 325 MG (65 MG FE EQUIVALENT) 325 MG: 325 (65 FE) TABLET DELAYED RESPONSE at 01:07

## 2018-07-14 RX ADMIN — SODIUM CHLORIDE 500 ML: 0.9 INJECTION, SOLUTION INTRAVENOUS at 08:07

## 2018-07-14 RX ADMIN — ISOSORBIDE MONONITRATE 30 MG: 30 TABLET, EXTENDED RELEASE ORAL at 01:07

## 2018-07-14 RX ADMIN — HYDRALAZINE HYDROCHLORIDE 50 MG: 25 TABLET, FILM COATED ORAL at 01:07

## 2018-07-14 RX ADMIN — PANTOPRAZOLE SODIUM 40 MG: 40 TABLET, DELAYED RELEASE ORAL at 01:07

## 2018-07-14 RX ADMIN — IPRATROPIUM BROMIDE AND ALBUTEROL SULFATE 3 ML: .5; 3 SOLUTION RESPIRATORY (INHALATION) at 03:07

## 2018-07-14 RX ADMIN — IPRATROPIUM BROMIDE AND ALBUTEROL SULFATE 3 ML: .5; 3 SOLUTION RESPIRATORY (INHALATION) at 07:07

## 2018-07-14 NOTE — PROVIDER PROGRESS NOTES - EMERGENCY DEPT.
Encounter Date: 7/14/2018    ED Physician Progress Notes             EKG interpretation:  Normal sinus rhythm, rate 89, T-wave inversion lateral leads (I, aVL, V6), no reciprocal changes normal intervals.  Compared with prior EKG 05/2018, T-wave inversions are stable, no significant change.

## 2018-07-14 NOTE — PLAN OF CARE
Problem: Patient Care Overview  Goal: Plan of Care Review  Plan of care reviewed with the patient. Verbalized clear understanding. Bed alarm set. Bed in lowest position. Urinal at the bedside. Pt remain afebrile and free of fall. Call light within reach. Instructed pt to call when getting out of bed. Denies any pain or discomfort. Sinus rhythm to tachy HR 's on telemetry monitor. SCD's on. Fall contract signed. No report of SOB or lightheadedness. Renal diet. Will continue to monitor.

## 2018-07-14 NOTE — H&P
[facility] History and Physical - Resident Note    Admitting Team: Medicine Team A  Attending Physician: Stephanie  Resident: Marleny  Interns: Shahbaz    Date of Admit: 7/14/2018    Chief Complaint     Fatigue, abd pain, and shortness of breath x 2 days    Subjective:      History of Present Illness:  Pt is a 62 yo male, PMH of HTN, CAD, hep C tx and cirrhosis, CKD 5, illicit drug abuse, schizophrenia, COPD on home O2, AOCD, BPH, GERD presenting with Fatigue, abd pain, and shortness of breath x 2 days. Pt poor historian. Pt reports he has progressivly become more shortn of breath, no relation to position. Also endorses diffuse abd pain and wheezing, also says he had b/l chest pain. Pt reports non complaince with his medications. He cannot tell me if his chest pain radiated but says that his CP and abd pain are constant for two days. A/w dizziness, nausea, diaphoresis and cough with white sputum, increased from baseline. Pt recently on hospice however appears reports he is only on it because of his family. No diarrhea, constipation, palpitations.    Past Medical History:  Past Medical History:   Diagnosis Date    RAPHAEL (acute kidney injury)     Allergy     poison ivy    Anemia     Anxiety     Arthritis     Asthma     Bipolar affective     BPH (benign prostatic hyperplasia)     Cancer     blood and bone    CHF (congestive heart failure)     Chronic hepatitis C with cirrhosis     CKD (chronic kidney disease) stage 5, GFR less than 15 ml/min     COPD (chronic obstructive pulmonary disease)     Coronary artery disease     Depression     Diabetes mellitus type II     Disorder of kidney and ureter     Edema     Elevated PSA     has had prostate problems but unaware of his psa level    Encounter for blood transfusion     Hematuria     History of back injury     Chronic back pain    Hyperlipidemia     Hyperphosphatemia     Hypertension     Internal hemorrhoid, bleeding 9/21/2015     Pancytopenia     Proteinuria     Renal cyst, right     Renal disorder     Schizo affective schizophrenia     Seizures     Stroke     Thyroid disease     Urinary tract infection        Past Surgical History:  Past Surgical History:   Procedure Laterality Date    ELBOW BURSA SURGERY      JOINT REPLACEMENT      ORIF FEMUR FRACTURE Right 8/2015       Allergies:  Review of patient's allergies indicates:   Allergen Reactions    Codeine      Other reaction(s): Nausea    Depakote [divalproex] Other (See Comments)     Thrombocytopenia    Haldol [haloperidol lactate] Other (See Comments)     Seizures    Methadone     Morphine     Naloxone     Opium     Propoxyphene     Stelazine [trifluoperazine]     Amoxicillin Rash       Home Medications:  Prior to Admission medications    Medication Sig Start Date End Date Taking? Authorizing Provider   albuterol 90 mcg/actuation inhaler Inhale 1 puff into the lungs every 4 (four) hours as needed for Wheezing. 5/23/18 5/23/19  Wale Mcguire MD   amLODIPine (NORVASC) 10 MG tablet Take 1 tablet (10 mg total) by mouth once daily. 12/14/17 12/14/18  Wale Mcguire MD   blood sugar diagnostic Strp 1 strip by Misc.(Non-Drug; Combo Route) route 2 (two) times daily. 1/30/18   Wale Mcguire MD   bumetanide (BUMEX) 1 MG tablet 1 tablet once daily. 3/2/18   Historical Provider, MD   butalbital-acetaminophen-caffeine -40 mg (FIORICET, ESGIC) -40 mg per tablet TK 1 T PO Q 6 H FOR PAIN 2/15/18   Historical Provider, MD   carvedilol (COREG) 25 MG tablet TAKE 1 TABLET(25 MG) BY MOUTH TWICE DAILY 12/14/17   Wale Mcguire MD   cloNIDine (CATAPRES) 0.1 MG tablet 1 tablet 3 (three) times daily. 3/2/18   Historical Provider, MD   epoetin joseph (PROCRIT) 10,000 unit/mL injection Inject 10,000 Units into the skin every 14 (fourteen) days.    Historical Provider, MD   ferrous sulfate 325 mg (65 mg iron) Tab tablet TK 1 T PO  BID 11/2/17   Historical Provider, MD   FLUoxetine  (PROZAC) 20 MG capsule TK 1 C PO QD 12/11/17   Historical Provider, MD   furosemide (LASIX) 80 MG tablet Take 1 tablet (80 mg total) by mouth once daily. 12/14/17   Wale Mcguire MD   gabapentin (NEURONTIN) 100 MG capsule 1 capsule once daily. 3/2/18   Historical Provider, MD   hydrALAZINE (APRESOLINE) 50 MG tablet 1 tablet 2 (two) times daily. 3/2/18   Historical Provider, MD   hydrocodone-acetaminophen 5-325mg (NORCO) 5-325 mg per tablet Take 1 tablet by mouth every 8 (eight) hours as needed for Pain. 2/12/18   Wale Mcguire MD   isosorbide mononitrate (IMDUR) 30 MG 24 hr tablet 1 tablet once daily. 3/2/18   Historical Provider, MD   labetalol (NORMODYNE) 100 MG tablet 1 tablet 2 (two) times daily. 3/2/18   Historical Provider, MD   lactulose (CHRONULAC) 20 gram/30 mL Soln Take 15 mLs (10 g total) by mouth 2 (two) times daily as needed (Constipation). 5/1/18   Janak Brewer MD   LORazepam (ATIVAN) 0.5 MG tablet 1 tablet nightly. 3/2/18   Historical Provider, MD   nicotine (NICODERM CQ) 21 mg/24 hr Place 1 patch onto the skin once daily. 6/6/18   Seema Burleson NP   OLANZapine (ZYPREXA) 5 MG tablet TK 1 T PO BID 6/6/18   Wale Mcguire MD   omeprazole (PRILOSEC) 20 MG capsule Take 1 capsule (20 mg total) by mouth once daily. 8/15/17 8/15/18  Wale Mcguire MD   ondansetron (ZOFRAN) 4 MG tablet Take 1 tablet (4 mg total) by mouth every 6 (six) hours as needed. 5/30/18   Wale Mcguire MD   oxybutynin (DITROPAN) 5 MG Tab Take 1 tablet (5 mg total) by mouth every evening. One tablet before sleep 10/5/17 10/5/18  Wale Mcguire MD   PULMICORT FLEXHALER 180 mcg/actuation AePB INHALE 1 PUFF PO BID 5/23/18   Wale Mcguire MD   sevelamer carbonate (RENVELA) 800 mg Tab Take 1 tablet (800 mg total) by mouth 3 (three) times daily with meals. 7/9/18 7/9/19  Estrella Olmedo MD   tamsulosin (FLOMAX) 0.4 mg Cp24 TAKE 1 CAPSULE(0.4 MG) BY MOUTH EVERY DAY 8/28/17   Wale Mcguire MD       Family History:  Family History  "  Problem Relation Age of Onset    Hypertension Mother     Diabetes Mother     Transient ischemic attack Mother     Heart disease Mother         stent placement    Arthritis Mother     Heart disease Father     Hypertension Father     Diabetes Father     Stroke Father     Arthritis Sister     Hypertension Sister     Heart disease Maternal Grandmother     Diabetes Maternal Grandmother     Stroke Maternal Grandmother     Cancer Paternal Grandmother         breast    Heart disease Paternal Grandmother     Cancer Sister         brain    Prostate cancer Neg Hx     Kidney disease Neg Hx        Social History:  Social History   Substance Use Topics    Smoking status: Current Every Day Smoker     Packs/day: 0.50     Years: 50.00    Smokeless tobacco: Never Used    Alcohol use No       Review of Systems:  Pertinent positives and negatives are listed in HPI.  All other systems are reviewed and are negative.    Health Maintaince :   Primary Care Physician: Shayla     Objective:   Last 24 Hour Vital Signs:  BP  Min: 147/87  Max: 186/88  Temp  Av.8 °F (37.1 °C)  Min: 98.8 °F (37.1 °C)  Max: 98.8 °F (37.1 °C)  Pulse  Av  Min: 87  Max: 102  Resp  Avg: 15.5  Min: 13  Max: 20  SpO2  Av %  Min: 94 %  Max: 98 %  Height  Av' 9" (175.3 cm)  Min: 5' 8" (172.7 cm)  Max: 5' 10" (177.8 cm)  Weight  Av.5 kg (177 lb 8 oz)  Min: 79.4 kg (175 lb)  Max: 81.6 kg (180 lb)  Body mass index is 27.37 kg/m².  No intake/output data recorded.    Physical Examination:  General: Appears jaundiced, Alert and awake in NAD  Head:  Normocephalic and atraumatic  Eyes:  PERRL; EOMi with mild icteric and clear conjunctivae  Mouth:  Dark tar stains on tounge, sublingual icterus  Cardio:  Regular rate and rhythm with normal S1 and S2; no murmurs or rubs  Resp:  Dec breath sound diffusely, no wheezing  Abdom: Distended, no flank dullness, Soft, NTND with normoactive bowel sounds  Extrem: WWP with no clubbing, cyanosis or " edema  Skin:  No rashes, lesions, or color changes  Pulses: 2+ and symmetric distally  Neuro:  AAOx3; cooperative and pleasant with no focal deficits    Laboratory:  Most Recent Data:  CBC: Lab Results   Component Value Date    WBC 3.59 (L) 07/14/2018    HGB 8.3 (L) 07/14/2018    HCT 23.9 (L) 07/14/2018    PLT 52 (L) 07/14/2018    MCV 86 07/14/2018    RDW 14.7 (H) 07/14/2018     BMP: Lab Results   Component Value Date     07/14/2018    K 3.8 07/14/2018     (H) 07/14/2018    CO2 22 (L) 07/14/2018    BUN 78 (H) 07/14/2018    CREATININE 5.4 (H) 07/14/2018     (H) 07/14/2018    CALCIUM 8.3 (L) 07/14/2018    MG 2.1 11/20/2017    PHOS 5.4 (H) 06/13/2018     LFTs: Lab Results   Component Value Date    PROT 5.2 (L) 07/14/2018    ALBUMIN 3.2 (L) 07/14/2018    BILITOT 0.4 07/14/2018    AST 21 07/14/2018    ALKPHOS 73 07/14/2018    ALT 34 07/14/2018     (H) 03/11/2016     Coags:   Lab Results   Component Value Date    INR 1.0 11/17/2017     Urinalysis: Lab Results   Component Value Date    LABURIN No growth 06/02/2017    COLORU Yellow 07/14/2018    SPECGRAV 1.015 07/14/2018    NITRITE Negative 07/14/2018    PROTEINUR +++ 02/08/2018    KETONESU Negative 07/14/2018    UROBILINOGEN Negative 07/14/2018    BILIRUBINUR NEG 02/08/2018    WBCUA 1 07/14/2018       Trended Lab Data:    Recent Labs  Lab 07/14/18  0805   WBC 3.59*   HGB 8.3*   HCT 23.9*   PLT 52*   MCV 86   RDW 14.7*      K 3.8   *   CO2 22*   BUN 78*   CREATININE 5.4*   *   PROT 5.2*   ALBUMIN 3.2*   BILITOT 0.4   AST 21   ALKPHOS 73   ALT 34       Trended Cardiac Data:    Recent Labs  Lab 07/14/18  0805   TROPONINI 0.034*   BNP 42         Other Results:  EKG (my interpretation):   NSR, inflat TW inv    Radiology:  Imaging Results          X-Ray Chest AP Portable (Final result)  Result time 07/14/18 08:09:36    Final result by Brad Mendez MD (07/14/18 08:09:36)                 Impression:      No acute  abnormality.      Electronically signed by: Brad Mendez MD  Date:    07/14/2018  Time:    08:09             Narrative:    EXAMINATION:  XR CHEST AP PORTABLE    CLINICAL HISTORY:  Shortness of breath    TECHNIQUE:  Single frontal portable view of the chest was performed.    COMPARISON:  Chest radiograph 05/01/2018    FINDINGS:  Support devices: None    The lungs are clear, with normal appearance of pulmonary vasculature and no pleural effusion or pneumothorax.    The cardiac silhouette is normal in size. The hilar and mediastinal contours are unremarkable.    Bones are intact.                                   Assessment:     Leif Ramirez Jr. is a 63 y.o. male with shortness of breath, chest and abd pain x 2 days     Plan:     Shortness of breath  -Likely COPD exacerbation, 2 days chest, abd pain and SOB  -Hypertensive 179/92, otherwise stable, exam with diffuse ttp, L>R lower abd pain, no ascites on bedside US  -CXR wnl  -Will give prednisone, moxi and duonebs, ABG pending, procal pending    Abdominal pain  -Possibly 2/2 GERD, ttp diffusely L>R lower abd pain  -Get CT for diverticulitis/cholelithiasis r/o    Chest pain with intermediate trop  -Possible upper epigastric though poor historian  -Trop 0.034, EKG with baseline inflate TWI  -Will repeat, give ASA, cont PPI    Hep C w/ compensated cirrhosis  -Reports receiving harvoni  -Low concern for SBP given no ascites on bedside US  -Low Na diet, cont bumex    ESRD  -Has refused dialysis in past but now wants it, follows w/ [place]  -Will f/u on discharge    Benign Essential HTN  -Restarted home amlodipine, hydralazine, imdur, coreg    Controlled DM II  -Not on home meds, repeat A1C    Schizophrenia  -No acute issues, not on meds    AOCD   -Per Fe studies, Hbg stable  -Monitor    Thrombocytopenia  -Platelets 52, baseline ~90, likely 2/2 cirrhosis, no bleeding  -Monitor    Leukopenia  -Likely 2/2 hep  -Monitor, get B12, folate    BPH  -Cont flomax    Hx of Vit D  def  -Repeat    HM  -Needs CSC, refuses flu, needs PNA and tdap    Diet: renal  DVT: SCD given platelets     Dipso: cont nebs, steroids, abx, trend trops/ekg  Code Status:     Full    Bertin Farmer  [facility] HO[facility]III  [facility]    [facility] Medicine Hospitalist Pager numbers:   [facility] Hospitalist Medicine Team A (Stephanie/Nathen): [phone]  [facility] Hospitalist Medicine Team B (Holland/Rina):  [phone]

## 2018-07-14 NOTE — ED NOTES
"To room to assess patient , was has urinated in the bed , refused to be changed  " , I will lay here in my p..., but you  Cant do another thing to me . I have been here since 8 am "    primary nurse notified . Pt notified of admit  "

## 2018-07-14 NOTE — MEDICAL/APP STUDENT
"U Internal Medicine History and Physical - L3 Note    Admitting Team: Medicine Team A   Attending Physician: Apryl Brown  Resident: Bertin Farmer  Interns: Xuan Hartmann    Date of Admit: 7/14/2018    Chief Complaint     Fatigue (pt presents to ED today via EMS who reports pt c/o generalized fatigue x 1 day. increase in blood pressure. )   for epigastric pain and shortness of breath onset 2 days ago.     Subjective:      History of Present Illness:  Leif Ramirez Jr. is a 63 y.o. male who  has a past medical history of RAPHAEL (acute kidney injury); Allergy; Anemia; Anxiety; Arthritis; Asthma; Bipolar affective; BPH (benign prostatic hyperplasia); Cancer; CHF (congestive heart failure); Chronic hepatitis C with cirrhosis; CKD (chronic kidney disease) stage 5, GFR less than 15 ml/min; COPD (chronic obstructive pulmonary disease); Coronary artery disease; Depression; Diabetes mellitus type II; Disorder of kidney and ureter; Edema; Elevated PSA; Encounter for blood transfusion; Hematuria; History of back injury; Hyperlipidemia; Hyperphosphatemia; Hypertension; Internal hemorrhoid, bleeding (9/21/2015); Pancytopenia; Proteinuria; Renal cyst, right; Renal disorder; Schizo affective schizophrenia; Seizures; Stroke; Thyroid disease; and Urinary tract infection.. The patient presented to [facility] on 7/14/2018 with a primary complaint of Fatigue (pt presents to ED today via EMS who reports pt c/o generalized fatigue x 1 day. increase in blood pressure. )  with associated SOB and epigastric pain onset 2 days ago. Pt states that over the past few days he has been experiencing increasing SOB that worsens when he is flat and constant epigastric pain that he describes as if "someone has punched me in the stomach". He states the epigastric pain is worsened by movement and at present considers it mild. Pt also c/o associated nausea, dizziness, black stools, sweating, chills, wheezing, and cough with " productive white sputum x2 days. He states he has not been able to eat anything over the past few days and states he is very thirsty and hungry.     Past Medical History:  Past Medical History:   Diagnosis Date    RAPHAEL (acute kidney injury)     Allergy     poison ivy    Anemia     Anxiety     Arthritis     Asthma     Bipolar affective     BPH (benign prostatic hyperplasia)     Cancer     blood and bone    CHF (congestive heart failure)     Chronic hepatitis C with cirrhosis     CKD (chronic kidney disease) stage 5, GFR less than 15 ml/min     COPD (chronic obstructive pulmonary disease)     Coronary artery disease     Depression     Diabetes mellitus type II     Disorder of kidney and ureter     Edema     Elevated PSA     has had prostate problems but unaware of his psa level    Encounter for blood transfusion     Hematuria     History of back injury     Chronic back pain    Hyperlipidemia     Hyperphosphatemia     Hypertension     Internal hemorrhoid, bleeding 9/21/2015    Pancytopenia     Proteinuria     Renal cyst, right     Renal disorder     Schizo affective schizophrenia     Seizures     Stroke     Thyroid disease     Urinary tract infection        Past Surgical History:  Past Surgical History:   Procedure Laterality Date    ELBOW BURSA SURGERY      JOINT REPLACEMENT      ORIF FEMUR FRACTURE Right 8/2015       Allergies:  Review of patient's allergies indicates:   Allergen Reactions    Codeine      Other reaction(s): Nausea    Depakote [divalproex] Other (See Comments)     Thrombocytopenia    Haldol [haloperidol lactate] Other (See Comments)     Seizures    Methadone     Morphine     Naloxone     Opium     Propoxyphene     Stelazine [trifluoperazine]     Amoxicillin Rash       Home Medications:  Prior to Admission medications    Medication Sig Start Date End Date Taking? Authorizing Provider   albuterol 90 mcg/actuation inhaler Inhale 1 puff into the lungs every 4  (four) hours as needed for Wheezing. 5/23/18 5/23/19  Wale Mcguire MD   amLODIPine (NORVASC) 10 MG tablet Take 1 tablet (10 mg total) by mouth once daily. 12/14/17 12/14/18  Wale Mcguire MD   blood sugar diagnostic Strp 1 strip by Misc.(Non-Drug; Combo Route) route 2 (two) times daily. 1/30/18   Wale Mcguire MD   bumetanide (BUMEX) 1 MG tablet 1 tablet once daily. 3/2/18   Historical Provider, MD   butalbital-acetaminophen-caffeine -40 mg (FIORICET, ESGIC) -40 mg per tablet TK 1 T PO Q 6 H FOR PAIN 2/15/18   Historical Provider, MD   carvedilol (COREG) 25 MG tablet TAKE 1 TABLET(25 MG) BY MOUTH TWICE DAILY 12/14/17   Wale Mcguire MD   cloNIDine (CATAPRES) 0.1 MG tablet 1 tablet 3 (three) times daily. 3/2/18   Historical Provider, MD   epoetin joseph (PROCRIT) 10,000 unit/mL injection Inject 10,000 Units into the skin every 14 (fourteen) days.    Historical Provider, MD   ferrous sulfate 325 mg (65 mg iron) Tab tablet TK 1 T PO  BID 11/2/17   Historical Provider, MD   FLUoxetine (PROZAC) 20 MG capsule TK 1 C PO QD 12/11/17   Historical Provider, MD   furosemide (LASIX) 80 MG tablet Take 1 tablet (80 mg total) by mouth once daily. 12/14/17   Wale Mcguire MD   gabapentin (NEURONTIN) 100 MG capsule 1 capsule once daily. 3/2/18   Historical Provider, MD   hydrALAZINE (APRESOLINE) 50 MG tablet 1 tablet 2 (two) times daily. 3/2/18   Historical Provider, MD   hydrocodone-acetaminophen 5-325mg (NORCO) 5-325 mg per tablet Take 1 tablet by mouth every 8 (eight) hours as needed for Pain. 2/12/18   Wale Mcguire MD   isosorbide mononitrate (IMDUR) 30 MG 24 hr tablet 1 tablet once daily. 3/2/18   Historical Provider, MD   labetalol (NORMODYNE) 100 MG tablet 1 tablet 2 (two) times daily. 3/2/18   Historical Provider, MD   lactulose (CHRONULAC) 20 gram/30 mL Soln Take 15 mLs (10 g total) by mouth 2 (two) times daily as needed (Constipation). 5/1/18   Janak Brewer MD   LORazepam (ATIVAN) 0.5 MG tablet 1  tablet nightly. 3/2/18   Historical Provider, MD   nicotine (NICODERM CQ) 21 mg/24 hr Place 1 patch onto the skin once daily. 6/6/18   Seema Burleson NP   OLANZapine (ZYPREXA) 5 MG tablet TK 1 T PO BID 6/6/18   Wale Mcguire MD   omeprazole (PRILOSEC) 20 MG capsule Take 1 capsule (20 mg total) by mouth once daily. 8/15/17 8/15/18  Wale Mcguire MD   ondansetron (ZOFRAN) 4 MG tablet Take 1 tablet (4 mg total) by mouth every 6 (six) hours as needed. 5/30/18   Wale Mcguire MD   oxybutynin (DITROPAN) 5 MG Tab Take 1 tablet (5 mg total) by mouth every evening. One tablet before sleep 10/5/17 10/5/18  Wale Mcguire MD   PULMICORT FLEXHALER 180 mcg/actuation AePB INHALE 1 PUFF PO BID 5/23/18   Wale Mcguire MD   sevelamer carbonate (RENVELA) 800 mg Tab Take 1 tablet (800 mg total) by mouth 3 (three) times daily with meals. 7/9/18 7/9/19  Estrella Olmedo MD   tamsulosin (FLOMAX) 0.4 mg Cp24 TAKE 1 CAPSULE(0.4 MG) BY MOUTH EVERY DAY 8/28/17   Wale Mcguire MD       Family History:  Family History   Problem Relation Age of Onset    Hypertension Mother     Diabetes Mother     Transient ischemic attack Mother     Heart disease Mother         stent placement    Arthritis Mother     Heart disease Father     Hypertension Father     Diabetes Father     Stroke Father     Arthritis Sister     Hypertension Sister     Heart disease Maternal Grandmother     Diabetes Maternal Grandmother     Stroke Maternal Grandmother     Cancer Paternal Grandmother         breast    Heart disease Paternal Grandmother     Cancer Sister         brain    Prostate cancer Neg Hx     Kidney disease Neg Hx        Social History:  Social History   Substance Use Topics    Smoking status: Current Every Day Smoker     Packs/day: 0.50     Years: 50.00    Smokeless tobacco: Never Used    Alcohol use No       Review of Systems:  Pertinent positives and negatives are listed in HPI.  All other systems are reviewed and are  "negative.    Health Maintaince :   Primary Care Physician:   Immunizations:   TDap is not up to date  Influenza is not up to date    Pneumovax is not up to date  Cancer Screening:  Colonoscopy: is not up to date     Objective:   Last 24 Hour Vital Signs:  BP  Min: 147/87  Max: 186/88  Temp  Av.8 °F (37.1 °C)  Min: 98.8 °F (37.1 °C)  Max: 98.8 °F (37.1 °C)  Pulse  Av  Min: 87  Max: 96  Resp  Avg: 15.3  Min: 13  Max: 18  SpO2  Av %  Min: 94 %  Max: 98 %  Height  Av' 9" (175.3 cm)  Min: 5' 8" (172.7 cm)  Max: 5' 10" (177.8 cm)  Weight  Av.5 kg (177 lb 8 oz)  Min: 79.4 kg (175 lb)  Max: 81.6 kg (180 lb)  Body mass index is 27.37 kg/m².  No intake/output data recorded.    Physical Examination:  General: Alert and awake in NAD, mildly somnolent and slow speech  Head:  Normocephalic and atraumatic  Eyes:  PERRL; EOMi with anicteric sclera and clear conjunctivae  Mouth:  Oropharynx clear and without exudate; moist mucous membranes; black discoloration to tongue   Cardio:  Regular rate and rhythm with normal S1 and S2; no murmurs or rubs  Resp:  CTAB and unlabored; no wheezes, crackles or rhonchi  Abdom: Soft, mild pain to palpation with normoactive bowel sounds  Extrem: WWP with no clubbing, cyanosis or edema  Skin:  No rashes, lesions, or color changes  Pulses: 2+ and symmetric distally  Neuro:  AAOx3; cooperative and pleasant with no focal deficits    Laboratory:  Most Recent Data:  CBC: Lab Results   Component Value Date    WBC 3.59 (L) 2018    HGB 8.3 (L) 2018    HCT 23.9 (L) 2018    PLT 52 (L) 2018    MCV 86 2018    RDW 14.7 (H) 2018     BMP: Lab Results   Component Value Date     2018    K 3.8 2018     (H) 2018    CO2 22 (L) 2018    BUN 78 (H) 2018    CREATININE 5.4 (H) 2018     (H) 2018    CALCIUM 8.3 (L) 2018    MG 2.1 2017    PHOS 5.4 (H) 2018     LFTs: Lab Results   Component " Value Date    PROT 5.2 (L) 07/14/2018    ALBUMIN 3.2 (L) 07/14/2018    BILITOT 0.4 07/14/2018    AST 21 07/14/2018    ALKPHOS 73 07/14/2018    ALT 34 07/14/2018     (H) 03/11/2016     Coags:   Lab Results   Component Value Date    INR 1.0 11/17/2017     Urinalysis: Lab Results   Component Value Date    LABURIN No growth 06/02/2017    COLORU Yellow 07/14/2018    SPECGRAV 1.015 07/14/2018    NITRITE Negative 07/14/2018    PROTEINUR +++ 02/08/2018    KETONESU Negative 07/14/2018    UROBILINOGEN Negative 07/14/2018    BILIRUBINUR NEG 02/08/2018    WBCUA 1 07/14/2018       Trended Lab Data:    Recent Labs  Lab 07/14/18  0805   WBC 3.59*   HGB 8.3*   HCT 23.9*   PLT 52*   MCV 86   RDW 14.7*      K 3.8   *   CO2 22*   BUN 78*   CREATININE 5.4*   *   PROT 5.2*   ALBUMIN 3.2*   BILITOT 0.4   AST 21   ALKPHOS 73   ALT 34       Trended Cardiac Data:    Recent Labs  Lab 07/14/18  0805   TROPONINI 0.034*   BNP 42       Microbiology Data:  Blood Cx ordered    Other Results:  EKG (my interpretation):   Pending     Radiology:  Imaging Results          X-Ray Chest AP Portable (Final result)  Result time 07/14/18 08:09:36    Final result by Brad Mendez MD (07/14/18 08:09:36)                 Impression:      No acute abnormality.      Electronically signed by: Brad Mendez MD  Date:    07/14/2018  Time:    08:09             Narrative:    EXAMINATION:  XR CHEST AP PORTABLE    CLINICAL HISTORY:  Shortness of breath    TECHNIQUE:  Single frontal portable view of the chest was performed.    COMPARISON:  Chest radiograph 05/01/2018    FINDINGS:  Support devices: None    The lungs are clear, with normal appearance of pulmonary vasculature and no pleural effusion or pneumothorax.    The cardiac silhouette is normal in size. The hilar and mediastinal contours are unremarkable.    Bones are intact.                                   Assessment:     Leif Ramirez Jr. is a 63 y.o. male with acute fatigue, epigastric  pain, and SOB onset 2 days ago.      Plan:   Fatigue/COPD Exacerbation  - 95% on RA with no signs on physical of fluid build up; CXR negative for effusion   - Last Echo 11/2017 showed EF 60-65% with indeterminate LV diastolic fn  - pt nonadherent with COPD medications  - begin home duoneb 1.5/0.5 and PO prednisone 50  - reevaluate symptoms for improvement and monitor SpO2  - H/H 8.3/23.9, consider MARLINE as Fe has been low in previous visits    Epigastric abdominal pain   - H/H 8.3/23.9   - mild pain to palpation  - begin moxifloxacin for coverage of CAP, sepsis   - procal pending    Intermediate Troponin Elevation  - trop at admit 0.034  - continue to trend for potential cardiac etiology     CKD Stage V  - GFR 10 at admit with BUN/Cr 78/5.4; pt baseline Cr 5.7-5.8 and BUN 66-75   - 2+ protein in urine, consider nephrotic syndrome  - pt currently not on dialysis   - set up nephro f/u for dialysis     Essential HTN   - BP at admit 169/95  - Pt nonadherent with HTN medication   - begin hydralazine and isosorbide dinitrate     Pancytopenia  - WBC 3.59, RBC 2.78, platelets 52,   - Likely from Chronic Hep C Cirrhosis  - continue to monitor for improvement     Compensated cirrhosis  - AST 21, ALT 34 at admit  - bedside US showed no ascites    Diet: no restrictions  Dispo: Pending results on blood Cx, improvement of HTN, COPD exacerbation and f/u with nephro      Code Status:         Dalton Garcia  [facility] Internal Medicine L3  U Medicine Service    [facility] Medicine Hospitalist Pager numbers:   [facility] Hospitalist Medicine Team A (Stephanie/Nathen): [phone]  [facility] Hospitalist Medicine Team B (Holland/Rina):  [phone]

## 2018-07-14 NOTE — ED PROVIDER NOTES
Encounter Date: 7/14/2018       History     Chief Complaint   Patient presents with    Fatigue     pt presents to ED today via EMS who reports pt c/o generalized fatigue x 1 day. increase in blood pressure.      Leif Ramirez Jr., a 63 y.o. male with PMH significant for COPD, CAD, CKD, DM, elicit drug use that presents to the ED for evaluation of fatigue and increased SOB x 2-3 days.  Patient states he is on home oxygen but is unsure about the dosage.  He states that he has had of productive cough with subjective fever.  He states that he has chest pain but no different than his baseline.  He is hypertensive and states that he does not take antihypertensive medications.          The history is provided by the patient.     Review of patient's allergies indicates:   Allergen Reactions    Codeine      Other reaction(s): Nausea    Depakote [divalproex] Other (See Comments)     Thrombocytopenia    Haldol [haloperidol lactate] Other (See Comments)     Seizures    Methadone     Morphine     Naloxone     Opium     Propoxyphene     Stelazine [trifluoperazine]     Amoxicillin Rash     Past Medical History:   Diagnosis Date    RAPHAEL (acute kidney injury)     Allergy     poison ivy    Anemia     Anxiety     Arthritis     Asthma     Bipolar affective     BPH (benign prostatic hyperplasia)     Cancer     blood and bone    CHF (congestive heart failure)     Chronic hepatitis C with cirrhosis     CKD (chronic kidney disease) stage 5, GFR less than 15 ml/min     COPD (chronic obstructive pulmonary disease)     Coronary artery disease     Depression     Diabetes mellitus type II     Disorder of kidney and ureter     Edema     Elevated PSA     has had prostate problems but unaware of his psa level    Encounter for blood transfusion     Hematuria     History of back injury     Chronic back pain    Hyperlipidemia     Hyperphosphatemia     Hypertension     Internal hemorrhoid, bleeding 9/21/2015     Pancytopenia     Proteinuria     Renal cyst, right     Renal disorder     Schizo affective schizophrenia     Seizures     Stroke     Thyroid disease     Urinary tract infection      Past Surgical History:   Procedure Laterality Date    ELBOW BURSA SURGERY      JOINT REPLACEMENT      ORIF FEMUR FRACTURE Right 8/2015     Family History   Problem Relation Age of Onset    Hypertension Mother     Diabetes Mother     Transient ischemic attack Mother     Heart disease Mother         stent placement    Arthritis Mother     Heart disease Father     Hypertension Father     Diabetes Father     Stroke Father     Arthritis Sister     Hypertension Sister     Heart disease Maternal Grandmother     Diabetes Maternal Grandmother     Stroke Maternal Grandmother     Cancer Paternal Grandmother         breast    Heart disease Paternal Grandmother     Cancer Sister         brain    Prostate cancer Neg Hx     Kidney disease Neg Hx      Social History   Substance Use Topics    Smoking status: Current Every Day Smoker     Packs/day: 0.50     Years: 50.00    Smokeless tobacco: Never Used    Alcohol use No     Review of Systems   Constitutional: Positive for fatigue. Negative for fever.   Respiratory: Positive for cough and shortness of breath.    Cardiovascular: Positive for chest pain. Negative for palpitations and leg swelling.   Gastrointestinal: Positive for nausea. Negative for abdominal pain, diarrhea and vomiting.   Skin: Negative for color change and rash.   Allergic/Immunologic: Positive for immunocompromised state.   Neurological: Negative for dizziness.   Psychiatric/Behavioral: Negative for agitation.   All other systems reviewed and are negative.      Physical Exam     Initial Vitals   BP Pulse Resp Temp SpO2   07/14/18 0652 07/14/18 0652 07/14/18 0652 07/14/18 0652 07/14/18 0651   (!) 179/92 92 18 98.8 °F (37.1 °C) 98 %      MAP       --                Physical Exam    Nursing note and vitals  reviewed.  Constitutional: He appears well-developed and well-nourished. He appears cachectic. He appears ill (chronically ill ).   HENT:   Head: Normocephalic and atraumatic.   Right Ear: External ear normal.   Left Ear: External ear normal.   Nose: Nose normal.   Mouth/Throat: Oropharynx is clear and moist.   Eyes: EOM are normal.   Neck: Normal range of motion. Neck supple.   Cardiovascular: Normal rate and regular rhythm.   Pulmonary/Chest: No respiratory distress. He has decreased breath sounds. He has wheezes in the right upper field, the right middle field, the right lower field, the left upper field, the left middle field and the left lower field. He has no rhonchi. He has rales in the right upper field, the right middle field, the right lower field, the left upper field, the left middle field and the left lower field. He exhibits no tenderness.   Abdominal: Soft. Bowel sounds are normal. He exhibits no distension. There is no tenderness. There is no rebound and no guarding.   Musculoskeletal: Normal range of motion. He exhibits no edema or tenderness.   Neurological: He is alert and oriented to person, place, and time. No cranial nerve deficit.   Skin: Skin is warm and dry. Capillary refill takes less than 2 seconds.   Psychiatric: He has a normal mood and affect. Thought content normal.         ED Course   Procedures  Labs Reviewed   CBC W/ AUTO DIFFERENTIAL - Abnormal; Notable for the following:        Result Value    WBC 3.59 (*)     RBC 2.78 (*)     Hemoglobin 8.3 (*)     Hematocrit 23.9 (*)     RDW 14.7 (*)     Platelets 52 (*)     MPV 9.0 (*)     Lymph # 0.8 (*)     All other components within normal limits   COMPREHENSIVE METABOLIC PANEL - Abnormal; Notable for the following:     Chloride 111 (*)     CO2 22 (*)     Glucose 119 (*)     BUN, Bld 78 (*)     Creatinine 5.4 (*)     Calcium 8.3 (*)     Total Protein 5.2 (*)     Albumin 3.2 (*)     eGFR if  12 (*)     eGFR if non   American 10 (*)     All other components within normal limits   TROPONIN I - Abnormal; Notable for the following:     Troponin I 0.034 (*)     All other components within normal limits   URINALYSIS, REFLEX TO URINE CULTURE - Abnormal; Notable for the following:     Protein, UA 2+ (*)     Glucose, UA Trace (*)     Occult Blood UA Trace (*)     All other components within normal limits    Narrative:     Preferred Collection Type->Urine, Clean Catch   B-TYPE NATRIURETIC PEPTIDE   URINALYSIS MICROSCOPIC    Narrative:     Preferred Collection Type->Urine, Clean Catch   DRUG SCREEN PANEL, URINE EMERGENCY          Imaging Results          X-Ray Chest AP Portable (Final result)  Result time 07/14/18 08:09:36    Final result by Brad Mendez MD (07/14/18 08:09:36)                 Impression:      No acute abnormality.      Electronically signed by: Brad Mendez MD  Date:    07/14/2018  Time:    08:09             Narrative:    EXAMINATION:  XR CHEST AP PORTABLE    CLINICAL HISTORY:  Shortness of breath    TECHNIQUE:  Single frontal portable view of the chest was performed.    COMPARISON:  Chest radiograph 05/01/2018    FINDINGS:  Support devices: None    The lungs are clear, with normal appearance of pulmonary vasculature and no pleural effusion or pneumothorax.    The cardiac silhouette is normal in size. The hilar and mediastinal contours are unremarkable.    Bones are intact.                                 Medical Decision Making:   Initial Assessment:   SOB, CP  Differential Diagnosis:   COPD exacerbation, ACS, electrolyte abnormality, kidney dysfunction, uremia  ED Management:  Patient presents to ED with complaint of shortness of breath and anterior chest pain. Rales and rhonchi noted throughout all lung fields.  Normal O2 saturations.  Patient was given IV Solu-Medrol and breathing treatment with little improvement of his or shortness of breath. CBC, CMP, BMP, UA, show no acute abnormalities.  Troponin is elevated at  0.034.  Patient will be be admitted for further evaluation of his elevated troponin in his continued shortness of breath.                      Clinical Impression:   The primary encounter diagnosis was Chronic obstructive pulmonary disease, unspecified COPD type. Diagnoses of Shortness of breath, Chest pain, Mixed hyperlipidemia, Elevated troponin, COPD exacerbation, and Thrombocytopenia were also pertinent to this visit.                             Leny Murrell PA-C  07/14/18 1558       Leny Murrell PA-C  07/15/18 0880

## 2018-07-14 NOTE — PLAN OF CARE
Pt arrived on unit via bed. Pt is AAOx4. He is on room air. Telemetry applied on. Fall risk band applied. No lightheadedness or SOB reported. Denies chest pain. Bed in lowest position. Bed alarm set. Pt instructed to call for assistance. VS documented refer to flowsheet. Will continue to monitor.

## 2018-07-14 NOTE — ED NOTES
Assumed care of this patient at this time.     APPEARANCE: Alert, oriented and in no acute distress.  CARDIAC: Normal rate and rhythm, no murmur heard. Elevated BP  PERIPHERAL VASCULAR: peripheral pulses present. Normal cap refill. No edema. Warm to touch.    RESPIRATORY:Normal rate and effort, breath sounds diminished bilaterally throughout chest. Respirations are equal and unlabored no obvious signs of distress.  GASTRO: soft, bowel sounds normal, no tenderness, no abdominal distention.  MUSC: Full ROM. No bony tenderness or soft tissue tenderness. No obvious deformity.  SKIN: Skin is warm and dry, normal skin turgor, mucous membranes moist.  NEURO: 5/5 strength major flexors/extensors bilaterally. Sensory intact to light touch bilaterally. Bobbi coma scale: eyes open spontaneously-4, oriented & converses-5, obeys commands-6. No neurological abnormalities.   MENTAL STATUS: awake, alert and aware of environment.  EYE: PERRL, both eyes: pupils brisk and reactive to light. Normal size.  ENT: EARS: no obvious drainage. NOSE: no active bleeding.   Pt complains of shortness of breath. Placed on cardiac monitor. No distress noted.

## 2018-07-14 NOTE — ED NOTES
Pt resting on stretcher with eyes closed. SR up and CB in reach. No distress noted. No complaints of shortness of breath at this time.

## 2018-07-15 VITALS
BODY MASS INDEX: 27.28 KG/M2 | TEMPERATURE: 98 F | HEART RATE: 79 BPM | OXYGEN SATURATION: 95 % | WEIGHT: 180 LBS | DIASTOLIC BLOOD PRESSURE: 87 MMHG | HEIGHT: 68 IN | SYSTOLIC BLOOD PRESSURE: 172 MMHG | RESPIRATION RATE: 14 BRPM

## 2018-07-15 PROBLEM — E11.9 TYPE 2 DIABETES MELLITUS WITHOUT COMPLICATION, WITHOUT LONG-TERM CURRENT USE OF INSULIN: Status: ACTIVE | Noted: 2018-07-15

## 2018-07-15 PROBLEM — D63.8 ANEMIA OF CHRONIC DISEASE: Status: ACTIVE | Noted: 2018-07-15

## 2018-07-15 PROBLEM — N40.0 BPH (BENIGN PROSTATIC HYPERPLASIA): Status: ACTIVE | Noted: 2018-07-15

## 2018-07-15 PROBLEM — R10.9 ABDOMINAL PAIN: Status: ACTIVE | Noted: 2018-07-15

## 2018-07-15 LAB
ALBUMIN SERPL BCP-MCNC: 3 G/DL
ALP SERPL-CCNC: 65 U/L
ALT SERPL W/O P-5'-P-CCNC: 28 U/L
ANION GAP SERPL CALC-SCNC: 8 MMOL/L
AST SERPL-CCNC: 15 U/L
BASOPHILS # BLD AUTO: 0 K/UL
BASOPHILS NFR BLD: 0 %
BILIRUB SERPL-MCNC: 0.3 MG/DL
BUN SERPL-MCNC: 80 MG/DL
CALCIUM SERPL-MCNC: 8.3 MG/DL
CHLORIDE SERPL-SCNC: 109 MMOL/L
CO2 SERPL-SCNC: 23 MMOL/L
CREAT SERPL-MCNC: 5.7 MG/DL
DIFFERENTIAL METHOD: ABNORMAL
EOSINOPHIL # BLD AUTO: 0 K/UL
EOSINOPHIL NFR BLD: 0.8 %
ERYTHROCYTE [DISTWIDTH] IN BLOOD BY AUTOMATED COUNT: 14.7 %
EST. GFR  (AFRICAN AMERICAN): 11 ML/MIN/1.73 M^2
EST. GFR  (NON AFRICAN AMERICAN): 10 ML/MIN/1.73 M^2
GLUCOSE SERPL-MCNC: 118 MG/DL
HCT VFR BLD AUTO: 20.9 %
HGB BLD-MCNC: 7.4 G/DL
LYMPHOCYTES # BLD AUTO: 0.6 K/UL
LYMPHOCYTES NFR BLD: 15.8 %
MCH RBC QN AUTO: 30.2 PG
MCHC RBC AUTO-ENTMCNC: 35.4 G/DL
MCV RBC AUTO: 85 FL
MONOCYTES # BLD AUTO: 0.3 K/UL
MONOCYTES NFR BLD: 7.7 %
NEUTROPHILS # BLD AUTO: 3 K/UL
NEUTROPHILS NFR BLD: 75.4 %
PLATELET # BLD AUTO: 64 K/UL
PMV BLD AUTO: 9.1 FL
POTASSIUM SERPL-SCNC: 4.1 MMOL/L
PROT SERPL-MCNC: 5.2 G/DL
RBC # BLD AUTO: 2.45 M/UL
SODIUM SERPL-SCNC: 140 MMOL/L
WBC # BLD AUTO: 3.92 K/UL

## 2018-07-15 PROCEDURE — 80053 COMPREHEN METABOLIC PANEL: CPT

## 2018-07-15 PROCEDURE — 85025 COMPLETE CBC W/AUTO DIFF WBC: CPT

## 2018-07-15 PROCEDURE — 25000003 PHARM REV CODE 250: Performed by: STUDENT IN AN ORGANIZED HEALTH CARE EDUCATION/TRAINING PROGRAM

## 2018-07-15 PROCEDURE — 36415 COLL VENOUS BLD VENIPUNCTURE: CPT

## 2018-07-15 PROCEDURE — S4991 NICOTINE PATCH NONLEGEND: HCPCS | Performed by: STUDENT IN AN ORGANIZED HEALTH CARE EDUCATION/TRAINING PROGRAM

## 2018-07-15 PROCEDURE — 63600175 PHARM REV CODE 636 W HCPCS: Performed by: STUDENT IN AN ORGANIZED HEALTH CARE EDUCATION/TRAINING PROGRAM

## 2018-07-15 PROCEDURE — 99900035 HC TECH TIME PER 15 MIN (STAT)

## 2018-07-15 PROCEDURE — 94761 N-INVAS EAR/PLS OXIMETRY MLT: CPT

## 2018-07-15 PROCEDURE — 94640 AIRWAY INHALATION TREATMENT: CPT

## 2018-07-15 PROCEDURE — 25000003 PHARM REV CODE 250: Performed by: ORTHOPAEDIC SURGERY

## 2018-07-15 PROCEDURE — 25000242 PHARM REV CODE 250 ALT 637 W/ HCPCS: Performed by: STUDENT IN AN ORGANIZED HEALTH CARE EDUCATION/TRAINING PROGRAM

## 2018-07-15 RX ORDER — PREDNISONE 10 MG/1
50 TABLET ORAL DAILY
Qty: 25 TABLET | Refills: 0 | Status: SHIPPED | OUTPATIENT
Start: 2018-07-16 | End: 2018-07-21

## 2018-07-15 RX ORDER — MOXIFLOXACIN HYDROCHLORIDE 400 MG/1
400 TABLET ORAL DAILY
Qty: 5 TABLET | Refills: 0 | Status: SHIPPED | OUTPATIENT
Start: 2018-07-15 | End: 2018-07-20

## 2018-07-15 RX ORDER — ACETAMINOPHEN 500 MG
500 TABLET ORAL ONCE
Status: COMPLETED | OUTPATIENT
Start: 2018-07-15 | End: 2018-07-15

## 2018-07-15 RX ORDER — ACETAMINOPHEN 500 MG
500 TABLET ORAL ONCE
Status: DISCONTINUED | OUTPATIENT
Start: 2018-07-15 | End: 2018-07-15

## 2018-07-15 RX ADMIN — HYDRALAZINE HYDROCHLORIDE 50 MG: 25 TABLET, FILM COATED ORAL at 02:07

## 2018-07-15 RX ADMIN — PANTOPRAZOLE SODIUM 40 MG: 40 TABLET, DELAYED RELEASE ORAL at 08:07

## 2018-07-15 RX ADMIN — IPRATROPIUM BROMIDE AND ALBUTEROL SULFATE 3 ML: .5; 3 SOLUTION RESPIRATORY (INHALATION) at 12:07

## 2018-07-15 RX ADMIN — NICOTINE 1 PATCH: 21 PATCH, EXTENDED RELEASE TRANSDERMAL at 08:07

## 2018-07-15 RX ADMIN — BUMETANIDE 1 MG: 1 TABLET ORAL at 08:07

## 2018-07-15 RX ADMIN — SEVELAMER CARBONATE 800 MG: 800 TABLET, FILM COATED ORAL at 12:07

## 2018-07-15 RX ADMIN — IPRATROPIUM BROMIDE AND ALBUTEROL SULFATE 3 ML: .5; 3 SOLUTION RESPIRATORY (INHALATION) at 08:07

## 2018-07-15 RX ADMIN — CARVEDILOL 25 MG: 25 TABLET, FILM COATED ORAL at 08:07

## 2018-07-15 RX ADMIN — FERROUS SULFATE TAB EC 325 MG (65 MG FE EQUIVALENT) 325 MG: 325 (65 FE) TABLET DELAYED RESPONSE at 08:07

## 2018-07-15 RX ADMIN — TAMSULOSIN HYDROCHLORIDE 0.4 MG: 0.4 CAPSULE ORAL at 08:07

## 2018-07-15 RX ADMIN — IPRATROPIUM BROMIDE AND ALBUTEROL SULFATE 3 ML: .5; 3 SOLUTION RESPIRATORY (INHALATION) at 03:07

## 2018-07-15 RX ADMIN — HYDRALAZINE HYDROCHLORIDE 50 MG: 25 TABLET, FILM COATED ORAL at 08:07

## 2018-07-15 RX ADMIN — AMLODIPINE BESYLATE 10 MG: 5 TABLET ORAL at 08:07

## 2018-07-15 RX ADMIN — ISOSORBIDE MONONITRATE 30 MG: 30 TABLET, EXTENDED RELEASE ORAL at 08:07

## 2018-07-15 RX ADMIN — GABAPENTIN 100 MG: 100 CAPSULE ORAL at 08:07

## 2018-07-15 RX ADMIN — ACETAMINOPHEN 500 MG: 500 TABLET ORAL at 12:07

## 2018-07-15 RX ADMIN — PREDNISONE 50 MG: 10 TABLET ORAL at 08:07

## 2018-07-15 RX ADMIN — ASPIRIN 81 MG 324 MG: 81 TABLET ORAL at 08:07

## 2018-07-15 RX ADMIN — SEVELAMER CARBONATE 800 MG: 800 TABLET, FILM COATED ORAL at 08:07

## 2018-07-15 NOTE — PLAN OF CARE
Problem: Patient Care Overview  Goal: Plan of Care Review  Outcome: Ongoing (interventions implemented as appropriate)  Patient on RA, no respiratory distress noted. The proper method of use, as well as anticipated side effects, of this aerosol treatment are discussed and demonstrated to the patient.  Will continue to monitor.

## 2018-07-15 NOTE — PLAN OF CARE
Pt discharge to home. Pt is stable. Pt discharge teaching discussed with pt. Verbalized clear understanding. Written prescription sent to patient. IV removed and exhibit no signs of active bleeding. No complaints of discomfort or pain. No respiratory distress noted. Telemetry box removed. Waiting for transport to [facility].

## 2018-07-15 NOTE — PLAN OF CARE
Informed Dr. Hartmann that pt refused to take his scheduled IV amoxicillin. Will continue to monitor.

## 2018-07-15 NOTE — PLAN OF CARE
Problem: Patient Care Overview  Goal: Plan of Care Review  Plan of care reviewed with patient.  Verbalized understanding.  Continuous telemetry monitor on in NSR 90s.  Complaint of headache and anxiety, administered one time dose of tylenol.  On renal diet.  Fall precaution maintained.  Bed alarm set on, bed locked, in the lowest, and side rails up x2.  Call bell within reach, instructed to call for any assistance.  Will continue to monitor.

## 2018-07-15 NOTE — PLAN OF CARE
[facility] Internal Medicine Update    Team saw patient on rounds. Patient expressing clear wishes for placement in Nursing Home. Placed consult to case management to initiate placement process. Will continue to care for patient with COPD exacerbation regimen, monitor renal function as inpatient.     Xuan Hartmann DO HO-II  [facility] Internal Medicine Team A  [phone]

## 2018-07-15 NOTE — PROGRESS NOTES
"[facility] Internal Medicine History and Physical - Resident Note    Admitting Team: Medicine Team A  Attending Physician: Stephanie  Resident: Marleny  Interns: Shahbaz    Date of Admit: 2018      Subjective:    Pt feels improved however feels "down", no other complaints.  Objective:   Last 24 Hour Vital Signs:  BP  Min: 147/87  Max: 186/88  Temp  Av.1 °F (36.7 °C)  Min: 97.1 °F (36.2 °C)  Max: 98.9 °F (37.2 °C)  Pulse  Av.5  Min: 74  Max: 108  Resp  Av.4  Min: 12  Max: 20  SpO2  Av %  Min: 93 %  Max: 98 %  Height  Av' 8" (172.7 cm)  Min: 5' 8" (172.7 cm)  Max: 5' 8" (172.7 cm)  Body mass index is 27.37 kg/m².  I/O last 3 completed shifts:  In: 700 [P.O.:700]  Out: 930 [Urine:930]    Physical Examination:  General: Appears jaundiced, Alert and awake in NAD  Head:  Normocephalic and atraumatic  Eyes:  PERRL; EOMi with mild icteric and clear conjunctivae  Mouth:  Dark tar stains on tounge, sublingual icterus  Cardio:  Regular rate and rhythm with normal S1 and S2; no murmurs or rubs  Resp:  Dec breath sound diffusely, no wheezing  Abdom: Distended, no flank dullness, Soft, NTND with normoactive bowel sounds  Extrem: WWP with no clubbing, cyanosis or edema  Skin:  No rashes, lesions, or color changes  Pulses: 2+ and symmetric distally  Neuro:  AAOx3; cooperative and pleasant with no focal deficits    Laboratory:  Most Recent Data:  CBC:   Lab Results   Component Value Date    WBC 3.92 07/15/2018    HGB 7.4 (L) 07/15/2018    HCT 20.9 (L) 07/15/2018    PLT 64 (L) 07/15/2018    MCV 85 07/15/2018    RDW 14.7 (H) 07/15/2018     BMP:   Lab Results   Component Value Date     07/15/2018    K 4.1 07/15/2018     07/15/2018    CO2 23 07/15/2018    BUN 80 (H) 07/15/2018    CREATININE 5.7 (H) 07/15/2018     (H) 07/15/2018    CALCIUM 8.3 (L) 07/15/2018    MG 2.1 2018    PHOS 4.9 (H) 2018     LFTs:   Lab Results   Component Value Date    PROT 5.2 (L) 07/15/2018    ALBUMIN 3.0 (L) " 07/15/2018    BILITOT 0.3 07/15/2018    AST 15 07/15/2018    ALKPHOS 65 07/15/2018    ALT 28 07/15/2018     (H) 03/11/2016     Coags:   Lab Results   Component Value Date    INR 0.9 07/14/2018     Urinalysis:   Lab Results   Component Value Date    LABURIN No growth 06/02/2017    COLORU Yellow 07/14/2018    SPECGRAV 1.015 07/14/2018    NITRITE Negative 07/14/2018    PROTEINUR +++ 02/08/2018    KETONESU Negative 07/14/2018    UROBILINOGEN Negative 07/14/2018    BILIRUBINUR NEG 02/08/2018    WBCUA 1 07/14/2018       Trended Lab Data:    Recent Labs  Lab 07/14/18  0805 07/15/18  0515   WBC 3.59* 3.92   HGB 8.3* 7.4*   HCT 23.9* 20.9*   PLT 52* 64*   MCV 86 85   RDW 14.7* 14.7*    140   K 3.8 4.1   * 109   CO2 22* 23   BUN 78* 80*   CREATININE 5.4* 5.7*   * 118*   PROT 5.2* 5.2*   ALBUMIN 3.2* 3.0*   BILITOT 0.4 0.3   AST 21 15   ALKPHOS 73 65   ALT 34 28       Trended Cardiac Data:    Recent Labs  Lab 07/14/18  0805 07/14/18  1410   TROPONINI 0.034* 0.032*   BNP 42  --          Other Results:  EKG (my interpretation):   NSR, inflat TW inv    Radiology:  Imaging Results          CT Abdomen Pelvis  Without Contrast (Final result)  Result time 07/14/18 16:36:21    Final result by Berhane Gallo MD (07/14/18 16:36:21)                 Impression:      1. No findings to suggest diverticulitis as clinically questioned noting scattered colonic diverticula.  There are several regions of mild colonic wall thickening, throughout the colon, likely as sequela of nondistention, could reflect sequela of previous inflammation, no active inflammation at this time.  No obstruction.  2. Nodular patent contour, nonspecific, correlation with LFTs recommended, this could reflect cirrhosis as there is splenomegaly.  No ascites..  3. Cholelithiasis without findings to suggest cholecystitis.  4. Several additional findings above.      Electronically signed by: Berhane Gallo,  MD  Date:    07/14/2018  Time:    16:36             Narrative:    EXAMINATION:  CT ABDOMEN PELVIS WITHOUT CONTRAST    CLINICAL HISTORY:  Diverticulitis, known, follow up; Chronic obstructive pulmonary disease, unspecified    TECHNIQUE:  Low dose axial images, sagittal and coronal reformations were obtained from the lung bases to the pubic symphysis.  30 mL of oral Omnipaque 350 was administered.    COMPARISON:  02/26/2017    FINDINGS:  Images of the lower thorax are remarkable for dependent atelectasis.  There is a partially visualized pulmonary cyst within the right lower lobe.    Please note, a portion of the right hepatic dome is excluded from view.  The liver has a nodular contour, nonspecific.  The spleen is prominent.  The pancreas and adrenal glands are grossly unremarkable.  The gallbladder is mildly distended, without wall thickening.  There is cholelithiasis, near the gallbladder neck.  The common duct is not dilated.  There is residual high attenuating material within the gastric.  Scattered shotty periaortic, pericaval, alida hepatic, and gastrohepatic lymph nodes are noted.  There is atherosclerotic calcification of the aorta and its branches noting tortuosity of the splenic artery.    There is nonspecific bilateral perinephric fat stranding.  There is right nonobstructive nephrolithiasis versus renal vascular calcification.  There are subcentimeter low attenuating lesions arising from the right kidney, too small for characterization noting 1 of which measures attenuation higher than would be expected for simple cyst.  There is a possible low attenuating lesion arising from the interpolar region of the left kidney versus renal lobulation measuring 1.6 cm, attenuation of which is higher than would be expected for a simple cyst, and appears stable as compared to the previous exam.  The ureters are unremarkable without calculi seen.  The urinary bladder is unremarkable.  The prostate is not  enlarged.    The large bowel is decompressed.  There are a few regions of bowel wall thickening, however no inflammation, and this may be related to nondistention.  The terminal ileum and appendix are unremarkable.  The small bowel is grossly unremarkable.  There are scattered nonenlarged mesenteric lymph nodes.    No focal organized pelvic fluid collection.    Marked degenerative changes are noted of the hips, noting postsurgical changes of the proximal femurs.  There is heterotopic ossification about the left hip.  Degenerative changes are noted of the spine.  There is osteopenia.    No significant inguinal lymphadenopathy.                               X-Ray Chest AP Portable (Final result)  Result time 07/14/18 08:09:36    Final result by Brad Mendez MD (07/14/18 08:09:36)                 Impression:      No acute abnormality.      Electronically signed by: Brad Mendez MD  Date:    07/14/2018  Time:    08:09             Narrative:    EXAMINATION:  XR CHEST AP PORTABLE    CLINICAL HISTORY:  Shortness of breath    TECHNIQUE:  Single frontal portable view of the chest was performed.    COMPARISON:  Chest radiograph 05/01/2018    FINDINGS:  Support devices: None    The lungs are clear, with normal appearance of pulmonary vasculature and no pleural effusion or pneumothorax.    The cardiac silhouette is normal in size. The hilar and mediastinal contours are unremarkable.    Bones are intact.                                   Assessment:     Leif Ramirez Jr. is a 63 y.o. male with shortness of breath, chest and abd pain x 2 days     Plan:     Shortness of breath  -Likely COPD exacerbation, 2 days chest, abd pain and SOB  -Hypertensive 179/92, otherwise stable, exam with diffuse ttp, L>R lower abd pain, no ascites on bedside US  -CXR wnl, procal wnl  -Cont prednisone, moxi and duonebs, refused ABG    Abdominal pain  -Possibly 2/2 GERD, ttp diffusely L>R lower abd pain  -CT abd with likely cirrhosis, cholelithiasis,  old sequela of inflammation, no ascites  -Monitor for further pain, cont PPI    Chest pain with intermediate trop  -Possible upper epigastric though poor historian  -Trop 0.034-->0.032, EKG with baseline inflate TWI, stopped trending EKG/trop  -Monitor for further sx    Hep C w/ compensated cirrhosis  -Reports receiving harvoni  -Low concern for SBP given no ascites on bedside US  -Low Na diet, cont bumex    ESRD  -Has refused dialysis in past but now wants it, follows w/ [facility]  -Will f/u on discharge    Benign Essential HTN  -Restarted home amlodipine, hydralazine, imdur, coreg    Controlled DM II  -Not on home meds, repeat A1C    Schizophrenia  -No acute issues, not on meds    AOCD   -Per Fe studies, Hbg stable  -Monitor    Thrombocytopenia  -On admit, Platelets 52, baseline ~90, likely 2/2 cirrhosis, no bleeding  -Monitor, stable    Leukopenia  -Likely 2/2 hep  -Monitor, get B12, folate    BPH  -Cont flomax    Hx of Vit D def  -Repeat    HM  -Needs CSC, refuses flu, needs PNA and tdap    Diet: renal  DVT: SCD given platelets     Dipso: cont nebs, steroids, abx, likely discharge today  Code Status:     Full    Bertin Farmer  [facility] HO-III  [facility] Service    [facility] Hospitalist Pager numbers:   [facility] Hospitalist Medicine Team A (Stephanie/Nathen): [phone]  [facility] Hospitalist Medicine Team B (Holland/Rina):  [phone]

## 2018-07-15 NOTE — MEDICAL/APP STUDENT
"[facility] L3 Progress Note    Subjective:      Leif Ramirez Jr. is a 62 y/o M who admitted from the ED yesterday for fatigue with associated SOB and epigastric CP. Pt SOB has improved with no new complaints besides feeling "down".      Objective:   Last 24 Hour Vital Signs:  BP  Min: 147/87  Max: 186/88  Temp  Av.1 °F (36.7 °C)  Min: 97.1 °F (36.2 °C)  Max: 98.9 °F (37.2 °C)  Pulse  Av.9  Min: 74  Max: 108  Resp  Av.6  Min: 13  Max: 20  SpO2  Av.4 %  Min: 94 %  Max: 98 %  Height  Av' 8" (172.7 cm)  Min: 5' 8" (172.7 cm)  Max: 5' 8" (172.7 cm)  I/O last 3 completed shifts:  In: 700 [P.O.:700]  Out: 930 [Urine:930]    Physical Examination:  General: Alert, Awake, Oriented x 3, appears jaundiced   Head: normocephalic, atraumatic  Eyes: PERRL, EOMI, mild scleral icterus, no conjunctival pallor  Nose: no tenderness to palpation, no drainage or erythema appreciated  Mouth and Throat: dark, charcoal colored lesions on tongue, moist mucus membranes  Neck: no lymphadenopathy appreciated, No carotid bruits,  Respiratory: decreased breath sounds bilaterally, no accessory use of muscles   Cardiovascular: regular rate with regular rhythm, no murmurs, rubs, or S3, S4, normal apical impulse.  Gastrointestinal: mildly firm, distended,no rebound or guarding, normoactive bowel sounds.   Extremities: pulses 2+ in all extremities, normal ROM   Neuro:  full strength even in all extremities, no sensory deficits.   Skin: no lesions, rashes, or breakdown.     Laboratory:  Laboratory Data Reviewed:  Trended Lab Data:    Recent Labs  Lab 18  0805 07/15/18  0515   WBC 3.59* 3.92   HGB 8.3* 7.4*   HCT 23.9* 20.9*   PLT 52* 64*   MCV 86 85   RDW 14.7* 14.7*    140   K 3.8 4.1   * 109   CO2 22* 23   BUN 78* 80*   CREATININE 5.4* 5.7*   * 118*   PROT 5.2* 5.2*   ALBUMIN 3.2* 3.0*   BILITOT 0.4 0.3   AST 21 15   ALKPHOS 73 65   ALT 34 28       Trended Cardiac Data:    Recent " Labs  Lab 07/14/18  0805 07/14/18  1410   TROPONINI 0.034* 0.032*   BNP 42  --        Microbiology Data   Microbiology Results (last 7 days)     Procedure Component Value Units Date/Time    Blood culture (site 1) [[id number]] Collected:  07/14/18 1411    Order Status:  Completed Specimen:  Blood Updated:  07/15/18 0115     Blood Culture, Routine No Growth to date    Narrative:       Site # 1, aerobic and anaerobic    Blood culture (site 2) [[id number]] Collected:  07/14/18 1411    Order Status:  Completed Specimen:  Blood Updated:  07/15/18 0115     Blood Culture, Routine No Growth to date    Narrative:       Site # 2, aerobic only          Radiology:  Imaging Results          CT Abdomen Pelvis  Without Contrast (Final result)  Result time 07/14/18 16:36:21    Final result by Berhane Gallo MD (07/14/18 16:36:21)                 Impression:      1. No findings to suggest diverticulitis as clinically questioned noting scattered colonic diverticula.  There are several regions of mild colonic wall thickening, throughout the colon, likely as sequela of nondistention, could reflect sequela of previous inflammation, no active inflammation at this time.  No obstruction.  2. Nodular patent contour, nonspecific, correlation with LFTs recommended, this could reflect cirrhosis as there is splenomegaly.  No ascites..  3. Cholelithiasis without findings to suggest cholecystitis.  4. Several additional findings above.      Electronically signed by: Berhane Gallo MD  Date:    07/14/2018  Time:    16:36             Narrative:    EXAMINATION:  CT ABDOMEN PELVIS WITHOUT CONTRAST    CLINICAL HISTORY:  Diverticulitis, known, follow up; Chronic obstructive pulmonary disease, unspecified    TECHNIQUE:  Low dose axial images, sagittal and coronal reformations were obtained from the lung bases to the pubic symphysis.  30 mL of oral Omnipaque 350 was administered.    COMPARISON:  02/26/2017    FINDINGS:  Images of the lower thorax are  remarkable for dependent atelectasis.  There is a partially visualized pulmonary cyst within the right lower lobe.    Please note, a portion of the right hepatic dome is excluded from view.  The liver has a nodular contour, nonspecific.  The spleen is prominent.  The pancreas and adrenal glands are grossly unremarkable.  The gallbladder is mildly distended, without wall thickening.  There is cholelithiasis, near the gallbladder neck.  The common duct is not dilated.  There is residual high attenuating material within the gastric.  Scattered shotty periaortic, pericaval, alida hepatic, and gastrohepatic lymph nodes are noted.  There is atherosclerotic calcification of the aorta and its branches noting tortuosity of the splenic artery.    There is nonspecific bilateral perinephric fat stranding.  There is right nonobstructive nephrolithiasis versus renal vascular calcification.  There are subcentimeter low attenuating lesions arising from the right kidney, too small for characterization noting 1 of which measures attenuation higher than would be expected for simple cyst.  There is a possible low attenuating lesion arising from the interpolar region of the left kidney versus renal lobulation measuring 1.6 cm, attenuation of which is higher than would be expected for a simple cyst, and appears stable as compared to the previous exam.  The ureters are unremarkable without calculi seen.  The urinary bladder is unremarkable.  The prostate is not enlarged.    The large bowel is decompressed.  There are a few regions of bowel wall thickening, however no inflammation, and this may be related to nondistention.  The terminal ileum and appendix are unremarkable.  The small bowel is grossly unremarkable.  There are scattered nonenlarged mesenteric lymph nodes.    No focal organized pelvic fluid collection.    Marked degenerative changes are noted of the hips, noting postsurgical changes of the proximal femurs.  There is  heterotopic ossification about the left hip.  Degenerative changes are noted of the spine.  There is osteopenia.    No significant inguinal lymphadenopathy.                               X-Ray Chest AP Portable (Final result)  Result time 07/14/18 08:09:36    Final result by Brad Mendez MD (07/14/18 08:09:36)                 Impression:      No acute abnormality.      Electronically signed by: Brad Mendez MD  Date:    07/14/2018  Time:    08:09             Narrative:    EXAMINATION:  XR CHEST AP PORTABLE    CLINICAL HISTORY:  Shortness of breath    TECHNIQUE:  Single frontal portable view of the chest was performed.    COMPARISON:  Chest radiograph 05/01/2018    FINDINGS:  Support devices: None    The lungs are clear, with normal appearance of pulmonary vasculature and no pleural effusion or pneumothorax.    The cardiac silhouette is normal in size. The hilar and mediastinal contours are unremarkable.    Bones are intact.                                  Current Medications:     Infusions:       Scheduled:   albuterol-ipratropium  3 mL Nebulization Q4H    amLODIPine  10 mg Oral Daily    aspirin  324 mg Oral Daily    bumetanide  1 mg Oral Daily    carvedilol  25 mg Oral BID    ergocalciferol  50,000 Units Oral Q7 Days    ferrous sulfate  325 mg Oral Daily    gabapentin  100 mg Oral Daily    hydrALAZINE  50 mg Oral TID    isosorbide mononitrate  30 mg Oral Daily    moxifloxacin  400 mg Intravenous Q24H    nicotine  1 patch Transdermal Daily    pantoprazole  40 mg Oral Daily    predniSONE  50 mg Oral Daily    sevelamer carbonate  800 mg Oral TID WM    tamsulosin  0.4 mg Oral Daily        PRN:  sodium chloride 0.9%    Antibiotics and Day Number of Therapy:  IV moxifloxacin 400mg - 1 day    Lines and Day Number of Therapy:  Peripheral IV, single lumen L antecubital - 1 day    Assessment and Plan     Leif Ramirez . is a 63 y.o.male with fatigue with associated SOB and epigastric CP.    Shortness of  breath  - Likely due to long standing COPD  - hypertensive this am 162/79   - CXR negative   - currently on duoneb with prednisone and moxi Tx for possible pna  - procal 0.14  - continue duoneb, steroids, and abx as condition is improving     Abdominal pain  - diffuse pain but worse on L side  - likely due to long standing liver cirrhosis  - CT shows cirrhosis, splenomegaly, and cholelithiasis; no indications for cholecystitis   - consider GERD and longstanding cirrhosis as potential cause     Chest pain with intermediate trop  - pt describes pain as epigastric   - troponin 0.034, 0.032 trending down  - EKG negative for new findings   - consider GERD as cause of pain  - give ASA and continue PPI     Hep C w/ compensated cirrhosis  - pt states he no longer has Hep C and was taking harvoni  - hep c lab values correlate with statement  - no ascites on bedside US  - LFTs and PT-INR normal   - continue home bumex and trend LFTs for any change     ESRD  - pt at baseline; this am BUN/Cr 80/5.7 with GFR 10  - pt did not want dialysis initially but does now  - follows [facility]  - f/u with renal after discharge     Benign Essential HTN  - pt nonadherent to home med regimen  - begin home amlodipine, hydralazine, imdur, coreg     Controlled DM II  - pt not on home meds but glucose levels have been   -  pt on diet      Schizophrenia  - No acute presentations for this encounter     AOCD   - previous iron studies 1 month ago are normal  - monitor CBC for signs of worsening      Thrombocytopenia  - likely due to longstanding cirrhosis  - given platelets      Leukopenia  - likely secondary to his hepatitis   - continue to trend     BPH  - continue home flomax     Hx of Vit D def  - Vit D levels 35; normal     HM  -  pt on proper nutrition and update vaccinations  - strongly recommend f/u with renal     Diet: renal  DVT: SCD given platelets     Dipso: continue duoneb Tx, steroids; begin home HTN meds again  Code  Status:      Full        Dalton Garcia  [facility] Internal Medicine L3  [facility] Hospitalitis Service Team A    [facility] Medicine Hospitalist Pager numbers:   [facility] Hospitalist Medicine Team A (Stephanie/Nathen): [phone]  [facility] Hospitalist Medicine Team B (Holland/Rina):  [phone]

## 2018-07-15 NOTE — PLAN OF CARE
Notified Dr. Alejandre that patient's blood pressure has been high, the latest BP was 168/79.  Patient had first scheduled night dose of BP medications, asked for if MD would like to order prn BP medication for the patient.  MD stated that will inform to oncoming day shift MD.  Asked for the BP parameter to call Dr, and Dr. Alejandre stated that call when SBP > 180.

## 2018-07-15 NOTE — PLAN OF CARE
"[facility] Internal Medicine Update    Nursing paged team to inform that patient refusing antibiotics and requesting to speak with social work. Went to beside. Patient expressing that he no longer wants to go to a Nursing Home. Says "a nursing home was my sister's suggestion, she wants me to leave my house and take over my property". When asked what the patient wanted, he replied, "I want to go home, I'm ready to go home".   Discussed with patient that team could provide his information to case management, so that he could consider nursing home placement and have that set up from home if he changed his mind again in the future. Patient said he would appreciate that.    Plan to discharge home with steroids, antibiotics for COPD exacerbation. Follow up with PCP, nephrology.     DO AUDREY Bundy-II  [facility] Internal Medicine Team A  [phone]  "

## 2018-07-15 NOTE — PLAN OF CARE
Notified Dr. Alejandre that patient has headache and anxiety, patient stated that he  wants to take tylenol or xanax.  MD stated will order tylenol for the patient.

## 2018-07-16 NOTE — PLAN OF CARE
Patient became irate and came out of room screaming at staff. Code white called. Security arrived and patient went back to room continuing to scream. MD notified and came to speak with patient. [facility] [place] [facility] running late to  patient. Patient wants to leave. Patient escorted to front of hospital with security. He states he will sit on bench and wait for his ride. Patient is discharged.

## 2018-07-16 NOTE — PLAN OF CARE
Patient very agitated, states he would like to go home, declined Nursing home placement . Team  Discharged patient to home, [facility] van set up for 6:15pm .      update: 7pm- [place] placed call to MARISA CURRY enroute delayed per dispatcher. Informed  [facility] patient would be waiting in [facility]. Escorted by security to [facility] waiting area.     Future Appointments  Date Time Provider Department Center   7/20/2018 9:00 AM Gagan Hernandez MD [facility]   7/24/2018 11:00 AM Estrella Olmedo MD [facility]   7/26/2018 1:40 PM Wale Mcguire MD [facility]   9/10/2018 1:00 PM Wale Mcguire MD [facility]           07/15/18 1928   Discharge Assessment   Assessment Type Discharge Planning Assessment   Confirmed/corrected address and phone number on facesheet? Yes   Assessment information obtained from? Patient;Medical Record   Communicated expected length of stay with patient/caregiver yes   Prior to hospitilization cognitive status: Alert/Oriented   Prior to hospitalization functional status: Independent   Current cognitive status: Alert/Oriented   Current Functional Status: Independent   Lives With alone   Able to Return to Prior Arrangements yes   Is patient able to care for self after discharge? Unable to determine at this time (comments)   Who are your caregiver(s) and their phone number(s)? sister   Patient's perception of discharge disposition home or selfcare   Patient currently being followed by outpatient case management? Yes   Patient currently receives any other outside agency services? No   Do you have any problems affording any of your prescribed medications? TBD   Does the patient have transportation home? No   Discharge Plan A Home   Discharge Plan B Home   Patient/Family In Agreement With Plan yes   Readmission Questionnaire   Have you felt down, depressed, or hopeless? Unable to Assess

## 2018-07-16 NOTE — DISCHARGE SUMMARY
[facility] Discharge Summary    Primary Team: [facility] Team A  Attending Physician: Brianna att. 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)    Anxiety    Depression    Arthritis    History of back injury    Hyperlipidemia    Thrombocytopenia    COPD (chronic obstructive pulmonary disease)    Aftercare for healing traumatic closed fracture of right hip    Greater trochanteric bursitis of left hip    Schizoaffective disorder, bipolar type    Essential hypertension    Psychosis    Hallucinations    Accelerated hypertension    Pancytopenia    Leukopenia    Bilateral hip pain    Chronic low back pain    COPD exacerbation    CKD (chronic kidney disease), stage V    Hyperkalemia    Weakness    Neck pain    Bilateral low back pain with sciatica    Upper extremity weakness    Weakness of both lower extremities    Decreased functional mobility    Anemia of chronic renal failure, stage 5    Primary osteoarthritis of both knees    Chronic back pain    Chronic anemia    Thoracic spinal stenosis    Lumbar radiculopathy    Shortness of breath    Syncope    Acute respiratory failure with hypoxia    Fall    Hyperglycemia    GERD (gastroesophageal reflux disease)    Benign prostatic hyperplasia without lower urinary tract symptoms    Chest pain    Chronic hepatitis C without hepatic coma    Major depression, recurrent, chronic    Spinal stenosis of lumbar region without neurogenic claudication    Chronic pain syndrome    Primary osteoarthritis of left hip    Secondary hyperparathyroidism of renal origin    ESRD (end stage renal disease)    Anemia of chronic disease    BPH (benign prostatic hyperplasia)    Type 2 diabetes mellitus  without complication, without long-term current use of insulin    Vitamin D deficiency    Abdominal pain       Consultants and Procedures     Consultants:  None    Procedures:   CT abdomen, CXR    Brief History of Present Illness      Leif Ramirez Jr. is a 63 y.o.  male who  has a past medical history of RAPHAEL (acute kidney injury); Allergy; Anemia; Anxiety; Arthritis; Asthma; Bipolar affective; BPH (benign prostatic hyperplasia); Cancer; CHF (congestive heart failure); Chronic hepatitis C with cirrhosis; CKD (chronic kidney disease) stage 5, GFR less than 15 ml/min; COPD (chronic obstructive pulmonary disease); Coronary artery disease; Depression; Diabetes mellitus type II; Disorder of kidney and ureter; Edema; Elevated PSA; Encounter for blood transfusion; Hematuria; History of back injury; Hyperlipidemia; Hyperphosphatemia; Hypertension; Internal hemorrhoid, bleeding (9/21/2015); Pancytopenia; Proteinuria; Renal cyst, right; Renal disorder; Schizo affective schizophrenia; Seizures; Stroke; Thyroid disease; and Urinary tract infection.  The patient presented to [facility] on 7/14/2018 with a primary complaint of Fatigue (pt presents to ED today via EMS who reports pt c/o generalized fatigue x 1 day. increase in blood pressure. )    Pt is a 62 yo male, PMH of HTN, CAD, hep C tx and cirrhosis, CKD 5, illicit drug abuse, schizophrenia, COPD on home O2, AOCD, BPH, GERD presenting with Fatigue, abd pain, and shortness of breath x 2 days. Also with noncompliance with medications. CP, abdominal pain constant for 2 days.     For the full HPI please refer to the History & Physical from this admission.    Hospital Course By Problem with Pertinent Findings     COPD exacerbation  -Presented with 2 days chest, abd pain and SOB  -Hypertensive 179/92, otherwise stable, exam with diffuse ttp, L>R lower abd pain, no ascites on bedside US  -CXR wnl, procal wnl  -refused ABG  -Continued prednisone,  moxi and duonebs     Abdominal pain  -Possibly 2/2 GERD, ttp diffusely L>R lower abd pain  -CT abd with likely cirrhosis, cholelithiasis, old sequela of inflammation, no ascites  -Monitored for further pain, continued PPI  -refused overall     Chest pain with intermediate trop  -Possible upper epigastric though poor historian  -Trop 0.034-->0.032, EKG with baseline inflate TWI, stopped trending EKG/trop  -Symptoms resolved     Hep C w/ compensated cirrhosis  -Reported receiving harvoni  -Low concern for SBP given no ascites on bedside US  -Low Na diet, continued bumex     ESRD  -Had refused dialysis in past but now wants it, follows w/ [place]  -Referred to follow up on discharge     Benign Essential HTN  -Restarted home amlodipine, hydralazine, imdur, coreg     Controlled DM II  -Not on home meds  -Hgb a1c 4.5     Schizophrenia  -No acute issues, not on meds     AOCD   -Per Fe studies, Hbg stable  -Monitored     Thrombocytopenia  -On admit, Platelets 52, baseline ~90, likely 2/2 cirrhosis, no bleeding  -stable     Leukopenia  -Likely 2/2 hep  -B12, folate wnl     BPH  -Continued flomax       Discharge Medications        Medication List      START taking these medications    moxifloxacin 400 mg tablet  Commonly known as:  AVELOX  Take 1 tablet (400 mg total) by mouth once daily. for 5 days     predniSONE 10 MG tablet  Commonly known as:  DELTASONE  Take 5 tablets (50 mg total) by mouth once daily. for 5 days        CONTINUE taking these medications    albuterol 90 mcg/actuation inhaler  Inhale 1 puff into the lungs every 4 (four) hours as needed for Wheezing.     amLODIPine 10 MG tablet  Commonly known as:  NORVASC  Take 1 tablet (10 mg total) by mouth once daily.     blood sugar diagnostic Strp  1 strip by Misc.(Non-Drug; Combo Route) route 2 (two) times daily.     bumetanide 1 MG tablet  Commonly known as:  BUMEX     butalbital-acetaminophen-caffeine -40 mg -40 mg per tablet  Commonly known as:   FIORICET, ESGIC     carvedilol 25 MG tablet  Commonly known as:  COREG  TAKE 1 TABLET(25 MG) BY MOUTH TWICE DAILY     epoetin joseph 10,000 unit/mL injection  Commonly known as:  PROCRIT     ferrous sulfate 325 mg (65 mg iron) Tab tablet     FLUoxetine 20 MG capsule  Commonly known as:  PROZAC     furosemide 80 MG tablet  Commonly known as:  LASIX  Take 1 tablet (80 mg total) by mouth once daily.     gabapentin 100 MG capsule  Commonly known as:  NEURONTIN     hydrALAZINE 50 MG tablet  Commonly known as:  APRESOLINE     HYDROcodone-acetaminophen 5-325 mg per tablet  Commonly known as:  NORCO  Take 1 tablet by mouth every 8 (eight) hours as needed for Pain.     isosorbide mononitrate 30 MG 24 hr tablet  Commonly known as:  IMDUR     lactulose 20 gram/30 mL Soln  Commonly known as:  CHRONULAC  Take 15 mLs (10 g total) by mouth 2 (two) times daily as needed (Constipation).     LORazepam 0.5 MG tablet  Commonly known as:  ATIVAN     nicotine 21 mg/24 hr  Commonly known as:  NICODERM CQ  Place 1 patch onto the skin once daily.     OLANZapine 5 MG tablet  Commonly known as:  ZyPREXA  TK 1 T PO BID     omeprazole 20 MG capsule  Commonly known as:  PRILOSEC  Take 1 capsule (20 mg total) by mouth once daily.     ondansetron 4 MG tablet  Commonly known as:  ZOFRAN  Take 1 tablet (4 mg total) by mouth every 6 (six) hours as needed.     oxybutynin 5 MG Tab  Commonly known as:  DITROPAN  Take 1 tablet (5 mg total) by mouth every evening. One tablet before sleep     PULMICORT FLEXHALER 180 mcg/actuation Aepb  Generic drug:  budesonide 180mcg  INHALE 1 PUFF PO BID     sevelamer carbonate 800 mg Tab  Commonly known as:  RENVELA  Take 1 tablet (800 mg total) by mouth 3 (three) times daily with meals.     tamsulosin 0.4 mg Cap  Commonly known as:  FLOMAX  TAKE 1 CAPSULE(0.4 MG) BY MOUTH EVERY DAY        STOP taking these medications    cloNIDine 0.1 MG tablet  Commonly known as:  CATAPRES     labetalol 100 MG tablet  Commonly known as:   NORMODYNE           Where to Get Your Medications      You can get these medications from any pharmacy    Bring a paper prescription for each of these medications  · moxifloxacin 400 mg tablet  · predniSONE 10 MG tablet         Discharge Information:   Diet:  Cardiac, diabetic    Physical Activity:  As tolerated    Instructions:  1. Take all medications as prescribed  2. Keep all follow-up appointments  3. Return to the hospital or call your primary care physicians if any worsening symptoms such as temp >100.4, chest pain, shortness of breath occur.      Follow-Up Appointments:  Follow-up Information     Wale Mcguire MD.    Specialty:  Internal Medicine  Contact information:  [address]  [address]  [place] [address]  [phone]             Estrella Olmedo MD.    Specialty:  Nephrology  Contact information:  [address]  [address]  [facility]  [place] [id number] [phone]                     Xuan Hartmann  [facility] Internal Medicine, [facility] [place]

## 2018-07-19 LAB
BACTERIA BLD CULT: NORMAL
BACTERIA BLD CULT: NORMAL

## 2018-07-26 ENCOUNTER — TELEPHONE (OUTPATIENT)
Dept: SURGERY | Facility: CLINIC | Age: 63
End: 2018-07-26

## 2018-07-26 NOTE — TELEPHONE ENCOUNTER
07/26/2018         1425  Attempted to contact patient regarding rescheduling his surgery. No answer. Could not leave a message due to voicemail box being full.

## 2018-08-10 ENCOUNTER — TELEPHONE (OUTPATIENT)
Dept: FAMILY MEDICINE | Facility: CLINIC | Age: 63
End: 2018-08-10

## 2018-08-10 NOTE — TELEPHONE ENCOUNTER
----- Message from Gifty Velasquez sent at 8/10/2018 11:51 AM CDT -----  Contact: [phone]  Pt called stating his blood pressure its really high and off the roof . Pt states his medication its being denied . Please advise

## 2018-08-10 NOTE — TELEPHONE ENCOUNTER
Attempted to call patient twice.  No answer, unable to leave voicemail due to phone continuously ringing.

## 2018-08-13 ENCOUNTER — LAB VISIT (OUTPATIENT)
Dept: LAB | Facility: HOSPITAL | Age: 63
End: 2018-08-13
Attending: INTERNAL MEDICINE
Payer: OTHER MISCELLANEOUS

## 2018-08-13 DIAGNOSIS — N18.5 CKD (CHRONIC KIDNEY DISEASE), STAGE V: ICD-10-CM

## 2018-08-13 LAB
ALBUMIN SERPL BCP-MCNC: 3.8 G/DL
ANION GAP SERPL CALC-SCNC: 14 MMOL/L
ANISOCYTOSIS BLD QL SMEAR: SLIGHT
BASOPHILS # BLD AUTO: 0.01 K/UL
BASOPHILS NFR BLD: 0.2 %
BUN SERPL-MCNC: 116 MG/DL
CALCIUM SERPL-MCNC: 8.4 MG/DL
CHLORIDE SERPL-SCNC: 99 MMOL/L
CO2 SERPL-SCNC: 20 MMOL/L
CREAT SERPL-MCNC: 6.4 MG/DL
DIFFERENTIAL METHOD: ABNORMAL
EOSINOPHIL # BLD AUTO: 0 K/UL
EOSINOPHIL NFR BLD: 0.3 %
ERYTHROCYTE [DISTWIDTH] IN BLOOD BY AUTOMATED COUNT: 14.6 %
EST. GFR  (AFRICAN AMERICAN): 10 ML/MIN/1.73 M^2
EST. GFR  (NON AFRICAN AMERICAN): 8 ML/MIN/1.73 M^2
FERRITIN SERPL-MCNC: 383 NG/ML
GLUCOSE SERPL-MCNC: 129 MG/DL
HCT VFR BLD AUTO: 20.8 %
HGB BLD-MCNC: 6.9 G/DL
IRON SERPL-MCNC: 75 UG/DL
LYMPHOCYTES # BLD AUTO: 0.8 K/UL
LYMPHOCYTES NFR BLD: 12.1 %
MCH RBC QN AUTO: 30.4 PG
MCHC RBC AUTO-ENTMCNC: 33.2 G/DL
MCV RBC AUTO: 92 FL
MONOCYTES # BLD AUTO: 0.7 K/UL
MONOCYTES NFR BLD: 11.3 %
NEUTROPHILS # BLD AUTO: 4.7 K/UL
NEUTROPHILS NFR BLD: 76.1 %
PHOSPHATE SERPL-MCNC: 6 MG/DL
PLATELET # BLD AUTO: 131 K/UL
PMV BLD AUTO: 8.5 FL
POTASSIUM SERPL-SCNC: 4.1 MMOL/L
PTH-INTACT SERPL-MCNC: 294.6 PG/ML
RBC # BLD AUTO: 2.27 M/UL
SATURATED IRON: 25 %
SODIUM SERPL-SCNC: 133 MMOL/L
SPHEROCYTES BLD QL SMEAR: ABNORMAL
TOTAL IRON BINDING CAPACITY: 299 UG/DL
TRANSFERRIN SERPL-MCNC: 202 MG/DL
URATE SERPL-MCNC: 6.8 MG/DL
WBC # BLD AUTO: 6.18 K/UL

## 2018-08-13 PROCEDURE — 86706 HEP B SURFACE ANTIBODY: CPT

## 2018-08-13 PROCEDURE — 83540 ASSAY OF IRON: CPT

## 2018-08-13 PROCEDURE — 85025 COMPLETE CBC W/AUTO DIFF WBC: CPT

## 2018-08-13 PROCEDURE — 84550 ASSAY OF BLOOD/URIC ACID: CPT

## 2018-08-13 PROCEDURE — 80069 RENAL FUNCTION PANEL: CPT

## 2018-08-13 PROCEDURE — 86704 HEP B CORE ANTIBODY TOTAL: CPT

## 2018-08-13 PROCEDURE — 83970 ASSAY OF PARATHORMONE: CPT

## 2018-08-13 PROCEDURE — 82728 ASSAY OF FERRITIN: CPT

## 2018-08-13 PROCEDURE — 87340 HEPATITIS B SURFACE AG IA: CPT

## 2018-08-13 PROCEDURE — 36415 COLL VENOUS BLD VENIPUNCTURE: CPT

## 2018-08-14 LAB
HBV CORE AB SERPL QL IA: POSITIVE
HBV SURFACE AB SER-ACNC: NEGATIVE M[IU]/ML
HBV SURFACE AG SERPL QL IA: NEGATIVE

## 2018-11-08 ENCOUNTER — TELEPHONE (OUTPATIENT)
Dept: SMOKING CESSATION | Facility: CLINIC | Age: 63
End: 2018-11-08

## 2018-11-08 NOTE — TELEPHONE ENCOUNTER
Patient is . Encounter created to complete smoking cessation smart form and resolve episode for Quit attempt #2. No further assessment needed.

## 2018-11-19 ENCOUNTER — NURSE TRIAGE (OUTPATIENT)
Dept: SURGERY | Facility: CLINIC | Age: 63
End: 2018-11-19

## 2019-04-09 NOTE — Clinical Note
Patient continue to smoke between 20-30 cpd.  Patient seemed stressed and not focused on information given during group.  Patient set a goal of 20 cpd this week and we reviewed strategies to help obtain the goal.  Patient continue to use 21 mg nicotine patches that he had since last year.  Patient was advised to discontinue use and to  the patches sent to his pharmacy last week.  The patient denies any abnormal behavioral or mental changes at this time. 
Unknown if ever smoked

## 2019-07-16 ENCOUNTER — TELEPHONE (OUTPATIENT)
Dept: SMOKING CESSATION | Facility: CLINIC | Age: 64
End: 2019-07-16

## 2020-01-27 NOTE — TELEPHONE ENCOUNTER
He stated he can try the percocet as that is what they sent to him as a replacement already he hasnt tried one yet because he only takes them when absolutely needed. So he will wait and take one and let us know if he doesn't like it.   Deborah Nicholson MA 1/27/2020     Left msg for CB.

## 2022-03-23 NOTE — ED NOTES
Received report. Hospice working on nursing home placement--[facility]   Xenograft Text: The defect edges were debeveled with a #15 scalpel blade.  Given the location of the defect, shape of the defect and the proximity to free margins a xenograft was deemed most appropriate.  The graft was then trimmed to fit the size of the defect.  The graft was then placed in the primary defect and oriented appropriately.